# Patient Record
Sex: FEMALE | Race: WHITE | NOT HISPANIC OR LATINO | Employment: OTHER | URBAN - METROPOLITAN AREA
[De-identification: names, ages, dates, MRNs, and addresses within clinical notes are randomized per-mention and may not be internally consistent; named-entity substitution may affect disease eponyms.]

---

## 2017-05-19 ENCOUNTER — GENERIC CONVERSION - ENCOUNTER (OUTPATIENT)
Dept: OTHER | Facility: OTHER | Age: 82
End: 2017-05-19

## 2017-05-19 ENCOUNTER — APPOINTMENT (EMERGENCY)
Dept: RADIOLOGY | Facility: HOSPITAL | Age: 82
End: 2017-05-19
Payer: MEDICARE

## 2017-05-19 ENCOUNTER — HOSPITAL ENCOUNTER (OUTPATIENT)
Facility: HOSPITAL | Age: 82
Setting detail: OBSERVATION
Discharge: HOME | End: 2017-05-20
Attending: EMERGENCY MEDICINE | Admitting: INTERNAL MEDICINE
Payer: MEDICARE

## 2017-05-19 DIAGNOSIS — R06.02 SOB (SHORTNESS OF BREATH): ICD-10-CM

## 2017-05-19 DIAGNOSIS — I20.0 ANGINA PECTORIS, UNSTABLE (HCC): Primary | ICD-10-CM

## 2017-05-19 PROBLEM — R07.9 CHEST PAIN: Status: ACTIVE | Noted: 2017-05-19

## 2017-05-19 PROBLEM — I10 ESSENTIAL HYPERTENSION: Status: ACTIVE | Noted: 2017-05-19

## 2017-05-19 PROBLEM — K21.9 GERD (GASTROESOPHAGEAL REFLUX DISEASE): Status: ACTIVE | Noted: 2017-05-19

## 2017-05-19 PROBLEM — I25.10 CAD (CORONARY ARTERY DISEASE): Status: ACTIVE | Noted: 2017-05-19

## 2017-05-19 PROBLEM — E03.9 ACQUIRED HYPOTHYROIDISM: Status: ACTIVE | Noted: 2017-05-19

## 2017-05-19 LAB
ANION GAP SERPL CALCULATED.3IONS-SCNC: 9 MMOL/L (ref 4–13)
ATRIAL RATE: 63 BPM
BASOPHILS # BLD AUTO: 0.02 THOUSANDS/ΜL (ref 0–0.1)
BASOPHILS NFR BLD AUTO: 0 % (ref 0–1)
BUN SERPL-MCNC: 26 MG/DL (ref 5–25)
CALCIUM SERPL-MCNC: 8.9 MG/DL (ref 8.3–10.1)
CHLORIDE SERPL-SCNC: 109 MMOL/L (ref 100–108)
CO2 SERPL-SCNC: 26 MMOL/L (ref 21–32)
CREAT SERPL-MCNC: 0.92 MG/DL (ref 0.6–1.3)
EOSINOPHIL # BLD AUTO: 0.15 THOUSAND/ΜL (ref 0–0.61)
EOSINOPHIL NFR BLD AUTO: 2 % (ref 0–6)
ERYTHROCYTE [DISTWIDTH] IN BLOOD BY AUTOMATED COUNT: 12.2 % (ref 11.6–15.1)
GFR SERPL CREATININE-BSD FRML MDRD: 57.9 ML/MIN/1.73SQ M
GLUCOSE SERPL-MCNC: 95 MG/DL (ref 65–140)
HCT VFR BLD AUTO: 39.2 % (ref 34.8–46.1)
HGB BLD-MCNC: 13.4 G/DL (ref 11.5–15.4)
HOLD SPECIMEN: NORMAL
LYMPHOCYTES # BLD AUTO: 1.17 THOUSANDS/ΜL (ref 0.6–4.47)
LYMPHOCYTES NFR BLD AUTO: 15 % (ref 14–44)
MCH RBC QN AUTO: 33.4 PG (ref 26.8–34.3)
MCHC RBC AUTO-ENTMCNC: 34.2 G/DL (ref 31.4–37.4)
MCV RBC AUTO: 98 FL (ref 82–98)
MONOCYTES # BLD AUTO: 0.59 THOUSAND/ΜL (ref 0.17–1.22)
MONOCYTES NFR BLD AUTO: 8 % (ref 4–12)
NEUTROPHILS # BLD AUTO: 5.7 THOUSANDS/ΜL (ref 1.85–7.62)
NEUTS SEG NFR BLD AUTO: 75 % (ref 43–75)
NRBC BLD AUTO-RTO: 0 /100 WBCS
NT-PROBNP SERPL-MCNC: 600 PG/ML
P AXIS: 65 DEGREES
PLATELET # BLD AUTO: 150 THOUSANDS/UL (ref 149–390)
PLATELET # BLD AUTO: 170 THOUSANDS/UL (ref 149–390)
PMV BLD AUTO: 10.1 FL (ref 8.9–12.7)
PMV BLD AUTO: 10.4 FL (ref 8.9–12.7)
POTASSIUM SERPL-SCNC: 3.9 MMOL/L (ref 3.5–5.3)
PR INTERVAL: 170 MS
QRS AXIS: 68 DEGREES
QRSD INTERVAL: 74 MS
QT INTERVAL: 402 MS
QTC INTERVAL: 411 MS
RBC # BLD AUTO: 4.01 MILLION/UL (ref 3.81–5.12)
SODIUM SERPL-SCNC: 144 MMOL/L (ref 136–145)
SPECIMEN SOURCE: NORMAL
T WAVE AXIS: 55 DEGREES
TROPONIN I BLD-MCNC: 0 NG/ML (ref 0–0.08)
TROPONIN I SERPL-MCNC: <0.02 NG/ML
VENTRICULAR RATE: 63 BPM
WBC # BLD AUTO: 7.65 THOUSAND/UL (ref 4.31–10.16)

## 2017-05-19 PROCEDURE — 93005 ELECTROCARDIOGRAM TRACING: CPT | Performed by: EMERGENCY MEDICINE

## 2017-05-19 PROCEDURE — 71020 HB CHEST X-RAY 2VW FRONTAL&LATL: CPT

## 2017-05-19 PROCEDURE — 84484 ASSAY OF TROPONIN QUANT: CPT

## 2017-05-19 PROCEDURE — 36415 COLL VENOUS BLD VENIPUNCTURE: CPT | Performed by: EMERGENCY MEDICINE

## 2017-05-19 PROCEDURE — 85025 COMPLETE CBC W/AUTO DIFF WBC: CPT | Performed by: EMERGENCY MEDICINE

## 2017-05-19 PROCEDURE — 85049 AUTOMATED PLATELET COUNT: CPT | Performed by: INTERNAL MEDICINE

## 2017-05-19 PROCEDURE — 83880 ASSAY OF NATRIURETIC PEPTIDE: CPT | Performed by: EMERGENCY MEDICINE

## 2017-05-19 PROCEDURE — 99285 EMERGENCY DEPT VISIT HI MDM: CPT

## 2017-05-19 PROCEDURE — 84484 ASSAY OF TROPONIN QUANT: CPT | Performed by: INTERNAL MEDICINE

## 2017-05-19 PROCEDURE — 80048 BASIC METABOLIC PNL TOTAL CA: CPT | Performed by: EMERGENCY MEDICINE

## 2017-05-19 RX ORDER — PANTOPRAZOLE SODIUM 20 MG/1
20 TABLET, DELAYED RELEASE ORAL
Status: DISCONTINUED | OUTPATIENT
Start: 2017-05-19 | End: 2017-05-20 | Stop reason: HOSPADM

## 2017-05-19 RX ORDER — AMLODIPINE BESYLATE 5 MG/1
5 TABLET ORAL DAILY
Status: DISCONTINUED | OUTPATIENT
Start: 2017-05-19 | End: 2017-05-20 | Stop reason: HOSPADM

## 2017-05-19 RX ORDER — LEVOTHYROXINE SODIUM 0.1 MG/1
100 TABLET ORAL
Status: DISCONTINUED | OUTPATIENT
Start: 2017-05-19 | End: 2017-05-20 | Stop reason: HOSPADM

## 2017-05-19 RX ORDER — ATORVASTATIN CALCIUM 40 MG/1
40 TABLET, FILM COATED ORAL DAILY
COMMUNITY
End: 2018-06-05 | Stop reason: SDUPTHER

## 2017-05-19 RX ORDER — MELATONIN
1000 DAILY
COMMUNITY
End: 2018-06-05 | Stop reason: SDUPTHER

## 2017-05-19 RX ORDER — ASPIRIN 81 MG/1
81 TABLET, CHEWABLE ORAL DAILY
Status: DISCONTINUED | OUTPATIENT
Start: 2017-05-19 | End: 2017-05-20 | Stop reason: HOSPADM

## 2017-05-19 RX ORDER — HYDRALAZINE HYDROCHLORIDE 20 MG/ML
10 INJECTION INTRAMUSCULAR; INTRAVENOUS EVERY 6 HOURS PRN
Status: DISCONTINUED | OUTPATIENT
Start: 2017-05-19 | End: 2017-05-20 | Stop reason: HOSPADM

## 2017-05-19 RX ORDER — CARVEDILOL 12.5 MG/1
12.5 TABLET ORAL 2 TIMES DAILY WITH MEALS
Status: DISCONTINUED | OUTPATIENT
Start: 2017-05-19 | End: 2017-05-20 | Stop reason: HOSPADM

## 2017-05-19 RX ORDER — DIPHENHYDRAMINE HCL 25 MG
25 TABLET ORAL
Status: DISCONTINUED | OUTPATIENT
Start: 2017-05-19 | End: 2017-05-20 | Stop reason: HOSPADM

## 2017-05-19 RX ORDER — OMEPRAZOLE 20 MG/1
20 CAPSULE, DELAYED RELEASE ORAL DAILY
COMMUNITY
End: 2018-06-05 | Stop reason: SDUPTHER

## 2017-05-19 RX ORDER — LANOLIN ALCOHOL/MO/W.PET/CERES
1000 CREAM (GRAM) TOPICAL DAILY
COMMUNITY
End: 2019-08-16 | Stop reason: HOSPADM

## 2017-05-19 RX ORDER — CHOLECALCIFEROL (VITAMIN D3) 125 MCG
1000 CAPSULE ORAL DAILY
Status: DISCONTINUED | OUTPATIENT
Start: 2017-05-19 | End: 2017-05-20 | Stop reason: HOSPADM

## 2017-05-19 RX ORDER — LOSARTAN POTASSIUM 100 MG/1
100 TABLET ORAL DAILY
COMMUNITY
End: 2018-06-05 | Stop reason: SDUPTHER

## 2017-05-19 RX ORDER — ACETAMINOPHEN 325 MG/1
650 TABLET ORAL EVERY 4 HOURS PRN
Status: DISCONTINUED | OUTPATIENT
Start: 2017-05-19 | End: 2017-05-20 | Stop reason: HOSPADM

## 2017-05-19 RX ORDER — MELATONIN
1000 DAILY
Status: DISCONTINUED | OUTPATIENT
Start: 2017-05-19 | End: 2017-05-20 | Stop reason: HOSPADM

## 2017-05-19 RX ORDER — DIPHENHYDRAMINE HCL 25 MG
25 CAPSULE ORAL
COMMUNITY
End: 2018-06-01

## 2017-05-19 RX ORDER — ATORVASTATIN CALCIUM 40 MG/1
40 TABLET, FILM COATED ORAL DAILY
Status: DISCONTINUED | OUTPATIENT
Start: 2017-05-19 | End: 2017-05-20 | Stop reason: HOSPADM

## 2017-05-19 RX ORDER — ASPIRIN 81 MG/1
81 TABLET, CHEWABLE ORAL DAILY
COMMUNITY
End: 2018-06-05 | Stop reason: SDUPTHER

## 2017-05-19 RX ORDER — MEMANTINE HYDROCHLORIDE 10 MG/1
10 TABLET ORAL 2 TIMES DAILY
Status: DISCONTINUED | OUTPATIENT
Start: 2017-05-19 | End: 2017-05-20 | Stop reason: HOSPADM

## 2017-05-19 RX ORDER — LEVOTHYROXINE SODIUM 0.1 MG/1
100 TABLET ORAL DAILY
COMMUNITY
End: 2018-06-04 | Stop reason: SDUPTHER

## 2017-05-19 RX ORDER — MEMANTINE HYDROCHLORIDE 10 MG/1
10 TABLET ORAL 2 TIMES DAILY
COMMUNITY
End: 2018-06-05 | Stop reason: SDUPTHER

## 2017-05-19 RX ORDER — METOPROLOL SUCCINATE 50 MG/1
50 TABLET, EXTENDED RELEASE ORAL DAILY
Status: DISCONTINUED | OUTPATIENT
Start: 2017-05-19 | End: 2017-05-19

## 2017-05-19 RX ORDER — LOSARTAN POTASSIUM 50 MG/1
100 TABLET ORAL DAILY
Status: DISCONTINUED | OUTPATIENT
Start: 2017-05-19 | End: 2017-05-20 | Stop reason: HOSPADM

## 2017-05-19 RX ADMIN — LOSARTAN POTASSIUM 100 MG: 50 TABLET, FILM COATED ORAL at 13:09

## 2017-05-19 RX ADMIN — LEVOTHYROXINE SODIUM 100 MCG: 100 TABLET ORAL at 13:06

## 2017-05-19 RX ADMIN — PANTOPRAZOLE SODIUM 20 MG: 20 TABLET, DELAYED RELEASE ORAL at 12:25

## 2017-05-19 RX ADMIN — CARVEDILOL 12.5 MG: 12.5 TABLET, FILM COATED ORAL at 17:05

## 2017-05-19 RX ADMIN — ACETAMINOPHEN 650 MG: 325 TABLET, FILM COATED ORAL at 22:16

## 2017-05-19 RX ADMIN — CYANOCOBALAMIN TAB 500 MCG 1000 MCG: 500 TAB at 13:10

## 2017-05-19 RX ADMIN — MEMANTINE HYDROCHLORIDE 10 MG: 10 TABLET ORAL at 17:14

## 2017-05-19 RX ADMIN — VITAMIN D, TAB 1000IU (100/BT) 1000 UNITS: 25 TAB at 13:07

## 2017-05-19 RX ADMIN — ATORVASTATIN CALCIUM 40 MG: 40 TABLET, FILM COATED ORAL at 13:08

## 2017-05-19 RX ADMIN — AMLODIPINE BESYLATE 5 MG: 5 TABLET ORAL at 19:55

## 2017-05-19 RX ADMIN — ASPIRIN 81 MG 81 MG: 81 TABLET ORAL at 12:25

## 2017-05-19 RX ADMIN — MEMANTINE HYDROCHLORIDE 10 MG: 10 TABLET ORAL at 13:07

## 2017-05-19 RX ADMIN — ENOXAPARIN SODIUM 40 MG: 40 INJECTION SUBCUTANEOUS at 12:25

## 2017-05-19 RX ADMIN — METOPROLOL SUCCINATE 50 MG: 50 TABLET, EXTENDED RELEASE ORAL at 13:08

## 2017-05-20 ENCOUNTER — APPOINTMENT (OUTPATIENT)
Dept: NON INVASIVE DIAGNOSTICS | Facility: HOSPITAL | Age: 82
End: 2017-05-20
Payer: MEDICARE

## 2017-05-20 VITALS
HEART RATE: 56 BPM | OXYGEN SATURATION: 97 % | BODY MASS INDEX: 18.78 KG/M2 | WEIGHT: 110 LBS | SYSTOLIC BLOOD PRESSURE: 173 MMHG | RESPIRATION RATE: 18 BRPM | TEMPERATURE: 97.5 F | DIASTOLIC BLOOD PRESSURE: 72 MMHG | HEIGHT: 64 IN

## 2017-05-20 PROBLEM — R06.02 SOB (SHORTNESS OF BREATH): Status: RESOLVED | Noted: 2017-05-19 | Resolved: 2017-05-20

## 2017-05-20 LAB
CHOLEST SERPL-MCNC: 195 MG/DL (ref 50–200)
HDLC SERPL-MCNC: 87 MG/DL (ref 40–60)
LDLC SERPL CALC-MCNC: 80 MG/DL (ref 0–100)
TRIGL SERPL-MCNC: 139 MG/DL

## 2017-05-20 PROCEDURE — 80061 LIPID PANEL: CPT | Performed by: INTERNAL MEDICINE

## 2017-05-20 RX ORDER — CARVEDILOL 12.5 MG/1
12.5 TABLET ORAL 2 TIMES DAILY WITH MEALS
Qty: 60 TABLET | Refills: 0 | Status: SHIPPED | OUTPATIENT
Start: 2017-05-20 | End: 2017-10-04 | Stop reason: HOSPADM

## 2017-05-20 RX ORDER — AMLODIPINE BESYLATE 5 MG/1
5 TABLET ORAL DAILY
Qty: 30 TABLET | Refills: 0 | Status: SHIPPED | OUTPATIENT
Start: 2017-05-20 | End: 2019-08-28 | Stop reason: ALTCHOICE

## 2017-05-20 RX ADMIN — ATORVASTATIN CALCIUM 40 MG: 40 TABLET, FILM COATED ORAL at 09:21

## 2017-05-20 RX ADMIN — CARVEDILOL 12.5 MG: 12.5 TABLET, FILM COATED ORAL at 09:21

## 2017-05-20 RX ADMIN — VITAMIN D, TAB 1000IU (100/BT) 1000 UNITS: 25 TAB at 09:21

## 2017-05-20 RX ADMIN — LEVOTHYROXINE SODIUM 100 MCG: 100 TABLET ORAL at 06:08

## 2017-05-20 RX ADMIN — PANTOPRAZOLE SODIUM 20 MG: 20 TABLET, DELAYED RELEASE ORAL at 06:10

## 2017-05-20 RX ADMIN — LOSARTAN POTASSIUM 100 MG: 50 TABLET, FILM COATED ORAL at 09:21

## 2017-05-20 RX ADMIN — ASPIRIN 81 MG 81 MG: 81 TABLET ORAL at 09:21

## 2017-05-20 RX ADMIN — MEMANTINE HYDROCHLORIDE 10 MG: 10 TABLET ORAL at 09:22

## 2017-05-20 RX ADMIN — AMLODIPINE BESYLATE 5 MG: 5 TABLET ORAL at 09:21

## 2017-05-22 DIAGNOSIS — R26.9 ABNORMALITY OF GAIT AND MOBILITY: ICD-10-CM

## 2017-06-13 ENCOUNTER — GENERIC CONVERSION - ENCOUNTER (OUTPATIENT)
Dept: OTHER | Facility: OTHER | Age: 82
End: 2017-06-13

## 2017-06-13 ENCOUNTER — APPOINTMENT (OUTPATIENT)
Dept: PHYSICAL THERAPY | Facility: REHABILITATION | Age: 82
End: 2017-06-13
Payer: MEDICARE

## 2017-06-13 DIAGNOSIS — R26.9 ABNORMALITY OF GAIT AND MOBILITY: ICD-10-CM

## 2017-06-13 PROCEDURE — 97162 PT EVAL MOD COMPLEX 30 MIN: CPT

## 2017-06-13 PROCEDURE — 97112 NEUROMUSCULAR REEDUCATION: CPT

## 2017-06-13 PROCEDURE — G8978 MOBILITY CURRENT STATUS: HCPCS

## 2017-06-13 PROCEDURE — G8979 MOBILITY GOAL STATUS: HCPCS

## 2017-06-22 ENCOUNTER — APPOINTMENT (OUTPATIENT)
Dept: PHYSICAL THERAPY | Facility: REHABILITATION | Age: 82
End: 2017-06-22
Payer: MEDICARE

## 2017-06-22 PROCEDURE — 97112 NEUROMUSCULAR REEDUCATION: CPT

## 2017-06-22 PROCEDURE — 97110 THERAPEUTIC EXERCISES: CPT

## 2017-06-27 ENCOUNTER — APPOINTMENT (OUTPATIENT)
Dept: PHYSICAL THERAPY | Facility: REHABILITATION | Age: 82
End: 2017-06-27
Payer: MEDICARE

## 2017-06-27 PROCEDURE — 97140 MANUAL THERAPY 1/> REGIONS: CPT

## 2017-06-27 PROCEDURE — 97112 NEUROMUSCULAR REEDUCATION: CPT

## 2017-06-27 PROCEDURE — 97110 THERAPEUTIC EXERCISES: CPT

## 2017-06-29 ENCOUNTER — ALLSCRIPTS OFFICE VISIT (OUTPATIENT)
Dept: OTHER | Facility: OTHER | Age: 82
End: 2017-06-29

## 2017-06-29 ENCOUNTER — APPOINTMENT (OUTPATIENT)
Dept: PHYSICAL THERAPY | Facility: REHABILITATION | Age: 82
End: 2017-06-29
Payer: MEDICARE

## 2017-06-29 DIAGNOSIS — I25.10 ATHEROSCLEROTIC HEART DISEASE OF NATIVE CORONARY ARTERY WITHOUT ANGINA PECTORIS: ICD-10-CM

## 2017-06-29 PROCEDURE — 97112 NEUROMUSCULAR REEDUCATION: CPT

## 2017-06-29 PROCEDURE — 97110 THERAPEUTIC EXERCISES: CPT

## 2017-07-03 ENCOUNTER — ALLSCRIPTS OFFICE VISIT (OUTPATIENT)
Dept: OTHER | Facility: OTHER | Age: 82
End: 2017-07-03

## 2017-07-03 ENCOUNTER — APPOINTMENT (OUTPATIENT)
Dept: PHYSICAL THERAPY | Facility: REHABILITATION | Age: 82
End: 2017-07-03
Payer: MEDICARE

## 2017-07-05 ENCOUNTER — APPOINTMENT (OUTPATIENT)
Dept: PHYSICAL THERAPY | Facility: REHABILITATION | Age: 82
End: 2017-07-05
Payer: MEDICARE

## 2017-07-05 PROCEDURE — 97112 NEUROMUSCULAR REEDUCATION: CPT

## 2017-07-05 PROCEDURE — 97110 THERAPEUTIC EXERCISES: CPT

## 2017-07-06 ENCOUNTER — APPOINTMENT (OUTPATIENT)
Dept: PHYSICAL THERAPY | Facility: REHABILITATION | Age: 82
End: 2017-07-06
Payer: MEDICARE

## 2017-07-10 ENCOUNTER — APPOINTMENT (OUTPATIENT)
Dept: PHYSICAL THERAPY | Facility: REHABILITATION | Age: 82
End: 2017-07-10
Payer: MEDICARE

## 2017-07-11 ENCOUNTER — HOSPITAL ENCOUNTER (EMERGENCY)
Facility: HOSPITAL | Age: 82
Discharge: LEFT BEFORE DISCHARGE | End: 2017-07-11
Attending: EMERGENCY MEDICINE | Admitting: EMERGENCY MEDICINE
Payer: MEDICARE

## 2017-07-11 ENCOUNTER — APPOINTMENT (EMERGENCY)
Dept: RADIOLOGY | Facility: HOSPITAL | Age: 82
End: 2017-07-11
Payer: MEDICARE

## 2017-07-11 VITALS
OXYGEN SATURATION: 96 % | SYSTOLIC BLOOD PRESSURE: 159 MMHG | HEIGHT: 62 IN | HEART RATE: 60 BPM | BODY MASS INDEX: 22.08 KG/M2 | TEMPERATURE: 98.2 F | DIASTOLIC BLOOD PRESSURE: 75 MMHG | WEIGHT: 120 LBS | RESPIRATION RATE: 18 BRPM

## 2017-07-11 DIAGNOSIS — R19.7 DIARRHEA: Primary | ICD-10-CM

## 2017-07-11 LAB
ALBUMIN SERPL BCP-MCNC: 3 G/DL (ref 3.5–5)
ALP SERPL-CCNC: 87 U/L (ref 46–116)
ALT SERPL W P-5'-P-CCNC: 23 U/L (ref 12–78)
ANION GAP SERPL CALCULATED.3IONS-SCNC: 6 MMOL/L (ref 4–13)
AST SERPL W P-5'-P-CCNC: 21 U/L (ref 5–45)
ATRIAL RATE: 67 BPM
BASOPHILS # BLD AUTO: 0.01 THOUSANDS/ΜL (ref 0–0.1)
BASOPHILS NFR BLD AUTO: 0 % (ref 0–1)
BILIRUB SERPL-MCNC: 0.36 MG/DL (ref 0.2–1)
BUN SERPL-MCNC: 12 MG/DL (ref 5–25)
CALCIUM SERPL-MCNC: 8.4 MG/DL (ref 8.3–10.1)
CHLORIDE SERPL-SCNC: 109 MMOL/L (ref 100–108)
CO2 SERPL-SCNC: 26 MMOL/L (ref 21–32)
CREAT SERPL-MCNC: 0.95 MG/DL (ref 0.6–1.3)
EOSINOPHIL # BLD AUTO: 0.16 THOUSAND/ΜL (ref 0–0.61)
EOSINOPHIL NFR BLD AUTO: 3 % (ref 0–6)
ERYTHROCYTE [DISTWIDTH] IN BLOOD BY AUTOMATED COUNT: 12.3 % (ref 11.6–15.1)
GFR SERPL CREATININE-BSD FRML MDRD: 55.8 ML/MIN/1.73SQ M
GLUCOSE SERPL-MCNC: 92 MG/DL (ref 65–140)
HCT VFR BLD AUTO: 36.2 % (ref 34.8–46.1)
HGB BLD-MCNC: 12.5 G/DL (ref 11.5–15.4)
LIPASE SERPL-CCNC: 240 U/L (ref 73–393)
LYMPHOCYTES # BLD AUTO: 1.24 THOUSANDS/ΜL (ref 0.6–4.47)
LYMPHOCYTES NFR BLD AUTO: 20 % (ref 14–44)
MCH RBC QN AUTO: 33 PG (ref 26.8–34.3)
MCHC RBC AUTO-ENTMCNC: 34.5 G/DL (ref 31.4–37.4)
MCV RBC AUTO: 96 FL (ref 82–98)
MONOCYTES # BLD AUTO: 0.7 THOUSAND/ΜL (ref 0.17–1.22)
MONOCYTES NFR BLD AUTO: 11 % (ref 4–12)
NEUTROPHILS # BLD AUTO: 4.11 THOUSANDS/ΜL (ref 1.85–7.62)
NEUTS SEG NFR BLD AUTO: 66 % (ref 43–75)
NRBC BLD AUTO-RTO: 0 /100 WBCS
P AXIS: 105 DEGREES
PLATELET # BLD AUTO: 191 THOUSANDS/UL (ref 149–390)
PMV BLD AUTO: 10.2 FL (ref 8.9–12.7)
POTASSIUM SERPL-SCNC: 4.1 MMOL/L (ref 3.5–5.3)
PR INTERVAL: 178 MS
PROT SERPL-MCNC: 6.1 G/DL (ref 6.4–8.2)
QRS AXIS: 71 DEGREES
QRSD INTERVAL: 80 MS
QT INTERVAL: 390 MS
QTC INTERVAL: 412 MS
RBC # BLD AUTO: 3.79 MILLION/UL (ref 3.81–5.12)
SODIUM SERPL-SCNC: 141 MMOL/L (ref 136–145)
T WAVE AXIS: 98 DEGREES
VENTRICULAR RATE: 67 BPM
WBC # BLD AUTO: 6.23 THOUSAND/UL (ref 4.31–10.16)

## 2017-07-11 PROCEDURE — 93005 ELECTROCARDIOGRAM TRACING: CPT | Performed by: EMERGENCY MEDICINE

## 2017-07-11 PROCEDURE — 99284 EMERGENCY DEPT VISIT MOD MDM: CPT

## 2017-07-11 PROCEDURE — 36415 COLL VENOUS BLD VENIPUNCTURE: CPT

## 2017-07-11 PROCEDURE — 85025 COMPLETE CBC W/AUTO DIFF WBC: CPT | Performed by: EMERGENCY MEDICINE

## 2017-07-11 PROCEDURE — 74177 CT ABD & PELVIS W/CONTRAST: CPT

## 2017-07-11 PROCEDURE — 83690 ASSAY OF LIPASE: CPT | Performed by: EMERGENCY MEDICINE

## 2017-07-11 PROCEDURE — 80053 COMPREHEN METABOLIC PANEL: CPT | Performed by: EMERGENCY MEDICINE

## 2017-07-11 RX ADMIN — IOHEXOL 85 ML: 350 INJECTION, SOLUTION INTRAVENOUS at 15:22

## 2017-07-13 ENCOUNTER — APPOINTMENT (OUTPATIENT)
Dept: PHYSICAL THERAPY | Facility: REHABILITATION | Age: 82
End: 2017-07-13
Payer: MEDICARE

## 2017-07-13 PROCEDURE — 97112 NEUROMUSCULAR REEDUCATION: CPT

## 2017-07-13 PROCEDURE — 97110 THERAPEUTIC EXERCISES: CPT

## 2017-07-17 ENCOUNTER — APPOINTMENT (OUTPATIENT)
Dept: PHYSICAL THERAPY | Facility: REHABILITATION | Age: 82
End: 2017-07-17
Payer: MEDICARE

## 2017-07-17 PROCEDURE — 97110 THERAPEUTIC EXERCISES: CPT

## 2017-07-17 PROCEDURE — 97112 NEUROMUSCULAR REEDUCATION: CPT

## 2017-07-19 ENCOUNTER — HOSPITAL ENCOUNTER (OUTPATIENT)
Dept: NON INVASIVE DIAGNOSTICS | Facility: CLINIC | Age: 82
Discharge: HOME | End: 2017-07-19
Payer: MEDICARE

## 2017-07-19 DIAGNOSIS — I25.10 ATHEROSCLEROTIC HEART DISEASE OF NATIVE CORONARY ARTERY WITHOUT ANGINA PECTORIS: ICD-10-CM

## 2017-07-19 PROCEDURE — 93306 TTE W/DOPPLER COMPLETE: CPT

## 2017-07-20 ENCOUNTER — APPOINTMENT (OUTPATIENT)
Dept: PHYSICAL THERAPY | Facility: REHABILITATION | Age: 82
End: 2017-07-20
Payer: MEDICARE

## 2017-07-20 PROCEDURE — 97110 THERAPEUTIC EXERCISES: CPT

## 2017-07-20 PROCEDURE — 97112 NEUROMUSCULAR REEDUCATION: CPT

## 2017-07-25 ENCOUNTER — APPOINTMENT (OUTPATIENT)
Dept: PHYSICAL THERAPY | Facility: REHABILITATION | Age: 82
End: 2017-07-25
Payer: MEDICARE

## 2017-07-25 PROCEDURE — 97110 THERAPEUTIC EXERCISES: CPT

## 2017-07-27 ENCOUNTER — APPOINTMENT (OUTPATIENT)
Dept: PHYSICAL THERAPY | Facility: REHABILITATION | Age: 82
End: 2017-07-27
Payer: MEDICARE

## 2017-07-27 PROCEDURE — G8979 MOBILITY GOAL STATUS: HCPCS

## 2017-07-27 PROCEDURE — 97110 THERAPEUTIC EXERCISES: CPT

## 2017-07-27 PROCEDURE — 97112 NEUROMUSCULAR REEDUCATION: CPT

## 2017-07-27 PROCEDURE — G8978 MOBILITY CURRENT STATUS: HCPCS | Performed by: PHYSICAL THERAPIST

## 2017-08-08 ENCOUNTER — APPOINTMENT (OUTPATIENT)
Dept: PHYSICAL THERAPY | Facility: REHABILITATION | Age: 82
End: 2017-08-08
Payer: MEDICARE

## 2017-08-08 PROCEDURE — G8991 OTHER PT/OT GOAL STATUS: HCPCS

## 2017-08-08 PROCEDURE — G8990 OTHER PT/OT CURRENT STATUS: HCPCS

## 2017-08-08 PROCEDURE — 97110 THERAPEUTIC EXERCISES: CPT

## 2017-08-08 PROCEDURE — 97112 NEUROMUSCULAR REEDUCATION: CPT

## 2017-08-16 ENCOUNTER — APPOINTMENT (OUTPATIENT)
Dept: PHYSICAL THERAPY | Facility: REHABILITATION | Age: 82
End: 2017-08-16
Payer: MEDICARE

## 2017-08-16 PROCEDURE — 97112 NEUROMUSCULAR REEDUCATION: CPT

## 2017-08-16 PROCEDURE — 97110 THERAPEUTIC EXERCISES: CPT

## 2017-08-21 ENCOUNTER — APPOINTMENT (OUTPATIENT)
Dept: PHYSICAL THERAPY | Facility: REHABILITATION | Age: 82
End: 2017-08-21
Payer: MEDICARE

## 2017-08-21 PROCEDURE — 97110 THERAPEUTIC EXERCISES: CPT

## 2017-08-21 PROCEDURE — 97112 NEUROMUSCULAR REEDUCATION: CPT

## 2017-08-23 ENCOUNTER — APPOINTMENT (OUTPATIENT)
Dept: PHYSICAL THERAPY | Facility: REHABILITATION | Age: 82
End: 2017-08-23
Payer: MEDICARE

## 2017-08-23 PROCEDURE — 97112 NEUROMUSCULAR REEDUCATION: CPT

## 2017-08-23 PROCEDURE — 97110 THERAPEUTIC EXERCISES: CPT

## 2017-08-28 ENCOUNTER — APPOINTMENT (OUTPATIENT)
Dept: PHYSICAL THERAPY | Facility: REHABILITATION | Age: 82
End: 2017-08-28
Payer: MEDICARE

## 2017-08-28 PROCEDURE — 97112 NEUROMUSCULAR REEDUCATION: CPT

## 2017-08-28 PROCEDURE — 97110 THERAPEUTIC EXERCISES: CPT

## 2017-08-30 ENCOUNTER — APPOINTMENT (OUTPATIENT)
Dept: PHYSICAL THERAPY | Facility: REHABILITATION | Age: 82
End: 2017-08-30
Payer: MEDICARE

## 2017-08-30 PROCEDURE — 97110 THERAPEUTIC EXERCISES: CPT

## 2017-08-30 PROCEDURE — 97112 NEUROMUSCULAR REEDUCATION: CPT

## 2017-09-05 ENCOUNTER — APPOINTMENT (OUTPATIENT)
Dept: PHYSICAL THERAPY | Facility: REHABILITATION | Age: 82
End: 2017-09-05
Payer: MEDICARE

## 2017-09-07 ENCOUNTER — APPOINTMENT (OUTPATIENT)
Dept: PHYSICAL THERAPY | Facility: REHABILITATION | Age: 82
End: 2017-09-07
Payer: MEDICARE

## 2017-09-07 PROCEDURE — 97112 NEUROMUSCULAR REEDUCATION: CPT

## 2017-09-07 PROCEDURE — 97110 THERAPEUTIC EXERCISES: CPT

## 2017-09-12 ENCOUNTER — APPOINTMENT (OUTPATIENT)
Dept: PHYSICAL THERAPY | Facility: REHABILITATION | Age: 82
End: 2017-09-12
Payer: MEDICARE

## 2017-09-12 PROCEDURE — 97112 NEUROMUSCULAR REEDUCATION: CPT

## 2017-09-12 PROCEDURE — 97110 THERAPEUTIC EXERCISES: CPT

## 2017-09-14 ENCOUNTER — APPOINTMENT (OUTPATIENT)
Dept: PHYSICAL THERAPY | Facility: REHABILITATION | Age: 82
End: 2017-09-14
Payer: MEDICARE

## 2017-09-14 ENCOUNTER — GENERIC CONVERSION - ENCOUNTER (OUTPATIENT)
Dept: OTHER | Facility: OTHER | Age: 82
End: 2017-09-14

## 2017-09-14 PROCEDURE — G8979 MOBILITY GOAL STATUS: HCPCS

## 2017-09-14 PROCEDURE — 97110 THERAPEUTIC EXERCISES: CPT

## 2017-09-14 PROCEDURE — 97112 NEUROMUSCULAR REEDUCATION: CPT

## 2017-09-14 PROCEDURE — G8978 MOBILITY CURRENT STATUS: HCPCS

## 2017-09-21 ENCOUNTER — ALLSCRIPTS OFFICE VISIT (OUTPATIENT)
Dept: OTHER | Facility: OTHER | Age: 82
End: 2017-09-21

## 2017-10-02 ENCOUNTER — HOSPITAL ENCOUNTER (INPATIENT)
Facility: HOSPITAL | Age: 82
LOS: 1 days | Discharge: HOME | DRG: 641 | End: 2017-10-04
Attending: EMERGENCY MEDICINE | Admitting: INTERNAL MEDICINE
Payer: MEDICARE

## 2017-10-02 ENCOUNTER — APPOINTMENT (EMERGENCY)
Dept: RADIOLOGY | Facility: HOSPITAL | Age: 82
DRG: 641 | End: 2017-10-02
Payer: MEDICARE

## 2017-10-02 ENCOUNTER — GENERIC CONVERSION - ENCOUNTER (OUTPATIENT)
Dept: OTHER | Facility: OTHER | Age: 82
End: 2017-10-02

## 2017-10-02 DIAGNOSIS — R94.31 ECG ABNORMALITY: ICD-10-CM

## 2017-10-02 DIAGNOSIS — R82.998 URINE WBC INCREASED: ICD-10-CM

## 2017-10-02 DIAGNOSIS — R55 COLLAPSE: ICD-10-CM

## 2017-10-02 DIAGNOSIS — R63.0 DECREASED APPETITE: ICD-10-CM

## 2017-10-02 DIAGNOSIS — R63.4 UNINTENTIONAL WEIGHT LOSS: ICD-10-CM

## 2017-10-02 DIAGNOSIS — I63.9 ACUTE CEREBROVASCULAR ACCIDENT (CVA) (HCC): ICD-10-CM

## 2017-10-02 DIAGNOSIS — R53.1 WEAKNESS: Primary | ICD-10-CM

## 2017-10-02 PROBLEM — F03.90 DEMENTIA (HCC): Status: ACTIVE | Noted: 2017-10-02

## 2017-10-02 PROBLEM — R25.3 TWITCHING: Status: ACTIVE | Noted: 2017-10-02

## 2017-10-02 PROBLEM — Z86.73 HISTORY OF TIA (TRANSIENT ISCHEMIC ATTACK): Status: ACTIVE | Noted: 2017-10-02

## 2017-10-02 LAB
ALBUMIN SERPL BCP-MCNC: 3.7 G/DL (ref 3.5–5)
ALP SERPL-CCNC: 96 U/L (ref 46–116)
ALT SERPL W P-5'-P-CCNC: 22 U/L (ref 12–78)
ANION GAP SERPL CALCULATED.3IONS-SCNC: 8 MMOL/L (ref 4–13)
AST SERPL W P-5'-P-CCNC: 21 U/L (ref 5–45)
ATRIAL RATE: 63 BPM
BACTERIA UR QL AUTO: ABNORMAL /HPF
BASOPHILS # BLD AUTO: 0.01 THOUSANDS/ΜL (ref 0–0.1)
BASOPHILS NFR BLD AUTO: 0 % (ref 0–1)
BILIRUB SERPL-MCNC: 0.72 MG/DL (ref 0.2–1)
BILIRUB UR QL STRIP: ABNORMAL
BUN SERPL-MCNC: 15 MG/DL (ref 5–25)
CALCIUM SERPL-MCNC: 9.1 MG/DL (ref 8.3–10.1)
CHLORIDE SERPL-SCNC: 104 MMOL/L (ref 100–108)
CLARITY UR: CLEAR
CO2 SERPL-SCNC: 27 MMOL/L (ref 21–32)
COLOR UR: YELLOW
COLOR, POC: NORMAL
CREAT SERPL-MCNC: 0.92 MG/DL (ref 0.6–1.3)
EOSINOPHIL # BLD AUTO: 0.07 THOUSAND/ΜL (ref 0–0.61)
EOSINOPHIL NFR BLD AUTO: 1 % (ref 0–6)
ERYTHROCYTE [DISTWIDTH] IN BLOOD BY AUTOMATED COUNT: 12.7 % (ref 11.6–15.1)
GFR SERPL CREATININE-BSD FRML MDRD: 57 ML/MIN/1.73SQ M
GLUCOSE SERPL-MCNC: 91 MG/DL (ref 65–140)
GLUCOSE UR STRIP-MCNC: NEGATIVE MG/DL
HCT VFR BLD AUTO: 38.6 % (ref 34.8–46.1)
HGB BLD-MCNC: 12.9 G/DL (ref 11.5–15.4)
HGB UR QL STRIP.AUTO: ABNORMAL
HYALINE CASTS #/AREA URNS LPF: ABNORMAL /LPF
KETONES UR STRIP-MCNC: ABNORMAL MG/DL
LEUKOCYTE ESTERASE UR QL STRIP: ABNORMAL
LIPASE SERPL-CCNC: 180 U/L (ref 73–393)
LYMPHOCYTES # BLD AUTO: 1.37 THOUSANDS/ΜL (ref 0.6–4.47)
LYMPHOCYTES NFR BLD AUTO: 14 % (ref 14–44)
MCH RBC QN AUTO: 32.3 PG (ref 26.8–34.3)
MCHC RBC AUTO-ENTMCNC: 33.4 G/DL (ref 31.4–37.4)
MCV RBC AUTO: 97 FL (ref 82–98)
MONOCYTES # BLD AUTO: 0.6 THOUSAND/ΜL (ref 0.17–1.22)
MONOCYTES NFR BLD AUTO: 6 % (ref 4–12)
NEUTROPHILS # BLD AUTO: 7.77 THOUSANDS/ΜL (ref 1.85–7.62)
NEUTS SEG NFR BLD AUTO: 79 % (ref 43–75)
NITRITE UR QL STRIP: NEGATIVE
NON-SQ EPI CELLS URNS QL MICRO: ABNORMAL /HPF
NRBC BLD AUTO-RTO: 0 /100 WBCS
P AXIS: 75 DEGREES
PH UR STRIP.AUTO: 5.5 [PH] (ref 4.5–8)
PLATELET # BLD AUTO: 203 THOUSANDS/UL (ref 149–390)
PMV BLD AUTO: 10.6 FL (ref 8.9–12.7)
POTASSIUM SERPL-SCNC: 3.5 MMOL/L (ref 3.5–5.3)
PR INTERVAL: 176 MS
PROT SERPL-MCNC: 7.3 G/DL (ref 6.4–8.2)
PROT UR STRIP-MCNC: ABNORMAL MG/DL
QRS AXIS: 81 DEGREES
QRSD INTERVAL: 82 MS
QT INTERVAL: 428 MS
QTC INTERVAL: 437 MS
RBC # BLD AUTO: 4 MILLION/UL (ref 3.81–5.12)
RBC #/AREA URNS AUTO: ABNORMAL /HPF
SODIUM SERPL-SCNC: 139 MMOL/L (ref 136–145)
SP GR UR STRIP.AUTO: 1.02 (ref 1–1.03)
SPECIMEN SOURCE: NORMAL
T WAVE AXIS: 5 DEGREES
TROPONIN I BLD-MCNC: 0 NG/ML (ref 0–0.08)
TSH SERPL DL<=0.05 MIU/L-ACNC: 0.76 UIU/ML (ref 0.36–3.74)
UROBILINOGEN UR QL STRIP.AUTO: 0.2 E.U./DL
VENTRICULAR RATE: 63 BPM
WBC # BLD AUTO: 9.84 THOUSAND/UL (ref 4.31–10.16)
WBC #/AREA URNS AUTO: ABNORMAL /HPF

## 2017-10-02 PROCEDURE — 83690 ASSAY OF LIPASE: CPT | Performed by: EMERGENCY MEDICINE

## 2017-10-02 PROCEDURE — 80053 COMPREHEN METABOLIC PANEL: CPT | Performed by: EMERGENCY MEDICINE

## 2017-10-02 PROCEDURE — 84443 ASSAY THYROID STIM HORMONE: CPT | Performed by: EMERGENCY MEDICINE

## 2017-10-02 PROCEDURE — 81001 URINALYSIS AUTO W/SCOPE: CPT

## 2017-10-02 PROCEDURE — 87086 URINE CULTURE/COLONY COUNT: CPT

## 2017-10-02 PROCEDURE — 81002 URINALYSIS NONAUTO W/O SCOPE: CPT | Performed by: EMERGENCY MEDICINE

## 2017-10-02 PROCEDURE — 84484 ASSAY OF TROPONIN QUANT: CPT

## 2017-10-02 PROCEDURE — 96361 HYDRATE IV INFUSION ADD-ON: CPT

## 2017-10-02 PROCEDURE — 85025 COMPLETE CBC W/AUTO DIFF WBC: CPT | Performed by: EMERGENCY MEDICINE

## 2017-10-02 PROCEDURE — 70450 CT HEAD/BRAIN W/O DYE: CPT

## 2017-10-02 PROCEDURE — 71020 HB CHEST X-RAY 2VW FRONTAL&LATL: CPT

## 2017-10-02 PROCEDURE — 99285 EMERGENCY DEPT VISIT HI MDM: CPT

## 2017-10-02 PROCEDURE — 36415 COLL VENOUS BLD VENIPUNCTURE: CPT | Performed by: EMERGENCY MEDICINE

## 2017-10-02 PROCEDURE — 96360 HYDRATION IV INFUSION INIT: CPT

## 2017-10-02 PROCEDURE — 93005 ELECTROCARDIOGRAM TRACING: CPT | Performed by: EMERGENCY MEDICINE

## 2017-10-02 RX ORDER — ASPIRIN 81 MG/1
81 TABLET, CHEWABLE ORAL DAILY
Status: DISCONTINUED | OUTPATIENT
Start: 2017-10-03 | End: 2017-10-04 | Stop reason: HOSPADM

## 2017-10-02 RX ORDER — LEVOTHYROXINE SODIUM 0.1 MG/1
100 TABLET ORAL
Status: DISCONTINUED | OUTPATIENT
Start: 2017-10-03 | End: 2017-10-04 | Stop reason: HOSPADM

## 2017-10-02 RX ORDER — AMLODIPINE BESYLATE 5 MG/1
5 TABLET ORAL DAILY
Status: DISCONTINUED | OUTPATIENT
Start: 2017-10-03 | End: 2017-10-04 | Stop reason: HOSPADM

## 2017-10-02 RX ORDER — ATORVASTATIN CALCIUM 40 MG/1
40 TABLET, FILM COATED ORAL DAILY
Status: DISCONTINUED | OUTPATIENT
Start: 2017-10-03 | End: 2017-10-04 | Stop reason: HOSPADM

## 2017-10-02 RX ORDER — ATORVASTATIN CALCIUM 40 MG/1
40 TABLET, FILM COATED ORAL EVERY EVENING
COMMUNITY
Start: 2013-06-13 | End: 2017-10-02

## 2017-10-02 RX ORDER — CARVEDILOL 12.5 MG/1
12.5 TABLET ORAL 2 TIMES DAILY WITH MEALS
Status: DISCONTINUED | OUTPATIENT
Start: 2017-10-03 | End: 2017-10-03

## 2017-10-02 RX ORDER — CHOLECALCIFEROL (VITAMIN D3) 125 MCG
1000 CAPSULE ORAL DAILY
Status: DISCONTINUED | OUTPATIENT
Start: 2017-10-03 | End: 2017-10-04 | Stop reason: HOSPADM

## 2017-10-02 RX ORDER — MEMANTINE HYDROCHLORIDE 5 MG/1
10 TABLET ORAL 2 TIMES DAILY
Status: DISCONTINUED | OUTPATIENT
Start: 2017-10-03 | End: 2017-10-04 | Stop reason: HOSPADM

## 2017-10-02 RX ORDER — MELATONIN
1000 DAILY
Status: DISCONTINUED | OUTPATIENT
Start: 2017-10-03 | End: 2017-10-04 | Stop reason: HOSPADM

## 2017-10-02 RX ORDER — DIPHENHYDRAMINE HCL 25 MG
25 TABLET ORAL
Status: DISCONTINUED | OUTPATIENT
Start: 2017-10-02 | End: 2017-10-04 | Stop reason: HOSPADM

## 2017-10-02 RX ORDER — LOSARTAN POTASSIUM 50 MG/1
100 TABLET ORAL DAILY
Status: DISCONTINUED | OUTPATIENT
Start: 2017-10-03 | End: 2017-10-04 | Stop reason: HOSPADM

## 2017-10-02 RX ORDER — PANTOPRAZOLE SODIUM 40 MG/1
40 TABLET, DELAYED RELEASE ORAL
Status: DISCONTINUED | OUTPATIENT
Start: 2017-10-03 | End: 2017-10-04 | Stop reason: HOSPADM

## 2017-10-02 RX ADMIN — SODIUM CHLORIDE 1000 ML: 0.9 INJECTION, SOLUTION INTRAVENOUS at 14:42

## 2017-10-02 NOTE — ED PROVIDER NOTES
History  Chief Complaint   Patient presents with    Fatigue     Pt c/o weakness and shaking for 3 days     81 yo F who presents to ED with c/o intermittent episodes of "shaking" x 3 days and b/l LE weakness  Pt reports intermittent upper extremity tremors lasting a few seconds that are not associated with movement  Pt denies any trauma/injury, HA, visual changes, chest pain, dyspnea, cough/cold symptoms, fever/chills, abdominal pain, V/D, urinary symptoms or focal neuro deficits  Per , pt was at [employer] on Sunday when she had an episode in which she became weak and collapsed with no reported head injury/LOC  Pt has been in physical therapy all summer for b/l LE weakness  Pt has poor appetite at baseline with reports of unintentional weight loss  Pt has PMH of HTN, HLD, CAD s/p MI, hypothyroidism, anxiety and GERD, PSH of appendectomy  Pt is a nonsmoker, denies any ETOH or recreational drug use  No recent travel or known sick contacts  No recent medication changes  No interventions prior to arrival, no other complaints at this time  Prior to Admission Medications   Prescriptions Last Dose Informant Patient Reported? Taking?    amLODIPine (NORVASC) 5 mg tablet   No No   Sig: Take 1 tablet by mouth daily for 30 days   aspirin 81 mg chewable tablet   Yes No   Sig: Chew 81 mg daily   atorvastatin (LIPITOR) 40 mg tablet   Yes No   Sig: Take 40 mg by mouth daily   carvedilol (COREG) 12 5 mg tablet   No No   Sig: Take 1 tablet by mouth 2 (two) times a day with meals for 30 days   cholecalciferol (VITAMIN D3) 1,000 units tablet   Yes No   Sig: Take 1,000 Units by mouth daily   cyanocobalamin (VITAMIN B-12) 1,000 mcg tablet   Yes No   Sig: Take 1,000 mcg by mouth daily   diphenhydrAMINE (BENADRYL ALLERGY) 25 mg capsule   Yes No   Sig: Take 25 mg by mouth daily at bedtime as needed for itching   levothyroxine 100 mcg tablet   Yes No   Sig: Take 100 mcg by mouth daily   losartan (COZAAR) 100 MG tablet   Yes No Sig: Take 100 mg by mouth daily   memantine (NAMENDA) 10 mg tablet   Yes No   Sig: Take 10 mg by mouth 2 (two) times a day   omeprazole (PriLOSEC) 20 mg delayed release capsule   Yes No   Sig: Take 20 mg by mouth daily      Facility-Administered Medications: None       Past Medical History:   Diagnosis Date    Disease of thyroid gland     GERD (gastroesophageal reflux disease)     Hyperlipidemia     Hypertension     MI (myocardial infarction)        Past Surgical History:   Procedure Laterality Date    APPENDECTOMY         History reviewed  No pertinent family history  I have reviewed and agree with the history as documented  Social History   Substance Use Topics    Smoking status: Never Smoker    Smokeless tobacco: Never Used    Alcohol use Yes        Review of Systems   Constitutional: Positive for appetite change and unexpected weight change  Negative for chills, fatigue and fever  HENT: Negative for congestion, rhinorrhea and sore throat  Eyes: Negative for pain, redness and visual disturbance  Respiratory: Negative for cough, chest tightness, shortness of breath, wheezing and stridor  Cardiovascular: Negative for chest pain, palpitations and leg swelling  Gastrointestinal: Negative for abdominal pain, blood in stool, constipation, diarrhea, nausea and vomiting  Genitourinary: Negative for dysuria, frequency, hematuria and urgency  Musculoskeletal: Negative for back pain and neck pain  Skin: Negative for pallor and rash  Neurological: Positive for tremors and weakness  Negative for dizziness, seizures, syncope, light-headedness and headaches  Episode in which she became weak and collapsed on Sunday while at [employer]   Psychiatric/Behavioral: Negative for agitation and confusion         Physical Exam  ED Triage Vitals   Temperature Pulse Respirations Blood Pressure SpO2   10/02/17 1130 10/02/17 1130 10/02/17 1130 10/02/17 1130 10/02/17 1145   97 5 °F (36 4 °C) (!) 51 16 153/70 99 %      Temp Source Heart Rate Source Patient Position - Orthostatic VS BP Location FiO2 (%)   10/02/17 1130 10/02/17 1130 10/02/17 1145 10/02/17 1430 --   Tympanic Monitor Lying Right arm       Pain Score       10/02/17 1130       No Pain           Physical Exam   Constitutional: She is oriented to person, place, and time  She appears well-developed and well-nourished  No distress  HENT:   Head: Normocephalic and atraumatic  Mouth/Throat: Oropharynx is clear and moist  No oropharyngeal exudate  Eyes: Conjunctivae and EOM are normal  Pupils are equal, round, and reactive to light  Right eye exhibits no discharge  Left eye exhibits no discharge  Neck: Normal range of motion  Neck supple  No JVD present  No tracheal deviation present  Cardiovascular: Normal rate, regular rhythm, normal heart sounds and intact distal pulses  Exam reveals no gallop and no friction rub  No murmur heard  Pulmonary/Chest: Effort normal and breath sounds normal  No stridor  No respiratory distress  She has no wheezes  She has no rales  Abdominal: Soft  Bowel sounds are normal  She exhibits no distension  There is no tenderness  There is no rebound and no guarding  Musculoskeletal: Normal range of motion  She exhibits no edema, tenderness or deformity  Neurological: She is alert and oriented to person, place, and time  No cranial nerve deficit  4+/5 x 4, intact sensation, no facial droop/dysarthria   Skin: Skin is warm and dry  No rash noted  She is not diaphoretic  Psychiatric: She has a normal mood and affect  Nursing note and vitals reviewed        ED Medications  Medications   amLODIPine (NORVASC) tablet 5 mg (5 mg Oral Given 10/3/17 0851)   aspirin chewable tablet 81 mg (81 mg Oral Given 10/3/17 0851)   atorvastatin (LIPITOR) tablet 40 mg (40 mg Oral Given 10/3/17 0851)   cholecalciferol (VITAMIN D3) tablet 1,000 Units (1,000 Units Oral Given 10/3/17 0851)   cyanocobalamin (VITAMIN B-12) tablet 1,000 mcg (1,000 mcg Oral Given 10/3/17 0851)   diphenhydrAMINE (BENADRYL) tablet 25 mg (not administered)   levothyroxine tablet 100 mcg (100 mcg Oral Given 10/3/17 0523)   losartan (COZAAR) tablet 100 mg (100 mg Oral Given 10/3/17 0851)   memantine (NAMENDA) tablet 10 mg (10 mg Oral Given 10/3/17 1713)   pantoprazole (PROTONIX) EC tablet 40 mg (40 mg Oral Given 10/3/17 0523)   enoxaparin (LOVENOX) subcutaneous injection 40 mg (40 mg Subcutaneous Given 10/3/17 0850)   carvedilol (COREG) tablet 6 25 mg (6 25 mg Oral Given 10/3/17 1713)   sodium chloride 0 9 % bolus 1,000 mL (0 mL Intravenous Stopped 10/2/17 1642)   potassium chloride (K-DUR,KLOR-CON) CR tablet 40 mEq (40 mEq Oral Given 10/3/17 1331)   magnesium sulfate 2 g/50 mL IVPB (premix) 2 g (2 g Intravenous New Bag 10/3/17 1445)   potassium chloride (K-DUR,KLOR-CON) CR tablet 20 mEq (20 mEq Oral Given 10/3/17 1713)       Diagnostic Studies  Labs Reviewed   CBC AND DIFFERENTIAL - Abnormal        Result Value Ref Range Status    Neutrophils Relative 79 (*) 43 - 75 % Final    Neutrophils Absolute 7 77 (*) 1 85 - 7 62 Thousands/µL Final    WBC 9 84  4 31 - 10 16 Thousand/uL Final    RBC 4 00  3 81 - 5 12 Million/uL Final    Hemoglobin 12 9  11 5 - 15 4 g/dL Final    Hematocrit 38 6  34 8 - 46 1 % Final    MCV 97  82 - 98 fL Final    MCH 32 3  26 8 - 34 3 pg Final    MCHC 33 4  31 4 - 37 4 g/dL Final    RDW 12 7  11 6 - 15 1 % Final    MPV 10 6  8 9 - 12 7 fL Final    Platelets 332  823 - 390 Thousands/uL Final    nRBC 0  /100 WBCs Final    Lymphocytes Relative 14  14 - 44 % Final    Monocytes Relative 6  4 - 12 % Final    Eosinophils Relative 1  0 - 6 % Final    Basophils Relative 0  0 - 1 % Final    Lymphocytes Absolute 1 37  0 60 - 4 47 Thousands/µL Final    Monocytes Absolute 0 60  0 17 - 1 22 Thousand/µL Final    Eosinophils Absolute 0 07  0 00 - 0 61 Thousand/µL Final    Basophils Absolute 0 01  0 00 - 0 10 Thousands/µL Final   URINE MICROSCOPIC - Abnormal     WBC, UA 10-20 (*) None Seen, 0-5, 5-55, 5-65 /hpf Final    RBC, UA None Seen  None Seen, 0-5 /hpf Final    Epithelial Cells None Seen  None Seen, Occasional /hpf Final    Bacteria, UA None Seen  None Seen, Occasional /hpf Final    Hyaline Casts, UA None Seen  None Seen /lpf Final   ED URINE MACROSCOPIC - Abnormal     Leukocytes, UA Small (*) Negative Final    Protein, UA 30 (1+) (*) Negative mg/dl Final    Ketones, UA Trace (*) Negative mg/dl Final    Bilirubin, UA Interference- unable to analyze (*) Negative Final    Comment: The dipstick result may be falsely positive do to interfering substances  We recommend reliance upon serum bilirubin, liver & kidney function tests to guide patient care if clinically indicated  Blood, UA Trace (*) Negative Final    Color, UA Yellow   Final    Clarity, UA Clear   Final    pH, UA 5 5  4 5 - 8 0 Final    Nitrite, UA Negative  Negative Final    Glucose, UA Negative  Negative mg/dl Final    Urobilinogen, UA 0 2  0 2, 1 0 E U /dl E U /dl Final    Specific Gravity, UA 1 025  1 003 - 1 030 Final    Narrative:     CLINITEK RESULT   LIPASE - Normal    Lipase 180  73 - 393 u/L Final   TSH, 3RD GENERATION WITH T4 REFLEX - Normal    TSH 3RD GENERATON 0 760  0 358 - 3 740 uIU/mL Final    Narrative:     Patients undergoing fluorescein dye angiography may retain small amounts of fluorescein in the body for 48-72 hours post procedure  Samples containing fluorescein can produce falsely depressed TSH values  If the patient had this procedure,a specimen should be resubmitted post fluorescein clearance            The recommended reference ranges for TSH during pregnancy are as follows:  First trimester 0 1 to 2 5 uIU/mL  Second trimester  0 2 to 3 0 uIU/mL  Third trimester 0 3 to 3 0 uIU/m     POCT URINALYSIS DIPSTICK - Normal    Color, UA see chart   Final   POCT TROPONIN - Normal    POC Troponin I 0 00  0 00 - 0 08 ng/ml Final    Specimen Type VENOUS   Final    Narrative:     [employer] i-Stat handheld analyzer 99% cutoff is > 0 08ng/mL in [facility] Emergency Departments    o cTnI 99% cutoff is useful only when applied to patients in the clinical setting of myocardial ischemia  o cTnI 99% cutoff should be interpreted in the context of clinical history, ECG findings and possibly cardiac imaging to establish correct diagnosis  o cTnI 99% cutoff may be suggestive but clearly not indicative of a coronary event without the clinical setting of myocardial ischemia  COMPREHENSIVE METABOLIC PANEL    Sodium 806  136 - 145 mmol/L Final    Potassium 3 5  3 5 - 5 3 mmol/L Final    Chloride 104  100 - 108 mmol/L Final    CO2 27  21 - 32 mmol/L Final    Anion Gap 8  4 - 13 mmol/L Final    BUN 15  5 - 25 mg/dL Final    Creatinine 0 92  0 60 - 1 30 mg/dL Final    Comment: Standardized to IDMS reference method    Glucose 91  65 - 140 mg/dL Final    Comment:   If the patient is fasting, the ADA then defines impaired fasting glucose as > 100 mg/dL and diabetes as > or equal to 123 mg/dL  Specimen collection should occur prior to Sulfasalazine administration due to the potential for falsely depressed results  Specimen collection should occur prior to Sulfapyridine administration due to the potential for falsely elevated results  Calcium 9 1  8 3 - 10 1 mg/dL Final    AST 21  5 - 45 U/L Final    Comment:   Specimen collection should occur prior to Sulfasalazine administration due to the potential for falsely depressed results  ALT 22  12 - 78 U/L Final    Comment:   Specimen collection should occur prior to Sulfasalazine and/or Sulfapyridine administration due to the potential for falsely depressed results       Alkaline Phosphatase 96  46 - 116 U/L Final    Total Protein 7 3  6 4 - 8 2 g/dL Final    Albumin 3 7  3 5 - 5 0 g/dL Final    Total Bilirubin 0 72  0 20 - 1 00 mg/dL Final    eGFR 57  ml/min/1 73sq m Final    Narrative:     National Kidney Disease Education Program recommendations are as follows:  GFR calculation is accurate only with a steady state creatinine  Chronic Kidney disease less than 60 ml/min/1 73 sq  meters  Kidney failure less than 15 ml/min/1 73 sq  meters  XR chest 2 views   Final Result      No active pulmonary disease            Workstation performed: [id number]         CT head without contrast   Final Result      No acute intracranial hemorrhage seen   Mild cerebral atrophy   No mass effect or midline shift seen         Workstation performed: [id number]             Procedures  Procedures     ECG 10/02/17 1302  Performed in ED   Reviewed by Lavenia Sacks, Adra Milch  Compared to previous: changes noted  Abnormal ECG  Rate: Normal  Rhythm: Sinus rhythm  Ectopy: infrequent PACs  Axis: Normal  QRS width: Normal  No ST elevation/depression  T wave: inversion in leads aVR, V1, II, III, aVF, V4,V5,V6      Phone Consults  ED Phone Contact    ED Course  ED Course          HEART Risk Score    Flowsheet Row Most Recent Value   History  1 Filed at: 10/02/2017 1729   ECG  1 Filed at: 10/02/2017 1729   Age  2 Filed at: 10/02/2017 1729   Risk Factors  2 Filed at: 10/02/2017 1729   Troponin  0 Filed at: 10/02/2017 1729   Heart Score Risk Calculator   History  1 Filed at: 10/02/2017 1729   ECG  1 Filed at: 10/02/2017 1729   Age  2 Filed at: 10/02/2017 1729   Risk Factors  2 Filed at: 10/02/2017 1729   Troponin  0 Filed at: 10/02/2017 1729   HEART Score  6 Filed at: 10/02/2017 1729   HEART Score  6 Filed at: 10/02/2017 1729          NIH Stroke Scale    Flowsheet Row Most Recent Value   Level of Consciousness (1a )  0 Filed at: 10/03/2017 0000   LOC Questions (1b )  0 Filed at: 10/03/2017 0000   LOC Commands (1c )  0 Filed at: 10/03/2017 0000   Best Gaze (2 )  0 Filed at: 10/03/2017 0000   Visual (3 )  1 [left eye, decreased vision] Filed at: 10/03/2017 0000   Facial Palsy (4 )  0 Filed at: 10/03/2017 0000   Motor Arm, Left (5a )  0 Filed at: 10/03/2017 0000   Motor Arm, Right (5b )  0 Filed at: 10/03/2017 0000   Motor Leg, Left (6a )  0 Filed at: 10/03/2017 0000   Motor Leg, Right (6b )  0 Filed at: 10/03/2017 0000   Limb Ataxia (7 )  0 Filed at: 10/03/2017 0000   Sensory (8 )  0 Filed at: 10/03/2017 0000   Best Language (9 )  0 Filed at: 10/03/2017 0000   Dysarthria (10 )  0 Filed at: 10/03/2017 0000   Extinction and Inattention (11 ) (Formerly Neglect)  0 Filed at: 10/03/2017 0000   Total  1 Filed at: 10/03/2017 0000                 Unknown etiology of LE weakness/collapse while at [employer] on Sunday - pt with abnormal UA but no symptoms of UTI (will wait for cx), CT head negative and normal neuro exam (will evaluate for TIA), also pt with new ischemic t wave findings on ECG but normal troponin so potential for underlying ACS  Patient and  updated on results of tests and plan of care including admission to hospital for further evaluation of presenting complaint with understanding verbalized  Report to Dr Yuri Lock with [facility] for continuation of patient care  [facility]  [facility]    Disposition  Final diagnoses:   Weakness   Collapse   ECG abnormality   Urine WBC increased   Decreased appetite   Unintentional weight loss     ED Disposition     ED Disposition Condition Comment    Admit  Case was discussed with [facility] and the patient's admission status was agreed to be Admission Status: observation status to the service of Dr Munoz  Follow-up Information    None         No discharge procedures on file  ED Provider  Attending physically available and evaluated Jessica Gallardo I managed the patient along with the ED Attending      Electronically Signed by       Kelly Mccord DO  Resident  10/04/17 Jean Burris DO  Resident  10/04/17 [id number]

## 2017-10-02 NOTE — H&P
History and Physical - [facility] Internal Medicine    Patient Information: Akilah Yeh 80 y o  female MRN: [id number]  Unit/Bed#: ED 06 Encounter: [id number]  Admitting Physician: Carlota Mercado DO  PCP: Lety Sylvester MD  Date of Admission:  10/02/17    Assessment/Plan:    Hospital Problem List:     Principal Problem:    Weakness  Active Problems:    Acquired hypothyroidism    Essential hypertension    CAD (coronary artery disease)    GERD (gastroesophageal reflux disease)    Dementia    History of TIA (transient ischemic attack)    Twitching    Abnormal EKG      Plan for the Primary Problem(s):  · LLE weakness  · Pt will be placed on observation to rule out CVA/TIA  Due to the transient nature of this episode, and her risk factors including HTN and HL and past probable TIAs, suspect this may be a TIA  CTHead WNL  Ordered MRI brain  Consulted neuro for further recs  Pt may benefit from being switched to plavix  Pt already on Lipitor  Will check lipid panel  Defer to neuro if further testing indicated  If not TIA, possible dehydration - this was tx with NS boluses in ER  However, this is unlikely as her BUN was normal and pt says she had LLE weakness only and no dizziness  Plan for Additional Problems:   · Abnormal EKG: EKG showed flattened T waves in leads II and III  However, the pt denies CP or SOB, and has a negative trop  Will observe on tele  · CAD:   Stable  C/w Coreg, ASA, and statin  Pt had normal echo in 2017  · HTN: ostable on Coreg and Norvasc and ARB  · Hypothyroid: stable on Synthroid  · Dementia: stable on Namenda  · GERD: stable on PPI   · Pyuria: urine sent for cx  However, doubt UTI as pt has no dysuria or fever or leukocytosis    VTE Prophylaxis: Enoxaparin (Lovenox)  / sequential compression device   Code Status: Full  POLST: POLST form is not discussed and not completed at this time      Anticipated Length of Stay:  Patient will be admitted on an Observation basis with an anticipated length of stay of  Less than 2 midnights  Justification for Hospital Stay: rule out acute CVA    Total Time for Visit, including Counseling / Coordination of Care: 1 hour  Greater than 50% of this total time spent on direct patient counseling and coordination of care  Chief Complaint:   Left leg weakness    History of Present Illness:    Byron Montana is a 80 y o  female with hx of TIA and CAD who presents with an episode of left lower extremity weakness yesterday after [place]  Patient said that at [facility] she is mobile and exercises regularly  Yesterday, while leaving [employer], she said her LLE felt weak and she fell to the ground  Pt otherwise denied dizziness, CP, SOB, n/v/d  Pt said she had a slightly decreased appetite for dinner yesterday but ate her regular breakfast this AM   Pt does als admit to a brief episode of hand twitching 2-3 days ago but that has not occurred since  In the ER, pt says she has been afraid to walk after the episode yesterday, but otherwise feels fine  PT says she has had TIAs in the past where she has brief episodes of speech slurring  Review of Systems:    Review of Systems   HENT: Negative  Eyes: Negative  Respiratory: Negative  Cardiovascular: Negative  Gastrointestinal: Negative  Endocrine: Negative  Genitourinary: Negative  Musculoskeletal: Negative  Skin: Negative  Allergic/Immunologic: Negative  Neurological: Positive for weakness  Negative for dizziness and syncope  Hematological: Negative  Psychiatric/Behavioral: Negative          Past Medical and Surgical History:     Past Medical History:   Diagnosis Date    Disease of thyroid gland     GERD (gastroesophageal reflux disease)     Hyperlipidemia     Hypertension     MI (myocardial infarction)        Past Surgical History:   Procedure Laterality Date    APPENDECTOMY         Meds/Allergies:    Prior to Admission medications    Medication Sig Start Date End Date Taking? Authorizing Provider   atorvastatin (LIPITOR) 40 mg tablet Take 40 mg by mouth every evening 6/13/13 10/2/17 Yes Historical Provider, MD   amLODIPine (NORVASC) 5 mg tablet Take 1 tablet by mouth daily for 30 days 5/20/17 7/11/17  LEE Russo   aspirin 81 mg chewable tablet Chew 81 mg daily    Historical Provider, MD   atorvastatin (LIPITOR) 40 mg tablet Take 40 mg by mouth daily    Historical Provider, MD   carvedilol (COREG) 12 5 mg tablet Take 1 tablet by mouth 2 (two) times a day with meals for 30 days 5/20/17 7/11/17  LEE Russo   cholecalciferol (VITAMIN D3) 1,000 units tablet Take 1,000 Units by mouth daily    Historical Provider, MD   cyanocobalamin (VITAMIN B-12) 1,000 mcg tablet Take 1,000 mcg by mouth daily    Historical Provider, MD   diphenhydrAMINE (BENADRYL ALLERGY) 25 mg capsule Take 25 mg by mouth daily at bedtime as needed for itching    Historical Provider, MD   levothyroxine 100 mcg tablet Take 100 mcg by mouth daily    Historical Provider, MD   losartan (COZAAR) 100 MG tablet Take 100 mg by mouth daily    Historical Provider, MD   memantine (NAMENDA) 10 mg tablet Take 10 mg by mouth 2 (two) times a day    Historical Provider, MD   omeprazole (PriLOSEC) 20 mg delayed release capsule Take 20 mg by mouth daily    Historical Provider, MD     I have reviewed home medications using allscripts  Allergies: Allergies   Allergen Reactions    Penicillins        Social History:     Marital Status: /Civil Union     Substance Use History:   History   Alcohol Use    Yes     History   Smoking Status    Never Smoker   Smokeless Tobacco    Never Used     History   Drug Use No       Family History:    History reviewed  No pertinent family history      Physical Exam:     Vitals:   Blood Pressure: 164/65 (10/02/17 1815)  Pulse: 68 (10/02/17 1815)  Temperature: 97 5 °F (36 4 °C) (10/02/17 1130)  Temp Source: Tympanic (10/02/17 1130)  Respirations: 22 (10/02/17 1815)  Height: 5' 2" (157 5 cm) (10/02/17 1130)  Weight - Scale: 50 8 kg (112 lb) (10/02/17 1130)  SpO2: 97 % (10/02/17 1815)    Physical Exam   Constitutional: She is oriented to person, place, and time  She appears well-developed and well-nourished  HENT:   Head: Normocephalic and atraumatic  Eyes: Conjunctivae and EOM are normal    Neck: Normal range of motion  Neck supple  Cardiovascular: Normal rate and regular rhythm  Pulmonary/Chest: Effort normal and breath sounds normal  She has no wheezes  She has no rales  Abdominal: Soft  Bowel sounds are normal  She exhibits no distension  There is no tenderness  Musculoskeletal: Normal range of motion  She exhibits no edema  Neurological: She is alert and oriented to person, place, and time  No cranial nerve deficit  She exhibits normal muscle tone  Coordination normal    Skin: Skin is warm and dry        )    Additional Data:     Lab Results: I have personally reviewed pertinent reports  Results from last 7 days  Lab Units 10/02/17  1323   WBC Thousand/uL 9 84   HEMOGLOBIN g/dL 12 9   HEMATOCRIT % 38 6   PLATELETS Thousands/uL 203   NEUTROS PCT % 79*   LYMPHS PCT % 14   MONOS PCT % 6   EOS PCT % 1       Results from last 7 days  Lab Units 10/02/17  1323   SODIUM mmol/L 139   POTASSIUM mmol/L 3 5   CHLORIDE mmol/L 104   CO2 mmol/L 27   BUN mg/dL 15   CREATININE mg/dL 0 92   CALCIUM mg/dL 9 1   TOTAL PROTEIN g/dL 7 3   BILIRUBIN TOTAL mg/dL 0 72   ALK PHOS U/L 96   ALT U/L 22   AST U/L 21   GLUCOSE RANDOM mg/dL 91           Imaging: I have personally reviewed pertinent reports  Xr Chest 2 Views    Result Date: 10/2/2017  Narrative: CHEST - DUAL ENERGY INDICATION:  Weakness  Collapse  COMPARISON:  May 19, 2017 VIEWS:  PA (including soft tissue/bone algorithms) and lateral projections IMAGES:  4 FINDINGS:     Cardiomediastinal silhouette appears unremarkable  Minor biapical pleural-parenchymal scarring is noted  The lungs are clear    No pneumothorax or pleural effusion  Visualized osseous structures appear within normal limits for the patient's age  Impression: No active pulmonary disease  Workstation performed: [id number]     Ct Head Without Contrast    Result Date: 10/2/2017  Narrative: CT BRAIN - WITHOUT CONTRAST INDICATION:  Status post collapse, evaluate for subdural hematoma COMPARISON:  September 11, 2014 TECHNIQUE:  CT examination of the brain was performed  In addition to axial images, coronal reformatted images were created and submitted for interpretation  Radiation dose length product (DLP) for this visit:  1036 mGy-cm   This examination, like all CT scans performed in the [facility], was performed utilizing techniques to minimize radiation dose exposure, including the use of iterative reconstruction and automated exposure control  IMAGE QUALITY:  Diagnostic  FINDINGS:  PARENCHYMA:  No intracranial mass, mass effect or midline shift  No CT signs of acute infarction  There is no parenchymal hemorrhage  Mild periventricular and white matter hypodensities are seen likely due to chronic small vessel ischemic changes VENTRICLES AND EXTRA-AXIAL SPACES:  Prominence of the extra-axial spaces in the frontal region suggests cerebral atrophic VISUALIZED ORBITS AND PARANASAL SINUSES:  Unremarkable  CALVARIUM AND EXTRACRANIAL SOFT TISSUES:   Normal      Impression: No acute intracranial hemorrhage seen Mild cerebral atrophy No mass effect or midline shift seen Workstation performed: [id number]       EKG, Pathology, and Other Studies Reviewed on Admission:   · EKG: NSR w/ PAC  Flattened T waves in leads II and II     Allscripts / Epic Records Reviewed: Yes     ** Please Note: This note has been constructed using a voice recognition system   **

## 2017-10-03 ENCOUNTER — APPOINTMENT (OUTPATIENT)
Dept: RADIOLOGY | Facility: HOSPITAL | Age: 82
DRG: 641 | End: 2017-10-03
Payer: MEDICARE

## 2017-10-03 ENCOUNTER — APPOINTMENT (OUTPATIENT)
Dept: NON INVASIVE DIAGNOSTICS | Facility: HOSPITAL | Age: 82
DRG: 641 | End: 2017-10-03
Payer: MEDICARE

## 2017-10-03 PROBLEM — E87.6 HYPOKALEMIA: Status: ACTIVE | Noted: 2017-10-03

## 2017-10-03 PROBLEM — E83.42 HYPOMAGNESEMIA: Status: ACTIVE | Noted: 2017-10-03

## 2017-10-03 PROBLEM — R29.898 LEFT LEG WEAKNESS: Status: ACTIVE | Noted: 2017-10-03

## 2017-10-03 LAB
ANION GAP SERPL CALCULATED.3IONS-SCNC: 8 MMOL/L (ref 4–13)
APTT PPP: 27 SECONDS (ref 23–35)
ATRIAL RATE: 56 BPM
BACTERIA UR CULT: NORMAL
BUN SERPL-MCNC: 11 MG/DL (ref 5–25)
CALCIUM SERPL-MCNC: 8.7 MG/DL (ref 8.3–10.1)
CHLORIDE SERPL-SCNC: 107 MMOL/L (ref 100–108)
CHOLEST SERPL-MCNC: 174 MG/DL (ref 50–200)
CO2 SERPL-SCNC: 26 MMOL/L (ref 21–32)
CREAT SERPL-MCNC: 0.76 MG/DL (ref 0.6–1.3)
ERYTHROCYTE [DISTWIDTH] IN BLOOD BY AUTOMATED COUNT: 12.9 % (ref 11.6–15.1)
GFR SERPL CREATININE-BSD FRML MDRD: 71 ML/MIN/1.73SQ M
GLUCOSE SERPL-MCNC: 87 MG/DL (ref 65–140)
HCT VFR BLD AUTO: 36.6 % (ref 34.8–46.1)
HDLC SERPL-MCNC: 100 MG/DL (ref 40–60)
HGB BLD-MCNC: 12.4 G/DL (ref 11.5–15.4)
INR PPP: 1.07 (ref 0.86–1.16)
LDLC SERPL CALC-MCNC: 49 MG/DL (ref 0–100)
MAGNESIUM SERPL-MCNC: 1.6 MG/DL (ref 1.6–2.6)
MCH RBC QN AUTO: 32.6 PG (ref 26.8–34.3)
MCHC RBC AUTO-ENTMCNC: 33.9 G/DL (ref 31.4–37.4)
MCV RBC AUTO: 96 FL (ref 82–98)
P AXIS: 63 DEGREES
PA ADP BLD-ACNC: 435 ARU
PHOSPHATE SERPL-MCNC: 2.7 MG/DL (ref 2.3–4.1)
PLATELET # BLD AUTO: 187 THOUSANDS/UL (ref 149–390)
PMV BLD AUTO: 10.1 FL (ref 8.9–12.7)
POTASSIUM SERPL-SCNC: 3.2 MMOL/L (ref 3.5–5.3)
PR INTERVAL: 160 MS
PROTHROMBIN TIME: 13.9 SECONDS (ref 12.1–14.4)
QRS AXIS: 62 DEGREES
QRSD INTERVAL: 82 MS
QT INTERVAL: 442 MS
QTC INTERVAL: 426 MS
RBC # BLD AUTO: 3.8 MILLION/UL (ref 3.81–5.12)
SODIUM SERPL-SCNC: 141 MMOL/L (ref 136–145)
T WAVE AXIS: 98 DEGREES
TRIGL SERPL-MCNC: 123 MG/DL
VENTRICULAR RATE: 56 BPM
WBC # BLD AUTO: 7.46 THOUSAND/UL (ref 4.31–10.16)

## 2017-10-03 PROCEDURE — 70551 MRI BRAIN STEM W/O DYE: CPT

## 2017-10-03 PROCEDURE — 80048 BASIC METABOLIC PNL TOTAL CA: CPT | Performed by: GENERAL PRACTICE

## 2017-10-03 PROCEDURE — 84100 ASSAY OF PHOSPHORUS: CPT | Performed by: GENERAL PRACTICE

## 2017-10-03 PROCEDURE — 85610 PROTHROMBIN TIME: CPT | Performed by: GENERAL PRACTICE

## 2017-10-03 PROCEDURE — G8988 SELF CARE GOAL STATUS: HCPCS

## 2017-10-03 PROCEDURE — G8978 MOBILITY CURRENT STATUS: HCPCS

## 2017-10-03 PROCEDURE — 93880 EXTRACRANIAL BILAT STUDY: CPT

## 2017-10-03 PROCEDURE — 85730 THROMBOPLASTIN TIME PARTIAL: CPT | Performed by: GENERAL PRACTICE

## 2017-10-03 PROCEDURE — 85027 COMPLETE CBC AUTOMATED: CPT | Performed by: GENERAL PRACTICE

## 2017-10-03 PROCEDURE — 97166 OT EVAL MOD COMPLEX 45 MIN: CPT

## 2017-10-03 PROCEDURE — 85576 BLOOD PLATELET AGGREGATION: CPT | Performed by: PHYSICIAN ASSISTANT

## 2017-10-03 PROCEDURE — G8979 MOBILITY GOAL STATUS: HCPCS

## 2017-10-03 PROCEDURE — G8987 SELF CARE CURRENT STATUS: HCPCS

## 2017-10-03 PROCEDURE — 93005 ELECTROCARDIOGRAM TRACING: CPT | Performed by: NURSE PRACTITIONER

## 2017-10-03 PROCEDURE — 80061 LIPID PANEL: CPT | Performed by: GENERAL PRACTICE

## 2017-10-03 PROCEDURE — 83735 ASSAY OF MAGNESIUM: CPT | Performed by: GENERAL PRACTICE

## 2017-10-03 PROCEDURE — 97163 PT EVAL HIGH COMPLEX 45 MIN: CPT

## 2017-10-03 RX ORDER — MAGNESIUM SULFATE HEPTAHYDRATE 40 MG/ML
2 INJECTION, SOLUTION INTRAVENOUS ONCE
Status: COMPLETED | OUTPATIENT
Start: 2017-10-03 | End: 2017-10-04

## 2017-10-03 RX ORDER — POTASSIUM CHLORIDE 20 MEQ/1
20 TABLET, EXTENDED RELEASE ORAL ONCE
Status: COMPLETED | OUTPATIENT
Start: 2017-10-03 | End: 2017-10-03

## 2017-10-03 RX ORDER — CARVEDILOL 6.25 MG/1
6.25 TABLET ORAL 2 TIMES DAILY WITH MEALS
Status: DISCONTINUED | OUTPATIENT
Start: 2017-10-03 | End: 2017-10-04 | Stop reason: HOSPADM

## 2017-10-03 RX ORDER — POTASSIUM CHLORIDE 20 MEQ/1
40 TABLET, EXTENDED RELEASE ORAL ONCE
Status: COMPLETED | OUTPATIENT
Start: 2017-10-03 | End: 2017-10-03

## 2017-10-03 RX ADMIN — POTASSIUM CHLORIDE 20 MEQ: 1500 TABLET, EXTENDED RELEASE ORAL at 17:13

## 2017-10-03 RX ADMIN — POTASSIUM CHLORIDE 40 MEQ: 1500 TABLET, EXTENDED RELEASE ORAL at 13:31

## 2017-10-03 RX ADMIN — VITAMIN D, TAB 1000IU (100/BT) 1000 UNITS: 25 TAB at 08:51

## 2017-10-03 RX ADMIN — CARVEDILOL 12.5 MG: 12.5 TABLET, FILM COATED ORAL at 08:51

## 2017-10-03 RX ADMIN — CARVEDILOL 6.25 MG: 6.25 TABLET, FILM COATED ORAL at 17:13

## 2017-10-03 RX ADMIN — ATORVASTATIN CALCIUM 40 MG: 40 TABLET, FILM COATED ORAL at 08:51

## 2017-10-03 RX ADMIN — CYANOCOBALAMIN TAB 500 MCG 1000 MCG: 500 TAB at 08:51

## 2017-10-03 RX ADMIN — MEMANTINE 10 MG: 5 TABLET ORAL at 08:50

## 2017-10-03 RX ADMIN — MEMANTINE 10 MG: 5 TABLET ORAL at 17:13

## 2017-10-03 RX ADMIN — LEVOTHYROXINE SODIUM 100 MCG: 100 TABLET ORAL at 05:23

## 2017-10-03 RX ADMIN — LOSARTAN POTASSIUM 100 MG: 50 TABLET, FILM COATED ORAL at 08:51

## 2017-10-03 RX ADMIN — ENOXAPARIN SODIUM 40 MG: 40 INJECTION SUBCUTANEOUS at 08:50

## 2017-10-03 RX ADMIN — MAGNESIUM SULFATE HEPTAHYDRATE 2 G: 40 INJECTION, SOLUTION INTRAVENOUS at 14:45

## 2017-10-03 RX ADMIN — PANTOPRAZOLE SODIUM 40 MG: 40 TABLET, DELAYED RELEASE ORAL at 05:23

## 2017-10-03 RX ADMIN — ASPIRIN 81 MG 81 MG: 81 TABLET ORAL at 08:51

## 2017-10-03 RX ADMIN — AMLODIPINE BESYLATE 5 MG: 5 TABLET ORAL at 08:51

## 2017-10-03 NOTE — PHYSICAL THERAPY NOTE
Physical Therapy Evaluation    Patient's Name:Pete Lomeli    Today's Date:10/03/17    Patient Active Problem List   Diagnosis    Acquired hypothyroidism    Essential hypertension    CAD (coronary artery disease)    GERD (gastroesophageal reflux disease)    Chest pain    Weakness    Dementia    History of TIA (transient ischemic attack)    Twitching    Abnormal EKG    Left leg weakness    Hypokalemia    Hypomagnesemia       Past Medical History:   Diagnosis Date    Disease of thyroid gland     GERD (gastroesophageal reflux disease)     Hyperlipidemia     Hypertension     MI (myocardial infarction)        Past Surgical History:   Procedure Laterality Date    APPENDECTOMY            10/03/17 0940   Note Type   Note type Eval only   Pain Assessment   Pain Assessment No/denies pain   Pain Score No Pain   Home Living   Type of [address] Able to live on main level with bedroom/bathroom; Two level   Additional Comments Pt lives in [address] [place] [address] with  and has 1 SMITH  Pt reported she will be moving to [place] soon with her   Prior Function   Level of [place] Independent with ADLs and functional mobility   Lives With Spouse   Receives Help From Family   ADL Assistance Independent   IADLs Independent   Falls in the last 6 months (1)   Vocational Retired   Restrictions/Precautions   [place] Bearing Precautions Per Order No   Other Precautions Chair Alarm; Bed Alarm;Telemetry; Fall Risk   Cognition   Overall Cognitive Status WFL   Arousal/Participation Alert   Orientation Level Oriented X4   Following Commands Follows one step commands without difficulty   RLE Assessment   RLE Assessment X  (Pt overall strength 3+/5 in RLE assessed with ambulation )   LLE Assessment   LLE Assessment X  (Pt overall strength 3+/5 in LLE assessed with ambulation )   Coordination   Movements are Fluid and Coordinated 1   Sensation WFL   Bed Mobility   Additional Comments Pt was sitting up in chair upon arrival and left sitting up in chair at end of session  Transfers   Sit to Stand 5  Supervision   Additional items Increased time required; Impulsive   Stand to Sit 5  Supervision   Additional items Increased time required   Ambulation/Elevation   Gait pattern Short stride;Narrow LADI   Gait Assistance 4  Minimal assist  (for proper use of RW )   Additional items Verbal cues; Assist x 1   Assistive Device Rolling walker   Distance 75'   Balance   Static Sitting Fair +   Dynamic Sitting Fair   Static Standing Fair -   Dynamic Standing Fair -   Ambulatory Fair -   Endurance Deficit   Endurance Deficit Yes   Activity Tolerance   Activity Tolerance Treatment limited secondary to medical complications (Comment)   Assessment   Prognosis Good   Problem List Decreased endurance; Impaired balance;Decreased mobility; Decreased strength; Impaired vision   Assessment Pt is an 81 y/o female who presented with LE weakness, tremors of whole body, decreased appetite, SOB, nausea, and fatigue that was worsening for the past 3 days  Pt had a fall after [employer] secondary to increased LLE weakness  Pt was admitted to r/o CVA/TIA  Pt has a history of HTN, CAD, TIA, dementia, GERD, HLD, and MI  PTA, pt was (I) with ADLs and was not using an AD for ambulation  During IE, pt was able to perform transfers with S and ambulated 76' with RW and min A and VCing to help with proper sequencing with RW  Pt ran into things 3 times on her right side  Pt performed TUG in 28 1 secs with the use of RW  For community dwelling adults, a time of <13 5 secs is normal  Pts time puts her at a moderate risk for falls  Pt would benefit from inpatient physical therapy to improve balance, endurance, and strength  Recommend home with outpatient PT  Goals   Patient Goals Return home with      STG Expiration Date 10/13/17   Short Term Goal #1 Pt will be able to ambulate 200' with RW and S; pt will perform transfers (I)ly; pt will improve standing balance to fair +; pt will be able to asc /desc  2 steps with S   Plan   Treatment/Interventions Functional transfer training;LE strengthening/ROM; Elevations; Therapeutic exercise; Endurance training;Bed mobility;Gait training   PT Frequency 5x/wk   Recommendation   Recommendation Outpatient PT   Equipment Recommended Walker  (Does not currently own one)   PT - OK to Discharge (when medically stable)   Modified [place] Scale   Modified Reyes Scale 2   Barthel Index   Feeding 10   Bathing 5   Grooming Score 5   Dressing Score 5   Bladder Score 10   Bowels Score 10   Toilet Use Score 5   Transfers (Bed/Chair) Score 10   Mobility (Level Surface) Score 0   Stairs Score 0   Barthel Index Score 60   Gia Romero, SPT

## 2017-10-03 NOTE — OCCUPATIONAL THERAPY NOTE
[address]  Evaluation     Patient Name: Arlin Bucio  QOIJC'I Date: 10/3/2017  Problem List  Patient Active Problem List   Diagnosis    Acquired hypothyroidism    Essential hypertension    CAD (coronary artery disease)    GERD (gastroesophageal reflux disease)    Chest pain    Weakness    Dementia    History of TIA (transient ischemic attack)    Twitching    Abnormal EKG     Past Medical History  Past Medical History:   Diagnosis Date    Disease of thyroid gland     GERD (gastroesophageal reflux disease)     Hyperlipidemia     Hypertension     MI (myocardial infarction)      Past Surgical History  Past Surgical History:   Procedure Laterality Date    APPENDECTOMY        10/03/17 0941   Note Type   Note type Eval/Treat   Restrictions/Precautions   Weight Bearing Precautions Per Order No   Other Precautions Bed Alarm; Chair Alarm;Cognitive; Impulsive; Fall Risk;Telemetry;Multiple lines   Pain Assessment   Pain Assessment No/denies pain   Pain Score No Pain   Home Living   Type of Home House   Home Layout Multi-level   Additional Comments pt lives w/  in [place] in [facility] w/ first floor setup w/ 1 SMITH vs garage w/ 0 SMITH   Prior Function   Level of [place] Independent with ADLs and functional mobility   Lives With Spouse   Receives Help From Family   ADL Assistance Independent   IADLs Needs assistance   Falls in the last 6 months 0   Vocational Retired   Lifestyle   Autonomy Pt was I w/  I/ADLS, A for driving per , & required no use of DME PTA   Reciprocal Relationships Supportive husbadn pt lives w/ is retired and home to A as needed, manages driving PRN functionally indepdendent, pt has 4 children locally  Service to Others Retired from [employer] in [place], worked in [employer] Mom Enjoys going to [employer] and doing her rehab/exercises  Psychosocial   Psychosocial (WDL) WDL   Subjective   Subjective "I have alot on my plate"     ADL   Eating Assistance 6  Modified independent   Grooming Assistance 6  Modified Independent   UB Bathing Assistance 5  Supervision/Setup   LB Bathing Assistance 5  Supervision/Setup   UB Dressing Assistance 5  Supervision/Setup   LB Dressing Assistance 5  Supervision/Setup   Toileting Assistance  5  Supervision/Setup   Functional Assistance 5  Supervision/Setup   Bed Mobility   Rolling R (pt observed seated in chair upon entrance into room)   Transfers   Sit to Stand 5  Supervision   Stand to Sit 5  Supervision   Functional Mobility   Functional Mobility 4  Minimal assistance   Additional items Rolling walker   Balance   Static Sitting Fair +   Dynamic Sitting Fair +   Static Standing Fair +   Dynamic Standing Fair +   Ambulatory Fair +   Activity Tolerance   Activity Tolerance Patient limited by fatigue   RUE Assessment   RUE Assessment WFL   LUE Assessment   LUE Assessment WFL   Hand Function   Gross Motor Coordination Functional   Fine Motor Coordination Functional   Sensation   Light Touch No apparent deficits   Proprioception   Proprioception No apparent deficits   Vision-Basic Assessment   Current Vision No visual deficits   Vision - Complex Assessment   Ocular Range of Motion WFL   Perception   Inattention/Neglect Appears intact   Cognition   Overall Cognitive Status Impaired   Arousal/Participation Alert   Attention Attends with cues to redirect   Orientation Level Oriented to person;Disoriented to place; Disoriented to time;Disoriented to situation   Memory Decreased short term memory;Decreased recall of recent events;Decreased recall of precautions   Following Commands Follows one step commands with increased time or repetition   Assessment   Limitation Decreased ADL status; Decreased UE strength;Decreased Safe judgement during ADL;Decreased cognition;Decreased endurance;Decreased UE ROM; Decreased self-care trans;Decreased high-level ADLs   Prognosis Fair   Assessment Pt is pleasant, active, retired 81 y/o female seen for OT eval s/p adm to [facility] w/ dizziness, fell leaving [place] w/ + knee strike, LLE weakness, decreased appetite recently, dx'd w/ r/o TIA/CVA, CTB: (-) acute, MRIB P, comorbidities include a h/o HLD, HTN, MI, GERD  Pt was I w/  I/ADLS, A for driving per , & required no use of DME PTA, pt lives w/  in [place] in a [facility] w/ first floor setup w/ 1 SMITH vs garage w/ 0 SMITH  Pt is currently demonstrating the following occupational deficits: limited 2* decreased endurance/activity tolerance, generalized weakness, deconditioning, SOB, DOLAN, fatigue, fall risk, impaired balance, LLE discomfort, gross motor ataxia, ? R peripheral field inattention ? Field cut, minimizes impairments, mildly impulsive, anxious, decreased safety insight judgment and awareness into deficits  Pt requires overall SBA for ADLs/self care and SBA for fx'l mobility w/ RW  The following Occupational Performance Areas to address include: bathing/shower, toilet hygiene, dressing, socialization, health maintenance, functional mobility, community mobility, clothing management, cleaning, meal prep, money management, household maintenance, care of children, care of pets, job performance/volunteering and social participation  Pt scored overall 60/100 on the Barthel Index and 3/6 on the Modified Reyes Scale  Based on the aforementioned OT evaluation, functional performance deficits, and assessments, pt has been identified as a moderate complexity evaluation  Recommend outpt OT/PT when med stable, recommend [address] Neuro specialty site, pt minimally agreeable, perseverative on [place] outpt PT 2* h/o same though educated on need for neuro specific site  Pt to continue to benefit from acute immediate OT services to address the following goals 3-5x/wk to  w/in 7-10 days:   Goals   Patient Goals "To go to outpt in [place] I don't undertstand why I need this right away we're moving to [place]"     Short Term Goal #1 See established goals as stated below  Plan   Treatment Interventions ADL retraining;Functional transfer training;UE strengthening/ROM; Endurance training;Cognitive reorientation;Patient/family training;Equipment evaluation/education; Compensatory technique education;Continued evaluation; Activityengagement; Energy conservation   Goal Expiration Date 10/13/17   OT Frequency 3-5x/wk   Recommendation   Discharge Recommendation Outpatient OT  (to outpt OT/PT @ [address] for neuro specialty when med stable)   OT - OK to Discharge Yes  (to outpt OT/PT @ [address] for neuro specialty when med stable)   Barthel Index   Feeding 10   Bathing 5   Grooming Score 5   Dressing Score 5   Bladder Score 10   Bowels Score 10   Toilet Use Score 5   Transfers (Bed/Chair) Score 10   Mobility (Level Surface) Score 0   Stairs Score 0   Barthel Index Score 60   Modified [place] Scale   Modified [place] Scale 3   1) Pt will imrpove activity tolerance to G for min 30 min txment sessions  2) Pt will complete ADLs/self care w/ mod I  3) Pt will complete toileting w/ mod I w/ G hygiene/thoroughness using DME PRN  4) Pt will improve fx'l tfers on/off all surfaces using dME PRN w/ G balance/safety including toileting w/ mod I  5) Pt will improve fx'l mobility during I/ADl/leisure tasks using DME PRN w/ g balance/safety w/ mod I  6) Pt will engage in ongoing cognitive assessment w/ G participation w/ mod I to A w/ safe d/c planning/recommendations  7) Pt will demonstrate G carryover of pt/caregiver education and training as appropriate w/ mod I w/o cues w/ G tolerance  8) Pt will engage in depression screen/leisure interest checklist w/ mod I and G participation to monitor s/s depression and ID 3 positive coping strategies to A w/ emotional regulation and management  9) Pt will improve UB fx'l use/ROM to [facility] and strength 1/2 MMT via AROM/AAROM/PROM in all planes as tolerated s/p skilled education of HEP w/ mod I w/o cues for carryover  10) Pt will engage in ongoing  assessments, screens, and activities t/o fx'l I/ADL/leisure tasks w/ G participation and mod I to A w/ adaptation and accomodations or rule out visual perceptual impairments  Thank you for the consult!  Please call if you have any questions: Tae Ann, GISSELL, OTR/L, C-RAUDEL, CSRS  Neuro [employer]

## 2017-10-03 NOTE — ASSESSMENT & PLAN NOTE
resolved  R/o TIA vs  CVA- stroke pathway  MRI completed, pending official interpretation  CT head- no acute findings, Mild cerebral atrophy  ucx in process  ECHO in July noted  Carotid us pending  Neuro consult pending

## 2017-10-03 NOTE — CASE MANAGEMENT
Initial Clinical Review    Admission: Date/Time/Statement:   10/02/17 1749    Orders Placed This Encounter   Procedures    Place in Observation (expected length of stay for this patient is less than two midnights)     Standing Status:   Standing     Number of Occurrences:   1     Order Specific Question:   Admitting Physician     Answer:   Sixto Ruelas     Order Specific Question:   Level of Care     Answer:   Med Surg [16]         ED: Date/Time/Mode of Arrival:   ED Arrival Information     Expected Arrival Acuity Means of Arrival Escorted By Service Admission Type    10/2/2017 10:59 10/2/2017 11:15 Emergent Walk-In Self General Medicine Emergency    Arrival Complaint    weakness/nausea/loss of appetite          Chief Complaint:   Chief Complaint   Patient presents with    Fatigue     Pt c/o weakness and shaking for 3 days       History of Illness:     Gia Adler is a 80 y o  female with hx of TIA and CAD who presents with an episode of left lower extremity weakness yesterday after [facility]  Patient said that at [facility] she is mobile and exercises regularly  Yesterday, while leaving [facility], she said her LLE felt weak and she fell to the ground  Pt said she had a slightly decreased appetite for dinner yesterday but ate her regular breakfast this AM   Pt does als admit to a brief episode of hand twitching 2-3 days ago but that has not occurred since    In the ER, pt says she has been afraid to walk after the episode yesterday  PT says she has had TIAs in the past where she has brief episodes of speech slurring            ED Vital Signs:   ED Triage Vitals   Temperature Pulse Respirations Blood Pressure SpO2   10/02/17 1130 10/02/17 1130 10/02/17 1130 10/02/17 1130 10/02/17 1145   97 5 °F (36 4 °C) (!) 51 16 153/70 99 %      Temp Source Heart Rate Source Patient Position - Orthostatic VS BP Location FiO2 (%)   10/02/17 1130 10/02/17 1130 10/02/17 1145 10/02/17 1430 --   Tympanic Monitor Lying Right arm       Pain Score       10/02/17 1130       No Pain        Wt Readings from Last 1 Encounters:   10/02/17 55 6 kg (122 lb 9 2 oz)       Vital Signs (abnormal):   10/02/17 2159  --  87  18  166/71  --  97 %   10/02/17 2015  --   54  13  164/67  --  95 %       10/02/17 1503  --  64  15  157/96  99 %           Abnormal Labs/Diagnostic Test Results:     CT HEAD  NO ACUTE FINDING  EKG   NSR w/ PAC  Flattened T waves in leads II and II       TIA TOTAL SCORE  =4     AGE>60=1   SBP>140 =1  DBP >90  =1  UNILATERAL WEAKNESS =2   DURATION  UNKNOWN =0  DM =0      ED Treatment:   Medication Administration from 10/02/2017 1059 to 10/02/2017 2217    Date/Time Order Dose Route   10/02/2017 1442 sodium chloride 0 9 % bolus 1,000 mL 1,000 mL Intravenous       Past Medical/Surgical History: Active Ambulatory Problems     Diagnosis Date Noted    Acquired hypothyroidism 05/19/2017    Essential hypertension 05/19/2017    CAD (coronary artery disease) 05/19/2017    GERD (gastroesophageal reflux disease) 05/19/2017    Chest pain 05/19/2017     Resolved Ambulatory Problems     Diagnosis Date Noted    SOB (shortness of breath) 05/19/2017     Past Medical History:    Disease of thyroid gland     GERD (gastroesophageal reflux disease)     Hyperlipidemia     Hypertension     MI (myocardial infarction)        Admitting Diagnosis: Weakness [R53 1]  ECG abnormality [R94 31]  Decreased appetite [R63 0]  Urine WBC increased [R82 99]  Unintentional weight loss [R63 4]  Collapse [R55]    Age/Sex: 80 y o  female    Assessment/Plan:    Plan for the Primary Problem(s):  · LLE weakness  ? Pt will be placed on observation to rule out CVA/TIA  Due to the transient nature of this episode, and her risk factors including HTN and HL and past probable TIAs, suspect this may be a TIA  CTHead WNL  Ordered MRI brain  Consulted neuro for further recs  Pt may benefit from being switched to plavix  Pt already on Lipitor  Will check lipid panel  Defer to neuro if further testing indicated  If not TIA, possible dehydration - this was tx with NS boluses in ER  However, this is unlikely as her BUN was normal and pt says she had LLE weakness only and no dizziness        Plan for Additional Problems:   · Abnormal EKG: EKG showed flattened T waves in leads II and III  However, the pt denies CP or SOB, and has a negative trop  Will observe on tele  · CAD:   Stable  C/w Coreg, ASA, and statin  Pt had normal echo in 2017  · HTN: ostable on Coreg and Norvasc and ARB  · Hypothyroid: stable on Synthroid  · Dementia: stable on Namenda  · GERD: stable on PPI   · Pyuria: urine sent for cx    However, doubt UTI as pt has no dysuria or fever or leukocytosis          Admission Orders:    CAROTID COMPLETE STUDY   LIPID PANEL WITH LDL    URINE CULTURE   MRI BRAIN  ECHO  PT AND OT EVAL AND TREAT  CONSULT NEUROLOGY  STROKE EDUCATION  DYSPHAGIA ASSESS MENT PRIOR TO PO  CARDIAC STEP 1 DIET   TELEMETRY  SEQUENTIAL COMPRESSION DEVICE  Reason for not initiating IV thrombolytic: NIHSS score = 0      Scheduled Meds:   amLODIPine 5 mg Oral Daily   aspirin 81 mg Oral Daily   atorvastatin 40 mg Oral Daily   carvedilol 12 5 mg Oral BID With Meals   cholecalciferol 1,000 Units Oral Daily   cyanocobalamin 1,000 mcg Oral Daily   enoxaparin 40 mg Subcutaneous Daily   levothyroxine 100 mcg Oral Early Morning   losartan 100 mg Oral Daily   memantine 10 mg Oral BID   pantoprazole 40 mg Oral Early Morning     Continuous Infusions:    PRN Meds: diphenhydrAMINE

## 2017-10-03 NOTE — CONSULTS
Consultation - Neurology   Aria Schafer 80 y o  female MRN: [id number]  Unit/Bed#: [facility] 713-01 Encounter: [id number]      Assessment/Plan   Assessment:   Aria Schafer is a 80 y o  female with a h/o CAD, MI and TIA who has had a several day history of generalized weakness, difficulty walking, nausea, poor PO intake, and brief moments of hand tremors as well as an episode on 10/1 of LLE weakness and subsequent fall to L knee  Work-up thus far has been unremarkable  MRI read pending  Plan:  1  LLE wekaness - Resolved  It does not seem to be consistent with TIA , though interested in MRI read  More likely systemic process - ?dehydration due to recent poor intake, ? UTI though asymptomatic - urine culture pending  2  Macular degeneration - exam is consistent with this  Patient has difficulty with her vision, missing words when reading  Follow-up with outpatient ophthalmology  3  Left visual field cut - Unclear etiology  Will await MRI results  Recommend outpatient ophthalmology evaluation  2  H/o TIA with vascular risk factors - Already taking Aspirin 81mg qd and Lipitor 40mg qd  API ordered and resulted with therapeutic response noted  3  HTN - Amlodipine 5mg qd, Coreg 12 5mg qd  4  Dementia - Chronic diagnosis  On Namenda 10mg bid  5  EKG abnormality - as per primary team    History of Present Illness     Reason for Consult / Principal Problem: Weakness    HPI: Aria Schafer is a 80 y o  female with a h/o CAD, MI and TIA who presented to the ED upon recommendation of outpatient provider after having generalized weakness, difficulty walking, nausea, poor PO intake, and brief moments of B/L hand tremors x 3 days  After [facility] on 10/1, patient had episode of LLE weakness and subsequently fell onto left knee  CT head without contrast revealed no acute abnormalities  Her exam was documented as unremarkable in the ED  Urine culture is pending  MRI brain pending      Patient states that she has a history of TIAs in the past where she has had brief episodes of speech slurring  This morning the patient is a bit frustrated though able to be calmed  She does admit to vision issues but states that these are chronic  Currently she denies pain  She is pretty adamant that she wants to leave as soon as possible  Patient denies chest pain, shortness of breath, fever, chills, bowel/bladder issues, vision changes, weakness or numbness  Inpatient consult to Neurology  Consult performed by: Yonatan Moore ordered by: Julian Zamarripa        Review of Systems A 12 point ROS was completed and other than the complaints mentioned above in the HPI, all remaining systems were negative  Historical Information   Past Medical History:   Diagnosis Date    Disease of thyroid gland     GERD (gastroesophageal reflux disease)     Hyperlipidemia     Hypertension     MI (myocardial infarction)      Past Surgical History:   Procedure Laterality Date    APPENDECTOMY       Social History   History   Alcohol Use    Yes     History   Drug Use No     History   Smoking Status    Never Smoker   Smokeless Tobacco    Never Used     Family History: History reviewed  No pertinent family history  Review of previous medical records was completed      Meds/Allergies   current meds:   Current Facility-Administered Medications   Medication Dose Route Frequency    amLODIPine (NORVASC) tablet 5 mg  5 mg Oral Daily    aspirin chewable tablet 81 mg  81 mg Oral Daily    atorvastatin (LIPITOR) tablet 40 mg  40 mg Oral Daily    carvedilol (COREG) tablet 12 5 mg  12 5 mg Oral BID With Meals    cholecalciferol (VITAMIN D3) tablet 1,000 Units  1,000 Units Oral Daily    cyanocobalamin (VITAMIN B-12) tablet 1,000 mcg  1,000 mcg Oral Daily    diphenhydrAMINE (BENADRYL) tablet 25 mg  25 mg Oral HS PRN    enoxaparin (LOVENOX) subcutaneous injection 40 mg  40 mg Subcutaneous Daily    levothyroxine tablet 100 mcg  100 mcg Oral Early Morning    losartan (COZAAR) tablet 100 mg  100 mg Oral Daily    memantine (NAMENDA) tablet 10 mg  10 mg Oral BID    pantoprazole (PROTONIX) EC tablet 40 mg  40 mg Oral Early Morning    and PTA meds:   Prior to Admission Medications   Prescriptions Last Dose Informant Patient Reported? Taking? amLODIPine (NORVASC) 5 mg tablet   No No   Sig: Take 1 tablet by mouth daily for 30 days   aspirin 81 mg chewable tablet   Yes No   Sig: Chew 81 mg daily   atorvastatin (LIPITOR) 40 mg tablet   Yes No   Sig: Take 40 mg by mouth daily   carvedilol (COREG) 12 5 mg tablet   No No   Sig: Take 1 tablet by mouth 2 (two) times a day with meals for 30 days   cholecalciferol (VITAMIN D3) 1,000 units tablet   Yes No   Sig: Take 1,000 Units by mouth daily   cyanocobalamin (VITAMIN B-12) 1,000 mcg tablet   Yes No   Sig: Take 1,000 mcg by mouth daily   diphenhydrAMINE (BENADRYL ALLERGY) 25 mg capsule   Yes No   Sig: Take 25 mg by mouth daily at bedtime as needed for itching   levothyroxine 100 mcg tablet   Yes No   Sig: Take 100 mcg by mouth daily   losartan (COZAAR) 100 MG tablet   Yes No   Sig: Take 100 mg by mouth daily   memantine (NAMENDA) 10 mg tablet   Yes No   Sig: Take 10 mg by mouth 2 (two) times a day   omeprazole (PriLOSEC) 20 mg delayed release capsule   Yes No   Sig: Take 20 mg by mouth daily      Facility-Administered Medications: None       Allergies   Allergen Reactions    Penicillins        Objective   Vitals:Blood pressure 160/70, pulse 61, temperature 97 5 °F (36 4 °C), temperature source Oral, resp  rate 18, height 5' 2" (1 575 m), weight 55 6 kg (122 lb 9 2 oz), SpO2 98 %  ,Body mass index is 22 42 kg/m²  Intake/Output Summary (Last 24 hours) at 10/03/17 0856  Last data filed at 10/02/17 1642   Gross per 24 hour   Intake             2000 ml   Output                0 ml   Net             2000 ml       Invasive Devices:    Invasive Devices     Peripheral Intravenous Line            Peripheral IV 10/02/17 Left Antecubital less than 1 day                Physical Exam   Constitutional: She is oriented to person, place, and time  She appears well-developed and well-nourished  No distress  Elderly  female   HENT:   Head: Normocephalic and atraumatic  Eyes: Conjunctivae are normal  Pupils are equal, round, and reactive to light  Right eye exhibits no discharge  Left eye exhibits no discharge  No scleral icterus  See neuro exam for further details  Unable to visualize fundi   Neck: Normal range of motion  Neck supple  Cardiovascular: Normal rate, regular rhythm and normal heart sounds  Exam reveals no gallop and no friction rub  No murmur heard  Pulmonary/Chest: Breath sounds normal  No stridor  No respiratory distress  Abdominal: Soft  She exhibits no distension  There is no tenderness  Musculoskeletal: Normal range of motion  She exhibits no edema  Neurological: She is alert and oriented to person, place, and time  She has normal strength  She has a normal Heel to [employer] and a normal Romberg Test  Finger-nose-finger test: Looses track of examiner's finger far left  Skin: Skin is warm and dry  No erythema  Psychiatric: She has a normal mood and affect  Her speech is normal    Poor insight with regards to L visual neglect and with regards to leaving the hospital     Neurologic Exam     Mental Status   Oriented to person, place, and time  Attention: normal  Concentration: normal    Speech: speech is normal   Level of consciousness: alert  Knowledge: good  Able to name object  Unable to read: Misses multiple words in a sentence  Missed sentense on top of page  Normal comprehension  Patient does seem to lack judgment with regards to he readiness for discharge     Cranial Nerves     CN II   Visual acuity: decreased  Right visual field deficit: none  Left visual field deficit: upper temporal and lower temporal quadrant(s)    CN III, IV, VI   Pupils are equal, round, and reactive to light    Right pupil: Size: 1 mm  Shape: regular  Reactivity: brisk  Consensual response: intact  Accommodation: intact  Left pupil: Size: 1 mm  Shape: regular  Reactivity: brisk  Consensual response: intact  Accommodation: intact  Nystagmus: none   Diplopia: none  Conjugate gaze: present    CN V   Right facial sensation deficit: none  Left facial sensation deficit: none    CN VII   Right facial weakness: none  Left facial weakness: none    CN XI   CN XI normal      CN XII   Tongue: not atrophic  Fasciculations: absent  Tongue deviation: none  Patient unable to puff out cheeks  Able to track left with verbal cuing, but L field neglected noted     Motor Exam   Muscle bulk: normal  Overall muscle tone: normal    Strength   Strength 5/5 throughout  grossly     Sensory Exam   Patient able to tell when I am touching her (light touch) though she states that it is not symmetric but is unable to clarify  She guessed by saying I think you are touching me with more fingers on the LLE and LUE compared to the R      Gait, Coordination, and Reflexes     Gait  Gait: wide-based (Observed therapy session  Initially patient noted to be wall walking  Able to walk a few steps independently with wide base of support  More stable with walker )    Coordination   Romberg: negative  Finger-nose-finger test: Looses track of examiner's finger far left  Heel to shin coordination: normal    Tremor   Resting tremor: absent    Reflexes   Right plantar: normal  Left plantar: normal  L visual field neglect noted when ambulating with walker and pushed walker into closed door on far left    Unable to tap finger and thumb rapidly  Unable to alternate thumb to fingers bilaterally      1+ reflexes throughout       Lab Results:   Recent Results (from the past 24 hour(s))   ECG 12 lead    Collection Time: 10/02/17  1:02 PM   Result Value Ref Range    Ventricular Rate 63 BPM    Atrial Rate 63 BPM    VA Interval 176 ms    QRSD Interval 82 ms    QT Interval 428 ms    QTC Interval 437 ms    P Axis 75 degrees    QRS Axis 81 degrees    T Wave Axis 5 degrees   CBC and differential    Collection Time: 10/02/17  1:23 PM   Result Value Ref Range    WBC 9 84 4 31 - 10 16 Thousand/uL    RBC 4 00 3 81 - 5 12 Million/uL    Hemoglobin 12 9 11 5 - 15 4 g/dL    Hematocrit 38 6 34 8 - 46 1 %    MCV 97 82 - 98 fL    MCH 32 3 26 8 - 34 3 pg    MCHC 33 4 31 4 - 37 4 g/dL    RDW 12 7 11 6 - 15 1 %    MPV 10 6 8 9 - 12 7 fL    Platelets 182 382 - 669 Thousands/uL    nRBC 0 /100 WBCs    Neutrophils Relative 79 (H) 43 - 75 %    Lymphocytes Relative 14 14 - 44 %    Monocytes Relative 6 4 - 12 %    Eosinophils Relative 1 0 - 6 %    Basophils Relative 0 0 - 1 %    Neutrophils Absolute 7 77 (H) 1 85 - 7 62 Thousands/µL    Lymphocytes Absolute 1 37 0 60 - 4 47 Thousands/µL    Monocytes Absolute 0 60 0 17 - 1 22 Thousand/µL    Eosinophils Absolute 0 07 0 00 - 0 61 Thousand/µL    Basophils Absolute 0 01 0 00 - 0 10 Thousands/µL   Comprehensive metabolic panel    Collection Time: 10/02/17  1:23 PM   Result Value Ref Range    Sodium 139 136 - 145 mmol/L    Potassium 3 5 3 5 - 5 3 mmol/L    Chloride 104 100 - 108 mmol/L    CO2 27 21 - 32 mmol/L    Anion Gap 8 4 - 13 mmol/L    BUN 15 5 - 25 mg/dL    Creatinine 0 92 0 60 - 1 30 mg/dL    Glucose 91 65 - 140 mg/dL    Calcium 9 1 8 3 - 10 1 mg/dL    AST 21 5 - 45 U/L    ALT 22 12 - 78 U/L    Alkaline Phosphatase 96 46 - 116 U/L    Total Protein 7 3 6 4 - 8 2 g/dL    Albumin 3 7 3 5 - 5 0 g/dL    Total Bilirubin 0 72 0 20 - 1 00 mg/dL    eGFR 57 ml/min/1 73sq m   Lipase    Collection Time: 10/02/17  1:23 PM   Result Value Ref Range    Lipase 180 73 - 393 u/L   TSH, 3rd generation with T4 reflex    Collection Time: 10/02/17  1:23 PM   Result Value Ref Range    TSH 3RD GENERATON 0 760 0 358 - 3 740 uIU/mL   POCT troponin    Collection Time: 10/02/17  1:44 PM   Result Value Ref Range    POC Troponin I 0 00 0 00 - 0 08 ng/ml    Specimen Type VENOUS    POCT urinalysis dipstick    Collection Time: 10/02/17  1:46 PM   Result Value Ref Range    Color, UA see chart    ED Urine Macroscopic    Collection Time: 10/02/17  1:47 PM   Result Value Ref Range    Color, UA Yellow     Clarity, UA Clear     pH, UA 5 5 4 5 - 8 0    Leukocytes, UA Small (A) Negative    Nitrite, UA Negative Negative    Protein, UA 30 (1+) (A) Negative mg/dl    Glucose, UA Negative Negative mg/dl    Ketones, UA Trace (A) Negative mg/dl    Urobilinogen, UA 0 2 0 2, 1 0 E U /dl E U /dl    Bilirubin, UA Interference- unable to analyze (A) Negative    Blood, UA Trace (A) Negative    Specific Gravity, UA 1 025 1 003 - 1 030   Urine Microscopic    Collection Time: 10/02/17  1:47 PM   Result Value Ref Range    RBC, UA None Seen None Seen, 0-5 /hpf    WBC, UA 10-20 (A) None Seen, 0-5, 5-55, 5-65 /hpf    Epithelial Cells None Seen None Seen, Occasional /hpf    Bacteria, UA None Seen None Seen, Occasional /hpf    Hyaline Casts, UA None Seen None Seen /lpf   CBC (With Platelets)    Collection Time: 10/03/17  5:46 AM   Result Value Ref Range    WBC 7 46 4 31 - 10 16 Thousand/uL    RBC 3 80 (L) 3 81 - 5 12 Million/uL    Hemoglobin 12 4 11 5 - 15 4 g/dL    Hematocrit 36 6 34 8 - 46 1 %    MCV 96 82 - 98 fL    MCH 32 6 26 8 - 34 3 pg    MCHC 33 9 31 4 - 37 4 g/dL    RDW 12 9 11 6 - 15 1 %    Platelets 944 530 - 328 Thousands/uL    MPV 10 1 8 9 - 12 7 fL   Basic metabolic panel    Collection Time: 10/03/17  5:46 AM   Result Value Ref Range    Sodium 141 136 - 145 mmol/L    Potassium 3 2 (L) 3 5 - 5 3 mmol/L    Chloride 107 100 - 108 mmol/L    CO2 26 21 - 32 mmol/L    Anion Gap 8 4 - 13 mmol/L    BUN 11 5 - 25 mg/dL    Creatinine 0 76 0 60 - 1 30 mg/dL    Glucose 87 65 - 140 mg/dL    Calcium 8 7 8 3 - 10 1 mg/dL    eGFR 71 ml/min/1 73sq m   Magnesium    Collection Time: 10/03/17  5:46 AM   Result Value Ref Range    Magnesium 1 6 1 6 - 2 6 mg/dL   Phosphorus    Collection Time: 10/03/17  5:46 AM   Result Value Ref Range    Phosphorus 2 7 2 3 - 4 1 mg/dL   Protime-INR    Collection Time: 10/03/17  5:46 AM   Result Value Ref Range    Protime 13 9 12 1 - 14 4 seconds    INR 1 07 0 86 - 1 16   APTT    Collection Time: 10/03/17  5:46 AM   Result Value Ref Range    PTT 27 23 - 35 seconds   Lipid Panel with Direct LDL reflex    Collection Time: 10/03/17  5:46 AM   Result Value Ref Range    Cholesterol 174 50 - 200 mg/dL    Triglycerides 123 <=150 mg/dL    HDL, Direct 100 (H) 40 - 60 mg/dL    LDL Calculated 49 0 - 100 mg/dL     Imaging Studies: I have personally reviewed pertinent films in PACS   10/2/17 CT wo contrast:  No acute intracranial hemorrhage seen  Mild cerebral atrophy  No mass effect or midline shift seen    EKG, Pathology, and Other Studies: I have personally reviewed pertinent reports       EKG with T wave abnormality  VTE Prophylaxis: Sequential compression device (Venodyne)  and Enoxaparin (Lovenox)

## 2017-10-03 NOTE — CONSULTS
Consultation - Cardiology   Rita Sauceda 80 y o  female MRN: [id number]  Unit/Bed#: [facility] 713-01 Encounter: [id number]    Assessment/Plan     Assessment:    Sinus bradycardia with PACs   - Likely 2/2 to electrolyte abnormalities and/or beta blocker   - Asymptomatic bradycardic episodes down to low 40s with PACs on telemetry   - Replete potassium and Mg to goal K = 4 and Mg = 2  - Decrease coreg to 6 25     CAD   - MI 10 years ago with Cath at that time showing stenosed vessels per patient  No intervention at that time   - Recent ECHO 7/17 showed EF 60% with normal wall thickness and left ventricular diastolic function normal   - EKG on admission shows Sinus Rhythm with t wave abnormalities present on 7/11/17 but not present on 5/19/17    - Troponins negative x 1    - With EKG changes can consider outpatient stress test   - Continue aspirin 81 mg daily and atorvastatin 40 mg daily     HTN    - Controlled in hospital on current medications   - Continue amlodipine 5, losartan 100mg daily, coreg 6 25 BID    LLE weakness   - Likely TIA vs systemic dehydration vs hypoglycemia   - CT head without acute abnormality   - Neurology following   - F/U VAS carotid and MRI brain       History of Present Illness   Physician Requesting Consult: Zaki Moya MD  Reason for Consult / Principal Problem: Sinus Arrhythmia   HPI: Rita Sauceda is a 80y o  year old female who presents with Past medical history TIA, CAD, hypertension, GERD, and diabetes who presents with left lower extremity weakness  Following discharge service Monday morning, while patient was walking out to car with her  became weak and fell to the ground  She notes this is due to left leg weakness  She denied dizziness, lightheadedness, headaches, change in vision, chest pain, shortness of breath, palpitations, and nausea related to this event  Additionally, on Saturday night, the patient remembers having tremors in b/l hands which resolved that night  Patient rested during the day Sunday and called PCP Monday who recommended presenting to hospital for evaluation  Of note, patient follows with Dr Onel Guy as an outpatient  Patient was last seen 6/29/2017 as a follow-up visit from the hospital where she was admitted for shortness of breath secondary to hypertensive urgency  She was placed on Norvasc 5 milligrams daily and carvedilol was put in place of metoprolol  Following the hospitalization, patient had an echo which showed EF of 60 percent, normal wall thickness, left ventricular diastolic function normal, trace mitral regurgitation, trace aortic valve regurgitation  At this follow-up visit, there was discussion of updating ischemic tests but patient wants to hold off for now  Patient also reports having an MI 10 years ago with cardiac catheterization at that time showing "thin vessles"  There is no intervention at that time  On presentation to ED, Initial vitals show temperature 97 5, rate 51, respirations 16, blood pressure 153/70, 99 percent saturation on room air  Initial laboratory testing showed negative troponins x1, TSH 0 76, potassium 3 5, magnesium 1 6  Chest x-ray no active pulmonary disease  CT head without intracranial hemorrhage, mild cerebral atrophy, no mass effect or midline shift  Initial EKG shows sinus rhythm of 63 with premature atrial contractions and T-wave abnormalities  These findings were present on EKG 7/11/2017 but not present on 5/19/2017  Patient was admitted on telemetry and Neurology consulted to rule out CVA/TIA  Overnight, telemetry showing sinus bradycardia down to low 40s with multiple PACs  Patient asymptomatic during these episodes  Cardiology consulted for evaluation  Inpatient consult to Cardiology  Performed by: Halie Carrillo  Authorized by: Gustavo Monday           Review of Systems   Constitutional: Negative for chills, fatigue and fever     Eyes: Negative for photophobia and visual disturbance  Respiratory: Negative for cough, shortness of breath and wheezing  Cardiovascular: Negative for chest pain, palpitations and leg swelling  Gastrointestinal: Negative for abdominal pain, blood in stool, constipation and diarrhea  Genitourinary: Negative for difficulty urinating, dysuria and frequency  Neurological: Positive for tremors (hands )  Negative for dizziness, light-headedness, numbness and headaches  Historical Information   Past Medical History:   Diagnosis Date    Disease of thyroid gland     GERD (gastroesophageal reflux disease)     Hyperlipidemia     Hypertension     MI (myocardial infarction)      Past Surgical History:   Procedure Laterality Date    APPENDECTOMY       History   Alcohol Use    Yes     History   Drug Use No     History   Smoking Status    Never Smoker   Smokeless Tobacco    Never Used     Family History: History reviewed  No pertinent family history  Meds/Allergies   PTA meds:   Prior to Admission Medications   Prescriptions Last Dose Informant Patient Reported? Taking?    amLODIPine (NORVASC) 5 mg tablet   No No   Sig: Take 1 tablet by mouth daily for 30 days   aspirin 81 mg chewable tablet   Yes No   Sig: Chew 81 mg daily   atorvastatin (LIPITOR) 40 mg tablet   Yes No   Sig: Take 40 mg by mouth daily   carvedilol (COREG) 12 5 mg tablet   No No   Sig: Take 1 tablet by mouth 2 (two) times a day with meals for 30 days   cholecalciferol (VITAMIN D3) 1,000 units tablet   Yes No   Sig: Take 1,000 Units by mouth daily   cyanocobalamin (VITAMIN B-12) 1,000 mcg tablet   Yes No   Sig: Take 1,000 mcg by mouth daily   diphenhydrAMINE (BENADRYL ALLERGY) 25 mg capsule   Yes No   Sig: Take 25 mg by mouth daily at bedtime as needed for itching   levothyroxine 100 mcg tablet   Yes No   Sig: Take 100 mcg by mouth daily   losartan (COZAAR) 100 MG tablet   Yes No   Sig: Take 100 mg by mouth daily   memantine (NAMENDA) 10 mg tablet   Yes No   Sig: Take 10 mg by mouth 2 (two) times a day   omeprazole (PriLOSEC) 20 mg delayed release capsule   Yes No   Sig: Take 20 mg by mouth daily      Facility-Administered Medications: None     Allergies   Allergen Reactions    Penicillins        Objective   Vitals: Blood pressure 123/61, pulse 60, temperature 98 5 °F (36 9 °C), temperature source Oral, resp  rate 18, height 5' 2" (1 575 m), weight 55 6 kg (122 lb 9 2 oz), SpO2 99 %  Orthostatic Blood Pressures    Flowsheet Row Most Recent Value   Blood Pressure  123/61 filed at 10/03/2017 1225   Patient Position - Orthostatic VS  Sitting filed at 10/03/2017 1225            Intake/Output Summary (Last 24 hours) at 10/03/17 1447  Last data filed at 10/03/17 1331   Gross per 24 hour   Intake             1360 ml   Output                0 ml   Net             1360 ml       Invasive Devices     Peripheral Intravenous Line            Peripheral IV 10/02/17 Left Antecubital 1 day                Physical Exam   Constitutional: She is oriented to person, place, and time  She appears well-developed and well-nourished  No distress  HENT:   Head: Normocephalic and atraumatic  Mouth/Throat: Oropharynx is clear and moist  No oropharyngeal exudate  Eyes: Conjunctivae and EOM are normal  Pupils are equal, round, and reactive to light  No scleral icterus  Cardiovascular: Regular rhythm and normal heart sounds  No murmur heard  Bradycardic, regularly irregular    Pulmonary/Chest: Effort normal and breath sounds normal  No respiratory distress  She has no wheezes  She has no rales  Abdominal: Soft  Bowel sounds are normal  She exhibits no distension  There is no tenderness  There is no rebound  Musculoskeletal: She exhibits no edema or tenderness  Muscle strength 5/5 UE and LE b/l    Neurological: She is alert and oriented to person, place, and time  No cranial nerve deficit  Skin: She is not diaphoretic  Psychiatric: She has a normal mood and affect   Her behavior is normal  Judgment and thought content normal        Lab Results:   CBC with diff:   Results from last 7 days  Lab Units 10/03/17  0546   WBC Thousand/uL 7 46   RBC Million/uL 3 80*   HEMOGLOBIN g/dL 12 4   HEMATOCRIT % 36 6   MCV fL 96   MCH pg 32 6   MCHC g/dL 33 9   RDW % 12 9   MPV fL 10 1   PLATELETS Thousands/uL 187     CMP:   Results from last 7 days  Lab Units 10/03/17  0546 10/02/17  1323   SODIUM mmol/L 141 139   POTASSIUM mmol/L 3 2* 3 5   CHLORIDE mmol/L 107 104   CO2 mmol/L 26 27   ANION GAP mmol/L 8 8   BUN mg/dL 11 15   CREATININE mg/dL 0 76 0 92   GLUCOSE RANDOM mg/dL 87 91   CALCIUM mg/dL 8 7 9 1   AST U/L  --  21   ALT U/L  --  22   ALK PHOS U/L  --  96   TOTAL PROTEIN g/dL  --  7 3   ALBUMIN g/dL  --  3 7   BILIRUBIN TOTAL mg/dL  --  0 72   EGFR ml/min/1 73sq m 71 57     Troponin:   0  Lab Value Date/Time   TROPONINI <0 02 05/19/2017 1709   TROPONINI <0 02 05/19/2017 1501   TROPONINI <0 02 05/19/2017 1232     BNP:   Results from last 7 days  Lab Units 10/03/17  0546   SODIUM mmol/L 141   POTASSIUM mmol/L 3 2*   CHLORIDE mmol/L 107   CO2 mmol/L 26   ANION GAP mmol/L 8   BUN mg/dL 11   CREATININE mg/dL 0 76   GLUCOSE RANDOM mg/dL 87   CALCIUM mg/dL 8 7   EGFR ml/min/1 73sq m 71     Coags:   Results from last 7 days  Lab Units 10/03/17  0546   PTT seconds 27   INR  1 07     TSH:   Results from last 7 days  Lab Units 10/02/17  1323   TSH 3RD GENERATON uIU/mL 0 760     Magnesium:   Results from last 7 days  Lab Units 10/03/17  0546   MAGNESIUM mg/dL 1 6     Lipid Profile:   Results from last 7 days  Lab Units 10/03/17  0546   HDL mg/dL 100*   CHOLESTEROL mg/dL 174   LDL CALC mg/dL 49   TRIGLYCERIDES mg/dL 123     Imaging: Xr Chest 2 Views    Result Date: 10/2/2017  Impression: No active pulmonary disease   Workstation performed: [id number]     Ct Head Without Contrast    Result Date: 10/2/2017  Impression: No acute intracranial hemorrhage seen Mild cerebral atrophy No mass effect or midline shift seen Workstation performed: [id number]     EKG:  Sinus bradycardia with with supraventricular complexes   VTE Prophylaxis: Enoxaparin (Lovenox)    Code Status: Level 1 - Full Code     Counseling / Coordination of Care  Total floor / unit time spent today 45 minutes  Greater than 50% of total time was spent with the patient and / or family counseling and / or coordination of care  A description of the counseling / coordination of care: Crista Hernandez

## 2017-10-03 NOTE — SOCIAL WORK
Met with pt and explained CM role  Pt lives with her  in a 2 story house with 1 SMITH and 1st floor setup  Pt was independent PTA and has a HHA from [address] [place] [address] on Tues and Thurs from 8 a to 4p  Pt does not drive and her  provides pt with transportation  Pt's PCP is Dr Leslie Sapp  Pt has a cane at home  No hx of HHC or rehab  No hx of mental health issues or drug use  Pt has a prescription plan and uses [facility] for her prescriptions  Primary contact is pt's  [facility] # [phone]  Pt's  will provide pt with transportation home upon discharge  CM reviewed d/c planning process including the following: identifying help at home, patient preference for d/c planning needs, Discharge Lounge, [facility] Meds to Bed program, availability of treatment team to discuss questions or concerns patient and/or family may have regarding understanding medications and recognizing signs and symptoms once discharged  CM also encouraged patient to follow up with all recommended appointments after discharge  Patient advised of importance for patient and family to participate in managing patients medical well being

## 2017-10-03 NOTE — PLAN OF CARE
Problem: OCCUPATIONAL THERAPY ADULT  Goal: Performs self-care activities at highest level of function for planned discharge setting  See evaluation for individualized goals  Treatment Interventions: ADL retraining, Functional transfer training, UE strengthening/ROM, Endurance training, Cognitive reorientation, Patient/family training, Equipment evaluation/education, Compensatory technique education, Continued evaluation, Activityengagement, Energy conservation          See flowsheet documentation for full assessment, interventions and recommendations  Outcome: Progressing  Limitation: Decreased ADL status, Decreased UE strength, Decreased Safe judgement during ADL, Decreased cognition, Decreased endurance, Decreased UE ROM, Decreased self-care trans, Decreased high-level ADLs  Prognosis: Fair  Assessment: Pt is pleasant, active, retired 79 y/o female seen for OT eval s/p adm to [facility] w/ dizziness, fell leaving [facility] w/ + knee strike, LLE weakness, decreased appetite recently, dx'd w/ r/o TIA/CVA, CTB: (-) acute, MRIB P, comorbidities include a h/o HLD, HTN, MI, GERD  Pt was I w/  I/ADLS, A for driving per , & required no use of DME PTA, pt lives w/  in [place] in a [facility] w/ first floor setup w/ 1 SMITH vs garage w/ 0 SMITH  Pt is currently demonstrating the following occupational deficits: limited 2* decreased endurance/activity tolerance, generalized weakness, deconditioning, SOB, DOLAN, fatigue, fall risk, impaired balance, LLE discomfort, gross motor ataxia, ? R peripheral field inattention ? Field cut, minimizes impairments, mildly impulsive, anxious, decreased safety insight judgment and awareness into deficits  Pt requires overall SBA for ADLs/self care and SBA for fx'l mobility w/ RW   The following Occupational Performance Areas to address include: bathing/shower, toilet hygiene, dressing, socialization, health maintenance, functional mobility, community mobility, clothing management, cleaning, meal prep, money management, household maintenance, care of children, care of pets, job performance/volunteering and social participation  Pt scored overall 60/100 on the Barthel Index and 3/6 on the Modified Sterling Scale  Based on the aforementioned OT evaluation, functional performance deficits, and assessments, pt has been identified as a moderate complexity evaluation  Recommend outpt OT/PT when med stable, recommend [address] Neuro specialty site, pt minimally agreeable, perseverative on [place] outpt PT 2* h/o same though educated on need for neuro specific site  Pt to continue to benefit from acute immediate OT services to address the following goals 3-5x/wk to  w/in 7-10 days:     Discharge Recommendation: Outpatient OT (to outpt OT/PT @ [address] for neuro specialty when med stable)  OT - OK to Discharge: Yes (to outpt OT/PT @ [address] for neuro specialty when med stable)    Thank you for the consult!  Please call if you have any questions: Carmen Carranza, OTD, OTR/L, C-GCM, CSRS  [facility] [employer]

## 2017-10-03 NOTE — PROGRESS NOTES
Progress Note - Akilah Yeh 80 y o  female MRN: [id number]    Unit/Bed#: [facility] 713-01 Encounter: [id number]        Abnormal EKG   Assessment & Plan    Repeat EKG appreciated   Telemetry showing sinus arrythmia with bradycardia as well  Consult cards  Replete electrolytes  Hold coreg for heart rate less than 60  TSH 0 7    Recent echo in July showing LEFT VENTRICLE: Size was normal Systolic function was normal  Ejection fraction was estimated to be 60 %  Wall thickness was normal Left ventricular diastolic function parameters were normal RIGHT VENTRICLE:The size was normal Systolic function was normal MITRAL VALVE:There was trace to mild regurgitation  AORTIC VALVE:There was trace regurgitation  * Left leg weakness   Assessment & Plan    resolved  R/o TIA vs  CVA- stroke pathway  MRI completed, pending official interpretation  CT head- no acute findings, Mild cerebral atrophy  ucx in process  ECHO in July noted  Carotid us pending  Neuro consult pending         Hypomagnesemia   Assessment & Plan    Give 2 grams of Mag        Hypokalemia   Assessment & Plan    Replete with 40 meq kdur        Dementia   Assessment & Plan    C/w namenda        GERD (gastroesophageal reflux disease)   Assessment & Plan    C/w PPI        CAD (coronary artery disease)   Assessment & Plan    Pending cards eval         Essential hypertension   Assessment & Plan    C/w norvasc and coreg, hold coreg for HR < 60        Acquired hypothyroidism   Assessment & Plan    tsh 0 760  C/w synthroid             VTE Pharmacologic Prophylaxis:   Pharmacologic: Enoxaparin (Lovenox)  Mechanical VTE Prophylaxis in Place: Yes    Patient Centered Rounds: I have performed bedside rounds with nursing staff today  Discussions with Specialists or Other Care Team Provider: RN, neurology    Education and Discussions with Family / Patient: d/w patient  Call to  no answer, message left     Time Spent for Care: 30 minutes    More than 50% of total time spent on counseling and coordination of care as described above  Current Length of Stay: 0 day(s)    Current Patient Status: Observation   Certification Statement: TBD    Discharge Plan: pt is adamant about being discharged today  Reviewed EKG results with her and the need for a cardiac evaluation prior to discharge  Also still needs to be seen by neuro and MRI pending  Recommended that the patient stay inpatient  Pt currently refusing  Attempting to talk with her   She was instructed that she would have to leave AMA if she did decide to leave  Code Status: Level 1 - Full Code      Subjective:   Pt is upset  "I want to be discharged, why can't people call me when I get home to give me the results " she denies any current complaints, left leg weakness and left hand numbness/tingling resolved  Objective:     Vitals:   Temp (24hrs), Av 8 °F (36 6 °C), Min:97 5 °F (36 4 °C), Max:98 °F (36 7 °C)    HR:  [54-96] 56  Resp:  [13-22] 18  BP: (102-192)/(52-96) 102/53  SpO2:  [94 %-99 %] 99 %  Body mass index is 22 42 kg/m²  Input and Output Summary (last 24 hours): Intake/Output Summary (Last 24 hours) at 10/03/17 1200  Last data filed at 10/02/17 1642   Gross per 24 hour   Intake             2000 ml   Output                0 ml   Net             2000 ml       Physical Exam:      Physical Exam   Constitutional: She is oriented to person, place, and time  She appears well-developed  No distress  Neck: Neck supple  Cardiovascular: Normal heart sounds and intact distal pulses  Bradycardia present  No murmur heard  Pulmonary/Chest: Effort normal and breath sounds normal  No respiratory distress  She has no wheezes  She has no rales  Abdominal: Soft  Bowel sounds are normal  She exhibits no distension  There is no tenderness  There is no rebound  Musculoskeletal: She exhibits no edema or tenderness  Neurological: She is alert and oriented to person, place, and time   No cranial nerve deficit  Skin: Skin is warm and dry  No rash noted  She is not diaphoretic  No erythema  Psychiatric: She has a normal mood and affect  Additional Data:     Labs:      Results from last 7 days  Lab Units 10/03/17  0546 10/02/17  1323   WBC Thousand/uL 7 46 9 84   HEMOGLOBIN g/dL 12 4 12 9   HEMATOCRIT % 36 6 38 6   PLATELETS Thousands/uL 187 203   NEUTROS PCT %  --  79*   LYMPHS PCT %  --  14   MONOS PCT %  --  6   EOS PCT %  --  1       Results from last 7 days  Lab Units 10/03/17  0546 10/02/17  1323   SODIUM mmol/L 141 139   POTASSIUM mmol/L 3 2* 3 5   CHLORIDE mmol/L 107 104   CO2 mmol/L 26 27   BUN mg/dL 11 15   CREATININE mg/dL 0 76 0 92   CALCIUM mg/dL 8 7 9 1   TOTAL PROTEIN g/dL  --  7 3   BILIRUBIN TOTAL mg/dL  --  0 72   ALK PHOS U/L  --  96   ALT U/L  --  22   AST U/L  --  21   GLUCOSE RANDOM mg/dL 87 91       Results from last 7 days  Lab Units 10/03/17  0546   INR  1 07       * I Have Reviewed All Lab Data Listed Above  * Additional Pertinent Lab Tests Reviewed: All Labs Within Last 24 Hours Reviewed    Imaging:    Imaging Reports Reviewed Today Include: ct head, chest xray   Recent Cultures (last 7 days):           Last 24 Hours Medication List:     amLODIPine 5 mg Oral Daily   aspirin 81 mg Oral Daily   atorvastatin 40 mg Oral Daily   carvedilol 12 5 mg Oral BID With Meals   cholecalciferol 1,000 Units Oral Daily   cyanocobalamin 1,000 mcg Oral Daily   enoxaparin 40 mg Subcutaneous Daily   levothyroxine 100 mcg Oral Early Morning   losartan 100 mg Oral Daily   memantine 10 mg Oral BID   pantoprazole 40 mg Oral Early Morning        Today, Patient Was Seen By: LEE Koenig    ** Please Note: Dragon 360 Dictation voice to text software may have been used in the creation of this document   **

## 2017-10-03 NOTE — PLAN OF CARE
Problem: PHYSICAL THERAPY ADULT  Goal: Performs mobility at highest level of function for planned discharge setting  See evaluation for individualized goals  Treatment/Interventions: Functional transfer training, LE strengthening/ROM, Elevations, Therapeutic exercise, Endurance training, Bed mobility, Gait training  Equipment Recommended: Katerin Yusuf (Does not currently own one)       See flowsheet documentation for full assessment, interventions and recommendations  Prognosis: Good  Problem List: Decreased endurance, Impaired balance, Decreased mobility, Decreased strength, Impaired vision  Assessment: Pt is an 79 y/o female who presented with LE weakness, tremors of whole body, decreased appetite, SOB, nausea, and fatigue that was worsening for the past 3 days  Pt had a fall after [employer] secondary to increased LLE weakness  Pt was admitted to r/o CVA/TIA  Pt has a history of HTN, CAD, TIA, dementia, GERD, HLD, and MI  PTA, pt was (I) with ADLs and was not using an AD for ambulation  During IE, pt was able to perform transfers with S and ambulated 76' with RW and min A and VCing to help with proper sequencing with RW  Pt ran into things 3 times on her right side  Pt performed TUG in 28 1 secs with the use of RW  For community dwelling adults, a time of <13 5 secs is normal  Pts time puts her at a moderate risk for falls  Pt would benefit from inpatient physical therapy to improve balance, endurance, and strength  Recommend home with outpatient PT  Recommendation: Outpatient PT     PT - OK to Discharge:  (when medically stable)    See flowsheet documentation for full assessment

## 2017-10-03 NOTE — PLAN OF CARE
Activity Intolerance/Impaired Mobility     Mobility/activity is maintained at optimum level for patient Progressing        Communication Impairment     Ability to express needs and understand communication [address] Macdonald Discharge to home or other facility with appropriate resources Progressing        INFECTION - ADULT     Absence or prevention of progression during hospitalization Progressing        Knowledge Deficit     Patient/family/caregiver demonstrates understanding of disease process, treatment plan, medications, and discharge instructions Progressing        Neurological Deficit     Neurological status is stable or improving Progressing        Nutrition     Nutrition/Hydration status is improving Progressing        Nutrition/Hydration-ADULT     Nutrient/Hydration intake appropriate for improving, restoring or maintaining nutritional needs Progressing        PAIN - ADULT     Verbalizes/displays adequate comfort level or baseline comfort level Progressing        Potential for Aspiration     Non-ventilated patient's risk of aspiration is minimized Progressing        Potential for Falls     Patient will remain free of falls Progressing        Prexisting or High Potential for Compromised Skin Integrity     Skin integrity is maintained or improved Progressing        SAFETY ADULT     Maintain or return to baseline ADL function Progressing     Maintain or return mobility status to optimal level Progressing

## 2017-10-03 NOTE — PLAN OF CARE
Problem: DISCHARGE PLANNING - CARE MANAGEMENT  Goal: Discharge to post-acute care or home with appropriate resources  INTERVENTIONS:  - Conduct assessment to determine patient/family and health care team treatment goals, and need for post-acute services based on payer coverage, community resources, and patient preferences, and barriers to discharge  - Address psychosocial, clinical, and financial barriers to discharge as identified in assessment in conjunction with the patient/family and health care team  - Arrange appropriate level of post-acute services according to patient's   needs and preference and payer coverage in collaboration with the physician and health care team  - Communicate with and update the patient/family, physician, and health care team regarding progress on the discharge plan  - Arrange appropriate transportation to post-acute venues  - Pt will be discharged to home self care  Outcome: Progressing

## 2017-10-03 NOTE — PROGRESS NOTES
Discussed with ultilization review   The patient qualifies for an inpatient admission, requiring > 2MN stay d/t CVA w/u and abnormal EKG pending cardiology evaluation and neurology evals

## 2017-10-03 NOTE — ASSESSMENT & PLAN NOTE
Repeat EKG appreciated   Telemetry showing sinus arrythmia with bradycardia as well  Consult cards  Replete electrolytes  Hold coreg for heart rate less than 60  TSH 0 7    Recent echo in July showing LEFT VENTRICLE: Size was normal Systolic function was normal  Ejection fraction was estimated to be 60 %  Wall thickness was normal Left ventricular diastolic function parameters were normal RIGHT VENTRICLE:The size was normal Systolic function was normal MITRAL VALVE:There was trace to mild regurgitation  AORTIC VALVE:There was trace regurgitation

## 2017-10-04 VITALS
BODY MASS INDEX: 22.56 KG/M2 | OXYGEN SATURATION: 100 % | WEIGHT: 122.58 LBS | RESPIRATION RATE: 18 BRPM | TEMPERATURE: 98.1 F | HEART RATE: 53 BPM | SYSTOLIC BLOOD PRESSURE: 136 MMHG | DIASTOLIC BLOOD PRESSURE: 69 MMHG | HEIGHT: 62 IN

## 2017-10-04 PROBLEM — R29.898 LEFT LEG WEAKNESS: Status: RESOLVED | Noted: 2017-10-03 | Resolved: 2017-10-04

## 2017-10-04 PROBLEM — E87.6 HYPOKALEMIA: Status: RESOLVED | Noted: 2017-10-03 | Resolved: 2017-10-04

## 2017-10-04 PROBLEM — E83.42 HYPOMAGNESEMIA: Status: RESOLVED | Noted: 2017-10-03 | Resolved: 2017-10-04

## 2017-10-04 LAB
ANION GAP SERPL CALCULATED.3IONS-SCNC: 8 MMOL/L (ref 4–13)
BUN SERPL-MCNC: 19 MG/DL (ref 5–25)
CALCIUM SERPL-MCNC: 8.7 MG/DL (ref 8.3–10.1)
CHLORIDE SERPL-SCNC: 108 MMOL/L (ref 100–108)
CO2 SERPL-SCNC: 25 MMOL/L (ref 21–32)
CREAT SERPL-MCNC: 1.17 MG/DL (ref 0.6–1.3)
GFR SERPL CREATININE-BSD FRML MDRD: 42 ML/MIN/1.73SQ M
GLUCOSE SERPL-MCNC: 88 MG/DL (ref 65–140)
POTASSIUM SERPL-SCNC: 4.5 MMOL/L (ref 3.5–5.3)
SODIUM SERPL-SCNC: 141 MMOL/L (ref 136–145)

## 2017-10-04 PROCEDURE — 97535 SELF CARE MNGMENT TRAINING: CPT

## 2017-10-04 PROCEDURE — 97530 THERAPEUTIC ACTIVITIES: CPT

## 2017-10-04 PROCEDURE — 80048 BASIC METABOLIC PNL TOTAL CA: CPT | Performed by: NURSE PRACTITIONER

## 2017-10-04 PROCEDURE — 97116 GAIT TRAINING THERAPY: CPT

## 2017-10-04 RX ORDER — CARVEDILOL 6.25 MG/1
6.25 TABLET ORAL 2 TIMES DAILY WITH MEALS
Qty: 60 TABLET | Refills: 0 | Status: SHIPPED | OUTPATIENT
Start: 2017-10-04 | End: 2018-11-07 | Stop reason: ALTCHOICE

## 2017-10-04 RX ADMIN — AMLODIPINE BESYLATE 5 MG: 5 TABLET ORAL at 07:46

## 2017-10-04 RX ADMIN — ENOXAPARIN SODIUM 40 MG: 40 INJECTION SUBCUTANEOUS at 07:46

## 2017-10-04 RX ADMIN — ASPIRIN 81 MG 81 MG: 81 TABLET ORAL at 07:46

## 2017-10-04 RX ADMIN — CYANOCOBALAMIN TAB 500 MCG 1000 MCG: 500 TAB at 07:45

## 2017-10-04 RX ADMIN — VITAMIN D, TAB 1000IU (100/BT) 1000 UNITS: 25 TAB at 07:46

## 2017-10-04 RX ADMIN — ATORVASTATIN CALCIUM 40 MG: 40 TABLET, FILM COATED ORAL at 07:46

## 2017-10-04 RX ADMIN — CARVEDILOL 6.25 MG: 6.25 TABLET, FILM COATED ORAL at 07:45

## 2017-10-04 RX ADMIN — LEVOTHYROXINE SODIUM 100 MCG: 100 TABLET ORAL at 05:03

## 2017-10-04 RX ADMIN — LOSARTAN POTASSIUM 100 MG: 50 TABLET, FILM COATED ORAL at 07:46

## 2017-10-04 RX ADMIN — MEMANTINE 10 MG: 5 TABLET ORAL at 07:46

## 2017-10-04 RX ADMIN — PANTOPRAZOLE SODIUM 40 MG: 40 TABLET, DELAYED RELEASE ORAL at 05:03

## 2017-10-04 NOTE — OCCUPATIONAL THERAPY NOTE
Occupational Therapy Treatment Note       10/04/17 0858   Restrictions/Precautions   Weight Bearing Precautions Per Order No   Other Precautions Chair Alarm   Lifestyle   Autonomy Pt was I w/  I/ADLS, A for driving per , & required no use of DME PTA   Reciprocal Relationships Supportive valencia pt lives w/ is retired and home to A as needed, manages driving PRN functionally indepdendent, pt has 4 children locally  Service to Others Retired from [employer] in [place], worked in [employer] Enjoys going to [employer] and doing her rehab/exercises  Pain Assessment   Pain Assessment No/denies pain   Pain Score No Pain   ADL   Where Assessed Standing at sink   Grooming Assistance 5  Supervision/Setup    W [address]; Increased time to complete  (cognitve support )   Toileting Assistance  5  Supervision/Setup   Toileting Deficit Increased time to complete;Steadying   Transfers   Sit to Stand 5  Supervision   Additional items Increased time required   Stand to Sit 5  Supervision   Additional items Increased time required   Functional Mobility   Functional Mobility 5  Supervision   Additional items Rolling walker   Cognition   Overall Cognitive Status Impaired   Arousal/Participation Alert   Attention Attends with cues to redirect   Orientation Level Oriented X4   Memory Decreased short term memory;Decreased recall of recent events;Decreased recall of precautions   Following Commands Follows one step commands with increased time or repetition   Activity Tolerance   Activity Tolerance Patient tolerated treatment well   Assessment   Assessment Pt participated in occupational therapy with focus on activity tolerance, functional transfers/mob, standing  balance and tolerance for pt engagement in functional UB self-care task/oral care, toileting/toilet transfers   Pt presented sitting out of bed to bedside chair for sponge bathing/AM care  Pt pleasant and cooperative with OT requests  Pt reported no need for RW to/from pt bathroom however did require furniture/wall surface to steady balance in stance  Pt reported looking forward to return to home upon d/c from hospital   Pt would benefit from  Out-pt rehab  to continue to address pt deficits with decreased overall strength which currently impair pt ADL and functional mob      Plan   Treatment Interventions ADL retraining;Functional transfer training;Patient/family training   Goal Expiration Date 10/13/17   Treatment Day 1   OT Frequency 3-5x/wk   Recommendation   Discharge Recommendation Outpatient OT     Adriana GRAFF/RYAN

## 2017-10-04 NOTE — SOCIAL WORK
Met with pt and pts  who are requesting a RW for pt at dc and requesting [facility]  Pt and spouse report never receiving DME within 5 years  Pt and  are requesting RW be delivered to pts home  Pt's  to transport home at dc  [facility] referral sent to [facility]  Inna Dawson liasion aware of same

## 2017-10-04 NOTE — SOCIAL WORK
Pt recommended for outpt PT/OT services--pt is aware and agreeable  Outpt script provided to patient

## 2017-10-04 NOTE — DISCHARGE SUMMARY
Discharge Summary - [facility] Internal Medicine    Patient Information: Surya Rosario 80 y o  female MRN: [id number]  Unit/Bed#: [facility] 738-88 Encounter: [id number]    Discharging Physician / Practitioner: Bari Chadwick PA-C  PCP: Ritu Almanza MD  Admission Date: 10/2/2017  Discharge Date: 10/04/17    Reason for Admission: weakness r/o CVA     Discharge Diagnoses:     Principal Problem:    Left leg weakness  Active Problems:    Acquired hypothyroidism    Essential hypertension    CAD (coronary artery disease)    GERD (gastroesophageal reflux disease)    Dementia    History of TIA (transient ischemic attack)    Abnormal EKG    Hypokalemia    Hypomagnesemia  Resolved Problems:    * No resolved hospital problems  *      Consultations During Hospital Stay:  · Neurology Dr Naeem Ott  · Cardiology Dr Yo Vazquez    Procedures Performed:     · none    Significant Findings / Test Results:     · Chest x-ray no active pulmonary disease  · CT head no acute intracranial hemorrhage  Mild cerebral atrophy  No mass effect or midline shift  · MRI of the brain:  No acute intracranial abnormality  No restricted diffusion within the brain parenchyma to suggest acute ischemia  Mild scattered periventricular and subcortical foci of white matter T2 hyperintensity which is nonspecific most likely related to some small vessel ischemic changes are chronic  Mild volume loss  · Bilateral carotid duplex study-less than 50% stenosis in the right internal carotid artery, no significant stenosis in the left internal carotid artery  · Sinus Camelia Matter with PACs  · Left lower extremity weakness, resolved    Incidental Findings:   · None     Test Results Pending at Discharge (will require follow up):    · None     Outpatient Tests Requested:  · outpatient follow-up with PCP, cardiology    Medication changes upon discharge:  · Decrease carvedilol to 6 25 mg twice daily  · Continue aspirin, statin for stroke prevention    Complications: None    Hospital Course:     Byron Montana is a 80 y o  female patient with past medical history is significant for hypertension, coronary artery disease, who originally presented to the hospital on 10/2/2017 due to left lower extremity weakness  Patient was also experiencing generalized fatigue, a fall at home as well as poor appetite and weight loss  Patient was seen in consultation by Neurology to rule out CVA/TIA and workup was essentially negative for new ischemic event  The patient was found to have abnormal EKG and cardiology was also consulted and she was observed on telemetry  Patient was found to have some episodes of bradycardia and her Coreg dose was decreased in half  She was maintained on amlodipine and losartan no blood pressures remained stable with new adjustment  Patient has refused ischemic testing in the outpatient setting and no further testing was done in the inpatient setting  Prior to discharge there are no events of bradycardia overnight on telemetry  Her lower extremity weakness and left arm tingling without other focal deficits resolved prior to discharge and patient informed she did not have a stroke  Patient's symptoms are possibly related to hypoperfusion in the setting of multiple antihypertensive medications versus dehydration  The signs and symptoms associated with stroke were discussed with the patient and she expressed understanding  She will continue taking her aspirin and statin and follow up with her primary care physician and cardiologist Dr Guera Quintero  Patient expressed understanding and agrees with plan for discharge home with close outpatient follow-up  Her reduction in Coreg dose was discussed and provided in discharge instructions  Patient also given prescription for outpatient physical therapy and gait training  Condition at Discharge: stable     Discharge Day Visit / Exam:     Subjective:  Patient seen and examined at bedside    Patient feels well and wants to go home  No lower extremity weakness  No difficulty ambulating or sensation of feeling off balance for the patient  No nausea, vomiting, diarrhea  No chest pain or shortness of breath  No visual deficits that are new  Vitals: Blood Pressure: 101/56 (10/04/17 0729)  Pulse: 61 (10/04/17 0729)  Temperature: 98 1 °F (36 7 °C) (10/04/17 0729)  Temp Source: Oral (10/04/17 0729)  Respirations: 16 (10/04/17 0729)  Height: 5' 2" (157 5 cm) (10/02/17 2303)  Weight - Scale: 55 6 kg (122 lb 9 2 oz) (10/02/17 2303)  SpO2: 96 % (10/04/17 0729)    Exam:   Physical Exam   Constitutional: She is oriented to person, place, and time  She appears well-developed and well-nourished  No distress  HENT:   Head: Normocephalic and atraumatic  Mouth/Throat: Oropharynx is clear and moist    Eyes: EOM are normal  No scleral icterus  Cardiovascular: Normal rate, regular rhythm and normal heart sounds  No murmur heard  Pulmonary/Chest: Effort normal and breath sounds normal    Abdominal: Soft  Bowel sounds are normal    Musculoskeletal: Normal range of motion  She exhibits no edema  Strength intact all 4 extremities    Neurological: She is alert and oriented to person, place, and time  Skin: Skin is warm and dry  Psychiatric: She has a normal mood and affect  Vitals reviewed  Discharge instructions/Information to patient and family:   See after visit summary for information provided to patient and family  Provisions for Follow-Up Care:  See after visit summary for information related to follow-up care and any pertinent home health orders  Disposition:     Home    For Discharges to [facility] SNF:   · Not Applicable to this Patient - Not Applicable to this Patient    Planned Readmission: none     Discharge Statement:  I spent 35 minutes discharging the patient  This time was spent on the day of discharge  I had direct contact with the patient on the day of discharge   Greater than 50% of the total time was spent examining patient, answering all patient questions, arranging and discussing plan of care with patient as well as directly providing post-discharge instructions  Additional time then spent on discharge activities  Discharge Medications:  See after visit summary for reconciled discharge medications provided to patient and family        ** Please Note: This note has been constructed using a voice recognition system **

## 2017-10-04 NOTE — PLAN OF CARE
Problem: PHYSICAL THERAPY ADULT  Goal: Performs mobility at highest level of function for planned discharge setting  See evaluation for individualized goals  Treatment/Interventions: Functional transfer training, LE strengthening/ROM, Elevations, Therapeutic exercise, Endurance training, Bed mobility, Gait training  Equipment Recommended: Fern Dle Toro (Does not currently own one)       See flowsheet documentation for full assessment, interventions and recommendations  Outcome: Progressing  Prognosis: Good  Problem List: Decreased strength, Decreased endurance, Impaired balance, Decreased mobility  Assessment: Pt seen for session, gait training x 25 min  Note much improved strength today in LEs  Less LOB w/ gait, with or without RW  Still performs better w/ RW, and continue to recommend home w/ RW, outpatient PT at d/c        Recommendation:  (home w/ RW, outpatient PT)     PT - OK to Discharge:  (when medically stable)    See flowsheet documentation for full assessment

## 2017-10-04 NOTE — PROGRESS NOTES
Progress Note - Cardiology   Arlin Bucio 80 y o  female MRN: [id number]  [facility] Encounter: [id number]    Assessment:    Sinus bradycardia with PACs   - Likely 2/2 to electrolyte abnormalities and/or beta blocker   - No bradycardic events on telemetry overnight   - Patients HR ~ 60 Continues to be asymptomatic   - Goal K = 4 and Mg = 2  - Continue coreg 6 25   - Follow up outpatient with Dr Henny Calzada   - Continue to monitor blood pressure at home    CAD   - MI 10 years ago with Cath at that time showing stenosed vessels per patient  No intervention at that time   - Recent ECHO 7/17 showed EF 60% with normal wall thickness and left ventricular diastolic function normal   - EKG on admission shows Sinus Rhythm with t wave abnormalities present on 7/11/17 but not present on 5/19/17    - Troponins negative x 1    - Continue aspirin 81 mg daily and atorvastatin 40 mg daily   - Patient refused further ischemic testing at last office visit   No further testing for now      HTN    - Controlled in hospital on current medications   - Continue amlodipine 5 mg, losartan 100mg daily, coreg 6 25 BID  - If needed, can increase amlodipine dose to 10mg     LLE weakness   - Likely TIA vs systemic dehydration vs hypoglycemia   - CT head without acute abnormality   - MRI without acute intracranial abnormalities   - VAS <50% stenosis   - Appreciate neurology recommendations     Subjective/Objective       Vitals: /56   Pulse 61   Temp 98 1 °F (36 7 °C) (Oral)   Resp 16   Ht 5' 2" (1 575 m)   Wt 55 6 kg (122 lb 9 2 oz)   SpO2 96%   BMI 22 42 kg/m²   Vitals:    10/02/17 1130 10/02/17 2303   Weight: 50 8 kg (112 lb) 55 6 kg (122 lb 9 2 oz)     Orthostatic Blood Pressures    Flowsheet Row Most Recent Value   Blood Pressure  101/56 filed at 10/04/2017 4712   Patient Position - Orthostatic VS  Lying filed at 10/04/2017 0729            Intake/Output Summary (Last 24 hours) at 10/04/17 6137  Last data filed at 10/04/17 9770   Gross per 24 hour   Intake              680 ml   Output                0 ml   Net              680 ml       Invasive Devices     Peripheral Intravenous Line            Peripheral IV 10/02/17 Left Antecubital 1 day                Physical Exam: /56   Pulse 61   Temp 98 1 °F (36 7 °C) (Oral)   Resp 16   Ht 5' 2" (1 575 m)   Wt 55 6 kg (122 lb 9 2 oz)   SpO2 96%   BMI 22 42 kg/m²     Physical Exam   Constitutional: She is oriented to person, place, and time  She appears well-developed and well-nourished  No distress  HENT:   Head: Normocephalic and atraumatic  Mouth/Throat: Oropharynx is clear and moist  No oropharyngeal exudate  Eyes: Conjunctivae and EOM are normal  Pupils are equal, round, and reactive to light  Cardiovascular: Regular rhythm and normal heart sounds  No murmur heard  bradycardic   Pulmonary/Chest: Effort normal and breath sounds normal  No respiratory distress  She has no wheezes  She has no rales  Abdominal: Soft  There is no tenderness  Musculoskeletal: She exhibits no edema, tenderness or deformity  Neurological: She is alert and oriented to person, place, and time  Psychiatric: She has a normal mood and affect   Her behavior is normal  Judgment and thought content normal        Lab Results: CBC with diff:   Results from last 7 days  Lab Units 10/03/17  0546   WBC Thousand/uL 7 46   RBC Million/uL 3 80*   HEMOGLOBIN g/dL 12 4   HEMATOCRIT % 36 6   MCV fL 96   MCH pg 32 6   MCHC g/dL 33 9   RDW % 12 9   MPV fL 10 1   PLATELETS Thousands/uL 187     CMP:   Results from last 7 days  Lab Units 10/04/17  0531  10/02/17  1323   SODIUM mmol/L 141  < > 139   POTASSIUM mmol/L 4 5  < > 3 5   CHLORIDE mmol/L 108  < > 104   CO2 mmol/L 25  < > 27   ANION GAP mmol/L 8  < > 8   BUN mg/dL 19  < > 15   CREATININE mg/dL 1 17  < > 0 92   GLUCOSE RANDOM mg/dL 88  < > 91   CALCIUM mg/dL 8 7  < > 9 1   AST U/L  --   --  21   ALT U/L  --   --  22   ALK PHOS U/L  --   --  96   TOTAL PROTEIN g/dL  --   --  7 3   ALBUMIN g/dL  --   --  3 7   BILIRUBIN TOTAL mg/dL  --   --  0 72   EGFR ml/min/1 73sq m 42  < > 57   < > = values in this interval not displayed  Troponin:   0  Lab Value Date/Time   TROPONINI <0 02 05/19/2017 1709   TROPONINI <0 02 05/19/2017 1501   TROPONINI <0 02 05/19/2017 1232     BNP:   Results from last 7 days  Lab Units 10/04/17  0531   SODIUM mmol/L 141   POTASSIUM mmol/L 4 5   CHLORIDE mmol/L 108   CO2 mmol/L 25   ANION GAP mmol/L 8   BUN mg/dL 19   CREATININE mg/dL 1 17   GLUCOSE RANDOM mg/dL 88   CALCIUM mg/dL 8 7   EGFR ml/min/1 73sq m 42     Coags:   Results from last 7 days  Lab Units 10/03/17  0546   PTT seconds 27   INR  1 07     TSH:   Results from last 7 days  Lab Units 10/02/17  1323   TSH 3RD GENERATON uIU/mL 0 760     Magnesium:   Results from last 7 days  Lab Units 10/03/17  0546   MAGNESIUM mg/dL 1 6     Lipid Profile:   Results from last 7 days  Lab Units 10/03/17  0546   HDL mg/dL 100*   CHOLESTEROL mg/dL 174   LDL CALC mg/dL 49   TRIGLYCERIDES mg/dL 123     Imaging: I have personally reviewed pertinent reports      EKG: Sinus bradycardia with PACs  VTE Pharmacologic Prophylaxis: Enoxaparin (Lovenox)  VTE Mechanical Prophylaxis: sequential compression device

## 2017-10-04 NOTE — PLAN OF CARE
Problem: Nutrition/Hydration-ADULT  Goal: Nutrient/Hydration intake appropriate for improving, restoring or maintaining nutritional needs  Monitor and assess patient's nutrition/hydration status for malnutrition (ex- brittle hair, bruises, dry skin, pale skin and conjunctiva, muscle wasting, smooth red tongue, and disorientation)  Collaborate with interdisciplinary team and initiate plan and interventions as ordered  Monitor patient's weight and dietary intake as ordered or per policy  Utilize nutrition screening tool and intervene per policy  Determine patient's food preferences and provide high-protein, high-caloric foods as appropriate       INTERVENTIONS:  - Monitor oral intake, urinary output, labs, and treatment plans  - Assess nutrition and hydration status and recommend course of action  - Evaluate amount of meals eaten  - Assist patient with eating if necessary   - Allow adequate time for meals  - Recommend/ encourage appropriate diets, oral nutritional supplements, and vitamin/mineral supplements  - Provide specific nutrition/hydration education as appropriate  - Include patient/family/caregiver in decisions related to nutrition    Outcome: Progressing

## 2017-10-04 NOTE — PHYSICAL THERAPY NOTE
Physical Therapy Treatment Note     10/04/17 1030   Pain Assessment   Pain Assessment 0-10   Pain Score No Pain   Restrictions/Precautions   Weight Bearing Precautions Per Order No   Other Precautions Chair Alarm; Bed Alarm; Fall Risk;Impulsive   General   Chart Reviewed Yes   Family/Caregiver Present No   Cognition   Overall Cognitive Status Impaired   Arousal/Participation Responsive   Attention Attends with cues to redirect   Orientation Level Oriented X4   Memory Unable to assess   Following Commands Follows one step commands without difficulty   Subjective   Subjective states she feels better today  improving strength, hopes to go home today   Transfers   Sit to Stand 5  Supervision   Additional items Assist x 1   Stand to Sit 5  Supervision   Additional items Assist x 1   Ambulation/Elevation   Gait pattern (mild lateral sway, especially when distracted  no LOB)   Gait Assistance 4  Minimal assist   Additional items Assist x 1   Assistive Device Rolling walker   Distance 200'x1 w/ RW, 150' without RW   standing rest x 5 min   Balance   Static Sitting Normal   Dynamic Sitting Good   Static Standing Fair +   Dynamic Standing Fair +   Ambulatory Fair +   Endurance Deficit   Endurance Deficit Yes   Activity Tolerance   Activity Tolerance Patient limited by fatigue;Treatment limited secondary to medical complications (Comment)   Assessment   Prognosis Good   Problem List Decreased strength;Decreased endurance; Impaired balance;Decreased mobility   Assessment Pt seen for session, gait training x 25 min  Note much improved strength today in LEs  Less LOB w/ gait, with or without RW  Still performs better w/ RW, and continue to recommend home w/ RW, outpatient PT at d/c   Goals   Patient Goals none stated   STG Expiration Date 10/13/17   Treatment Day 1   Plan   Treatment/Interventions Functional transfer training;LE strengthening/ROM; Therapeutic exercise; Endurance training;Patient/family training;Equipment eval/education; Bed mobility;Gait training   PT Frequency 5x/wk   Recommendation   Recommendation (home w/ RW, outpatient PT)   Equipment Recommended Anu Goldberg   PT - OK to Discharge (to home when stable w/ RW and outpatient PT)   Tomasa James PT, DPT CSRS

## 2017-10-09 ENCOUNTER — ALLSCRIPTS OFFICE VISIT (OUTPATIENT)
Dept: OTHER | Facility: OTHER | Age: 82
End: 2017-10-09

## 2017-10-10 ENCOUNTER — APPOINTMENT (OUTPATIENT)
Dept: PHYSICAL THERAPY | Facility: REHABILITATION | Age: 82
End: 2017-10-10
Payer: MEDICARE

## 2017-10-10 PROCEDURE — G8978 MOBILITY CURRENT STATUS: HCPCS

## 2017-10-10 PROCEDURE — 97110 THERAPEUTIC EXERCISES: CPT

## 2017-10-10 PROCEDURE — 97162 PT EVAL MOD COMPLEX 30 MIN: CPT

## 2017-10-10 PROCEDURE — G8979 MOBILITY GOAL STATUS: HCPCS

## 2017-10-12 ENCOUNTER — GENERIC CONVERSION - ENCOUNTER (OUTPATIENT)
Dept: OTHER | Facility: OTHER | Age: 82
End: 2017-10-12

## 2017-10-13 ENCOUNTER — APPOINTMENT (OUTPATIENT)
Dept: PHYSICAL THERAPY | Facility: REHABILITATION | Age: 82
End: 2017-10-13
Payer: MEDICARE

## 2017-10-13 PROCEDURE — 97110 THERAPEUTIC EXERCISES: CPT

## 2017-10-13 PROCEDURE — 97140 MANUAL THERAPY 1/> REGIONS: CPT

## 2017-10-17 ENCOUNTER — APPOINTMENT (OUTPATIENT)
Dept: PHYSICAL THERAPY | Facility: REHABILITATION | Age: 82
End: 2017-10-17
Payer: MEDICARE

## 2017-10-17 PROCEDURE — 97112 NEUROMUSCULAR REEDUCATION: CPT

## 2017-10-17 PROCEDURE — 97110 THERAPEUTIC EXERCISES: CPT

## 2017-10-19 ENCOUNTER — APPOINTMENT (OUTPATIENT)
Dept: PHYSICAL THERAPY | Facility: REHABILITATION | Age: 82
End: 2017-10-19
Payer: MEDICARE

## 2017-10-19 PROCEDURE — 97140 MANUAL THERAPY 1/> REGIONS: CPT

## 2017-10-19 PROCEDURE — 97110 THERAPEUTIC EXERCISES: CPT

## 2017-10-19 PROCEDURE — 97112 NEUROMUSCULAR REEDUCATION: CPT

## 2017-10-23 ENCOUNTER — APPOINTMENT (OUTPATIENT)
Dept: PHYSICAL THERAPY | Facility: REHABILITATION | Age: 82
End: 2017-10-23
Payer: MEDICARE

## 2017-10-23 PROCEDURE — 97110 THERAPEUTIC EXERCISES: CPT

## 2017-10-23 PROCEDURE — 97112 NEUROMUSCULAR REEDUCATION: CPT

## 2017-10-24 ENCOUNTER — GENERIC CONVERSION - ENCOUNTER (OUTPATIENT)
Dept: OTHER | Facility: OTHER | Age: 82
End: 2017-10-24

## 2017-10-26 ENCOUNTER — APPOINTMENT (OUTPATIENT)
Dept: PHYSICAL THERAPY | Facility: REHABILITATION | Age: 82
End: 2017-10-26
Payer: MEDICARE

## 2017-10-26 PROCEDURE — 97110 THERAPEUTIC EXERCISES: CPT

## 2017-10-26 PROCEDURE — 97112 NEUROMUSCULAR REEDUCATION: CPT

## 2017-10-30 ENCOUNTER — APPOINTMENT (OUTPATIENT)
Dept: PHYSICAL THERAPY | Facility: REHABILITATION | Age: 82
End: 2017-10-30
Payer: MEDICARE

## 2017-10-30 PROCEDURE — 97110 THERAPEUTIC EXERCISES: CPT

## 2017-10-30 PROCEDURE — 97112 NEUROMUSCULAR REEDUCATION: CPT

## 2017-11-02 ENCOUNTER — APPOINTMENT (OUTPATIENT)
Dept: PHYSICAL THERAPY | Facility: REHABILITATION | Age: 82
End: 2017-11-02
Payer: MEDICARE

## 2017-11-02 ENCOUNTER — GENERIC CONVERSION - ENCOUNTER (OUTPATIENT)
Dept: OTHER | Facility: OTHER | Age: 82
End: 2017-11-02

## 2017-11-02 PROCEDURE — 97112 NEUROMUSCULAR REEDUCATION: CPT

## 2017-11-02 PROCEDURE — 97110 THERAPEUTIC EXERCISES: CPT

## 2017-11-02 PROCEDURE — G8992 OTHER PT/OT  D/C STATUS: HCPCS

## 2017-11-02 PROCEDURE — G8991 OTHER PT/OT GOAL STATUS: HCPCS

## 2018-01-10 NOTE — RESULT NOTES
Verified Results  (1) CBC/PLT/DIFF [id number] 83:50VI Carol Otero    Order Number: [id number]  TW Order Number: [id number]     Test Name Result Flag Reference   WBC COUNT 7 75 Thousand/uL  4 31-10 16   RBC COUNT 4 22 Million/uL  3 81-5 12   HEMOGLOBIN 13 8 g/dL  11 5-15 4   HEMATOCRIT 42 5 %  34 8-46  1    fL H 82-98   MCH 32 7 pg  26 8-34 3   MCHC 32 5 g/dL  31 4-37 4   RDW 13 0 %  11 6-15 1   MPV 11 3 fL  8 9-12 7   PLATELET COUNT 754 Thousands/uL  149-390   nRBC AUTOMATED 0 /100 WBCs     NEUTROPHILS RELATIVE PERCENT 74 %  43-75   LYMPHOCYTES RELATIVE PERCENT 18 %  14-44   MONOCYTES RELATIVE PERCENT 7 %  4-12   EOSINOPHILS RELATIVE PERCENT 1 %  0-6   BASOPHILS RELATIVE PERCENT 0 %  0-1   NEUTROPHILS ABSOLUTE COUNT 5 68 Thousands/?L  1 85-7 62   LYMPHOCYTES ABSOLUTE COUNT 1 42 Thousands/?L  0 60-4 47   MONOCYTES ABSOLUTE COUNT 0 51 Thousand/?L  0 17-1 22   EOSINOPHILS ABSOLUTE COUNT 0 10 Thousand/?L  0 00-0 61   BASOPHILS ABSOLUTE COUNT 0 02 Thousands/?L  0 00-0 10     (1) COMPREHENSIVE METABOLIC PANEL 42PEZ3519 12:31QT Carol Otero    Order Number: [id number]   Order Number: [id number] Order Number: [id number] Order Number: [id number] Order Number: [id number] Order Number: [id number]     Test Name Result Flag Reference   GLUCOSE,RANDM 97 mg/dL     If the patient is fasting, the ADA then defines impaired fasting glucose as > 100 mg/dL and diabetes as > or equal to 123 mg/dL     SODIUM 140 mmol/L  136-145   POTASSIUM 4 6 mmol/L  3 5-5 3   CHLORIDE 107 mmol/L  100-108   CARBON DIOXIDE 28 mmol/L  21-32   ANION GAP (CALC) 5 mmol/L  4-13   BLOOD UREA NITROGEN 20 mg/dL  5-25   CREATININE 0 92 mg/dL  0 60-1 30   Standardized to IDMS reference method   CALCIUM 9 0 mg/dL  8 3-10 1   BILI, TOTAL 0 49 mg/dL  0 20-1 00   ALK PHOSPHATAS 105 U/L     ALT (SGPT) 26 U/L  12-78   AST(SGOT) 26 U/L  5-45   ALBUMIN 3 8 g/dL  3 5-5 0   TOTAL PROTEIN 7 0 g/dL  6 4-8 2   eGFR Non-African American 58 0 ml/min/1 73sq m     [facility] Disease Education Program recommendations are as follows:  GFR calculation is accurate only with a steady state creatinine  Chronic Kidney disease less than 60 ml/min/1 73 sq  meters  Kidney failure less than 15 ml/min/1 73 sq  meters       (1) TSH [id number] 62:76DF [place]   TW Order Number: [id number]  TW Order Number: [id number] Order Number: [id number] Order Number: [id number] Order Number: [id number] Order Number: [id number]     Test Name Result Flag Reference   TSH 1 630 uIU/mL  0 358-3 740   The recommended reference ranges for TSH during pregnancy are as follows:  First trimester 0 1 to 2 5 uIU/mL  Second trimester  0 2 to 3 0 uIU/mL  Third trimester 0 3 to 3 0 uIU/m     (1) VITAMIN B12 [id number] 58:49VM [place]   TW Order Number: [id number]  TW Order Number: [id number] Order Number: [id number] Order Number: [id number] Order Number: [id number] Order Number: [id number]     Test Name Result Flag Reference   VITAMIN B12 279 pg/mL  100-900     (1) FOLATE [id number] 34:66EA [place]   TW Order Number: [id number]  TW Order Number: [id number] Order Number: [id number] Order Number: [id number] Order Number: [id number] Order Number: [id number]     Test Name Result Flag Reference   FOLATE 9 1 ng/mL  3 1-17 5     (1) VITAMIN D 25-HYDROXY [id number] 78:54IC Afua Lilly Order Number: [id number]  TW Order Number: [id number]     Test Name Result Flag Reference   VIT D 25-HYDROX 22 1 ng/mL L 30 0-100 0     (1) LIPID PANEL, FASTING [id number] 58:47ET Afua Lilly Order Number: [id number]  TW Order Number: [id number] Order Number: [id number] Order Number: [id number] Order Number: [id number] Order Number: [id number]     Test Name Result Flag Reference   CHOLESTEROL 210 mg/dL H    HDL,DIRECT 98 mg/dL H 40-60   Specimen collection should occur prior to Metamizole administration due to the potential for falsely depressed results  LDL CHOLESTEROL CALCULATED 76 mg/dL  0-100   Triglyceride:         Normal              <150 mg/dl       Borderline High    150-199 mg/dl       High               200-499 mg/dl       Very High          >499 mg/dl  Cholesterol:         Desirable        <200 mg/dl      Borderline High  200-239 mg/dl      High             >239 mg/dl  HDL Cholesterol:        High    >59 mg/dL      Low     <41 mg/dL  LDL CALCULATED:    This screening LDL is a calculated result  It does not have the accuracy of the Direct Measured LDL in the monitoring of patients with hyperlipidemia and/or statin therapy  Direct Measure LDL (ONC479) must be ordered separately in these patients  TRIGLYCERIDES 180 mg/dL H <=150   Specimen collection should occur prior to N-Acetylcysteine or Metamizole administration due to the potential for falsely depressed results  Discussion/Summary   Please speak with   Only significant findings on patient's blood work was a low vitamin D at 25  She should increase her over-the-counter vitamin D3 by an extra 2000 units per day  However this would not cause her symptoms of memory lapses    If they are interested we could initiate Aricept 5 mg once daily as we have discussed

## 2018-01-11 NOTE — MISCELLANEOUS
Assessment    1  Gait disturbance (781 2) (R26 9)   2  Muscle weakness (generalized) (998 87) (M62 81)    Discussion/Summary  Discussion Summary:   Gait disturbance due to lower extremity weakness  No etiology was discovered  Patient will initiate physical therapy and continue her therapy once she reaches [place] for the winter  Chief Complaint  Chief Complaint Free Text Note Form: MARISA: PT was admitted to [facility] from 10/02/2017 through 10/04/2017  Dx: Left Leg Weakness  Spoke with pt's  who reports pt feels weak but is doing ok  Denies SOB, CP, dizziness, palpitations, N/V  Pt is using walker to help her get around  Follow up with PCP scheduled for 10/09/2017 @ 11:15am  Med list will be reviewed at time of office visit  History of Present Illness  TCM Communication [place]: The patient is being contacted for follow-up after hospitalization and 10/05/2017, 10/06/2017  She was hospitalized at [facility]  The date of admission: 10/02/2017, date of discharge: 10/04/2017  Diagnosis: left leg weakness  She was discharged to home  Medications reviewed and updated today  She scheduled a follow up appointment  Symptoms: weakness  Counseling was provided to patient's family  spouse  Topics counseled included importance of compliance with treatment  Communication performed and completed by Bronwyn Patel       HPI: I reviewed MARISA notes as well as discharge summary from hospital  No etiology was discovered patient's left leg weakness which she is states continues to this day  She denies any significant pain  She is scheduled to travel to [place] in November 1 and stay for her usual 6 months during the winter  Patient is using a walker with wheels at home      Review of Systems  Complete-Female:   Constitutional: No fever, no chills, feels well, no tiredness, no recent weight gain or weight loss     ENT: no complaints of earache, no loss of hearing, no nose bleeds, no nasal discharge, no sore throat, no hoarseness  Cardiovascular: No complaints of slow heart rate, no fast heart rate, no chest pain, no palpitations, no leg claudication, no lower extremity edema  Respiratory: No complaints of shortness of breath, no wheezing, no cough, no SOB on exertion, no orthopnea, no PND  Gastrointestinal: No complaints of abdominal pain, no constipation, no nausea or vomiting, no diarrhea, no bloody stools  Musculoskeletal: No complaints of arthralgias, no myalgias, no joint swelling or stiffness, no limb pain or swelling  Integumentary: No complaints of skin rash or lesions, no itching, no skin wounds, no breast pain or lump  Neurological: Chronic mild memory lapses due to early dementia, but as noted in HPI  Psychiatric: Not suicidal, no sleep disturbance, no anxiety or depression, no change in personality, no emotional problems  Active Problems    1  Anxiety (300 00) (F41 9)   2  Coronary artery disease (414 00) (I25 10)   3  Dizziness (780 4) (R42)   4  Esophageal reflux (530 81) (K21 9)   5  Gait disturbance (781 2) (R26 9)   6  History of allergy (V15 09) (Z88 9)   7  Hyperlipidemia (272 4) (E78 5)   8  Hypertension (401 9) (I10)   9  Hypothyroidism (244 9) (E03 9)   10  Insomnia (780 52) (G47 00)   11  Macular degeneration (362 50) (H35 30)   12  Memory deficits (780 93) (R41 3)   13  Muscle weakness (generalized) (728 87) (M62 81)   14  Nausea (787 02) (R11 0)   15  Need for prophylactic vaccination and inoculation against influenza (V04 81) (Z23)   16  Need for vaccination with 13-polyvalent pneumococcal conjugate vaccine (V03 82) (Z23)   17  Normal routine physical examination (V70 0) (Z00 00)   18  Osteopenia (733 90) (M85 80)   19  Tremor (781 0) (R25 1)   20  URI, acute (465 9) (J06 9)    Past Medical History    1  History of Acute myocardial infarction (410 90) (I21 9)   2  History of insomnia (V13 89) (C38 275)    Surgical History    1   History of Colonoscopy (Fiberoptic) Screening    Family History  Father    1  No pertinent family history    Social History    · Marital History - Currently    · Never a smoker  Social History Reviewed: The social history was reviewed and updated today  Current Meds   1  Align Oral Capsule; Take 1 tablet daily; Therapy: (Recorded:20May2016) to Recorded   2  AmLODIPine Besylate 5 MG Oral Tablet; Take 1 tablet daily; Therapy: 63LWT7976 to (Jeremy Medicus)  Requested for: 40AGE0948; Last   Rx:23Jun2017 Ordered   3  Aspirin EC 81 MG Oral Tablet Delayed Release; TAKE 1 TABLET DAILY; Therapy: (Recorded:01May2013) to Recorded   4  Atorvastatin Calcium 40 MG Oral Tablet; Take 1 tablet daily; Therapy: 66ZXX6324 to (Last Rx:12May2017)  Requested for: 03AXQ4901 Ordered   5  Carvedilol 12 5 MG Oral Tablet; TAKE 1 TABLET TWICE DAILY WITH MEALS; Therapy: 78QWQ5962 to (Jeremy Medicus)  Requested for: 86CKC0597; Last   WL:01CTE2139 Ordered   6  Donepezil HCl - 10 MG Oral Tablet; TAKE ONE (1) TABLET(S) ONCE DAILY; Therapy: 11KWA4285 to (Abdullahi Sabillon)  Requested for: 08RLR7390; Last   Rx:12May2017 Ordered   7  Levothyroxine Sodium 100 MCG Oral Tablet; Take 1 tablet daily; Therapy: 16JKO7682 to (Last Rx:12May2017)  Requested for: 49ZQG1559 Ordered   8  LORazepam 0 5 MG Oral Tablet; TAKE 1 TABLET TWICE DAILY AS NEEDED; Last   Rx:12May2017 Ordered   9  Losartan Potassium 100 MG Oral Tablet; Take 1 tablet daily; Therapy: 19AMD2293 to (Last Rx:12May2017)  Requested for: 39EOY6588 Ordered   10  Memantine HCl - 10 MG Oral Tablet; take 1 tablet by mouth twice a day; Therapy: 26YXC3219 to Recorded   11  Metoprolol Tartrate 50 MG Oral Tablet; take one tablet by mouth every day; Therapy: 90EPV8158 to (Evaluate:20Mar2018) Recorded   12  Omeprazole 20 MG Oral Capsule Delayed Release; TAKE 1 CAPSULE Daily; Therapy: 05FBZ2713 to (Last Rx:12May2017)  Requested for: 41DJY2053 Ordered   13   Vitamin B-12 1000 MCG Oral Tablet; TAKE 1 TABLET DAILY AS DIRECTED; Therapy: 33SZW0045 to Recorded   14  Vitamin D3 1000 UNIT Oral Tablet; TAKE 1 TABLET DAILY; Therapy: 31UJP4236 to Recorded   15  Zolpidem Tartrate 5 MG Oral Tablet; TAKE 1 TABLET AT BEDTIME AS NEEDED; Last    Rx:89Fim8948 Ordered  Medication List Reviewed: The medication list was reviewed and updated today  Allergies    1  Benadryl TABS   2  Fosamax TABS   3  Penicillins    Vitals  Signs   Recorded: 42CHD3240 11:21AM   Temperature: 96 9 F  Heart Rate: 64  Respiration: 16  Systolic: 90  Diastolic: 58  Height: 5 ft 2 in  Weight: 114 lb 8 oz  BMI Calculated: 20 94  BSA Calculated: 1 51    Physical Exam    Constitutional   General appearance: No acute distress, well appearing and well nourished  Pulmonary   Respiratory effort: No increased work of breathing or signs of respiratory distress  Auscultation of lungs: Clear to auscultation  Cardiovascular   Auscultation of heart: Normal rate and rhythm, normal S1 and S2, without murmurs  Lymphatic   Palpation of lymph nodes in neck: No lymphadenopathy  Musculoskeletal   Gait and station: Abnormal   Patient ambulating with a walking cane  Inspection/palpation of joints, bones, and muscles: Normal   Is 5/5 lower extremity muscle strength  Negative straight leg raising          Signatures   Electronically signed by : SARANYA Kaba ; Oct  9 8785 12:23PM EST                       (Author)

## 2018-01-13 VITALS
HEART RATE: 66 BPM | BODY MASS INDEX: 20.08 KG/M2 | DIASTOLIC BLOOD PRESSURE: 66 MMHG | WEIGHT: 117 LBS | SYSTOLIC BLOOD PRESSURE: 118 MMHG

## 2018-01-13 VITALS
BODY MASS INDEX: 21.25 KG/M2 | HEART RATE: 60 BPM | TEMPERATURE: 96.5 F | SYSTOLIC BLOOD PRESSURE: 110 MMHG | HEIGHT: 62 IN | DIASTOLIC BLOOD PRESSURE: 60 MMHG | RESPIRATION RATE: 16 BRPM | WEIGHT: 115.5 LBS

## 2018-01-13 VITALS
RESPIRATION RATE: 16 BRPM | HEIGHT: 62 IN | WEIGHT: 114.5 LBS | TEMPERATURE: 96.9 F | DIASTOLIC BLOOD PRESSURE: 58 MMHG | SYSTOLIC BLOOD PRESSURE: 90 MMHG | HEART RATE: 64 BPM | BODY MASS INDEX: 21.07 KG/M2

## 2018-01-14 VITALS
HEIGHT: 62 IN | TEMPERATURE: 95.6 F | SYSTOLIC BLOOD PRESSURE: 108 MMHG | BODY MASS INDEX: 21.37 KG/M2 | RESPIRATION RATE: 16 BRPM | WEIGHT: 116.13 LBS | DIASTOLIC BLOOD PRESSURE: 60 MMHG | HEART RATE: 68 BPM

## 2018-01-22 VITALS
BODY MASS INDEX: 20.24 KG/M2 | WEIGHT: 110 LBS | SYSTOLIC BLOOD PRESSURE: 106 MMHG | HEART RATE: 60 BPM | TEMPERATURE: 97.2 F | RESPIRATION RATE: 16 BRPM | DIASTOLIC BLOOD PRESSURE: 68 MMHG | HEIGHT: 62 IN

## 2018-06-01 ENCOUNTER — OFFICE VISIT (OUTPATIENT)
Dept: FAMILY MEDICINE CLINIC | Facility: CLINIC | Age: 83
End: 2018-06-01
Payer: MEDICARE

## 2018-06-01 ENCOUNTER — TELEPHONE (OUTPATIENT)
Dept: FAMILY MEDICINE CLINIC | Facility: CLINIC | Age: 83
End: 2018-06-01

## 2018-06-01 ENCOUNTER — APPOINTMENT (OUTPATIENT)
Dept: LAB | Facility: CLINIC | Age: 83
End: 2018-06-01
Payer: MEDICARE

## 2018-06-01 VITALS
DIASTOLIC BLOOD PRESSURE: 56 MMHG | SYSTOLIC BLOOD PRESSURE: 108 MMHG | RESPIRATION RATE: 14 BRPM | TEMPERATURE: 97.2 F | BODY MASS INDEX: 19.85 KG/M2 | WEIGHT: 107.9 LBS | HEIGHT: 62 IN | HEART RATE: 60 BPM

## 2018-06-01 DIAGNOSIS — R53.1 WEAKNESS: ICD-10-CM

## 2018-06-01 DIAGNOSIS — R53.83 FATIGUE, UNSPECIFIED TYPE: ICD-10-CM

## 2018-06-01 DIAGNOSIS — J30.9 ALLERGIC RHINITIS, UNSPECIFIED SEASONALITY, UNSPECIFIED TRIGGER: ICD-10-CM

## 2018-06-01 DIAGNOSIS — R82.998 URINE LEUKOCYTES: Primary | ICD-10-CM

## 2018-06-01 DIAGNOSIS — I10 ESSENTIAL HYPERTENSION: ICD-10-CM

## 2018-06-01 DIAGNOSIS — F03.90 DEMENTIA WITHOUT BEHAVIORAL DISTURBANCE, UNSPECIFIED DEMENTIA TYPE (HCC): ICD-10-CM

## 2018-06-01 DIAGNOSIS — R26.9 GAIT DISTURBANCE: ICD-10-CM

## 2018-06-01 LAB
ALBUMIN SERPL BCP-MCNC: 3.4 G/DL (ref 3.5–5)
ALP SERPL-CCNC: 116 U/L (ref 46–116)
ALT SERPL W P-5'-P-CCNC: 31 U/L (ref 12–78)
ANION GAP SERPL CALCULATED.3IONS-SCNC: 8 MMOL/L (ref 4–13)
AST SERPL W P-5'-P-CCNC: 38 U/L (ref 5–45)
BILIRUB SERPL-MCNC: 0.56 MG/DL (ref 0.2–1)
BUN SERPL-MCNC: 19 MG/DL (ref 5–25)
CALCIUM SERPL-MCNC: 9.2 MG/DL (ref 8.3–10.1)
CHLORIDE SERPL-SCNC: 107 MMOL/L (ref 100–108)
CO2 SERPL-SCNC: 28 MMOL/L (ref 21–32)
CREAT SERPL-MCNC: 1.05 MG/DL (ref 0.6–1.3)
ERYTHROCYTE [DISTWIDTH] IN BLOOD BY AUTOMATED COUNT: 12.3 % (ref 11.6–15.1)
ERYTHROCYTE [SEDIMENTATION RATE] IN BLOOD: 20 MM/HOUR (ref 0–20)
GFR SERPL CREATININE-BSD FRML MDRD: 48 ML/MIN/1.73SQ M
GLUCOSE SERPL-MCNC: 95 MG/DL (ref 65–140)
HCT VFR BLD AUTO: 44.4 % (ref 34.8–46.1)
HGB BLD-MCNC: 14 G/DL (ref 11.5–15.4)
MCH RBC QN AUTO: 32.2 PG (ref 26.8–34.3)
MCHC RBC AUTO-ENTMCNC: 31.5 G/DL (ref 31.4–37.4)
MCV RBC AUTO: 102 FL (ref 82–98)
PLATELET # BLD AUTO: 204 THOUSANDS/UL (ref 149–390)
PMV BLD AUTO: 11.3 FL (ref 8.9–12.7)
POTASSIUM SERPL-SCNC: 4.1 MMOL/L (ref 3.5–5.3)
PROT SERPL-MCNC: 7.2 G/DL (ref 6.4–8.2)
RBC # BLD AUTO: 4.35 MILLION/UL (ref 3.81–5.12)
SL AMB  POCT GLUCOSE, UA: NEGATIVE
SL AMB LEUKOCYTE ESTERASE,UA: ABNORMAL
SL AMB POCT BILIRUBIN,UA: NEGATIVE
SL AMB POCT BLOOD,UA: NEGATIVE
SL AMB POCT CLARITY,UA: ABNORMAL
SL AMB POCT COLOR,UA: ABNORMAL
SL AMB POCT KETONES,UA: NEGATIVE
SL AMB POCT NITRITE,UA: ABNORMAL
SL AMB POCT PH,UA: 5
SL AMB POCT SPECIFIC GRAVITY,UA: 1.02
SL AMB POCT URINE PROTEIN: ABNORMAL
SL AMB POCT UROBILINOGEN: 0.2
SODIUM SERPL-SCNC: 143 MMOL/L (ref 136–145)
TSH SERPL DL<=0.05 MIU/L-ACNC: 10.3 UIU/ML (ref 0.36–3.74)
WBC # BLD AUTO: 9.1 THOUSAND/UL (ref 4.31–10.16)

## 2018-06-01 PROCEDURE — 81002 URINALYSIS NONAUTO W/O SCOPE: CPT | Performed by: FAMILY MEDICINE

## 2018-06-01 PROCEDURE — 85652 RBC SED RATE AUTOMATED: CPT

## 2018-06-01 PROCEDURE — 87086 URINE CULTURE/COLONY COUNT: CPT | Performed by: FAMILY MEDICINE

## 2018-06-01 PROCEDURE — 84443 ASSAY THYROID STIM HORMONE: CPT

## 2018-06-01 PROCEDURE — 80053 COMPREHEN METABOLIC PANEL: CPT

## 2018-06-01 PROCEDURE — 36415 COLL VENOUS BLD VENIPUNCTURE: CPT

## 2018-06-01 PROCEDURE — 85027 COMPLETE CBC AUTOMATED: CPT

## 2018-06-01 PROCEDURE — 99214 OFFICE O/P EST MOD 30 MIN: CPT | Performed by: FAMILY MEDICINE

## 2018-06-01 RX ORDER — ZOLPIDEM TARTRATE 5 MG/1
1 TABLET ORAL
COMMUNITY
End: 2018-06-05 | Stop reason: SDUPTHER

## 2018-06-01 RX ORDER — FLUTICASONE PROPIONATE 50 MCG
2 SPRAY, SUSPENSION (ML) NASAL DAILY
Qty: 16 G | Refills: 5 | Status: SHIPPED | OUTPATIENT
Start: 2018-06-01 | End: 2018-11-07 | Stop reason: SDUPTHER

## 2018-06-01 RX ORDER — METOPROLOL TARTRATE 50 MG/1
1 TABLET, FILM COATED ORAL DAILY
COMMUNITY
Start: 2017-09-21 | End: 2018-06-05 | Stop reason: SDUPTHER

## 2018-06-01 NOTE — PROGRESS NOTES
FAMILY PRACTICE OFFICE VISIT       NAME: Jannet Oscar  AGE: 80 y o  SEX: female       : 3/23/1931        MRN: [id number]    DATE: 2018  TIME: 12:12 PM    Assessment and Plan     Problem List Items Addressed This Visit     Essential hypertension     Hypertension  Patient blood pressure is stable at this time         Relevant Medications    metoprolol tartrate (LOPRESSOR) 50 mg tablet    Weakness     Generalized weakness  Patient will obtain blood work as ordered for further evaluation  We will check a urine culture to verify any infection after obtaining abnormal urinalysis in the office         Dementia     Dementia  No significant decline in memory according to patient and her          Relevant Medications    zolpidem (AMBIEN) 5 mg tablet    Gait disturbance     Gait disturbance  Patient will continue with outpatient physical therapy at [facility] physical therapy in [place]           Other Visit Diagnoses     Urine leukocytes    -  Primary    Relevant Orders    POCT urine dip (Completed)    Urine culture    Allergic rhinitis, unspecified seasonality, unspecified trigger        Relevant Medications    fluticasone (FLONASE) 50 mcg/act nasal spray    Fatigue, unspecified type        Relevant Orders    CBC    Comprehensive metabolic panel    TSH, 3rd generation    Sedimentation rate, automated            There are no Patient Instructions on file for this visit  Chief Complaint     Chief Complaint   Patient presents with    Fatigue     Patient is here c/o weakness and fatigue x's 3+ mths   Urinary Frequency     Patient also c/o urinary frequency and incontinence x's 6 mths  History of Present Illness     The patient was accompanied by her   They spend 6 months out of the year in [place]  She denies any illness during her winter in [place]  She has been having a difficulties with ambulating and had been receiving physical therapy while in [place]    She is scheduled to follow up with [facility] physical therapy next week for continued care of gait disturbance and lower extremity weakness  Review of Systems   Review of Systems   Constitutional: Positive for fatigue  Negative for fever  HENT: Negative  Respiratory: Negative  Gastrointestinal: Negative  Genitourinary: Negative  Musculoskeletal:        As per HPI       Active Problem List     Patient Active Problem List   Diagnosis    Acquired hypothyroidism    Essential hypertension    CAD (coronary artery disease)    GERD (gastroesophageal reflux disease)    Chest pain    Weakness    Dementia    History of TIA (transient ischemic attack)    Twitching    Abnormal EKG    Gait disturbance       Past Medical History:  Past Medical History:   Diagnosis Date    Disease of thyroid gland     GERD (gastroesophageal reflux disease)     Hyperlipidemia     Hypertension     Insomnia     MI (myocardial infarction) ([facility] [address] )        Past Surgical History:  Past Surgical History:   Procedure Laterality Date    APPENDECTOMY      COLONOSCOPY      [id number]  LAST ASSESSED       Family History:  Family History   Problem Relation Age of Onset    No Known Problems Father        Social History:  Social History     Social History    Marital status: /Civil Union     Spouse name: N/A    Number of children: N/A    Years of education: N/A     Occupational History    Not on file       Social History Main Topics    Smoking status: Never Smoker    Smokeless tobacco: Never Used    Alcohol use Yes    Drug use: No    Sexual activity: Not on file     Other Topics Concern    Not on file     Social History Narrative    No narrative on file       Objective     Vitals:    06/01/18 1045   BP: 108/56   Pulse: 60   Resp: 14   Temp: (!) 97 2 °F (36 2 °C)     Wt Readings from Last 3 Encounters:   06/01/18 48 9 kg (107 lb 14 4 oz)   10/09/17 51 9 kg (114 lb 8 oz)   10/02/17 55 6 kg (122 lb 9 2 oz)       Physical Exam Constitutional: She is oriented to person, place, and time  No distress  HENT:   Mouth/Throat: Oropharynx is clear and moist  No oropharyngeal exudate  Tympanic membranes within normal limits bilaterally   Neck:   No carotid bruits   Cardiovascular:   Regular rate and rhythm with no murmurs   Pulmonary/Chest:   Lungs are clear to auscultation without wheezes,rales, or rhonchi   Musculoskeletal: She exhibits no edema  Patient ambulates very slowly with the use of a walking cane  Lymphadenopathy:     She has no cervical adenopathy  Neurological: She is alert and oriented to person, place, and time  No cranial nerve deficit  Skin: No rash noted         Pertinent Laboratory/Diagnostic Studies:  Lab Results   Component Value Date    GLUCOSE 88 10/04/2017    BUN 19 10/04/2017    CREATININE 1 17 10/04/2017    CALCIUM 8 7 10/04/2017     10/04/2017    K 4 5 10/04/2017    CO2 25 10/04/2017     10/04/2017     Lab Results   Component Value Date    ALT 22 10/02/2017    AST 21 10/02/2017    ALKPHOS 96 10/02/2017    BILITOT 0 72 10/02/2017       Lab Results   Component Value Date    WBC 7 46 10/03/2017    HGB 12 4 10/03/2017    HCT 36 6 10/03/2017    MCV 96 10/03/2017     10/03/2017       No results found for: TSH    Lab Results   Component Value Date    CHOL 174 10/03/2017     Lab Results   Component Value Date    TRIG 123 10/03/2017     Lab Results   Component Value Date     (H) 10/03/2017     Lab Results   Component Value Date    LDLCALC 49 10/03/2017     Lab Results   Component Value Date    HGBA1C 5 4 09/12/2014       Results for orders placed or performed in visit on 06/01/18   POCT urine dip   Result Value Ref Range    LEUKOCYTE ESTERASE,++      NITRITE,UA +     SL AMB POCT UROBILINOGEN 0 2     SL AMB POCT URINE PROTEIN 15+      PH,UA 5 0      BLOOD,UA negative      SPECIFIC GRAVITY,UA 1 025      KETONES,UA negative      BILIRUBIN,UA negative     GLUCOSE, UA negative COLOR,UA bradly      CLARITY,UA hazy        Orders Placed This Encounter   Procedures    Urine culture    CBC    Comprehensive metabolic panel    TSH, 3rd generation    Sedimentation rate, automated    POCT urine dip       ALLERGIES:  Allergies   Allergen Reactions    Alendronate      Other reaction(s): tooth decay    Diphenhydramine Hyperactivity    Penicillins        Current Medications     Current Outpatient Prescriptions   Medication Sig Dispense Refill    amLODIPine (NORVASC) 5 mg tablet Take 1 tablet by mouth daily for 30 days 30 tablet 0    aspirin 81 mg chewable tablet Chew 81 mg daily      atorvastatin (LIPITOR) 40 mg tablet Take 40 mg by mouth daily      carvedilol (COREG) 6 25 mg tablet Take 1 tablet by mouth 2 (two) times a day with meals 60 tablet 0    cholecalciferol (VITAMIN D3) 1,000 units tablet Take 1,000 Units by mouth daily      cyanocobalamin (VITAMIN B-12) 1,000 mcg tablet Take 1,000 mcg by mouth daily      levothyroxine 100 mcg tablet Take 100 mcg by mouth daily      losartan (COZAAR) 100 MG tablet Take 100 mg by mouth daily      memantine (NAMENDA) 10 mg tablet Take 10 mg by mouth 2 (two) times a day      metoprolol tartrate (LOPRESSOR) 50 mg tablet Take 1 tablet by mouth daily      omeprazole (PriLOSEC) 20 mg delayed release capsule Take 20 mg by mouth daily      Probiotic Product (ALIGN PO) Take 1 tablet by mouth daily      fluticasone (FLONASE) 50 mcg/act nasal spray 2 sprays into each nostril daily 16 g 5    zolpidem (AMBIEN) 5 mg tablet Take 1 tablet by mouth       No current facility-administered medications for this visit  Health Maintenance   There are no preventive care reminders to display for this patient    Immunization History   Administered Date(s) Administered    Influenza 09/17/2010    Influenza Split High Dose Preservative Free IM 09/26/2014    Influenza TIV (IM) 10/04/2016    Pneumococcal Conjugate 13-Valent 10/04/2016    Pneumococcal Polysaccharide PPV23 03/23/1931, 10/05/2012    Td (adult), Unspecified 01/01/2005    Td (adult), adsorbed 08/01/2012    Tdap 08/01/2012    Zoster 05/14/2007, 01/01/2008, 50/81/4155       Gustavo Bhatt MD

## 2018-06-01 NOTE — TELEPHONE ENCOUNTER
----- Message from Fabián Boone MD sent at 9/0/0667  5:10 PM EDT -----  Please call   Recent blood work shows thyroid level to be low  If she has been on her usual levothyroxine 100 mcg tablet she would need increase to at least 125 mcg once daily and have repeat blood test prior to leaving for [place] in November

## 2018-06-01 NOTE — ASSESSMENT & PLAN NOTE
Generalized weakness  Patient will obtain blood work as ordered for further evaluation    We will check a urine culture to verify any infection after obtaining abnormal urinalysis in the office

## 2018-06-01 NOTE — ASSESSMENT & PLAN NOTE
Gait disturbance    Patient will continue with outpatient physical therapy at [facility] physical therapy in [facility]

## 2018-06-02 LAB — BACTERIA UR CULT: NORMAL

## 2018-06-04 ENCOUNTER — TELEPHONE (OUTPATIENT)
Dept: FAMILY MEDICINE CLINIC | Facility: CLINIC | Age: 83
End: 2018-06-04

## 2018-06-04 DIAGNOSIS — E03.9 HYPOTHYROIDISM, UNSPECIFIED TYPE: Primary | ICD-10-CM

## 2018-06-04 RX ORDER — LEVOTHYROXINE SODIUM 0.12 MG/1
TABLET ORAL
Qty: 30 TABLET | Refills: 5 | Status: SHIPPED | OUTPATIENT
Start: 2018-06-04 | End: 2018-11-26 | Stop reason: DRUGHIGH

## 2018-06-05 ENCOUNTER — TELEPHONE (OUTPATIENT)
Dept: FAMILY MEDICINE CLINIC | Facility: CLINIC | Age: 83
End: 2018-06-05

## 2018-06-05 ENCOUNTER — EVALUATION (OUTPATIENT)
Dept: PHYSICAL THERAPY | Facility: REHABILITATION | Age: 83
End: 2018-06-05
Payer: MEDICARE

## 2018-06-05 VITALS — DIASTOLIC BLOOD PRESSURE: 78 MMHG | HEIGHT: 63 IN | SYSTOLIC BLOOD PRESSURE: 118 MMHG

## 2018-06-05 DIAGNOSIS — R41.3 MEMORY DEFICIT: ICD-10-CM

## 2018-06-05 DIAGNOSIS — E78.5 HYPERLIPIDEMIA, UNSPECIFIED HYPERLIPIDEMIA TYPE: ICD-10-CM

## 2018-06-05 DIAGNOSIS — I10 ESSENTIAL HYPERTENSION: Primary | ICD-10-CM

## 2018-06-05 DIAGNOSIS — R26.9 NEUROLOGIC GAIT DYSFUNCTION: Primary | ICD-10-CM

## 2018-06-05 DIAGNOSIS — E55.9 VITAMIN D DEFICIENCY: ICD-10-CM

## 2018-06-05 DIAGNOSIS — K21.9 GASTROESOPHAGEAL REFLUX DISEASE WITHOUT ESOPHAGITIS: ICD-10-CM

## 2018-06-05 DIAGNOSIS — E03.9 HYPOTHYROIDISM, UNSPECIFIED TYPE: ICD-10-CM

## 2018-06-05 DIAGNOSIS — Z74.09 IMPAIRED FUNCTIONAL MOBILITY, BALANCE, GAIT, AND ENDURANCE: Primary | ICD-10-CM

## 2018-06-05 PROCEDURE — 97162 PT EVAL MOD COMPLEX 30 MIN: CPT | Performed by: PHYSICAL THERAPIST

## 2018-06-05 PROCEDURE — G8978 MOBILITY CURRENT STATUS: HCPCS | Performed by: PHYSICAL THERAPIST

## 2018-06-05 PROCEDURE — 97110 THERAPEUTIC EXERCISES: CPT | Performed by: PHYSICAL THERAPIST

## 2018-06-05 PROCEDURE — G8979 MOBILITY GOAL STATUS: HCPCS | Performed by: PHYSICAL THERAPIST

## 2018-06-05 RX ORDER — ASPIRIN 81 MG/1
81 TABLET, CHEWABLE ORAL DAILY
Qty: 30 TABLET | Refills: 3 | Status: SHIPPED | OUTPATIENT
Start: 2018-06-05 | End: 2021-06-07

## 2018-06-05 RX ORDER — LOSARTAN POTASSIUM 100 MG/1
100 TABLET ORAL DAILY
Qty: 30 TABLET | Refills: 3 | Status: SHIPPED | OUTPATIENT
Start: 2018-06-05 | End: 2018-11-07 | Stop reason: DRUGHIGH

## 2018-06-05 RX ORDER — ATORVASTATIN CALCIUM 40 MG/1
40 TABLET, FILM COATED ORAL DAILY
Qty: 30 TABLET | Refills: 3 | Status: SHIPPED | OUTPATIENT
Start: 2018-06-05 | End: 2018-10-02 | Stop reason: SDUPTHER

## 2018-06-05 RX ORDER — MEMANTINE HYDROCHLORIDE 10 MG/1
10 TABLET ORAL 2 TIMES DAILY
Qty: 30 TABLET | Refills: 3 | Status: SHIPPED | OUTPATIENT
Start: 2018-06-05 | End: 2018-10-02 | Stop reason: SDUPTHER

## 2018-06-05 RX ORDER — METOPROLOL TARTRATE 50 MG/1
50 TABLET, FILM COATED ORAL DAILY
Qty: 30 TABLET | Refills: 3 | Status: SHIPPED | OUTPATIENT
Start: 2018-06-05 | End: 2018-11-07 | Stop reason: SDUPTHER

## 2018-06-05 RX ORDER — MELATONIN
1000 DAILY
Qty: 30 TABLET | Refills: 3 | Status: SHIPPED | OUTPATIENT
Start: 2018-06-05 | End: 2019-08-16 | Stop reason: HOSPADM

## 2018-06-05 RX ORDER — ZOLPIDEM TARTRATE 5 MG/1
5 TABLET ORAL
Qty: 30 TABLET | Refills: 3 | Status: SHIPPED | OUTPATIENT
Start: 2018-06-05 | End: 2018-11-07 | Stop reason: SDUPTHER

## 2018-06-05 RX ORDER — OMEPRAZOLE 20 MG/1
20 CAPSULE, DELAYED RELEASE ORAL DAILY
Qty: 30 CAPSULE | Refills: 3 | Status: SHIPPED | OUTPATIENT
Start: 2018-06-05 | End: 2018-10-02 | Stop reason: SDUPTHER

## 2018-06-05 NOTE — TELEPHONE ENCOUNTER
Ra rey/ Ketty Jonas P/T in [facility] called requesting a RX for patient to be seen at their facility for weakness, and gait dysfunction  Can you please put in system

## 2018-06-05 NOTE — PROGRESS NOTES
PT Evaluation     Today's date: 2018  Patient name: Antonietta Ritchie  : 3/23/1931  MRN: [id number]  Referring provider: Mac Gandhi MD  Dx:   Encounter Diagnosis     ICD-10-CM    1  Impaired functional mobility, balance, gait, and endurance Z74 09        Start Time: 1130  Stop Time: 1224  Total time in clinic (min): 54 minutes    Assessment  Impairments: abnormal gait, abnormal muscle tone, abnormal or restricted ROM, impaired balance, impaired physical strength, lacks appropriate home exercise program, safety issue and poor body mechanics    Assessment details: Patient is an 79 y/o female who presents with generalized muscle weakness  B/L MMT of LE revealed a deficits in various hip, knee, and ankle motions  Patient's balance is at a fall risk due to age, previous falls, TUG and 5x Sit to Stand balance  assessment  Patient demonstrates a deficits in the amount of hip flexion, heel strike, and push off from metatarsals upon ambulating  Patient would benefit from from skilled PT in order to improve balance, gait abnormailty, muscle strength and ROM  Understanding of Dx/Px/POC: excellent   Prognosis: good    Goals  Short term:   Improve strength in in all motions a 1/2 MMT grade with in 4 weeks  Improve TUG time by 5 seconds  Long term:  Improve FOTO score to at least a 39 within 12 weeks  Improve TUG by 10 seconds  Patient will tolerate community ambulation with close supervision and use of LRAD  Pt will able to perform 13 steps with CGA and use of 1 HR  Pt will be able to perform ADL's with close supervision         Plan  Patient would benefit from: PT eval and skilled physical therapy  Planned modality interventions: unattended electrical stimulation  Planned therapy interventions: ADL training, abdominal trunk stabilization, balance, body mechanics training, flexibility, gait training, home exercise program, stretching, strengthening, patient education, muscle pump exercises, therapeutic exercise, manual therapy and therapeutic activities  Frequency: 3x week  Duration in weeks: 12  Treatment plan discussed with: patient and family        Subjective Evaluation    History of Present Illness  Mechanism of injury: Patient reports generalized muscle weakness in LE  Patient's functional mobility  has been severely affected slowly declining over the past six months  Patient recently fell outside  's office on her LLE hip    Recurrent probem    Quality of life: poor    Pain  Current pain ratin  At best pain ratin  At worst pain ratin    Social Support  Stairs in house: yes   Lives in: multiple-level home  Lives with: spouse      Diagnostic Tests  No diagnostic tests performed  Treatments  Previous treatment: physical therapy  Current treatment: physical therapy  Patient Goals  Patient goals for therapy: increased strength, improved balance, increased motion, independence with ADLs/IADLs and return to sport/leisure activities          Objective     Static Posture     Shoulders  Rounded  Neurological Testing     Sensation     Knee   Left Knee   Intact: light touch    Right Knee   Intact: light touch     Reflexes   Left   Patellar (L4): normal (2+)    Right   Patellar (L4): normal (2+)    Passive Range of Motion     Additional Passive Range of Motion Details  B/L Passive Hip Flexion was not WFL  Hamstring flexibility deficit      Strength/Myotome Testing     Left Hip   Planes of Motion   Flexion: 3+  Extension: 3+  Abduction: 3+  Adduction: 3+    Right Hip   Planes of Motion   Flexion: 3  Extension: 3+  Abduction: 3  Adduction: 3+    Left Knee   Flexion: 3  Extension: 3+    Right Knee   Flexion: 3  Extension: 3+    Left Ankle/Foot   Dorsiflexion: 3+  Plantar flexion: 4+    Right Ankle/Foot   Dorsiflexion: 3+  Plantar flexion: 4+    Ambulation     Ambulation: Stairs   Ascend stairs: moderate assist  Pattern: non-reciprocal  Railings: hand held  Descend stairs: moderate assist  Pattern: non-reciprocal  Railings: hand held    Additional Stairs Ambulation Details  Poor safety awareness increased fear of falling    Observational Gait   Increased left stance time and right stance time  Decreased walking speed, stride length, left swing time, right swing time, left step length and right step length  Left foot contact pattern: foot flat  Right foot contact pattern: foot flat  Left arm swing: decreased  Right arm swing: decreased    Additional Observational Gait Details  Patient uses a single point cane as an AD  Abnormal gait demonstrates shuffle gait  decrease in hip flexion/extension  followed by no heel strike upon initial contact  Quality of Movement During Gait   Trunk  Forward lean       Functional Assessment     Comments  Sit to Stand: 32 seconds  TU seconds  SLS: Unable to perform  Mod Tandem Stance: Unable to perform      Flowsheet Rows      Most Recent Value   PT/OT G-Codes   Current Score  12   Projected Score  47   FOTO information reviewed  Yes   Assessment Type  Evaluation   G code set  Mobility: Walking & Moving Around   Mobility: Walking and Moving Around Current Status ()  CM   Mobility: Walking and Moving Around Goal Status ()  CL          Precautions: HX of falls, HTN, Dementia, Fatigues Easily  Daily Treatment Diary     Manual                                                                                   Exercise Diary  6/5            Bike              Step to target  10x            Sit to stand  2x5            HR/TR 10x                                                                                                                                                                                                                                Modalities

## 2018-06-05 NOTE — TELEPHONE ENCOUNTER
Spoke with Esa Padilla from [facility] she would like the scripts faxed over  Faxes sent

## 2018-06-06 ENCOUNTER — TELEPHONE (OUTPATIENT)
Dept: FAMILY MEDICINE CLINIC | Facility: CLINIC | Age: 83
End: 2018-06-06

## 2018-06-06 DIAGNOSIS — I10 ESSENTIAL HYPERTENSION: ICD-10-CM

## 2018-06-06 DIAGNOSIS — R41.3 MEMORY DEFICIT: ICD-10-CM

## 2018-06-06 NOTE — TELEPHONE ENCOUNTER
lalito from [facility] called stating that he recived the medications that were requested, However Metoprolol should be ER 50 mg, Memantine should be 60 tablets since she is taking it two times a day, and Losartan he has it should be 50 mg not 100 mg  Please call to advise    He is just verifying these since that it what he has from [facility]

## 2018-06-06 NOTE — TELEPHONE ENCOUNTER
Please verify with  because he is the one that called for refills and gave us this info  I don't know if anything was changed when they were in [place]   Ok to change to whatever  states is her current dosing

## 2018-06-11 ENCOUNTER — OFFICE VISIT (OUTPATIENT)
Dept: PHYSICAL THERAPY | Facility: REHABILITATION | Age: 83
End: 2018-06-11
Payer: MEDICARE

## 2018-06-11 DIAGNOSIS — Z74.09 IMPAIRED FUNCTIONAL MOBILITY, BALANCE, GAIT, AND ENDURANCE: Primary | ICD-10-CM

## 2018-06-11 PROCEDURE — 97110 THERAPEUTIC EXERCISES: CPT | Performed by: PHYSICAL THERAPIST

## 2018-06-11 PROCEDURE — 97112 NEUROMUSCULAR REEDUCATION: CPT | Performed by: PHYSICAL THERAPIST

## 2018-06-11 PROCEDURE — 97140 MANUAL THERAPY 1/> REGIONS: CPT | Performed by: PHYSICAL THERAPIST

## 2018-06-11 RX ORDER — METOPROLOL TARTRATE 50 MG/1
50 TABLET, FILM COATED ORAL DAILY
Qty: 30 TABLET | Refills: 3 | Status: CANCELLED | OUTPATIENT
Start: 2018-06-11

## 2018-06-11 RX ORDER — LOSARTAN POTASSIUM 50 MG/1
TABLET ORAL
Qty: 30 TABLET | Refills: 3 | Status: CANCELLED | OUTPATIENT
Start: 2018-06-11

## 2018-06-11 RX ORDER — MEMANTINE HYDROCHLORIDE 10 MG/1
10 TABLET ORAL 2 TIMES DAILY
Qty: 60 TABLET | Refills: 3 | Status: CANCELLED | OUTPATIENT
Start: 2018-06-11

## 2018-06-11 NOTE — PROGRESS NOTES
Daily Note     Today's date: 2018  Patient name: Nidia Fletcher  : 3/23/1931  MRN: [id number]  Referring provider: Hailee Ramsey MD  Dx:   Encounter Diagnosis     ICD-10-CM    1  Impaired functional mobility, balance, gait, and endurance Z74 09        Start Time: 1145  Stop Time: 1240  Total time in clinic (min): 55 minutes    Subjective: My family says I need to get into a place that has more help for me  Objective: See treatment diary below      Assessment: Tolerated treatment fair  Patient demonstrated fatigue post treatment and would benefit from continued PT      Plan: Continue per plan of care  Progress treatment as tolerated        Precautions: HX of falls, HTN, Dementia, Fatigues Easily  Daily Treatment Diary     Manual              Hamstring stretch   LB           DF   LB                                                      Exercise Diary             Bike   10'           Step to target  10x            Sit to stand  2x5 2x10           HR/TR 10x 2x10           LAQ  3# 2x20           Standing on Foam FT FA EO/EC  multiple reps 12 min           Marching on Foam   2x20           Heel strike/long stride walking   2 laps                                                                                                                                                                            Modalities

## 2018-06-11 NOTE — TELEPHONE ENCOUNTER
Spoke with pt's , Correct doses are as follows:  1  Metoprolol 50 mg, One PO QD (#30)  2  Memantine 10 mg, One PO BID (#60)  3   Losartan 50mg, One PO QD  (#30)

## 2018-06-14 ENCOUNTER — OFFICE VISIT (OUTPATIENT)
Dept: PHYSICAL THERAPY | Facility: REHABILITATION | Age: 83
End: 2018-06-14
Payer: MEDICARE

## 2018-06-14 DIAGNOSIS — Z74.09 IMPAIRED FUNCTIONAL MOBILITY, BALANCE, GAIT, AND ENDURANCE: Primary | ICD-10-CM

## 2018-06-14 PROCEDURE — 97110 THERAPEUTIC EXERCISES: CPT | Performed by: PHYSICAL THERAPIST

## 2018-06-14 PROCEDURE — 97112 NEUROMUSCULAR REEDUCATION: CPT | Performed by: PHYSICAL THERAPIST

## 2018-06-14 NOTE — PROGRESS NOTES
Daily Note     Today's date: 2018  Patient name: Jessica Gallardo  : 3/23/1931  MRN: [id number]  Referring provider: Sarah Mcnally MD  Dx:   Encounter Diagnosis     ICD-10-CM    1  Impaired functional mobility, balance, gait, and endurance Z74 09        Start Time: 1130  Stop Time: 1215  Total time in clinic (min): 45 minutes  Subjective: I am not able to walk very well today  Objective: See treatment diary below      Assessment: Tolerated treatment fair  Patient demonstrated fatigue post treatment and would benefit from continued PT      Plan: Continue per plan of care  Progress treatment as tolerated        Precautions: HX of falls, HTN, Dementia, Fatigues Easily  Daily Treatment Diary     Manual             Hamstring stretch   LB LB          DF   LB LB                                                     Exercise Diary            Bike   10' 10'          Step to target  10x            Sit to stand  2x5 2x10 10x          HR/TR 10x 2x10 TR 2x10          LAQ  3# 2x20           Standing on Foam FT FA EO/EC  multiple reps 12 min           Marching on Foam   2x20 2x20          Heel strike/long stride walking   2 laps            Ambulation outdoors    Min A  9 min          SLR flex    3x10           Bridges    3x10           T band Abd    3x10                                                                                                                       Modalities

## 2018-06-18 ENCOUNTER — OFFICE VISIT (OUTPATIENT)
Dept: PHYSICAL THERAPY | Facility: REHABILITATION | Age: 83
End: 2018-06-18
Payer: MEDICARE

## 2018-06-18 DIAGNOSIS — Z74.09 IMPAIRED FUNCTIONAL MOBILITY, BALANCE, GAIT, AND ENDURANCE: Primary | ICD-10-CM

## 2018-06-18 PROCEDURE — 97112 NEUROMUSCULAR REEDUCATION: CPT | Performed by: PHYSICAL THERAPIST

## 2018-06-18 PROCEDURE — 97110 THERAPEUTIC EXERCISES: CPT | Performed by: PHYSICAL THERAPIST

## 2018-06-18 NOTE — PROGRESS NOTES
Daily Note     Today's date: 2018  Patient name: Valente Mcarthur  : 3/23/1931  MRN: [id number]  Referring provider: Artem Cleveland MD  Dx:   Encounter Diagnosis     ICD-10-CM    1  Impaired functional mobility, balance, gait, and endurance Z74 09        Start Time: 1015  Stop Time: 1055  Total time in clinic (min): 40 minutes    Subjective: I am wore out today  Didn't fall this weekend  Maggie Parnell hangs on to me  Objective: See treatment diary below      Assessment: Tolerated treatment fair  Pt BP was taken secondary to fatigue, dizziness, and difficulty staying alert and on task  Pt /77  Patient demonstrated fatigue post treatment and would benefit from continued PT      Plan: Continue per plan of care  Progress treatment as tolerated        Precautions: HX of falls, HTN, Dementia, Fatigues Easily  Daily Treatment Diary     Manual            Hamstring stretch   LB LB          DF   LB LB                                                     Exercise Diary           Bike   10' 10' 10'         Step to target  10x            Sit to stand  2x5 2x10 10x 2x10         HR/TR 10x 2x10 TR 2x10          LAQ  3# 2x20           Standing on Foam FT FA EO/EC  multiple reps 12 min  30sx5 each         Marching on Foam   2x20 2x20 2x15         Heel strike/long stride walking   2 laps   4 laps         Ambulation outdoors    Min A  9 min          SLR flex    3x10           Bridges    3x10           T band Abd    3x10  2x15         Step ups on Foam    2x10                                                                                                        Modalities

## 2018-06-21 ENCOUNTER — OFFICE VISIT (OUTPATIENT)
Dept: PHYSICAL THERAPY | Facility: REHABILITATION | Age: 83
End: 2018-06-21
Payer: MEDICARE

## 2018-06-21 DIAGNOSIS — Z74.09 IMPAIRED FUNCTIONAL MOBILITY, BALANCE, GAIT, AND ENDURANCE: Primary | ICD-10-CM

## 2018-06-21 PROCEDURE — 97110 THERAPEUTIC EXERCISES: CPT | Performed by: PHYSICAL THERAPIST

## 2018-06-21 PROCEDURE — 97112 NEUROMUSCULAR REEDUCATION: CPT | Performed by: PHYSICAL THERAPIST

## 2018-06-21 NOTE — PROGRESS NOTES
Daily Note     Today's date: 2018  Patient name: Surya Rosario  : 3/23/1931  MRN: [id number]  Referring provider: Angelica Olivares MD  Dx:   Encounter Diagnosis     ICD-10-CM    1  Impaired functional mobility, balance, gait, and endurance Z74 09        Start Time: 1100  Stop Time: 1150  Total time in clinic (min): 50 minutes    Subjective: I have been up since 530 that early just couldn't sleep  Objective: See treatment diary below      Assessment: Tolerated treatment fair  BP was taken secondary to increased balance intolerance and fatigue  / 62  Pt  was educated on increasing his guarding and awareness of her today  Any increased symptoms pt was advised to call MD   Patient demonstrated fatigue post treatment and would benefit from continued PT      Plan: Continue per plan of care  Progress treatment as tolerated          Precautions: HX of falls, HTN, Dementia, Fatigues Easily  Daily Treatment Diary     Manual           Hamstring stretch   LB LB          DF   LB LB                                                     Exercise Diary          Bike   10' 10' 10' 10'        Step to target  10x    3x10f/backward         Sit to stand  2x5 2x10 10x 2x10 10x        HR/TR 10x 2x10 TR 2x10          LAQ  3# 2x20   3x15        Standing on Foam FT FA EO/EC  multiple reps 12 min  30sx5 each         Marching on Foam   2x20 2x20 2x15         Heel strike/long stride walking   2 laps   4 laps 10'x4        Ambulation outdoors    Min A  9 min  Min A 10 min        SLR flex    3x10           Bridges    3x10           T band Abd    3x10  2x15 GTB        Step ups on Foam    2x10         4' step 10x      10x                                                                                          Modalities

## 2018-06-25 ENCOUNTER — APPOINTMENT (OUTPATIENT)
Dept: PHYSICAL THERAPY | Facility: REHABILITATION | Age: 83
End: 2018-06-25
Payer: MEDICARE

## 2018-06-27 ENCOUNTER — OFFICE VISIT (OUTPATIENT)
Dept: PHYSICAL THERAPY | Facility: REHABILITATION | Age: 83
End: 2018-06-27
Payer: MEDICARE

## 2018-06-27 DIAGNOSIS — Z74.09 IMPAIRED FUNCTIONAL MOBILITY, BALANCE, GAIT, AND ENDURANCE: Primary | ICD-10-CM

## 2018-06-27 PROCEDURE — 97110 THERAPEUTIC EXERCISES: CPT | Performed by: PHYSICAL THERAPIST

## 2018-06-27 PROCEDURE — 97112 NEUROMUSCULAR REEDUCATION: CPT | Performed by: PHYSICAL THERAPIST

## 2018-06-27 NOTE — PROGRESS NOTES
Daily Note     Today's date: 2018  Patient name: Nia Adan  : 3/23/1931  MRN: [id number]  Referring provider: Ned Pavon MD  Dx:   Encounter Diagnosis     ICD-10-CM    1  Impaired functional mobility, balance, gait, and endurance Z74 09        Start Time: 1130  Stop Time: 1215  Total time in clinic (min): 45 minutes    Subjective: I am better today but had a few rough days not sure what is  wrong I am just unstable  Objective: See treatment diary below      Assessment: Tolerated treatment well  Patient demonstrated fatigue post treatment and would benefit from continued PT      Plan: Continue per plan of care  Progress treatment as tolerated        Precautions: HX of falls, HTN, Dementia, Fatigues Easily  Daily Treatment Diary     Manual          Hamstring stretch   LB LB          DF   LB LB                                                     Exercise Diary         Bike   10' 10' 10' 10' 10       Step to target  10x    3x10f/backward         Sit to stand  2x5 2x10 10x 2x10 10x 2x10       HR/TR 10x 2x10 TR 2x10   2x15       LAQ  3# 2x20   3x15        Standing on Foam FT FA EO/EC  multiple reps 12 min  30sx5 each  5x 30"       Marching on Foam   2x20 2x20 2x15  3x20       Heel strike/long stride walking   2 laps   4 laps 10'x4        Ambulation outdoors    Min A  9 min  Min A 10 min        SLR flex    3x10           Bridges    3x10           T band Abd    3x10  2x15 GTB        Step ups on Foam    2x10  From sit to stand   3x10       4' step 10x      10x 5x 10"       Tandem stance       3x ez 15 sec         SLS       5x ea 3-6 sec                                                               Modalities

## 2018-06-28 ENCOUNTER — OFFICE VISIT (OUTPATIENT)
Dept: PHYSICAL THERAPY | Facility: REHABILITATION | Age: 83
End: 2018-06-28
Payer: MEDICARE

## 2018-06-28 DIAGNOSIS — Z74.09 IMPAIRED FUNCTIONAL MOBILITY, BALANCE, GAIT, AND ENDURANCE: Primary | ICD-10-CM

## 2018-06-28 PROCEDURE — 97112 NEUROMUSCULAR REEDUCATION: CPT | Performed by: PHYSICAL THERAPIST

## 2018-06-28 PROCEDURE — 97110 THERAPEUTIC EXERCISES: CPT | Performed by: PHYSICAL THERAPIST

## 2018-06-28 NOTE — PROGRESS NOTES
Daily Note     Today's date: 2018  Patient name: Jayden Hoffmann  : 3/23/1931  MRN: [id number]  Referring provider: Mone Moon MD  Dx:   Encounter Diagnosis     ICD-10-CM    1  Impaired functional mobility, balance, gait, and endurance Z74 09        Start Time: 1108  Stop Time: 1151  Total time in clinic (min): 43 minutes    Subjective: I am really having a balance problem lately       Objective: See treatment diary below      Assessment: Tolerated treatment fair  Family informed of pts decline in balance  Patient demonstrated fatigue post treatment and would benefit from continued PT      Plan: Continue per plan of care  Progress treatment as tolerated        Precautions: HX of falls, HTN, Dementia, Fatigues Easily  Daily Treatment Diary     Manual          Hamstring stretch   LB LB          DF   LB LB                                                     Exercise Diary        Bike   10' 10' 10' 10' 10 5      Step to target  10x    3x10f/backward   3x10      Sit to stand  2x5 2x10 10x 2x10 10x 2x10 2x10 w/step       HR/TR 10x 2x10 TR 2x10   2x15 2x15      LAQ  3# 2x20   3x15  2x20 3#      Standing on Foam FT FA EO/EC  multiple reps 12 min  30sx5 each  5x 30" 5x ea 10"      Marching on Foam   2x20 2x20 2x15  3x20 3x20      Heel strike/long stride walking   2 laps   4 laps 10'x4  2 laps 20'      Ambulation outdoors    Min A  9 min  Min A 10 min        SLR flex    3x10           Bridges    3x10           T band Abd    3x10  2x15 GTB        Step ups on Foam    2x10  From sit to stand   3x10       4' step 10x      10x 5x 10"       Tandem stance       3x ez 15 sec   5x 5" hold on blue foam steps      SLS       5x ea 3-6 sec 6x 5" Min A                                                              Modalities

## 2018-07-02 ENCOUNTER — OFFICE VISIT (OUTPATIENT)
Dept: PHYSICAL THERAPY | Facility: REHABILITATION | Age: 83
End: 2018-07-02
Payer: MEDICARE

## 2018-07-02 DIAGNOSIS — Z74.09 IMPAIRED FUNCTIONAL MOBILITY, BALANCE, GAIT, AND ENDURANCE: Primary | ICD-10-CM

## 2018-07-02 PROCEDURE — 97112 NEUROMUSCULAR REEDUCATION: CPT | Performed by: PHYSICAL THERAPIST

## 2018-07-02 PROCEDURE — 97110 THERAPEUTIC EXERCISES: CPT | Performed by: PHYSICAL THERAPIST

## 2018-07-02 PROCEDURE — G8978 MOBILITY CURRENT STATUS: HCPCS | Performed by: PHYSICAL THERAPIST

## 2018-07-02 PROCEDURE — G8979 MOBILITY GOAL STATUS: HCPCS | Performed by: PHYSICAL THERAPIST

## 2018-07-02 NOTE — PROGRESS NOTES
Daily Note     Today's date: 2018  Patient name: Antonietta Ritchie  : 3/23/1931  MRN: [id number]  Referring provider: Mac Gandhi MD  Dx:   Encounter Diagnosis     ICD-10-CM    1  Impaired functional mobility, balance, gait, and endurance Z74 09        Start Time: 1130  Stop Time: 1215  Total time in clinic (min): 45 minutes    Subjective: I have had no falls all week  Objective: See treatment diary below      Assessment: Tolerated treatment well  Patient demonstrated fatigue post treatment and would benefit from continued PT      Plan: Continue per plan of care  Progress treatment as tolerated        Precautions: HX of falls, HTN, Dementia, Fatigues Easily  Daily Treatment Diary     Manual          Hamstring stretch   LB LB          DF   LB LB                                                     Exercise Diary       Bike   10' 10' 10' 10' 10 5 10     Step to target  10x    3x10f/backward   3x10 3x10     Sit to stand  2x5 2x10 10x 2x10 10x 2x10 2x10 w/step  10x no UE     HR/TR 10x 2x10 TR 2x10   2x15 2x15 3x12     LAQ  3# 2x20   3x15  2x20 3#      Standing on Foam FT FA EO/EC  multiple reps 12 min  30sx5 each  5x 30" 5x ea 10" 8x ea     Marching on Foam   2x20 2x20 2x15  3x20 3x20 2x20     Heel strike/long stride walking   2 laps   4 laps 10'x4  2 laps 20' 2 laps     Ambulation outdoors    Min A  9 min  Min A 10 min        SLR flex ,abd   3x10      standing 2# 2x20      Bridges    3x10           T band Abd    3x10  2x15 GTB        Step ups on Foam    2x10  From sit to stand   3x10  3x15     4' step 10x      10x 5x 10"       Tandem stance       3x ez 15 sec   5x 5" hold on blue foam steps On grey foam 6x ea leg lead     SLS       5x ea 3-6 sec 6x 5" Min A 3x ea 10 " min A                                                             Modalities

## 2018-07-05 ENCOUNTER — OFFICE VISIT (OUTPATIENT)
Dept: PHYSICAL THERAPY | Facility: REHABILITATION | Age: 83
End: 2018-07-05
Payer: MEDICARE

## 2018-07-05 DIAGNOSIS — Z74.09 IMPAIRED FUNCTIONAL MOBILITY, BALANCE, GAIT, AND ENDURANCE: Primary | ICD-10-CM

## 2018-07-05 PROCEDURE — 97110 THERAPEUTIC EXERCISES: CPT | Performed by: PHYSICAL THERAPIST

## 2018-07-05 PROCEDURE — 97112 NEUROMUSCULAR REEDUCATION: CPT | Performed by: PHYSICAL THERAPIST

## 2018-07-05 NOTE — PROGRESS NOTES
Daily Note     Today's date: 2018  Patient name: Aria Schafer  : 3/23/1931  MRN: [id number]  Referring provider: Ravi Covarrubias MD  Dx:   Encounter Diagnosis     ICD-10-CM    1  Impaired functional mobility, balance, gait, and endurance Z74 09        Start Time: 1100  Stop Time: 1144  Total time in clinic (min): 44 minutes    Subjective: I am doing ok today would like to work on my balance  Objective: See treatment diary below      Assessment: Tolerated treatment fair  Patient demonstrated fatigue post treatment and would benefit from continued PT      Plan: Continue per plan of care  Progress treatment as tolerated  Precautions: HX of falls, HTN, Dementia, Fatigues Easily  Daily Treatment Diary     Manual          Hamstring stretch   LB LB          DF   LB LB                                                     Exercise Diary      Bike   10' 10' 10' 10' 10 5 10 10'    Step to target  10x    3x10f/backward   3x10 3x10     Sit to stand  2x5 2x10 10x 2x10 10x 2x10 2x10 w/step  10x no UE 10x     HR/TR 10x 2x10 TR 2x10   2x15 2x15 3x12 2x15    LAQ  3# 2x20   3x15  2x20 3#      Standing on Foam FT FA EO/EC  multiple reps 12 min  30sx5 each  5x 30" 5x ea 10" 8x ea 5x 10"    Marching on Foam   2x20 2x20 2x15  3x20 3x20 2x20 2x20    Heel strike/long stride walking   2 laps   4 laps 10'x4  2 laps 20' 2 laps     Ambulation outdoors    Min A  9 min  Min A 10 min        SLR flex ,abd   3x10      standing 2# 2x20      Bridges    3x10           T band Abd    3x10  2x15 GTB    2x15    Step ups on Foam    2x10  From sit to stand   3x10  3x15 2x20  With march 10x/trunl rotation       4' step 10x      10x 5x 10"       Tandem stance       3x ez 15 sec   5x 5" hold on blue foam steps On grey foam 6x ea leg lead 5x on blue foam /grey foam     SLS       5x ea 3-6 sec 6x 5" Min A 3x ea 10 " min A 5x 5-10"    Steam Modalities

## 2018-07-09 ENCOUNTER — OFFICE VISIT (OUTPATIENT)
Dept: PHYSICAL THERAPY | Facility: REHABILITATION | Age: 83
End: 2018-07-09
Payer: MEDICARE

## 2018-07-09 DIAGNOSIS — Z74.09 IMPAIRED FUNCTIONAL MOBILITY, BALANCE, GAIT, AND ENDURANCE: Primary | ICD-10-CM

## 2018-07-09 PROCEDURE — 97112 NEUROMUSCULAR REEDUCATION: CPT | Performed by: PHYSICAL THERAPIST

## 2018-07-09 PROCEDURE — 97110 THERAPEUTIC EXERCISES: CPT | Performed by: PHYSICAL THERAPIST

## 2018-07-09 NOTE — PROGRESS NOTES
Daily Note     Today's date: 2018  Patient name: Gia Adlre  : 3/23/1931  MRN: [id number]  Referring provider: Juan Darby MD  Dx:   Encounter Diagnosis     ICD-10-CM    1  Impaired functional mobility, balance, gait, and endurance Z74 09        Start Time: 1130  Stop Time: 1200  Total time in clinic (min): 30 minutes    Subjective: I am not having a very good day  I am just wiped out from the weekend  Objective: See treatment diary below      Assessment: Tolerated treatment poor  TX stopped early secondary to fatigue  BP taken 133/71 HR 62 Patient demonstrated fatigue post treatment and would benefit from continued PT      Plan: Continue per plan of care  Progress treatment as tolerated  Precautions: HX of falls, HTN, Dementia, Fatigues Easily  Daily Treatment Diary     Manual          Hamstring stretch   LB LB          DF   LB LB                                                     Exercise Diary     Bike   10' 10' 10' 10' 10 5 10 10' 8   Step to target  10x    3x10f/backward   3x10 3x10     Sit to stand  2x5 2x10 10x 2x10 10x 2x10 2x10 w/step  10x no UE 10x  2x5x   HR/TR 10x 2x10 TR 2x10   2x15 2x15 3x12 2x15 3x10   LAQ  3# 2x20   3x15  2x20 3#      Standing on Foam FT FA EO/EC  multiple reps 12 min  30sx5 each  5x 30" 5x ea 10" 8x ea 5x 10"    Marching on Foam   2x20 2x20 2x15  3x20 3x20 2x20 2x20 3x20   Heel strike/long stride walking   2 laps   4 laps 10'x4  2 laps 20' 2 laps  1 lap     Ambulation outdoors    Min A  9 min  Min A 10 min        SLR flex ,abd   3x10      standing 2# 2x20      Bridges    3x10           T band Abd    3x10  2x15 GTB    2x15    Step ups on Foam    2x10  From sit to stand   3x10  3x15 2x20  With march 10x/trunl rotation       4' step 10x      10x 5x 10"       Tandem stance       3x ez 15 sec   5x 5" hold on blue foam steps On grey foam 6x ea leg lead 5x on blue foam /grey foam     SLS 5x ea 3-6 sec 6x 5" Min A 3x ea 10 " min A 5x 5-10"                                                            Modalities

## 2018-07-12 ENCOUNTER — OFFICE VISIT (OUTPATIENT)
Dept: PHYSICAL THERAPY | Facility: REHABILITATION | Age: 83
End: 2018-07-12
Payer: MEDICARE

## 2018-07-12 DIAGNOSIS — Z74.09 IMPAIRED FUNCTIONAL MOBILITY, BALANCE, GAIT, AND ENDURANCE: Primary | ICD-10-CM

## 2018-07-12 PROCEDURE — 97112 NEUROMUSCULAR REEDUCATION: CPT | Performed by: PHYSICAL THERAPIST

## 2018-07-12 PROCEDURE — 97110 THERAPEUTIC EXERCISES: CPT | Performed by: PHYSICAL THERAPIST

## 2018-07-12 NOTE — PROGRESS NOTES
Daily Note     Today's date: 2018  Patient name: Cordell Zuniga  : 3/23/1931  MRN: [id number]  Referring provider: Kodak William MD  Dx:   Encounter Diagnosis     ICD-10-CM    1  Impaired functional mobility, balance, gait, and endurance Z74 09        Start Time: 1130  Stop Time: 1215  Total time in clinic (min): 45 minutes    Subjective: I have a little energy today but not much  Objective: See treatment diary below      Assessment: Tolerated treatment fair  Pt fatigues very easily and requires Min -Mod A for all therex and balance activites   educated on proper guarding techniques  Patient demonstrated fatigue post treatment and would benefit from continued PT      Plan: Continue per plan of care  Progress treatment as tolerated         Precautions: HX of falls, HTN, Dementia, Fatigues Easily  Daily Treatment Diary     Manual                Hamstring stretch              DF                                                         Exercise Diary                Bike  10            Step to target              Sit to stand  2x10            HR/TR 2x15            LAQ 4# 3x12            Standing on Foam FT FA EO/EC             Marching on Foam              Heel strike/long stride walking  2 lpas             Ambulation outdoors              SLR flex ,abd             Bridges              T band Abd  Side steps YTB            Step ups on Foam             4' step 10x              Tandem stance  10x 5"            SLS  10x 5"                                                                    Modalities

## 2018-07-16 ENCOUNTER — APPOINTMENT (OUTPATIENT)
Dept: PHYSICAL THERAPY | Facility: REHABILITATION | Age: 83
End: 2018-07-16
Payer: MEDICARE

## 2018-07-19 ENCOUNTER — APPOINTMENT (OUTPATIENT)
Dept: PHYSICAL THERAPY | Facility: REHABILITATION | Age: 83
End: 2018-07-19
Payer: MEDICARE

## 2018-07-23 ENCOUNTER — OFFICE VISIT (OUTPATIENT)
Dept: PHYSICAL THERAPY | Facility: REHABILITATION | Age: 83
End: 2018-07-23
Payer: MEDICARE

## 2018-07-23 DIAGNOSIS — Z74.09 IMPAIRED FUNCTIONAL MOBILITY, BALANCE, GAIT, AND ENDURANCE: Primary | ICD-10-CM

## 2018-07-23 PROCEDURE — 97110 THERAPEUTIC EXERCISES: CPT | Performed by: PHYSICAL THERAPIST

## 2018-07-23 PROCEDURE — 97112 NEUROMUSCULAR REEDUCATION: CPT | Performed by: PHYSICAL THERAPIST

## 2018-07-23 NOTE — PROGRESS NOTES
Daily Note     Today's date: 2018  Patient name: Min Calvillo  : 3/23/1931  MRN: [id number]  Referring provider: Darlin White MD  Dx:   Encounter Diagnosis     ICD-10-CM    1  Impaired functional mobility, balance, gait, and endurance Z74 09        Start Time: 1151  Stop Time: 1218  Total time in clinic (min): 27 minutes    Subjective: I am so fatigued I am not sure how much I can do  Objective: See treatment diary below      Assessment: Tolerated treatment fair  Patient demonstrated fatigue post treatment and would benefit from continued PT      Plan: Continue per plan of care  Progress treatment as tolerated         Precautions: HX of falls, HTN, Dementia, Fatigues Easily  Daily Treatment Diary     Manual                Hamstring stretch              DF                                                         Exercise Diary               Bike  10 8           Step to target   Over and to target 10" 3x10 b/l           Sit to stand  2x10 4x8 2x10           HR/TR 2x15            LAQ 4# 3x12 3#  3x16             Standing on Foam FT FA EO/EC             Marching on Foam              Heel strike/long stride walking  2 lpas             Ambulation outdoors              SLR flex ,abd             Bridges              T band Abd  Side steps YTB            Step ups on Foam  2x10            4' step 10x              Tandem stance  10x 5" 3x 10"           SLS  10x 5" 5x 5'"                                                                    Modalities

## 2018-07-26 ENCOUNTER — OFFICE VISIT (OUTPATIENT)
Dept: PHYSICAL THERAPY | Facility: REHABILITATION | Age: 83
End: 2018-07-26
Payer: MEDICARE

## 2018-07-26 DIAGNOSIS — Z74.09 IMPAIRED FUNCTIONAL MOBILITY, BALANCE, GAIT, AND ENDURANCE: Primary | ICD-10-CM

## 2018-07-26 PROCEDURE — G8979 MOBILITY GOAL STATUS: HCPCS | Performed by: PHYSICAL THERAPIST

## 2018-07-26 PROCEDURE — G8978 MOBILITY CURRENT STATUS: HCPCS | Performed by: PHYSICAL THERAPIST

## 2018-07-26 PROCEDURE — 97110 THERAPEUTIC EXERCISES: CPT | Performed by: PHYSICAL THERAPIST

## 2018-07-26 PROCEDURE — 97112 NEUROMUSCULAR REEDUCATION: CPT | Performed by: PHYSICAL THERAPIST

## 2018-07-26 NOTE — PROGRESS NOTES
PT Re-Evaluation     Today's date: 2018  Patient name: Irineo Padron  : 3/23/1931  MRN: [id number]  Referring provider: Pia Ly MD  Dx:   Encounter Diagnosis     ICD-10-CM    1  Impaired functional mobility, balance, gait, and endurance Z74 09        Start Time: 1138  Stop Time: 1220  Total time in clinic (min): 42 minutes    Assessment  Impairments: abnormal gait, abnormal muscle tone, abnormal or restricted ROM, impaired balance, impaired physical strength, lacks appropriate home exercise program, safety issue and poor body mechanics    Assessment details: Since beginning physical therapy, Matilde Miller has attended a total number of 10 visits and has maintained excellent compliance with established POC  Patient has made significant improvements in all areas, including increased strength, increased ROM, improved postural awareness and improved balance  Patient is reporting improved ability to perform a/iadls, recreational activities and engaging in social activities  Despite these improvements, Patient continues to present with decreased strength, ambulatory dysfunction and postural  dysfunction  Patient would continue to benefit from skilled physical therapy to address Her aforementioned impairments, improve their level of function and to improve their overall quality of life  Goals  Short term:   Improve strength in in all motions a 1/2 MMT grade with in 4 weeks  Improve TUG time by 5 seconds  Long term:  Improve FOTO score to at least a 39 within 12 weeks  Improve TUG by 10 seconds  Patient will tolerate community ambulation with close supervision and use of LRAD  Pt will able to perform 13 steps with CGA and use of 1 HR  Pt will be able to perform ADL's with close supervision         Plan  Patient would benefit from: skilled physical therapy  Planned modality interventions: unattended electrical stimulation  Planned therapy interventions: ADL training, abdominal trunk stabilization, balance, body mechanics training, flexibility, gait training, home exercise program, stretching, strengthening, patient education, muscle pump exercises, therapeutic exercise, manual therapy and therapeutic activities  Frequency: 3x week  Duration in weeks: 12  Treatment plan discussed with: patient and family        Subjective Evaluation    Quality of life: fair    Pain  Current pain ratin  At best pain ratin  At worst pain ratin  Progression: improved          Objective     Static Posture     Shoulders  Rounded  Neurological Testing     Sensation     Knee   Left Knee   Intact: light touch    Right Knee   Intact: light touch     Reflexes   Left   Patellar (L4): normal (2+)    Right   Patellar (L4): normal (2+)    Strength/Myotome Testing     Left Hip   Planes of Motion   Flexion: 4-  Extension: 3+  Abduction: 3+  Adduction: 3+    Right Hip   Planes of Motion   Flexion: 4-  Extension: 3+  Abduction: 3+  Adduction: 3+    Left Knee   Flexion: 3+  Extension: 4    Right Knee   Flexion: 3+  Extension: 4    Left Ankle/Foot   Dorsiflexion: 3+  Plantar flexion: 4+    Right Ankle/Foot   Dorsiflexion: 3+  Plantar flexion: 4+    Ambulation     Ambulation: Stairs   Ascend stairs: minimum assist  Pattern: non-reciprocal  Railings: one rail  Descend stairs: minimum assist  Pattern: non-reciprocal  Railings: one rail    Additional Stairs Ambulation Details  Continued decreased safety awareness    Observational Gait   Increased left stance time and right stance time  Decreased walking speed, stride length, left swing time, right swing time, left step length and right step length  Left foot contact pattern: foot flat  Right foot contact pattern: foot flat  Left arm swing: decreased  Right arm swing: decreased    Additional Observational Gait Details  Patient uses a single point cane as an AD   Abnormal gait demonstrates shuffle gait pattern but normalizes gait pattern with verbal cuing   Increased ambulation distance to 200' on even surface w/SPC  Quality of Movement During Gait   Trunk  Forward lean       Functional Assessment     Comments  Sit to Stand: 32 seconds  TU seconds  SLS: Unable to perform  Mod Tandem Stance: Unable to perform      Flowsheet Rows      Most Recent Value   PT/OT G-Codes   Current Score  23   Projected Score  47   FOTO information reviewed  Yes   Assessment Type  Re-evaluation   G code set  Mobility: Walking & Moving Around   Mobility: Walking and Moving Around Current Status ()  CL   Mobility: Walking and Moving Around Goal Status ()  CL          Precautions: Fall    Precautions: HX of falls, HTN, Dementia, Fatigues Easily  Daily Treatment Diary     Manual                Hamstring stretch              DF                                                         Exercise Diary              Bike  10 8 10          Step to target   Over and to target 10" 3x10 b/l           Sit to stand  2x10 4x8 2x10 3x10           HR/TR 2x15  3x10          LAQ 4# 3x12 3#  3x16             Standing on Foam FT FA EO/EC             Marching on Foam              Heel strike/long stride walking  2 lpas   ambulatio change of direction, backward, ^fabio           Ambulation outdoors              SLR flex ,abd             Bridges              T band Abd  Side steps YTB  sidesteps 15 x4          Step ups on Foam  2x10  2x20          4' step 10x              Tandem stance  10x 5" 3x 10" 5x 10"          SLS  10x 5" 5x 5'"            Stairs    14 steps 1 HR CGA                                                      Modalities

## 2018-08-01 ENCOUNTER — OFFICE VISIT (OUTPATIENT)
Dept: PHYSICAL THERAPY | Facility: REHABILITATION | Age: 83
End: 2018-08-01
Payer: MEDICARE

## 2018-08-01 DIAGNOSIS — Z74.09 IMPAIRED FUNCTIONAL MOBILITY, BALANCE, GAIT, AND ENDURANCE: Primary | ICD-10-CM

## 2018-08-01 PROCEDURE — G8979 MOBILITY GOAL STATUS: HCPCS | Performed by: PHYSICAL THERAPIST

## 2018-08-01 PROCEDURE — 97112 NEUROMUSCULAR REEDUCATION: CPT | Performed by: PHYSICAL THERAPIST

## 2018-08-01 PROCEDURE — G8978 MOBILITY CURRENT STATUS: HCPCS | Performed by: PHYSICAL THERAPIST

## 2018-08-01 PROCEDURE — 97110 THERAPEUTIC EXERCISES: CPT | Performed by: PHYSICAL THERAPIST

## 2018-08-01 NOTE — PROGRESS NOTES
Daily Note     Today's date: 2018  Patient name: Spencer Justin  : 3/23/1931  MRN: [id number]  Referring provider: Guerda Duarte MD  Dx:   Encounter Diagnosis     ICD-10-CM    1  Impaired functional mobility, balance, gait, and endurance Z74 09        Start Time: 1140  Stop Time: 1220  Total time in clinic (min): 40 minutes    Subjective: I am doing well today but tired       Objective: See treatment diary below      Assessment: Tolerated treatment well  Patient demonstrated fatigue post treatment and would benefit from continued PT      Plan: Continue per plan of care  Progress treatment as tolerated           Precautions: HX of falls, HTN, Dementia, Fatigues Easily  Daily Treatment Diary     Manual                Hamstring stretch              DF                                                         Exercise Diary             Bike  10 8 10 10         Step to target   Over and to target 10" 3x10 b/l  Over cone on to target 3x10          Sit to stand  2x10 4x8 2x10 3x10  3x10          HR/TR 2x15  3x10 3x15         LAQ 4# 3x12 3#  3x16             Standing on Foam FT FA EO/EC    5x 10"         Marching on Foam              Heel strike/long stride walking  2 lpas   ambulatio change of direction, backward, ^fabio           Ambulation outdoors              SLR flex ,abd             Bridges              T band Abd  Side steps YTB  sidesteps 15 x4          Step ups on Foam  2x10  2x20 2x20          4' step 10x              Tandem stance  10x 5" 3x 10" 5x 10" 10x ea 10"         SLS  10x 5" 5x 5'"            Stairs    14 steps 1 HR CGA  10 I railing HHA                                                     Modalities

## 2018-08-07 ENCOUNTER — OFFICE VISIT (OUTPATIENT)
Dept: PHYSICAL THERAPY | Facility: REHABILITATION | Age: 83
End: 2018-08-07
Payer: MEDICARE

## 2018-08-07 DIAGNOSIS — Z74.09 IMPAIRED FUNCTIONAL MOBILITY, BALANCE, GAIT, AND ENDURANCE: Primary | ICD-10-CM

## 2018-08-07 PROCEDURE — G8979 MOBILITY GOAL STATUS: HCPCS | Performed by: PHYSICAL THERAPIST

## 2018-08-07 PROCEDURE — 97112 NEUROMUSCULAR REEDUCATION: CPT | Performed by: PHYSICAL THERAPIST

## 2018-08-07 PROCEDURE — G8980 MOBILITY D/C STATUS: HCPCS | Performed by: PHYSICAL THERAPIST

## 2018-08-07 PROCEDURE — 97110 THERAPEUTIC EXERCISES: CPT | Performed by: PHYSICAL THERAPIST

## 2018-08-07 NOTE — PROGRESS NOTES
PT Discharge    Today's date: 2018  Patient name: Fahad Gonzalez  : 3/23/1931  MRN: [id number]  Referring provider: Maryann Fournier MD  Dx:   Encounter Diagnosis     ICD-10-CM    1  Impaired functional mobility, balance, gait, and endurance Z74 09                   Assessment    Assessment details: Pt has been attending outpatient PT for _15___  Pt has made steady progress in PT and has met all impairment and functional goals at this time  Pt is independent with HEP  Pt is D/C from PT to HEP continue to progress towards personal goals  Thank you! Goals  Goals  Short term:   Improve strength in in all motions a 1/2 MMT grade with in 4 weeks  Achieved   Improve TUG time by 5 seconds  Achieved       Long term:  Improve FOTO score to at least a 39 within 12 weeks  Achieved     Improve TUG by 10 seconds Not achieved   Patient will tolerate community ambulation with close supervision and use of LRAD  Achieved   Pt will able to perform 13 steps with CGA and use of 1 HR  partially achieved   Pt will be able to perform ADL's with close supervision  Achieved           Plan  Plan details: [facility]        Subjective Evaluation    History of Present Illness  Mechanism of injury:     Recurrent probem    Quality of life: poor    Pain  Current pain ratin  At best pain ratin  At worst pain ratin    Social Support  Stairs in house: yes   Lives in: multiple-level home  Lives with: spouse      Diagnostic Tests  No diagnostic tests performed  Treatments  Previous treatment: physical therapy  Current treatment: physical therapy  Patient Goals  Patient goals for therapy: increased strength, improved balance, increased motion, independence with ADLs/IADLs and return to sport/leisure activities          Objective     Static Posture     Shoulders  Rounded      Neurological Testing     Sensation     Knee   Left Knee   Intact: light touch    Right Knee   Intact: light touch     Reflexes   Left   Patellar (L4): normal (2+)    Right   Patellar (L4): normal (2+)    Passive Range of Motion     Additional Passive Range of Motion Details  B/L Passive Hip Flexion was not WFL  Hamstring flexibility deficit  Strength/Myotome Testing     Left Hip   Planes of Motion   Flexion: 4  Extension: 4  Abduction: 4  Adduction: 4    Right Hip   Planes of Motion   Flexion: 4-  Extension: 4-  Abduction: 3+  Adduction: 4    Left Knee   Flexion: 4-  Extension: 4    Right Knee   Flexion: 4-  Extension: 4    Left Ankle/Foot   Dorsiflexion: 3+  Plantar flexion: 4+    Right Ankle/Foot   Dorsiflexion: 3+  Plantar flexion: 4+    Ambulation     Ambulation: Stairs   Ascend stairs: minimum assist  Pattern: non-reciprocal  Railings: one rail  Descend stairs: minimum assist  Pattern: non-reciprocal  Railings: one rail    Additional Stairs Ambulation Details  Poor safety awareness increased fear of falling    Observational Gait   Walking speed and stride length within functional limits  Increased left stance time and right stance time  Decreased left swing time, right swing time, left step length and right step length  Left foot contact pattern: heel to toe  Right foot contact pattern: heel to toe  Left arm swing: within functional limits  Right arm swing: within functional limits  Walking speed comments: with vc   Stride length comments: with vc     Quality of Movement During Gait   Trunk  Trunk within functional limits and forward lean       Functional Assessment     Comments  Sit to Stand: 16 seconds  TU seconds  SLS: R 5 L 4  Mod Tandem Stance: 6 sec           Precautions: HX of falls, HTN, Dementia, Fatigues Easily  Daily Treatment Diary     Manual                Hamstring stretch              DF                                                         Exercise Diary            Bike  10 8 10 10 10        Step to target   Over and to target 10" 3x10 b/l  Over cone on to target 3x10          Sit to stand  2x10 4x8 2x10 3x10  3x10 3x10        HR/TR 2x15  3x10 3x15 3x15        LAQ 4# 3x12 3#  3x16             Standing on Foam FT FA EO/EC    5x 10"         Marching on Foam      10x        Heel strike/long stride walking  2 lpas   ambulatio change of direction, backward, ^fabio           Ambulation outdoors      5 min walk test tolerated         SLR flex ,abd     3x10        Bridges              T band Abd  Side steps YTB  sidesteps 15 x4  3x15        Step ups on Foam  2x10  2x20 2x20  20x         4' step 10x              Tandem stance  10x 5" 3x 10" 5x 10" 10x ea 10" Mod 10x 15"        SLS  10x 5" 5x 5'"            Stairs    14 steps 1 HR CGA  10 I railing HHA  12 Min A 1 railing                                                    Modalities

## 2018-09-04 DIAGNOSIS — F03.90 DEMENTIA WITHOUT BEHAVIORAL DISTURBANCE, UNSPECIFIED DEMENTIA TYPE (HCC): Primary | ICD-10-CM

## 2018-09-04 RX ORDER — DONEPEZIL HYDROCHLORIDE 10 MG/1
TABLET, FILM COATED ORAL
Qty: 30 TABLET | Refills: 5 | Status: SHIPPED | OUTPATIENT
Start: 2018-09-04 | End: 2018-12-31 | Stop reason: SDUPTHER

## 2018-09-28 NOTE — DISCHARGE INSTRUCTIONS
Please follow up with PCP and ophthalmologist in 2-4 weeks or sooner if your symptoms return  Please return to hospital immediately if you exerpeicne chest pain, shortness of breath, unilateral weakness, facial droop, slurred speech, palpitations, fever greater than 101 5 deg F  Please continue aspirin and lipitor for stroke prevention  Please take decreased dose of Coreg (carvedilol) 6 25mg twice a day **take 1/2 of your 12 5mg tabs twice a day** I will write you a prescription in case you need it  We will provide you a pill cutter  Please go to outpatient PT and OT  Fall Prevention   WHAT YOU NEED TO KNOW:   Fall prevention includes ways to make your home and other areas safer  It also includes ways you can move more carefully to prevent a fall  Health conditions that cause changes in your blood pressure, vision, or muscle strength and coordination may increase your risk for falls  Medicines may also increase your risk for falls if they make you dizzy, weak, or sleepy  DISCHARGE INSTRUCTIONS:   Call 911 or have someone else call if:   · You have fallen and are unconscious  · You have fallen and cannot move part of your body  Contact your healthcare provider if:   · You have fallen and have pain or a headache  · You have questions or concerns about your condition or care  Fall prevention tips:   · Stand or sit up slowly  This may help you keep your balance and prevent falls  · Use assistive devices as directed  Your healthcare provider may suggest that you use a cane or walker to help you keep your balance  You may need to have grab bars put in your bathroom near the toilet or in the shower  · Wear shoes that fit well and have soles that   Wear shoes both inside and outside  Use slippers with good   Do not wear shoes with high heels  · Wear a personal alarm  This is a device that allows you to call 911 if you fall and need help  Ask your healthcare provider for more information  · Stay active  Exercise can help strengthen your muscles and improve your balance  Your healthcare provider may recommend water aerobics or walking  He or she may also recommend physical therapy to improve your coordination  Never start an exercise program without talking to your healthcare provider first     · Manage your medical conditions  Keep all appointments with your healthcare providers  Visit your eye doctor as directed  Home safety tips:   · Add items to prevent falls in the bathroom  Put nonslip strips on your bath or shower floor to prevent you from slipping  Use a bath mat if you do not have carpet in the bathroom  This will prevent you from falling when you step out of the bath or shower  Use a shower seat so you do not need to stand while you shower  Sit on the toilet or a chair in your bathroom to dry yourself and put on clothing  This will prevent you from losing your balance from drying or dressing yourself while you are standing  · Keep paths clear  Remove books, shoes, and other objects from walkways and stairs  Place cords for telephones and lamps out of the way so that you do not need to walk over them  Tape them down if you cannot move them  Remove small rugs  If you cannot remove a rug, secure it with double-sided tape  This will prevent you from tripping  · Install bright lights in your home  Use night lights to help light paths to the bathroom or kitchen  Always turn on the light before you start walking  · Keep items you use often on shelves within reach  Do not use a step stool to help you reach an item  · Paint or place reflective tape on the edges of your stairs  This will help you see the stairs better  Follow up with your healthcare provider as directed: Write down your questions so you remember to ask them during your visits     © 2017 [employer] Information is for End User's use only and may not be sold, redistributed or otherwise used for commercial purposes  All illustrations and images included in CareNotes® are the copyrighted property of [employer]  or [employer]  The above information is an  only  It is not intended as medical advice for individual conditions or treatments  Talk to your doctor, nurse or pharmacist before following any medical regimen to see if it is safe and effective for you      Bradycardia   WHAT YOU NEED TO KNOW:   Bradycardia is a slow heart rate of fewer than 60 beats per minute  A slow heart rate is normal for some people, such as athletes, and needs no treatment  Bradycardia may also be caused by health conditions that do need treatment  DISCHARGE INSTRUCTIONS:   Follow up with your healthcare provider or cardiologist as directed: Write down your questions so you remember to ask them during your visits  You may need to see specialists for more treatment  If you have a pacemaker, your cardiologist needs to make sure that it is working as it should  Heart monitoring at home: You may need to use a heart monitor at home to provide more information about your condition  This device is also called a Holter monitor, event monitor, or mobile telemetry  Bring your monitor with you to follow-up visits with your healthcare provider or cardiologist  Ask for more information about the Holter monitor  For more information:   · [address]  [facility]   Phone: [phone]  Web Address: https://www strong [email or web address]  org   Contact your healthcare provider or cardiologist if:   · You are more tired than usual, even with treatment  · You have questions or concerns about your condition or care  Seek care immediately or call 911 if:   · You feel lightheaded or faint  · You have new or worsening dizziness, shortness of breath, chest pain, or confusion  · Your pulse rate is lower than healthcare providers say it should be, even with treatment    © 2017 [facility] Information is for End User's use only and may not be sold, redistributed or otherwise used for commercial purposes  All illustrations and images included in CareNotes® are the copyrighted property of [employer]  or [employer]  The above information is an  only  It is not intended as medical advice for individual conditions or treatments   Talk to your doctor, nurse or pharmacist before following any medical regimen to see if it is safe and effective for you     no palpitations/no peripheral edema/no syncope

## 2018-10-02 DIAGNOSIS — I10 ESSENTIAL HYPERTENSION: Primary | ICD-10-CM

## 2018-10-02 DIAGNOSIS — K21.9 GASTROESOPHAGEAL REFLUX DISEASE WITHOUT ESOPHAGITIS: ICD-10-CM

## 2018-10-02 DIAGNOSIS — R41.3 MEMORY DEFICIT: ICD-10-CM

## 2018-10-02 DIAGNOSIS — E78.5 HYPERLIPIDEMIA, UNSPECIFIED HYPERLIPIDEMIA TYPE: ICD-10-CM

## 2018-10-02 RX ORDER — MEMANTINE HYDROCHLORIDE 10 MG/1
TABLET ORAL
Qty: 60 TABLET | Refills: 5 | Status: SHIPPED | OUTPATIENT
Start: 2018-10-02 | End: 2019-02-18 | Stop reason: SDUPTHER

## 2018-10-02 RX ORDER — OMEPRAZOLE 20 MG/1
CAPSULE, DELAYED RELEASE ORAL
Qty: 30 CAPSULE | Refills: 5 | Status: SHIPPED | OUTPATIENT
Start: 2018-10-02 | End: 2019-02-18 | Stop reason: SDUPTHER

## 2018-10-02 RX ORDER — ATORVASTATIN CALCIUM 40 MG/1
TABLET, FILM COATED ORAL
Qty: 30 TABLET | Refills: 5 | Status: SHIPPED | OUTPATIENT
Start: 2018-10-02 | End: 2019-02-18 | Stop reason: SDUPTHER

## 2018-10-02 RX ORDER — METOPROLOL SUCCINATE 50 MG/1
TABLET, EXTENDED RELEASE ORAL
Qty: 30 TABLET | Refills: 5 | Status: SHIPPED | OUTPATIENT
Start: 2018-10-02 | End: 2019-07-20 | Stop reason: SDUPTHER

## 2018-10-10 DIAGNOSIS — R41.3 MEMORY DEFICIT: Primary | ICD-10-CM

## 2018-10-10 DIAGNOSIS — Z78.9 DECREASED ACTIVITIES OF DAILY LIVING (ADL): ICD-10-CM

## 2018-10-11 ENCOUNTER — TELEPHONE (OUTPATIENT)
Dept: FAMILY MEDICINE CLINIC | Facility: CLINIC | Age: 83
End: 2018-10-11

## 2018-10-11 NOTE — TELEPHONE ENCOUNTER
Jaqueline Blanton from [facility] called and stated that she would like to know if she can have a script for patient to have PT    Please fax to [phone] use Dx [phone]

## 2018-10-22 ENCOUNTER — TELEPHONE (OUTPATIENT)
Dept: FAMILY MEDICINE CLINIC | Facility: CLINIC | Age: 83
End: 2018-10-22

## 2018-10-22 NOTE — TELEPHONE ENCOUNTER
[email or web address] for Physical Therapy with a Dx:  [phone]  Please fax the RX to [facility] @ [phone]

## 2018-11-06 PROBLEM — H40.9 GLAUCOMA: Status: ACTIVE | Noted: 2018-11-06

## 2018-11-06 PROBLEM — I25.2 HISTORY OF MI (MYOCARDIAL INFARCTION): Status: ACTIVE | Noted: 2018-11-06

## 2018-11-06 PROBLEM — H26.9 CATARACTS, BILATERAL: Status: ACTIVE | Noted: 2018-11-06

## 2018-11-06 PROBLEM — Z90.49 HISTORY OF APPENDECTOMY: Status: ACTIVE | Noted: 2018-11-06

## 2018-11-07 ENCOUNTER — OFFICE VISIT (OUTPATIENT)
Dept: GERIATRICS | Facility: CLINIC | Age: 83
End: 2018-11-07
Payer: MEDICARE

## 2018-11-07 VITALS
RESPIRATION RATE: 14 BRPM | HEART RATE: 53 BPM | BODY MASS INDEX: 18.39 KG/M2 | HEIGHT: 63 IN | WEIGHT: 103.8 LBS | OXYGEN SATURATION: 98 % | SYSTOLIC BLOOD PRESSURE: 140 MMHG | DIASTOLIC BLOOD PRESSURE: 70 MMHG

## 2018-11-07 DIAGNOSIS — E03.8 OTHER SPECIFIED HYPOTHYROIDISM: ICD-10-CM

## 2018-11-07 DIAGNOSIS — E03.9 HYPOTHYROIDISM, UNSPECIFIED TYPE: ICD-10-CM

## 2018-11-07 DIAGNOSIS — L57.0 ACTINIC KERATOSIS: ICD-10-CM

## 2018-11-07 DIAGNOSIS — J30.9 ALLERGIC RHINITIS, UNSPECIFIED SEASONALITY, UNSPECIFIED TRIGGER: ICD-10-CM

## 2018-11-07 DIAGNOSIS — I10 ESSENTIAL HYPERTENSION: ICD-10-CM

## 2018-11-07 DIAGNOSIS — H35.30 MACULAR DEGENERATION OF BOTH EYES, UNSPECIFIED TYPE: Primary | ICD-10-CM

## 2018-11-07 PROCEDURE — 99205 OFFICE O/P NEW HI 60 MIN: CPT | Performed by: INTERNAL MEDICINE

## 2018-11-07 RX ORDER — ZOLPIDEM TARTRATE 5 MG/1
5 TABLET ORAL
Qty: 30 TABLET | Refills: 3 | Status: SHIPPED | OUTPATIENT
Start: 2018-11-07 | End: 2019-08-28 | Stop reason: ALTCHOICE

## 2018-11-07 RX ORDER — FLUTICASONE PROPIONATE 50 MCG
2 SPRAY, SUSPENSION (ML) NASAL DAILY
Qty: 16 G | Refills: 3 | Status: SHIPPED | OUTPATIENT
Start: 2018-11-07 | End: 2019-12-05 | Stop reason: ALTCHOICE

## 2018-11-07 RX ORDER — LOSARTAN POTASSIUM 50 MG/1
50 TABLET ORAL DAILY
COMMUNITY
End: 2018-12-03 | Stop reason: SDUPTHER

## 2018-11-07 RX ORDER — MONTELUKAST SODIUM 10 MG/1
10 TABLET ORAL
COMMUNITY
End: 2018-12-21 | Stop reason: SDUPTHER

## 2018-11-07 NOTE — PROGRESS NOTES
Assessment/Plan:    GERD (gastroesophageal reflux disease)  She finds that reflux is her most important symptom at present she does take omeprazole 20 mg daily but apparently it is not doing the job  Acquired hypothyroidism  Last TSH was performed in June and it was 10 but I do not see that anything was done about that so we will need to recheck a TSH and see her again in the next couple weeks  Essential hypertension  Patient's systolic blood pressure was 140/70 at present  CAD (coronary artery disease)  Patient has had up previously non ST segment elevated myocardial infarction  in the year 2000  Macular degeneration of both eyes  Patient follows with ophthalmologist locally I will refer her to Dr Samantha Wong for a 2nd opinion    Dementia  Will perform cognitive test     Actinic keratosis  Patient will be referred to Dermatology for this particular issue       Diagnoses and all orders for this visit:    Macular degeneration of both eyes, unspecified type  -     Ambulatory referral to Ophthalmology; Future    Other specified hypothyroidism  -     TSH, 3rd generation; Future    Essential hypertension  -     Comprehensive metabolic panel; Future  -     CBC and differential; Future    Actinic keratosis    Other orders  -     montelukast (SINGULAIR) 10 mg tablet; Take 10 mg by mouth daily at bedtime  -     losartan (COZAAR) 50 mg tablet; Take 25 mg by mouth daily          Subjective:      Patient ID: Jacqueline Peterson is a 80 y o  female  Patient is an 78-year-old  female who comes to establish the patient physician relationship  She has been living in this facility for the past few months  She has had some issues adjusting but she will be given and will apartment that is much larger and will meet their expectations  The patient does all of her basic and instrumental activities of daily living    She suffers from macular degeneration which makes reading rather difficult and would like to be assessed by an ophthalmologist   She has history of previous myocardial infarction in the year 2000 currently on atorvastatin 40 mg a day  She has history of hypertension currently on amlodipine, aspirin, Coreg, losartan, metoprolol  She does have history of reflux apparently omeprazole does not seem to be working as well as she expected  She does drink coffee on a regular basis  She also has history of hypothyroidism currently on levothyroxine will need to monitor a TSH  She also has a history of insomnia for which she takes Ambien 5 mg at night  Patient has had previous colonoscopies and mammograms in the past   She appears to be up-to-date on her vaccinations but is unsure as to whether she had the shingles vaccine are not  The following portions of the patient's history were reviewed and updated as appropriate: allergies, current medications, past family history, past medical history, past social history, past surgical history and problem list     Review of Systems   Constitutional: Negative  HENT: Negative  Eyes: Positive for visual disturbance  Patient has history of macular degeneration apparently bilaterally  Respiratory: Positive for apnea  Patient does get short of breath on moderate exertion  Gastrointestinal: Negative  Endocrine: Negative  Genitourinary: Positive for frequency and urgency  Patient does have stress and apparently urge incontinence  She uses approximately 1-2 pads a day   Musculoskeletal: Positive for back pain  Skin: Negative  Allergic/Immunologic: Negative  Neurological: Negative  Hematological: Negative  Psychiatric/Behavioral: Negative            Objective:      /70 (BP Location: Right arm, Patient Position: Sitting, Cuff Size: Standard)   Pulse (!) 53   Resp 14   Ht 5' 2 5" (1 588 m)   Wt 47 1 kg (103 lb 12 8 oz)   SpO2 98%   BMI 18 68 kg/m²          Physical Exam   Constitutional: She appears well-developed and well-nourished  HENT:   Head: Normocephalic and atraumatic  Right Ear: External ear normal    Left Ear: External ear normal    Nose: Nose normal    Mouth/Throat: Oropharynx is clear and moist    Eyes: Conjunctivae are normal    Neck: Neck supple  Cardiovascular: Normal rate, normal heart sounds and intact distal pulses  Pulmonary/Chest: Effort normal and breath sounds normal  No respiratory distress  Abdominal: Soft  Bowel sounds are normal  She exhibits no distension  There is no tenderness  Musculoskeletal: Normal range of motion  Neurological: She is alert  Skin: Skin is warm and dry  Patient has seborrheic lesion on her left neck that will need to be evaluated make sure there is no underlying cancer under looks like actinic keratosis  Psychiatric: She has a normal mood and affect  Her behavior is normal    Nursing note and vitals reviewed

## 2018-11-07 NOTE — ASSESSMENT & PLAN NOTE
She finds that reflux is her most important symptom at present she does take omeprazole 20 mg daily but apparently it is not doing the job

## 2018-11-07 NOTE — ASSESSMENT & PLAN NOTE
Patient follows with ophthalmologist locally I will refer her to Dr Rishabh Rowley for a 2nd opinion

## 2018-11-07 NOTE — PATIENT INSTRUCTIONS
1 -will refer to Dermatology for the skin lesion    2 -will refer to Dr Shira Pressley for glaucoma and macular degeneration      3 -will have her return in 2 weeks for a cognitive evaluation test

## 2018-11-07 NOTE — ASSESSMENT & PLAN NOTE
Last TSH was performed in June and it was 10 but I do not see that anything was done about that so we will need to recheck a TSH and see her again in the next couple weeks

## 2018-11-26 ENCOUNTER — OFFICE VISIT (OUTPATIENT)
Dept: GERIATRICS | Facility: CLINIC | Age: 83
End: 2018-11-26
Payer: MEDICARE

## 2018-11-26 VITALS
HEART RATE: 53 BPM | DIASTOLIC BLOOD PRESSURE: 58 MMHG | TEMPERATURE: 99.3 F | BODY MASS INDEX: 18.43 KG/M2 | WEIGHT: 102.38 LBS | RESPIRATION RATE: 14 BRPM | SYSTOLIC BLOOD PRESSURE: 122 MMHG

## 2018-11-26 DIAGNOSIS — F02.80 DEMENTIA ASSOCIATED WITH OTHER UNDERLYING DISEASE WITHOUT BEHAVIORAL DISTURBANCE (HCC): ICD-10-CM

## 2018-11-26 DIAGNOSIS — E03.9 ACQUIRED HYPOTHYROIDISM: Primary | ICD-10-CM

## 2018-11-26 PROCEDURE — 99214 OFFICE O/P EST MOD 30 MIN: CPT | Performed by: INTERNAL MEDICINE

## 2018-11-26 RX ORDER — LEVOTHYROXINE SODIUM 0.1 MG/1
100 TABLET ORAL DAILY
Qty: 30 TABLET | Refills: 3 | Status: SHIPPED | OUTPATIENT
Start: 2018-11-26 | End: 2019-06-25 | Stop reason: SDUPTHER

## 2018-11-26 NOTE — ASSESSMENT & PLAN NOTE
Patient's TSH is 0 03 I will need to reduce her levothyroxine to 100 mcg a day and recheck a TSH in 6 weeks time

## 2018-11-26 NOTE — PATIENT INSTRUCTIONS
1 - will recheck TSH in approximately 4 weeks and she will come for a follow-up visit at that time to review the results  2 -patient educated to not take Synthroid medication today and she will start her new dose after today

## 2018-11-26 NOTE — PROGRESS NOTES
Assessment/Plan:    Acquired hypothyroidism  Patient's TSH is 0 03 I will need to reduce her levothyroxine to 100 mcg a day and recheck a TSH in 6 weeks time    GERD (gastroesophageal reflux disease)  She did bring a list of her medications and she is no longer complaining of GERD symptoms    Essential hypertension  Blood pressure appears well controlled it is 122/58 she does show evidence of bradycardia she currently is taking metoprolol extended release 50 mg a day    Dementia  Patient had slum test today and scored 14/30  Diagnoses and all orders for this visit:    Acquired hypothyroidism  -     levothyroxine 100 mcg tablet; Take 1 tablet (100 mcg total) by mouth daily  -     TSH, 3rd generation with Free T4 reflex; Future    Dementia associated with other underlying disease without behavioral disturbance          Subjective:      Patient ID: Alayna Ba is a 80 y o  female  Patient is an 19-year-old  female who returns to the office at present she denies any GERD symptoms  We did review her blood work her GFR is 71  Her fasting blood sugar was 90  Her TSH was 0 03 I will need to readjust her dosage of her levothyroxine to 100 mcg daily and recheck a TSH in 6 weeks  We will need to do a cognitive evaluation today  She scored a 14/30 on the slum test   This score gives her moderate cognitive impairment   is the main caregiver looks after her well  The following portions of the patient's history were reviewed and updated as appropriate: allergies, current medications, past family history, past medical history, past social history, past surgical history and problem list     Review of Systems   Constitutional: Negative  HENT: Negative  Eyes: Positive for visual disturbance  Respiratory: Negative  Cardiovascular: Negative  Gastrointestinal: Negative  Endocrine: Negative  Genitourinary: Negative  Musculoskeletal: Negative  Skin: Negative  Allergic/Immunologic: Negative  Neurological: Negative  Hematological: Negative  Psychiatric/Behavioral: Negative  Objective:      /58 (BP Location: Left arm, Patient Position: Sitting, Cuff Size: Standard)   Pulse (!) 53   Temp 99 3 °F (37 4 °C) (Tympanic)   Resp 14   Wt 46 4 kg (102 lb 6 oz)   BMI 18 43 kg/m²          Physical Exam   Constitutional: She appears well-developed and well-nourished  HENT:   Head: Normocephalic and atraumatic  Right Ear: External ear normal    Left Ear: External ear normal    Nose: Nose normal    Mouth/Throat: Oropharynx is clear and moist    Eyes: Conjunctivae are normal    Neck: Normal range of motion  Neck supple  No thyromegaly present  Cardiovascular: Normal rate, regular rhythm, normal heart sounds and intact distal pulses  Pulmonary/Chest: Effort normal and breath sounds normal  No respiratory distress  Abdominal: Soft  Bowel sounds are normal    Musculoskeletal: She exhibits no edema  Neurological: She is alert  Skin: Skin is warm and dry  Psychiatric: She has a normal mood and affect  Her behavior is normal  Judgment and thought content normal    Nursing note and vitals reviewed

## 2018-11-26 NOTE — ASSESSMENT & PLAN NOTE
Blood pressure appears well controlled it is 122/58 she does show evidence of bradycardia she currently is taking metoprolol extended release 50 mg a day

## 2018-12-03 DIAGNOSIS — I10 ESSENTIAL HYPERTENSION: Primary | ICD-10-CM

## 2018-12-05 RX ORDER — LOSARTAN POTASSIUM 50 MG/1
TABLET ORAL
Qty: 30 TABLET | Refills: 0 | Status: SHIPPED | OUTPATIENT
Start: 2018-12-05 | End: 2019-05-31 | Stop reason: SDUPTHER

## 2018-12-19 ENCOUNTER — TELEPHONE (OUTPATIENT)
Dept: GERIATRICS | Facility: CLINIC | Age: 83
End: 2018-12-19

## 2018-12-19 NOTE — TELEPHONE ENCOUNTER
Patient  called requesting that we R/S OV 01/11/19 because they will be on vacation from 12/28- 05/01 in [place]  He also request that TSH BW schedule for 01/08 be R/S for 05/07/19  He was told that her TSH level was not at goal when check on 11/23 result at 0 03 and the Dr adjusted her meds at the last OV to recheck in 6wk   He still wanted BW to be R/S for 05/07/19

## 2018-12-21 DIAGNOSIS — J45.20 MILD INTERMITTENT ASTHMA WITHOUT COMPLICATION: Primary | ICD-10-CM

## 2018-12-24 RX ORDER — MONTELUKAST SODIUM 10 MG/1
TABLET ORAL
Qty: 30 TABLET | Refills: 0 | Status: SHIPPED | OUTPATIENT
Start: 2018-12-24 | End: 2019-07-20 | Stop reason: SDUPTHER

## 2018-12-31 DIAGNOSIS — F03.90 DEMENTIA WITHOUT BEHAVIORAL DISTURBANCE, UNSPECIFIED DEMENTIA TYPE (HCC): ICD-10-CM

## 2019-01-02 RX ORDER — DONEPEZIL HYDROCHLORIDE 10 MG/1
TABLET, FILM COATED ORAL
Qty: 90 TABLET | Refills: 3 | Status: SHIPPED | OUTPATIENT
Start: 2019-01-02 | End: 2019-07-20 | Stop reason: SDUPTHER

## 2019-01-08 ENCOUNTER — TELEPHONE (OUTPATIENT)
Dept: FAMILY MEDICINE CLINIC | Facility: CLINIC | Age: 84
End: 2019-01-08

## 2019-01-08 NOTE — TELEPHONE ENCOUNTER
States they sent over L[facility] of Care & evaluations back in Dec  & they haven't received them back yet  They need these back by 2pm today  Will refax them over for signature  Please call if any questions

## 2019-02-18 DIAGNOSIS — E78.5 HYPERLIPIDEMIA, UNSPECIFIED HYPERLIPIDEMIA TYPE: ICD-10-CM

## 2019-02-18 DIAGNOSIS — R41.3 MEMORY DEFICIT: ICD-10-CM

## 2019-02-18 DIAGNOSIS — K21.9 GASTROESOPHAGEAL REFLUX DISEASE WITHOUT ESOPHAGITIS: ICD-10-CM

## 2019-02-18 RX ORDER — MEMANTINE HYDROCHLORIDE 10 MG/1
TABLET ORAL
Qty: 60 TABLET | Refills: 2 | Status: SHIPPED | OUTPATIENT
Start: 2019-02-18 | End: 2019-07-20 | Stop reason: SDUPTHER

## 2019-02-18 RX ORDER — ATORVASTATIN CALCIUM 40 MG/1
TABLET, FILM COATED ORAL
Qty: 30 TABLET | Refills: 2 | Status: SHIPPED | OUTPATIENT
Start: 2019-02-18 | End: 2019-07-20 | Stop reason: SDUPTHER

## 2019-02-18 RX ORDER — OMEPRAZOLE 20 MG/1
CAPSULE, DELAYED RELEASE ORAL
Qty: 30 CAPSULE | Refills: 2 | Status: SHIPPED | OUTPATIENT
Start: 2019-02-18 | End: 2019-07-20 | Stop reason: SDUPTHER

## 2019-05-10 ENCOUNTER — OFFICE VISIT (OUTPATIENT)
Dept: GERIATRICS | Facility: CLINIC | Age: 84
End: 2019-05-10
Payer: MEDICARE

## 2019-05-10 VITALS
OXYGEN SATURATION: 98 % | SYSTOLIC BLOOD PRESSURE: 120 MMHG | HEART RATE: 64 BPM | DIASTOLIC BLOOD PRESSURE: 62 MMHG | BODY MASS INDEX: 19.14 KG/M2 | HEIGHT: 63 IN | WEIGHT: 108 LBS

## 2019-05-10 DIAGNOSIS — I10 ESSENTIAL HYPERTENSION: ICD-10-CM

## 2019-05-10 DIAGNOSIS — H35.30 MACULAR DEGENERATION OF BOTH EYES, UNSPECIFIED TYPE: ICD-10-CM

## 2019-05-10 DIAGNOSIS — R26.9 GAIT DISTURBANCE: ICD-10-CM

## 2019-05-10 DIAGNOSIS — E03.9 ACQUIRED HYPOTHYROIDISM: Primary | ICD-10-CM

## 2019-05-10 DIAGNOSIS — L57.0 ACTINIC KERATOSIS: ICD-10-CM

## 2019-05-10 DIAGNOSIS — E78.49 OTHER HYPERLIPIDEMIA: ICD-10-CM

## 2019-05-10 PROCEDURE — 99214 OFFICE O/P EST MOD 30 MIN: CPT | Performed by: INTERNAL MEDICINE

## 2019-05-31 DIAGNOSIS — I10 ESSENTIAL HYPERTENSION: ICD-10-CM

## 2019-06-03 RX ORDER — LOSARTAN POTASSIUM 50 MG/1
TABLET ORAL
Qty: 30 TABLET | Refills: 0 | Status: SHIPPED | OUTPATIENT
Start: 2019-06-03 | End: 2019-07-01 | Stop reason: SDUPTHER

## 2019-06-14 ENCOUNTER — TELEPHONE (OUTPATIENT)
Dept: GERIATRICS | Facility: CLINIC | Age: 84
End: 2019-06-14

## 2019-06-14 DIAGNOSIS — R53.1 WEAKNESS: ICD-10-CM

## 2019-06-14 DIAGNOSIS — Z86.73 HISTORY OF TIA (TRANSIENT ISCHEMIC ATTACK): Primary | ICD-10-CM

## 2019-06-14 DIAGNOSIS — R26.9 GAIT DISTURBANCE: ICD-10-CM

## 2019-06-25 DIAGNOSIS — E03.9 ACQUIRED HYPOTHYROIDISM: ICD-10-CM

## 2019-06-26 RX ORDER — LEVOTHYROXINE SODIUM 0.1 MG/1
TABLET ORAL
Qty: 30 TABLET | Refills: 3 | Status: SHIPPED | OUTPATIENT
Start: 2019-06-26 | End: 2019-10-18 | Stop reason: SDUPTHER

## 2019-07-01 DIAGNOSIS — I10 ESSENTIAL HYPERTENSION: ICD-10-CM

## 2019-07-02 RX ORDER — LOSARTAN POTASSIUM 50 MG/1
50 TABLET ORAL DAILY
Qty: 30 TABLET | Refills: 3 | Status: SHIPPED | OUTPATIENT
Start: 2019-07-02 | End: 2019-09-03

## 2019-07-19 ENCOUNTER — OFFICE VISIT (OUTPATIENT)
Dept: GERIATRICS | Facility: CLINIC | Age: 84
End: 2019-07-19
Payer: MEDICARE

## 2019-07-19 VITALS
WEIGHT: 103.1 LBS | SYSTOLIC BLOOD PRESSURE: 120 MMHG | BODY MASS INDEX: 18.97 KG/M2 | HEIGHT: 62 IN | HEART RATE: 69 BPM | OXYGEN SATURATION: 97 % | DIASTOLIC BLOOD PRESSURE: 56 MMHG | TEMPERATURE: 98.8 F

## 2019-07-19 DIAGNOSIS — IMO0002 SQUAMOUS CELL CARCINOMA: ICD-10-CM

## 2019-07-19 DIAGNOSIS — N39.45 CONTINUOUS LEAKAGE OF URINE: ICD-10-CM

## 2019-07-19 DIAGNOSIS — R09.82 POST-NASAL DRIP: Primary | ICD-10-CM

## 2019-07-19 PROBLEM — R32 URINARY LEAKAGE: Status: ACTIVE | Noted: 2019-07-19

## 2019-07-19 PROCEDURE — 99214 OFFICE O/P EST MOD 30 MIN: CPT | Performed by: INTERNAL MEDICINE

## 2019-07-19 RX ORDER — BENZONATATE 100 MG/1
100 CAPSULE ORAL 3 TIMES DAILY PRN
Qty: 20 CAPSULE | Refills: 0 | Status: SHIPPED | OUTPATIENT
Start: 2019-07-19 | End: 2019-07-19

## 2019-07-19 RX ORDER — BENZONATATE 100 MG/1
100 CAPSULE ORAL 3 TIMES DAILY PRN
Qty: 21 CAPSULE | Refills: 0 | Status: SHIPPED | OUTPATIENT
Start: 2019-07-19 | End: 2019-12-05 | Stop reason: ALTCHOICE

## 2019-07-19 NOTE — PROGRESS NOTES
[facility] Progress Note    NAME: Akilah Yeh  AGE: 80 y o  SEX: female [id number]    DATE OF ENCOUNTER: 7/19/2019    Assessment and Plan     Problem List Items Addressed This Visit        Other    Post-nasal drip - Primary     -Post-nasal drip present on examination of throat    -Ordered Tessalon perles 100mg PO tid for cough   -Patient advised to follow up in 1 week or sooner if her symptoms worsen or she begins to experience a fever    -Patient advised to stay positive about her health as much as she can  Relevant Medications    benzonatate (TESSALON PERLES) 100 mg capsule    Squamous cell carcinoma     Continue following with dermatology and plastic surgery for removal of SCC lesions  Urinary leakage     -Suspect it is secondary to increased frequency of coughing episodes  -Advised to continue wearing briefs    -Will reevaluate at follow up in 1 week  If still having trouble with leakage, nocturia, and urgency despite other symptoms resolving will consider other treatment options at that time  All medications and routine orders were reviewed and updated as needed  Plan discussed with: Patient    Chief Complaint     Chief Complaint   Patient presents with    Cough        History of Present Illness     Favio Doll is a 79 y/o  female with a primary complaint of post-nasal drip x 2 weeks  She also has a productive cough of cream-colored sputum  She reports that sometimes she also gets a sore chest from coughins so much  She denies having fever or chills  Since this began she has not been able to sleep well  She also has a complaint of increased leakage  She wears briefs but sometimes will leak through them  She also has nocturia, getting up 2-3 times a night  She has urgency to urinate frequently as well  Suspect increased leakage may be due to coughing episodes   She has a few lesions suspicious of scc on her skin, one on her nose, one on her left cheek, and one on her anterior right thigh  She is scheduled to have the lesions removed by a plastic surgeon in the near future  HISTORY:  Past Surgical History:   Procedure Laterality Date    APPENDECTOMY      COLONOSCOPY      [id number]  LAST ASSESSED      Past Medical History:   Diagnosis Date    Disease of thyroid gland     GERD (gastroesophageal reflux disease)     Hyperlipidemia     Hypertension     Insomnia     MI [facility]myocardial infarction) ([facility] [address] )      Family History   Problem Relation Age of Onset    No Known Problems Father     Heart disease Family      Social History     Socioeconomic History    Marital status: /Civil Union     Spouse name: Not on file    Number of children: Not on file    Years of education: Not on file    Highest education level: Not on file   Occupational History    Not on file   Social Needs    Financial resource strain: Not on file    Food insecurity:     Worry: Not on file     Inability: Not on file    Transportation needs:     Medical: Not on file     Non-medical: Not on file   Tobacco Use    Smoking status: Never Smoker    Smokeless tobacco: Never Used   Substance and Sexual Activity    Alcohol use:  Yes    Drug use: No    Sexual activity: Not on file   Lifestyle    Physical activity:     Days per week: Not on file     Minutes per session: Not on file    Stress: Not on file   Relationships    Social connections:     Talks on phone: Not on file     Gets together: Not on file     Attends Latter day service: Not on file     Active member of club or organization: Not on file     Attends meetings of clubs or organizations: Not on file     Relationship status: Not on file    Intimate partner violence:     Fear of current or ex partner: Not on file     Emotionally abused: Not on file     Physically abused: Not on file     Forced sexual activity: Not on file   Other Topics Concern    Not on file   Social History Narrative    Not on file Allergies: Allergies   Allergen Reactions    Alendronate      Other reaction(s): tooth decay    Diphenhydramine Hyperactivity    Penicillins        Review of Systems     Review of Systems   Constitutional: Negative  HENT: Positive for postnasal drip  Eyes: Negative  Respiratory: Positive for cough  Cardiovascular: Negative  Gastrointestinal: Negative  Genitourinary: Positive for frequency and urgency  Nocturia  Incontinence with increased leakage   Musculoskeletal: Negative  Skin: Negative  Neurological: Negative  Hematological: Negative  Psychiatric/Behavioral: Negative          PHQ-9 Depression Screening    PHQ-9:    Frequency of the following problems over the past two weeks:              Medications and orders       Current Outpatient Medications:     amLODIPine (NORVASC) 5 mg tablet, Take 1 tablet by mouth daily for 30 days, Disp: 30 tablet, Rfl: 0    aspirin 81 mg chewable tablet, Chew 1 tablet (81 mg total) daily, Disp: 30 tablet, Rfl: 3    atorvastatin (LIPITOR) 40 mg tablet, TAKE 1 TABLET BY MOUTH EVERY DAY, Disp: 30 tablet, Rfl: 2    cholecalciferol (VITAMIN D3) 1,000 units tablet, Take 1 tablet (1,000 Units total) by mouth daily, Disp: 30 tablet, Rfl: 3    cyanocobalamin (VITAMIN B-12) 1,000 mcg tablet, Take 1,000 mcg by mouth daily, Disp: , Rfl:     donepezil (ARICEPT) 10 mg tablet, TAKE 1 TABLET BY MOUTH EVERY DAY, Disp: 90 tablet, Rfl: 3    fluticasone (FLONASE) 50 mcg/act nasal spray, 2 sprays into each nostril daily, Disp: 16 g, Rfl: 3    levothyroxine 100 mcg tablet, TAKE 1 TABLET DAILY, Disp: 30 tablet, Rfl: 3    losartan (COZAAR) 50 mg tablet, Take 1 tablet (50 mg total) by mouth daily, Disp: 30 tablet, Rfl: 3    memantine (NAMENDA) 10 mg tablet, TAKE 1 TABLET BY MOUTH 2 TIMES A DAY, Disp: 60 tablet, Rfl: 2    metoprolol succinate (TOPROL-XL) 50 mg 24 hr tablet, TAKE 1 TABLET DAILY TAB/WHT/RND, Disp: 30 tablet, Rfl: 5    montelukast (SINGULAIR) 10 mg tablet, TAKE ONE TABLET AT BEDTIME, Disp: 30 tablet, Rfl: 0    omeprazole (PriLOSEC) 20 mg delayed release capsule, TAKE 1 CAPSULE BY MOUTH EVERY DAY, Disp: 30 capsule, Rfl: 2    Probiotic Product (ALIGN PO), Take 1 tablet by mouth daily, Disp: , Rfl:     zolpidem (AMBIEN) 5 mg tablet, Take 1 tablet (5 mg total) by mouth daily at bedtime as needed for sleep, Disp: 30 tablet, Rfl: 3       Objective     Vitals: There were no vitals filed for this visit  Physical Exam   Constitutional: She appears well-developed and well-nourished  No distress  HENT:   Head: Normocephalic and atraumatic  Right Ear: External ear normal    Left Ear: External ear normal    Mouth/Throat: Oropharynx is clear and moist  No oropharyngeal exudate  Post-nasal drip present    Cardiovascular: Normal rate, regular rhythm, normal heart sounds and intact distal pulses  Exam reveals no gallop and no friction rub  No murmur heard  Pulmonary/Chest: Effort normal and breath sounds normal  No stridor  No respiratory distress  She has no wheezes  She has no rales  She exhibits no tenderness  Abdominal: Soft  Bowel sounds are normal    Skin: Skin is warm  No rash noted  She is not diaphoretic  No erythema  No pallor  SCC lesions located on nose, left cheek, and anterior right thigh  Pertinent Laboratory/Diagnostic Studies: The following labs/studies were reviewed please see facility chart for details

## 2019-07-19 NOTE — ASSESSMENT & PLAN NOTE
-Suspect it is secondary to increased frequency of coughing episodes  -Advised to continue wearing briefs    -Will reevaluate at follow up in 1 week  If still having trouble with leakage, nocturia, and urgency despite other symptoms resolving will consider other treatment options at that time

## 2019-07-19 NOTE — ASSESSMENT & PLAN NOTE
-Post-nasal drip present on examination of throat    -Ordered Tessalon perles 100mg PO tid for cough   -Patient advised to follow up in 1 week or sooner if her symptoms worsen or she begins to experience a fever    -Patient advised to stay positive about her health as much as she can

## 2019-07-19 NOTE — PATIENT INSTRUCTIONS
1 - Tessalon perles 100mg PO tid x 7 days for cough  2 - Urinary leakage should become less frequent once coughing episodes subside  3 - Follow up in 1 week on 7/26/19

## 2019-07-20 DIAGNOSIS — F03.90 DEMENTIA WITHOUT BEHAVIORAL DISTURBANCE, UNSPECIFIED DEMENTIA TYPE (HCC): ICD-10-CM

## 2019-07-20 DIAGNOSIS — E78.5 HYPERLIPIDEMIA, UNSPECIFIED HYPERLIPIDEMIA TYPE: ICD-10-CM

## 2019-07-20 DIAGNOSIS — I10 ESSENTIAL HYPERTENSION: ICD-10-CM

## 2019-07-20 DIAGNOSIS — K21.9 GASTROESOPHAGEAL REFLUX DISEASE WITHOUT ESOPHAGITIS: ICD-10-CM

## 2019-07-20 DIAGNOSIS — R41.3 MEMORY DEFICIT: ICD-10-CM

## 2019-07-20 DIAGNOSIS — J45.20 MILD INTERMITTENT ASTHMA WITHOUT COMPLICATION: ICD-10-CM

## 2019-07-21 RX ORDER — METOPROLOL SUCCINATE 50 MG/1
TABLET, EXTENDED RELEASE ORAL
Qty: 30 TABLET | Refills: 2 | Status: SHIPPED | OUTPATIENT
Start: 2019-07-21 | End: 2019-10-18 | Stop reason: SDUPTHER

## 2019-07-21 RX ORDER — ATORVASTATIN CALCIUM 40 MG/1
TABLET, FILM COATED ORAL
Qty: 30 TABLET | Refills: 2 | Status: SHIPPED | OUTPATIENT
Start: 2019-07-21 | End: 2019-10-18 | Stop reason: SDUPTHER

## 2019-07-21 RX ORDER — DONEPEZIL HYDROCHLORIDE 10 MG/1
TABLET, FILM COATED ORAL
Qty: 30 TABLET | Refills: 2 | Status: SHIPPED | OUTPATIENT
Start: 2019-07-21 | End: 2019-10-18 | Stop reason: SDUPTHER

## 2019-07-21 RX ORDER — MEMANTINE HYDROCHLORIDE 10 MG/1
TABLET ORAL
Qty: 60 TABLET | Refills: 2 | Status: SHIPPED | OUTPATIENT
Start: 2019-07-21 | End: 2019-10-18 | Stop reason: SDUPTHER

## 2019-07-21 RX ORDER — OMEPRAZOLE 20 MG/1
CAPSULE, DELAYED RELEASE ORAL
Qty: 30 CAPSULE | Refills: 2 | Status: SHIPPED | OUTPATIENT
Start: 2019-07-21 | End: 2019-10-18 | Stop reason: SDUPTHER

## 2019-07-22 RX ORDER — MONTELUKAST SODIUM 10 MG/1
TABLET ORAL
Qty: 30 TABLET | Refills: 0 | Status: SHIPPED | OUTPATIENT
Start: 2019-07-22 | End: 2019-09-27 | Stop reason: SDUPTHER

## 2019-07-26 ENCOUNTER — OFFICE VISIT (OUTPATIENT)
Dept: GERIATRICS | Facility: CLINIC | Age: 84
End: 2019-07-26
Payer: MEDICARE

## 2019-07-26 ENCOUNTER — APPOINTMENT (EMERGENCY)
Dept: RADIOLOGY | Facility: HOSPITAL | Age: 84
DRG: 193 | End: 2019-07-26
Payer: MEDICARE

## 2019-07-26 ENCOUNTER — HOSPITAL ENCOUNTER (INPATIENT)
Facility: HOSPITAL | Age: 84
LOS: 20 days | Discharge: NURSING, REHAB OR LONG-TERM CARE | DRG: 193 | End: 2019-08-16
Attending: EMERGENCY MEDICINE | Admitting: INTERNAL MEDICINE
Payer: MEDICARE

## 2019-07-26 VITALS
HEIGHT: 62 IN | HEART RATE: 77 BPM | BODY MASS INDEX: 18.95 KG/M2 | SYSTOLIC BLOOD PRESSURE: 175 MMHG | DIASTOLIC BLOOD PRESSURE: 71 MMHG | WEIGHT: 103 LBS | OXYGEN SATURATION: 89 %

## 2019-07-26 DIAGNOSIS — J18.9 LEFT LOWER LOBE PNEUMONIA: Primary | ICD-10-CM

## 2019-07-26 DIAGNOSIS — R05.9 COUGH: ICD-10-CM

## 2019-07-26 DIAGNOSIS — J18.9 PNEUMONIA, UNSPECIFIED ORGANISM: Primary | ICD-10-CM

## 2019-07-26 DIAGNOSIS — K22.0 ACHALASIA OF ESOPHAGUS: ICD-10-CM

## 2019-07-26 DIAGNOSIS — J90 PLEURAL EFFUSION: ICD-10-CM

## 2019-07-26 DIAGNOSIS — I25.10 CAD (CORONARY ARTERY DISEASE): ICD-10-CM

## 2019-07-26 DIAGNOSIS — K21.9 GASTROESOPHAGEAL REFLUX DISEASE, ESOPHAGITIS PRESENCE NOT SPECIFIED: ICD-10-CM

## 2019-07-26 DIAGNOSIS — J86.9 EMPYEMA (HCC): ICD-10-CM

## 2019-07-26 PROBLEM — R53.1 GENERALIZED WEAKNESS: Status: ACTIVE | Noted: 2019-07-26

## 2019-07-26 LAB
ATRIAL RATE: 64 BPM
BASOPHILS # BLD AUTO: 0.03 THOUSANDS/ΜL (ref 0–0.1)
BASOPHILS NFR BLD AUTO: 0 % (ref 0–1)
EOSINOPHIL # BLD AUTO: 0 THOUSAND/ΜL (ref 0–0.61)
EOSINOPHIL NFR BLD AUTO: 0 % (ref 0–6)
ERYTHROCYTE [DISTWIDTH] IN BLOOD BY AUTOMATED COUNT: 12.4 % (ref 11.6–15.1)
HCT VFR BLD AUTO: 41.4 % (ref 34.8–46.1)
HGB BLD-MCNC: 13.6 G/DL (ref 11.5–15.4)
IMM GRANULOCYTES # BLD AUTO: 0.11 THOUSAND/UL (ref 0–0.2)
IMM GRANULOCYTES NFR BLD AUTO: 1 % (ref 0–2)
L PNEUMO1 AG UR QL IA.RAPID: NEGATIVE
LYMPHOCYTES # BLD AUTO: 0.59 THOUSANDS/ΜL (ref 0.6–4.47)
LYMPHOCYTES NFR BLD AUTO: 3 % (ref 14–44)
MCH RBC QN AUTO: 31.3 PG (ref 26.8–34.3)
MCHC RBC AUTO-ENTMCNC: 32.9 G/DL (ref 31.4–37.4)
MCV RBC AUTO: 95 FL (ref 82–98)
MONOCYTES # BLD AUTO: 0.85 THOUSAND/ΜL (ref 0.17–1.22)
MONOCYTES NFR BLD AUTO: 4 % (ref 4–12)
NEUTROPHILS # BLD AUTO: 18.89 THOUSANDS/ΜL (ref 1.85–7.62)
NEUTS SEG NFR BLD AUTO: 92 % (ref 43–75)
NRBC BLD AUTO-RTO: 0 /100 WBCS
NT-PROBNP SERPL-MCNC: 1458 PG/ML
P AXIS: 64 DEGREES
PLATELET # BLD AUTO: 259 THOUSANDS/UL (ref 149–390)
PMV BLD AUTO: 9.9 FL (ref 8.9–12.7)
PR INTERVAL: 164 MS
PROCALCITONIN SERPL-MCNC: 0.19 NG/ML
QRS AXIS: 33 DEGREES
QRSD INTERVAL: 76 MS
QT INTERVAL: 410 MS
QTC INTERVAL: 422 MS
RBC # BLD AUTO: 4.34 MILLION/UL (ref 3.81–5.12)
S PNEUM AG UR QL: NEGATIVE
T WAVE AXIS: 67 DEGREES
TROPONIN I SERPL-MCNC: <0.02 NG/ML
VENTRICULAR RATE: 64 BPM
WBC # BLD AUTO: 20.47 THOUSAND/UL (ref 4.31–10.16)

## 2019-07-26 PROCEDURE — 85025 COMPLETE CBC W/AUTO DIFF WBC: CPT | Performed by: EMERGENCY MEDICINE

## 2019-07-26 PROCEDURE — 93010 ELECTROCARDIOGRAM REPORT: CPT | Performed by: INTERNAL MEDICINE

## 2019-07-26 PROCEDURE — 99220 PR INITIAL OBSERVATION CARE/DAY 70 MINUTES: CPT | Performed by: INTERNAL MEDICINE

## 2019-07-26 PROCEDURE — 87449 NOS EACH ORGANISM AG IA: CPT | Performed by: EMERGENCY MEDICINE

## 2019-07-26 PROCEDURE — 83880 ASSAY OF NATRIURETIC PEPTIDE: CPT | Performed by: EMERGENCY MEDICINE

## 2019-07-26 PROCEDURE — 93005 ELECTROCARDIOGRAM TRACING: CPT

## 2019-07-26 PROCEDURE — 84484 ASSAY OF TROPONIN QUANT: CPT | Performed by: EMERGENCY MEDICINE

## 2019-07-26 PROCEDURE — 99285 EMERGENCY DEPT VISIT HI MDM: CPT

## 2019-07-26 PROCEDURE — 99214 OFFICE O/P EST MOD 30 MIN: CPT | Performed by: INTERNAL MEDICINE

## 2019-07-26 PROCEDURE — 99284 EMERGENCY DEPT VISIT MOD MDM: CPT | Performed by: EMERGENCY MEDICINE

## 2019-07-26 PROCEDURE — 84145 PROCALCITONIN (PCT): CPT | Performed by: EMERGENCY MEDICINE

## 2019-07-26 PROCEDURE — 96365 THER/PROPH/DIAG IV INF INIT: CPT

## 2019-07-26 PROCEDURE — 87040 BLOOD CULTURE FOR BACTERIA: CPT | Performed by: EMERGENCY MEDICINE

## 2019-07-26 PROCEDURE — 36415 COLL VENOUS BLD VENIPUNCTURE: CPT | Performed by: EMERGENCY MEDICINE

## 2019-07-26 PROCEDURE — 1123F ACP DISCUSS/DSCN MKR DOCD: CPT | Performed by: INTERNAL MEDICINE

## 2019-07-26 PROCEDURE — 71046 X-RAY EXAM CHEST 2 VIEWS: CPT

## 2019-07-26 RX ORDER — LEVOTHYROXINE SODIUM 0.1 MG/1
100 TABLET ORAL
Status: DISCONTINUED | OUTPATIENT
Start: 2019-07-27 | End: 2019-08-16 | Stop reason: HOSPADM

## 2019-07-26 RX ORDER — AMLODIPINE BESYLATE 5 MG/1
5 TABLET ORAL DAILY
Status: DISCONTINUED | OUTPATIENT
Start: 2019-07-27 | End: 2019-08-16 | Stop reason: HOSPADM

## 2019-07-26 RX ORDER — AZITHROMYCIN 250 MG/1
250 TABLET, FILM COATED ORAL EVERY 24 HOURS
Status: DISCONTINUED | OUTPATIENT
Start: 2019-07-27 | End: 2019-07-28

## 2019-07-26 RX ORDER — DONEPEZIL HYDROCHLORIDE 10 MG/1
10 TABLET, FILM COATED ORAL
Status: DISCONTINUED | OUTPATIENT
Start: 2019-07-26 | End: 2019-08-16 | Stop reason: HOSPADM

## 2019-07-26 RX ORDER — ZOLPIDEM TARTRATE 5 MG/1
5 TABLET ORAL
Status: DISCONTINUED | OUTPATIENT
Start: 2019-07-26 | End: 2019-08-16 | Stop reason: HOSPADM

## 2019-07-26 RX ORDER — METOPROLOL SUCCINATE 50 MG/1
50 TABLET, EXTENDED RELEASE ORAL DAILY
Status: DISCONTINUED | OUTPATIENT
Start: 2019-07-27 | End: 2019-08-16 | Stop reason: HOSPADM

## 2019-07-26 RX ORDER — LOSARTAN POTASSIUM 50 MG/1
50 TABLET ORAL DAILY
Status: DISCONTINUED | OUTPATIENT
Start: 2019-07-27 | End: 2019-08-16 | Stop reason: HOSPADM

## 2019-07-26 RX ORDER — ASPIRIN 81 MG/1
81 TABLET, CHEWABLE ORAL DAILY
Status: DISCONTINUED | OUTPATIENT
Start: 2019-07-27 | End: 2019-08-16 | Stop reason: HOSPADM

## 2019-07-26 RX ORDER — PANTOPRAZOLE SODIUM 40 MG/1
40 TABLET, DELAYED RELEASE ORAL
Status: DISCONTINUED | OUTPATIENT
Start: 2019-07-27 | End: 2019-08-16 | Stop reason: HOSPADM

## 2019-07-26 RX ORDER — FLUTICASONE PROPIONATE 50 MCG
2 SPRAY, SUSPENSION (ML) NASAL DAILY
Status: DISCONTINUED | OUTPATIENT
Start: 2019-07-27 | End: 2019-08-16 | Stop reason: HOSPADM

## 2019-07-26 RX ORDER — BENZONATATE 100 MG/1
100 CAPSULE ORAL 3 TIMES DAILY PRN
Status: DISCONTINUED | OUTPATIENT
Start: 2019-07-26 | End: 2019-08-16 | Stop reason: HOSPADM

## 2019-07-26 RX ORDER — ATORVASTATIN CALCIUM 40 MG/1
40 TABLET, FILM COATED ORAL EVERY EVENING
Status: DISCONTINUED | OUTPATIENT
Start: 2019-07-26 | End: 2019-08-16 | Stop reason: HOSPADM

## 2019-07-26 RX ORDER — MONTELUKAST SODIUM 10 MG/1
10 TABLET ORAL
Status: DISCONTINUED | OUTPATIENT
Start: 2019-07-26 | End: 2019-08-16 | Stop reason: HOSPADM

## 2019-07-26 RX ORDER — MEMANTINE HYDROCHLORIDE 10 MG/1
10 TABLET ORAL 2 TIMES DAILY
Status: DISCONTINUED | OUTPATIENT
Start: 2019-07-26 | End: 2019-08-16 | Stop reason: HOSPADM

## 2019-07-26 RX ORDER — ACETAMINOPHEN 325 MG/1
975 TABLET ORAL ONCE
Status: COMPLETED | OUTPATIENT
Start: 2019-07-26 | End: 2019-07-26

## 2019-07-26 RX ADMIN — CEFTRIAXONE SODIUM 1000 MG: 10 INJECTION, POWDER, FOR SOLUTION INTRAVENOUS at 15:20

## 2019-07-26 RX ADMIN — AZITHROMYCIN MONOHYDRATE 500 MG: 500 INJECTION, POWDER, LYOPHILIZED, FOR SOLUTION INTRAVENOUS at 16:34

## 2019-07-26 RX ADMIN — DONEPEZIL HYDROCHLORIDE 10 MG: 10 TABLET ORAL at 21:15

## 2019-07-26 RX ADMIN — SODIUM CHLORIDE 1000 ML: 0.9 INJECTION, SOLUTION INTRAVENOUS at 15:20

## 2019-07-26 RX ADMIN — MONTELUKAST SODIUM 10 MG: 10 TABLET, FILM COATED ORAL at 21:15

## 2019-07-26 RX ADMIN — ATORVASTATIN CALCIUM 40 MG: 40 TABLET, FILM COATED ORAL at 21:15

## 2019-07-26 RX ADMIN — ACETAMINOPHEN 975 MG: 325 TABLET ORAL at 15:24

## 2019-07-26 RX ADMIN — MEMANTINE 10 MG: 10 TABLET ORAL at 21:15

## 2019-07-26 NOTE — PROGRESS NOTES
[facility] Progress Note    NAME: Nia Adan  AGE: 80 y o  SEX: female [id number]    DATE OF ENCOUNTER: 7/26/2019    Assessment and Plan     Problem List Items Addressed This Visit        Respiratory    Pneumonia, unspecified organism - Primary     -Suspect based on history and PE findings  -Sent patient to the ED by ambulance for further evaluation  All medications and routine orders were reviewed and updated as needed  Plan discussed with: Patient    Chief Complaint     Chief Complaint   Patient presents with    Follow-up    Cough        History of Present Illness     Miladis Weldon is a 81 y/o  female who presents today for a follow up for cough  She had to be taken here by the nurses in a wheelchair  The patient is usually able to ambulate with the assistance of a walker  She appears to be ill and her skin is pale  She is not as talkative today  She was not able to take all of her morning medications because she felt so ill  She does not talk much during the appointment but she is concerned about her chest being in so much pain  She reports a persistent productive cough that has been ongoing since last appointment  She had a lesion surgically removed from her right anterior thigh on Tuesday  Her  was present as well and we all discussed her options and have decided to have her sent to the ED by ambulance for further evaluation           HISTORY:  Past Surgical History:   Procedure Laterality Date    APPENDECTOMY      COLONOSCOPY      [id number]  LAST ASSESSED      Past Medical History:   Diagnosis Date    Disease of thyroid gland     GERD (gastroesophageal reflux disease)     Hyperlipidemia     Hypertension     Insomnia     MI (myocardial infarction) ([facility] [address] )      Family History   Problem Relation Age of Onset    No Known Problems Father     Heart disease Family      Social History     Socioeconomic History    Marital status: /Civil Union     Spouse name: Not on file    Number of children: Not on file    Years of education: Not on file    Highest education level: Not on file   Occupational History    Not on file   Social Needs    Financial resource strain: Not on file    Food insecurity:     Worry: Not on file     Inability: Not on file    Transportation needs:     Medical: Not on file     Non-medical: Not on file   Tobacco Use    Smoking status: Never Smoker    Smokeless tobacco: Never Used   Substance and Sexual Activity    Alcohol use: Yes    Drug use: No    Sexual activity: Not on file   Lifestyle    Physical activity:     Days per week: Not on file     Minutes per session: Not on file    Stress: Not on file   Relationships    Social connections:     Talks on phone: Not on file     Gets together: Not on file     Attends Denominational service: Not on file     Active member of club or organization: Not on file     Attends meetings of clubs or organizations: Not on file     Relationship status: Not on file    Intimate partner violence:     Fear of current or ex partner: Not on file     Emotionally abused: Not on file     Physically abused: Not on file     Forced sexual activity: Not on file   Other Topics Concern    Not on file   Social History Narrative    Not on file       Allergies: Allergies   Allergen Reactions    Alendronate      Other reaction(s): tooth decay    Diphenhydramine Hyperactivity    Penicillins        Review of Systems     Review of Systems   Constitutional: Positive for fatigue  HENT: Negative  Respiratory: Positive for cough, chest tightness and shortness of breath  Cardiovascular: Positive for chest pain  Left lower chest, RUQ abdomen pain,    Gastrointestinal: Negative  Musculoskeletal: Negative  Skin: Negative  Neurological: Negative          PHQ-9 Depression Screening    PHQ-9:    Frequency of the following problems over the past two weeks:              Medications and orders Current Outpatient Medications:     amLODIPine (NORVASC) 5 mg tablet, Take 1 tablet by mouth daily for 30 days, Disp: 30 tablet, Rfl: 0    aspirin 81 mg chewable tablet, Chew 1 tablet (81 mg total) daily, Disp: 30 tablet, Rfl: 3    atorvastatin (LIPITOR) 40 mg tablet, TAKE 1 TABLET DAILY IN THE EVENING, Disp: 30 tablet, Rfl: 2    benzonatate (TESSALON PERLES) 100 mg capsule, Take 1 capsule (100 mg total) by mouth 3 (three) times a day as needed for cough, Disp: 21 capsule, Rfl: 0    cholecalciferol (VITAMIN D3) 1,000 units tablet, Take 1 tablet (1,000 Units total) by mouth daily, Disp: 30 tablet, Rfl: 3    cyanocobalamin (VITAMIN B-12) 1,000 mcg tablet, Take 1,000 mcg by mouth daily, Disp: , Rfl:     donepezil (ARICEPT) 10 mg tablet, TAKE 1 TABLET DAILY TAB/TAN/RND, Disp: 30 tablet, Rfl: 2    fluticasone (FLONASE) 50 mcg/act nasal spray, 2 sprays into each nostril daily, Disp: 16 g, Rfl: 3    levothyroxine 100 mcg tablet, TAKE 1 TABLET DAILY, Disp: 30 tablet, Rfl: 3    losartan (COZAAR) 50 mg tablet, Take 1 tablet (50 mg total) by mouth daily, Disp: 30 tablet, Rfl: 3    memantine (NAMENDA) 10 mg tablet, TAKE 1 TABLET TWICE DAILY, Disp: 60 tablet, Rfl: 2    metoprolol succinate (TOPROL-XL) 50 mg 24 hr tablet, TAKE 1 TABLET DAILY, Disp: 30 tablet, Rfl: 2    montelukast (SINGULAIR) 10 mg tablet, TAKE ONE TABLET AT BEDTIME, Disp: 30 tablet, Rfl: 0    omeprazole (PriLOSEC) 20 mg delayed release capsule, TAKE 1 CAPSULE DAILY, Disp: 30 capsule, Rfl: 2    Probiotic Product (ALIGN PO), Take 1 tablet by mouth daily, Disp: , Rfl:     zolpidem (AMBIEN) 5 mg tablet, Take 1 tablet (5 mg total) by mouth daily at bedtime as needed for sleep, Disp: 30 tablet, Rfl: 3       Objective     Vitals:   Vitals:    07/26/19 1119   BP: (!) 175/71   Pulse: 77   SpO2: (!) 89%   Weight: 46 7 kg (103 lb)   Height: 5' 2" (1 575 m)       Physical Exam   Constitutional: She is oriented to person, place, and time   She appears well-developed and well-nourished  She has a sickly appearance  She appears ill  She appears distressed  Patient is not acting like her usual self  She is wheelchair bound today when she is usually able to ambulate with the assistance of a walker  She appears moderately distressed  She does not talk very much and is very shaky  HENT:   Head: Normocephalic and atraumatic  Right Ear: External ear normal    Left Ear: External ear normal    Nose: Nose normal    Mouth/Throat: Oropharynx is clear and moist  No oropharyngeal exudate  TMs pearly grey and intact b/l  Neck: Normal range of motion  Neck supple  Cardiovascular: Normal rate, regular rhythm, normal heart sounds and intact distal pulses  Exam reveals no gallop and no friction rub  No murmur heard  Pulmonary/Chest: No stridor  She is in respiratory distress  She has wheezes  She has no rales  She exhibits tenderness  Respiratory effort increased and expansion decreased  Using accessory muscles to breath  Wheezes and rhonchi heard on auscultation, especially in mid left lung field  Musculoskeletal: She exhibits no edema, tenderness or deformity  ROM decreased  Wheelchair bound today  Neurological: She is alert and oriented to person, place, and time  Skin: She is not diaphoretic  There is pallor  Pertinent Laboratory/Diagnostic Studies: The following labs/studies were reviewed please see facility chart for details

## 2019-07-26 NOTE — ED PROVIDER NOTES
History  Chief Complaint   Patient presents with    Shortness of Breath     Patient comes to the E R  with report of wheezing and Rhonchi  Patient resides at [facility], went to the wellness center today  While at the wellness center, patient reportedly had chest pain wheezing and rhonchi  Patient sent to us for rule out pneumonia  Patient is an 80-year-old female with history of hypertension, CAD who presents from yet independent living at [facility] for concern for pneumonia  Symptoms started approximately 1 week ago with cough  Patient was initially evaluated and prescribed Tessalon Perles  Today, she developed generalized weakness, fatigue, and pleuritic chest pain  She was evaluated again and her primary care physician's office and noted to be hypoxic and ill-appearing  She was sent for further care  Patient is alert and able to provide a thorough history  She does report chest pain with cough  Denies current chest pain at rest or with breathing  Denies any current shortness of breath, but does admit to being short of breath with exertion -this is not baseline for her  Denies recent fever/chills, nausea/vomiting, leg swelling  Denies history of PE/DVT, recent major surgery, known malignancy, MDR infection, recent antibiotic use, recent hospital admission  Prior to Admission Medications   Prescriptions Last Dose Informant Patient Reported? Taking?    amLODIPine (NORVASC) 5 mg tablet 7/26/2019 at Unknown time  No Yes   Sig: Take 1 tablet by mouth daily for 30 days   aspirin 81 mg chewable tablet Past Month at Unknown time  No Yes   Sig: Chew 1 tablet (81 mg total) daily   atorvastatin (LIPITOR) 40 mg tablet 7/25/2019 at Unknown time  No Yes   Sig: TAKE 1 TABLET DAILY IN THE EVENING   benzonatate (TESSALON PERLES) 100 mg capsule 7/26/2019 at Unknown time  No Yes   Sig: Take 1 capsule (100 mg total) by mouth 3 (three) times a day as needed for cough   cholecalciferol (VITAMIN D3) 1,000 units tablet 2019 at Unknown time  No Yes   Sig: Take 1 tablet (1,000 Units total) by mouth daily   cyanocobalamin (VITAMIN B-12) 1,000 mcg tablet 2019 at Unknown time  Yes Yes   Sig: Take 1,000 mcg by mouth daily   donepezil (ARICEPT) 10 mg tablet 2019 at Unknown time  No Yes   Sig: TAKE 1 TABLET DAILY TAB/TAN/RND   fluticasone (FLONASE) 50 mcg/act nasal spray 2019 at Unknown time  No Yes   Si sprays into each nostril daily   levothyroxine 100 mcg tablet 2019 at Unknown time  No Yes   Sig: TAKE 1 TABLET DAILY   losartan (COZAAR) 50 mg tablet 2019 at Unknown time  No Yes   Sig: Take 1 tablet (50 mg total) by mouth daily   memantine (NAMENDA) 10 mg tablet 2019 at Unknown time  No Yes   Sig: TAKE 1 TABLET TWICE DAILY   metoprolol succinate (TOPROL-XL) 50 mg 24 hr tablet 2019 at Unknown time  No Yes   Sig: TAKE 1 TABLET DAILY   montelukast (SINGULAIR) 10 mg tablet 2019 at Unknown time  No Yes   Sig: TAKE ONE TABLET AT BEDTIME   omeprazole (PriLOSEC) 20 mg delayed release capsule 2019 at Unknown time  No Yes   Sig: TAKE 1 CAPSULE DAILY   zolpidem (AMBIEN) 5 mg tablet Past Week at Unknown time  No Yes   Sig: Take 1 tablet (5 mg total) by mouth daily at bedtime as needed for sleep      Facility-Administered Medications: None       Past Medical History:   Diagnosis Date    Disease of thyroid gland     GERD (gastroesophageal reflux disease)     Hyperlipidemia     Hypertension     Insomnia     MI [facility]myocardial infarction) ([facility] [address] )        Past Surgical History:   Procedure Laterality Date    APPENDECTOMY      COLONOSCOPY      [id number]  LAST ASSESSED       Family History   Problem Relation Age of Onset    No Known Problems Father     Heart disease Family      I have reviewed and agree with the history as documented      Social History     Tobacco Use    Smoking status: Never Smoker    Smokeless tobacco: Never Used   Substance Use Topics    Alcohol use: Yes    Drug use: No        Review of Systems   Constitutional: Negative for chills, fatigue and fever  HENT: Negative for congestion and sore throat  Eyes: Negative for visual disturbance  Respiratory: Positive for cough and shortness of breath  Cardiovascular: Positive for chest pain  Gastrointestinal: Negative for abdominal pain, diarrhea, nausea and vomiting  Endocrine: Negative for polyuria  Genitourinary: Negative for difficulty urinating and dysuria  Musculoskeletal: Negative for arthralgias  Skin: Negative for rash  Neurological: Negative for dizziness, light-headedness and headaches  All other systems reviewed and are negative  Physical Exam  ED Triage Vitals [07/26/19 1224]   Temperature Pulse Respirations Blood Pressure SpO2   97 5 °F (36 4 °C) 66 16 153/63 97 %      Temp Source Heart Rate Source Patient Position - Orthostatic VS BP Location FiO2 (%)   Oral Monitor Lying -- --      Pain Score       4             Orthostatic Vital Signs  Vitals:    07/26/19 1224 07/26/19 1345   BP: 153/63 154/70   Pulse: 66 65   Patient Position - Orthostatic VS: Lying        Physical Exam   Constitutional: She is oriented to person, place, and time  She appears well-developed and well-nourished  No distress  Elderly, frail   HENT:   Head: Normocephalic and atraumatic  Eyes: Pupils are equal, round, and reactive to light  EOM are normal    Neck: Normal range of motion  Neck supple  Cardiovascular: Normal rate, regular rhythm and normal heart sounds  Pulmonary/Chest: Effort normal and breath sounds normal  No respiratory distress  Abdominal: Soft  Bowel sounds are normal  There is no tenderness  Musculoskeletal: Normal range of motion  Neurological: She is alert and oriented to person, place, and time  Skin: Skin is warm and dry  Psychiatric: She has a normal mood and affect  Nursing note and vitals reviewed        ED Medications  Medications   sodium chloride 0 9 % bolus 1,000 mL (1,000 mL Intravenous New Bag 7/26/19 1520)   ceftriaxone (ROCEPHIN) 1 g/50 mL in dextrose IVPB (1,000 mg Intravenous New Bag 7/26/19 1520)   azithromycin (ZITHROMAX) 500 mg in sodium chloride 0 9% 250mL IVPB 500 mg (has no administration in time range)   acetaminophen (TYLENOL) tablet 975 mg (975 mg Oral Given 7/26/19 1524)       Diagnostic Studies  Results Reviewed     Procedure Component Value Units Date/Time    Legionella antigen, urine [[id number]] Collected:  07/26/19 1538    Lab Status: In process Specimen:  Urine, Clean Catch Updated:  07/26/19 1544    Strep Pneumoniae, Urine [[id number]] Collected:  07/26/19 1538    Lab Status: In process Specimen:  Urine, Clean Catch Updated:  07/26/19 1544    CBC and differential [[id number]]  (Abnormal) Collected:  07/26/19 1358    Lab Status:  Final result Specimen:  Blood from Arm, Right Updated:  07/26/19 1518     WBC 20 47 Thousand/uL      RBC 4 34 Million/uL      Hemoglobin 13 6 g/dL      Hematocrit 41 4 %      MCV 95 fL      MCH 31 3 pg      MCHC 32 9 g/dL      RDW 12 4 %      MPV 9 9 fL      Platelets 900 Thousands/uL      nRBC 0 /100 WBCs      Neutrophils Relative 92 %      Immat GRANS % 1 %      Lymphocytes Relative 3 %      Monocytes Relative 4 %      Eosinophils Relative 0 %      Basophils Relative 0 %      Neutrophils Absolute 18 89 Thousands/µL      Immature Grans Absolute 0 11 Thousand/uL      Lymphocytes Absolute 0 59 Thousands/µL      Monocytes Absolute 0 85 Thousand/µL      Eosinophils Absolute 0 00 Thousand/µL      Basophils Absolute 0 03 Thousands/µL     Narrative: This is an appended report  These results have been appended to a previously verified report      Procalcitonin [[id number]]  (Normal) Collected:  07/26/19 1358    Lab Status:  Final result Specimen:  Blood from Arm, Right Updated:  07/26/19 1505     Procalcitonin 0 19 ng/ml     B-type natriuretic peptide [[id number]]  (Abnormal) Collected:  07/26/19 1358    Lab Status:  Final result Specimen:  Blood from Arm, Right Updated:  07/26/19 1433     NT-proBNP 1,458 pg/mL     Troponin I [[id number]] Collected:  07/26/19 1358    Lab Status:  Final result Specimen:  Blood from Arm, Right Updated:  07/26/19 1432     Troponin I <0 02 ng/mL     Blood culture #2 [[id number]] Collected:  07/26/19 1358    Lab Status: In process Specimen:  Blood from Arm, Left Updated:  07/26/19 1407    Blood culture #1 [[id number]] Collected:  07/26/19 1358    Lab Status: In process Specimen:  Blood from Arm, Right Updated:  07/26/19 1407                 XR chest 2 views   ED Interpretation by Valdemar Hilliard MD (07/26 1446)   Abnormal   LLL PNA   More apparent on lateral        Final Result by Alina Sewell MD (07/26 1440)      Left mid to lower lung consolidation and moderate pleural effusion              Workstation performed: [id number]               Procedures  Procedures        ED Course               PERC Rule for PE      Most Recent Value   PERC Rule for PE   Age >=50  1 Filed at: 07/26/2019 1319   HR >=100     O2 Sat on room air < 95%     History of PE or DVT     Recent trauma or surgery     Hemoptysis     Exogenous estrogen     Unilateral leg swelling     PERC Rule for PE Results  1 Filed at: 07/26/2019 1319                Shyla Rolle' Criteria for PE      Most Recent Value   Wells' Criteria for PE   Clinical signs and symptoms of DVT  0 Filed at: 07/26/2019 1307   PE is primary diagnosis or equally likely  0 Filed at: 07/26/2019 1307   HR >100  0 Filed at: 07/26/2019 1307   Immobilization at least 3 days or Surgery in the previous 4 weeks  0 Filed at: 07/26/2019 1307   Previous, objectively diagnosed PE or DVT  0 Filed at: 07/26/2019 1307   Hemoptysis  0 Filed at: 07/26/2019 1307   Malignancy with treatment within 6 months or palliative  0 Filed at: 07/26/2019 1307   Wells' Criteria Total  0 Filed at: 07/26/2019 1307            MDM  Number of Diagnoses or Management Options  Diagnosis management comments: Patient is an 80-year-old female with history of hypertension, CAD who presents from yet independent living at [facility] for concern for pneumonia  Exam shows a frail but otherwise well-appearing female with normal oxygenation on room air; otherwise nonfocal   Workup significant for WBC count of 20, x-ray showed concerning for left lower lobe pneumonia  Patient has low risk for MDR  Discussed risk for spina spit of admission with patient  Attempted ambulation and patient was noted to be unsteady on her feet  Will therefore initiate treatment with IV antibiotics, admit for further care  Disposition  Final diagnoses:   None     ED Disposition     ED Disposition Condition Date/Time Comment    Admit Stable Fri Jul 26, 2019  3:46 PM Case was discussed with KAMILLA and the patient's admission status was agreed to be Admission Status: observation status to the service of Dr Enriquez Appl   Follow-up Information    None         Patient's Medications   Discharge Prescriptions    No medications on file     No discharge procedures on file  ED Provider  Attending physically available and evaluated Radha Cuellar I managed the patient along with the ED Attending      Electronically Signed by         Tereso Egan MD  07/26/19 6888

## 2019-07-26 NOTE — ED ATTENDING ATTESTATION
Leatha Renner MD, saw and evaluated the patient  All available labs and X-rays were ordered by me or the resident and have been reviewed by myself  I discussed the patient with the resident / non-physician and agree with the resident's / non-physician practitioner's findings and plan as documented in the resident's / non-physician practicitioner's note, except where noted  At this point, I agree with the current assessment done in the ED  I was present during key portions of all procedures performed unless otherwise stated  Chief Complaint   Patient presents with    Shortness of Breath     Patient comes to the E R  with report of wheezing and Rhonchi  Patient resides at [facility], went to the wellness center today  While at the wellness center, patient reportedly had chest pain wheezing and rhonchi  Patient sent to us for rule out pneumonia  This is an 61-year-old female presenting from home for evaluation of cough, occasional wheezing  Per patient she lives occur:  Susan Bradford with her   She has been having increased coughing last few days  She went to the PCP today who evaluated her who said that she might have a pneumonia so sent in for further evaluation  No fevers chills nausea vomiting  She denies chest pain to me  No dizziness or lightheadedness  She feels well enough to go home currently  She just wants something to help with the cough that is productive of mucus  No sore throat  Eating well drinking well  No bowel or bladder incontinence  PMH:  History of MI, hypertension, hyperlipidemia, GERD, hypothyroidism  PSH:appendectomy colonoscopy  No smoking drinking drugs  PE:  Vitals:    07/26/19 1224 07/26/19 1345 07/26/19 1545   BP: 153/63 154/70 156/100   Pulse: 66 65 66   Resp: 16 18 16   Temp: 97 5 °F (36 4 °C)     TempSrc: Oral     SpO2: 97% 93% 95%   Weight: 46 7 kg (102 lb 15 3 oz)     General: VSS, NAD, awake, alert  Well-nourished, well-developed   Appears stated age  Speaking normally in full sentences  Head: Normocephalic, atraumatic, nontender  Eyes: PERRL, EOM-I  No diplopia  No hyphema  No subconjunctival hemorrhages  Symmetrical lids  ENT: Atraumatic external nose and ears  MMM  No malocclusion  No stridor  Normal phonation  No drooling  Normal swallowing  Neck: Symmetric, trachea midline  No JVD  CV: RRR  +S1/S2  No murmurs or gallops  Peripheral pulses +2 throughout  No chest wall tenderness  Lungs:   Unlabored No retractions  CTAB, lungs sounds equal bilateral    No tachypnea  Abd: +BS, soft, NT/ND    MSK:   FROM   Back:   No rashes  Skin: Dry, intact  Neuro: AAOx3, GCS 15, CN II-XII grossly intact  Motor grossly intact  Psychiatric/Behavioral: Appropriate mood and affect   Exam: deferred  A:  - Pneumonia? P:  - Suspect viral vs bronchitis  - Given sent in by PCP though and elderly, labs + CXR for r/o pneumonia  - PSI/PORT ? dispo  - if no pna, ambulatory pulse ox, dispo   - 13 point ROS was performed and all are normal unless stated in the history above  - Nursing note reviewed  Vitals reviewed  - Orders placed by myself and/or advanced practitioner / resident     - Previous chart was reviewed  - No language barrier    - History obtained from  patient  - There are no limitations to the history obtained  - Critical care time: Not applicable for this patient  Final Diagnosis:  1  Left lower lobe pneumonia ([address] )    2   Cough        ED Course as of Jul 26 1622 Fri Jul 26, 2019   1443 WBC(!): 20 47   1444 Pneumonia Sevetity Index = [88]  [88] Age  [-10] Female = (-8) / Male = (0)  [10] Nursing home resident (+10)  [0] Neoplastic disease (+30)  [0] Liver disease (+20)  [0] CHF History (+10)  [0] CVA Hx (+10)  [0] Renal disease (+10)  [0] AMS (+20)  [0] RR > 29 (+29)  [1] Systolic < 90 (+12)  [6] Temp < 35 OR Temp > 39 9 (103 8F) (+15)  [0] HR > 124 (+10)  [0] pH < 7 35 (+30)  [0] BUN > 29 (+20)  [0] Na < 130 (+20)  [0] Glucose > 249 (+10)  [0] Hematocrit < 30% (+10)  [0] PaO2 < 60 (+10)  [0] Pleural effusion on CXR (+10)  71-90 = Risk Class III, (LOW)  0 9-2 8%% mortality  Outpatient or inpatient treatment, depending on clinical judgment  Medications   sodium chloride 0 9 % bolus 1,000 mL (1,000 mL Intravenous New Bag 7/26/19 1520)   azithromycin (ZITHROMAX) 500 mg in sodium chloride 0 9% 250mL IVPB 500 mg (has no administration in time range)   ceftriaxone (ROCEPHIN) 1 g/50 mL in dextrose IVPB (1,000 mg Intravenous New Bag 7/26/19 1520)   acetaminophen (TYLENOL) tablet 975 mg (975 mg Oral Given 7/26/19 1524)     XR chest 2 views   ED Interpretation   Abnormal   LLL PNA   More apparent on lateral        Final Result      Left mid to lower lung consolidation and moderate pleural effusion  Workstation performed: [id number]           Orders Placed This Encounter   Procedures    Blood culture #1    Blood culture #2    Legionella antigen, urine    Strep Pneumoniae, Urine    XR chest 2 views    CBC and differential    Procalcitonin    B-type natriuretic peptide    Troponin I    Basic metabolic panel    Continuous cardiac monitoring    Continuous pulse oximetry    Insert peripheral IV    EKG RESULTS    ECG 12 lead    Place in Observation (expected length of stay for this patient is less than two midnights)     Labs Reviewed   CBC AND DIFFERENTIAL - Abnormal       Result Value Ref Range Status    WBC 20 47 (*) 4 31 - 10 16 Thousand/uL Final    RBC 4 34  3 81 - 5 12 Million/uL Final    Hemoglobin 13 6  11 5 - 15 4 g/dL Final    Hematocrit 41 4  34 8 - 46 1 % Final    MCV 95  82 - 98 fL Final    MCH 31 3  26 8 - 34 3 pg Final    MCHC 32 9  31 4 - 37 4 g/dL Final    RDW 12 4  11 6 - 15 1 % Final    MPV 9 9  8 9 - 12 7 fL Final    Platelets 505  903 - 390 Thousands/uL Final    nRBC 0  /100 WBCs Final    Comment: This is an appended report    These results have been appended to a previously preliminary verified report  Neutrophils Relative 92 (*) 43 - 75 % Final    Immat GRANS % 1  0 - 2 % Final    Lymphocytes Relative 3 (*) 14 - 44 % Final    Monocytes Relative 4  4 - 12 % Final    Eosinophils Relative 0  0 - 6 % Final    Basophils Relative 0  0 - 1 % Final    Neutrophils Absolute 18 89 (*) 1 85 - 7 62 Thousands/µL Final    Immature Grans Absolute 0 11  0 00 - 0 20 Thousand/uL Final    Lymphocytes Absolute 0 59 (*) 0 60 - 4 47 Thousands/µL Final    Monocytes Absolute 0 85  0 17 - 1 22 Thousand/µL Final    Eosinophils Absolute 0 00  0 00 - 0 61 Thousand/µL Final    Basophils Absolute 0 03  0 00 - 0 10 Thousands/µL Final    Narrative: This is an appended report  These results have been appended to a previously verified report  NT-BNP PRO (BRAIN NATRIURETIC PEPTIDE) - Abnormal    NT-proBNP 1,458 (*) <450 pg/mL Final   PROCALCITONIN TEST - Normal    Procalcitonin 0 19  <=0 25 ng/ml Final    Comment: Suspected Lower Respiratory Tract Infection (LRTI):  - LESS than or EQUAL to 0 25 ng/mL:   low likelihood for bacterial LRTI; antibiotics DISCOURAGED   - GREATER than 0 25 ng/mL:   increased likelihood for bacterial LRTI; antibiotics ENCOURAGED  Suspected Sepsis:  - Strongly consider initiating antibiotics in ALL UNSTABLE patients  - LESS than or EQUAL to 0 5 ng/mL:   low likelihood for bacterial sepsis; antibiotics DISCOURAGED   - GREATER than 0 5 ng/mL:   increased likelihood for bacterial sepsis; antibiotics ENCOURAGED   - GREATER than 2 ng/mL:   high risk for severe sepsis / septic shock; antibiotics strongly ENCOURAGED  Decisions on antibiotic use should not be based solely on Procalcitonin (PCT) levels  If PCT is low but uncertainty exists with stopping antibiotics, repeat PCT in 6-24 hours to confirm the low level   If antibiotics are administered (regardless if initial PCT was high or low), repeat PCT every 1-2 days to consider early antibiotic cessation (when GREATER than 80% decrease from the peak OR when PCT drops below designated cutoffs, whichever comes first), so long as the infection is NOT one that typically requires prolonged treatment durations (e g , bone/joint infections, endocarditis, Staph  aureus bacteremia)  Situations of FALSE-POSITIVE Procalcitonin values:  1) Newborns < 67 hours old  2) Massive stress from severe trauma / burns, major surgery, acute pancreatitis, cardiogenic / hemorrhagic shock, sickle cell crisis, or other organ perfusion abnormalities  3) Malaria and some Candidal infections  4) Treatment with agents that stimulate cytokines (e g , OKT3, anti-lymphocyte globulins, alemtuzumab, IL-2, granulocyte transfusion [NOT GCSFs])  5) Chronic renal disease causes elevated baseline levels (consider GREATER than 0 75 ng/mL as an abnormal cut-off); initiating HD/CRRT may cause transient decreases  6) Paraneoplastic syndromes from medullary thyroid or SCLC, some forms of vasculitis, and acute yvdzj-jz-jvji disease    Situations of FALSE-NEGATIVE Procalcitonin values:  1) Too early in clinical course for PCT to have reached its peak (may repeat in 6-24 hours to confirm low level)  2) Localized infection WITHOUT systemic (SIRS / sepsis) response (e g , an abscess, osteomyelitis, cystitis)  3) Mycobacteria (e g , Tuberculosis, MAC)  4) Cystic fibrosis exacerbations     BLOOD CULTURE   BLOOD CULTURE   LEGIONELLA ANTIGEN, URINE   STREP PNEUMONIAE ANTIGEN,URINE   TROPONIN I    Troponin I <0 02  ng/mL Final    Comment:   [employer] Chemistry analyzer 99% cutoff is > 0 04 ng/mL in network labs     o cTnI 99% cutoff is useful only when applied to patients in the clinical setting of myocardial ischemia   o cTnI 99% cutoff should be interpreted in the context of clinical history, ECG findings and possibly cardiac imaging to establish correct diagnosis     o cTnI 99% cutoff may be suggestive but clearly not indicative of a coronary event without the clinical setting of myocardial ischemia  Time reflects when diagnosis was documented in both MDM as applicable and the Disposition within this note     Time User Action Codes Description Comment    2019  4:22 PM Maria G Olmedo [J18 1] Left lower lobe pneumonia ([address] )     2019  4:22 PM Jazlyn Myers Add [R05] Cough       ED Disposition     ED Disposition Condition Date/Time Comment    Admit Stable   3:46 PM Case was discussed with KAMILLA and the patient's admission status was agreed to be Admission Status: observation status to the service of Dr Jil Coy   Follow-up Information    None       Patient's Medications   Discharge Prescriptions    No medications on file     No discharge procedures on file  Prior to Admission Medications   Prescriptions Last Dose Informant Patient Reported? Taking?    amLODIPine (NORVASC) 5 mg tablet 2019 at Unknown time  No Yes   Sig: Take 1 tablet by mouth daily for 30 days   aspirin 81 mg chewable tablet Past Month at Unknown time  No Yes   Sig: Chew 1 tablet (81 mg total) daily   atorvastatin (LIPITOR) 40 mg tablet 2019 at Unknown time  No Yes   Sig: TAKE 1 TABLET DAILY IN THE EVENING   benzonatate (TESSALON PERLES) 100 mg capsule 2019 at Unknown time  No Yes   Sig: Take 1 capsule (100 mg total) by mouth 3 (three) times a day as needed for cough   cholecalciferol (VITAMIN D3) 1,000 units tablet 2019 at Unknown time  No Yes   Sig: Take 1 tablet (1,000 Units total) by mouth daily   cyanocobalamin (VITAMIN B-12) 1,000 mcg tablet 2019 at Unknown time  Yes Yes   Sig: Take 1,000 mcg by mouth daily   donepezil (ARICEPT) 10 mg tablet 2019 at Unknown time  No Yes   Sig: TAKE 1 TABLET DAILY TAB/TAN/RND   fluticasone (FLONASE) 50 mcg/act nasal spray 2019 at Unknown time  No Yes   Si sprays into each nostril daily   levothyroxine 100 mcg tablet 2019 at Unknown time  No Yes   Sig: TAKE 1 TABLET DAILY   losartan (COZAAR) 50 mg tablet 7/26/2019 at Unknown time  No Yes   Sig: Take 1 tablet (50 mg total) by mouth daily   memantine (NAMENDA) 10 mg tablet 7/26/2019 at Unknown time  No Yes   Sig: TAKE 1 TABLET TWICE DAILY   metoprolol succinate (TOPROL-XL) 50 mg 24 hr tablet 7/26/2019 at Unknown time  No Yes   Sig: TAKE 1 TABLET DAILY   montelukast (SINGULAIR) 10 mg tablet 7/25/2019 at Unknown time  No Yes   Sig: TAKE ONE TABLET AT BEDTIME   omeprazole (PriLOSEC) 20 mg delayed release capsule 7/26/2019 at Unknown time  No Yes   Sig: TAKE 1 CAPSULE DAILY   zolpidem (AMBIEN) 5 mg tablet Past Week at Unknown time  No Yes   Sig: Take 1 tablet (5 mg total) by mouth daily at bedtime as needed for sleep      Facility-Administered Medications: None       Portions of the record may have been created with voice recognition software  Occasional wrong word or "sound a like" substitutions may have occurred due to the inherent limitations of voice recognition software  Read the chart carefully and recognize, using context, where substitutions have occurred      Electronically signed by:  Estevan Laird

## 2019-07-26 NOTE — H&P
H&P- Celina Dong 3/23/1931, 80 y o  female MRN: [id number]    Unit/Bed#: ED 14 Encounter: [id number]    Primary Care Provider: Lalo Antoine MD   Date and time admitted to hospital: 7/26/2019 12:24 PM        * Community acquired pneumonia  Assessment & Plan  LL and LM lobe seen on lateral chest x-ray with possible effusion  Patient has leukocytosis  No fever  Started on ceftriaxone azithroycin  Will continue for now  Patient saturating well on room air  In ER evaluation patient's O2 saturation dropped into low 90s on ambulation, [facility] is not comfortable taking him back at this point because of generalized weakness and mild hypoxia  Check ambulatory pulse ox tomorrow  Follow-up blood cultures, urine strep and Legionella antigen  Generalized weakness  Assessment & Plan  Along with ambulatory dysfunction  Most likely secondary to pneumonia  PT OT evaluation  Dementia  Assessment & Plan  Continue Namenda and Aricept  Essential hypertension  Assessment & Plan  Well controlled  Continue home meds  Acquired hypothyroidism  Assessment & Plan  Continue levothyroxine  VTE Prophylaxis: Enoxaparin (Lovenox)  / sequential compression device   Code Status: full  POLST: POLST form is not discussed and not completed at this time  Discussion with family: pt    Anticipated Length of Stay:  Patient will be admitted on an Observation basis with an anticipated length of stay of  < 2 midnights  Justification for Hospital Stay: above    Total Time for Visit, including Counseling / Coordination of Care: 30 minutes  Greater than 50% of this total time spent on direct patient counseling and coordination of care  Chief Complaint:   Cough, SOB and left chest pain    History of Present Illness:    Celina Dong is a 80 y o  female w/PMH of HLD, HTN, and hx MI, who presents due to increased weakness and back pain w/coughing   Patient states she has been having a yellow-colored productive cough and runny nose x1wk, not associated w/ Fevers, chills N/V/D, ear/throat pain  She saw a doctor outpatient, who prescribed her Tessalon Pearls for her cough  Today pt notes increasing weakness, she had trouble ambulating with her walker, which she normally is able to do w/o issues  Additionally, pt complains of pleuritic pain located on L upper back, as well as TTP on left upper back and SOB with exertion  Patient denies any cp, hemoptysis, or sick contacts  ROS is negative for abd pain, N/V/D, dysuria, or LE edema  Review of Systems:    Review of Systems   Constitutional: Positive for fatigue  Negative for chills and fever  HENT: Negative for congestion and sore throat  Respiratory: Positive for cough and shortness of breath  Cardiovascular: Positive for chest pain  Negative for palpitations and leg swelling  Gastrointestinal: Negative for abdominal pain, nausea and vomiting  Genitourinary: Negative for difficulty urinating, dysuria, flank pain and frequency  Musculoskeletal: Negative for arthralgias and myalgias  Skin: Negative for color change and rash  Neurological: Negative for dizziness and light-headedness  Psychiatric/Behavioral: Negative for agitation and behavioral problems  Past Medical and Surgical History:     Past Medical History:   Diagnosis Date    Disease of thyroid gland     GERD (gastroesophageal reflux disease)     Hyperlipidemia     Hypertension     Insomnia     MI (myocardial infarction) [address] [address] )        Past Surgical History:   Procedure Laterality Date    APPENDECTOMY      COLONOSCOPY      [id number]  LAST ASSESSED       Meds/Allergies:    Prior to Admission medications    Medication Sig Start Date End Date Taking?  Authorizing Provider   amLODIPine (NORVASC) 5 mg tablet Take 1 tablet by mouth daily for 30 days 5/20/17 7/26/19 Yes LEE Drew   aspirin 81 mg chewable tablet Chew 1 tablet (81 mg total) daily 4/7/34  Yes Lidia Thomas MD atorvastatin (LIPITOR) 40 mg tablet TAKE 1 TABLET DAILY IN THE EVENING 9/72/79  Yes Jake Gracia MD   benzonatate (TESSALON PERLES) 100 mg capsule Take 1 capsule (100 mg total) by mouth 3 (three) times a day as needed for cough 7/19/19  Yes Jc Blake MD   cholecalciferol (VITAMIN D3) 1,000 units tablet Take 1 tablet (1,000 Units total) by mouth daily 8/5/95  Yes Jake Gracia MD   cyanocobalamin (VITAMIN B-12) 1,000 mcg tablet Take 1,000 mcg by mouth daily   Yes Historical Provider, MD   donepezil (ARICEPT) 10 mg tablet TAKE 1 TABLET DAILY TAB/TAN/RND 5/05/20  Yes Jake Gracia MD   fluticasone (FLONASE) 50 mcg/act nasal spray 2 sprays into each nostril daily 11/7/18  Yes Jc Blake MD   levothyroxine 100 mcg tablet TAKE 1 TABLET DAILY 6/26/19  Yes Jc Blake MD   losartan (COZAAR) 50 mg tablet Take 1 tablet (50 mg total) by mouth daily 7/2/19  Yes Jc Blake MD   memantine (NAMENDA) 10 mg tablet TAKE 1 TABLET TWICE DAILY 9/51/28  Yes Jake Gracia MD   metoprolol succinate (TOPROL-XL) 50 mg 24 hr tablet TAKE 1 TABLET DAILY 5/76/61  Yes Jake Gracia MD   montelukast (SINGULAIR) 10 mg tablet TAKE ONE TABLET AT BEDTIME 7/22/19  Yes Jc Blake MD   omeprazole (PriLOSEC) 20 mg delayed release capsule TAKE 1 CAPSULE DAILY 8/42/71  Yes Jake Gracia MD   zolpidem (AMBIEN) 5 mg tablet Take 1 tablet (5 mg total) by mouth daily at bedtime as needed for sleep 11/7/18  Yes Jc Blake MD   Probiotic Product (ALIGN PO) Take 1 tablet by mouth daily  7/26/19  Historical Provider, MD         Allergies:    Allergies   Allergen Reactions    Alendronate      Other reaction(s): tooth decay    Diphenhydramine Hyperactivity    Penicillins        Social History:     Marital Status: /Civil Union   Occupation:   Patient Pre-hospital Living Situation:   Patient Pre-hospital Level of Mobility:   Patient Pre-hospital Diet Restrictions:   Substance Use History:   Social History     Substance and Sexual Activity   Alcohol Use Yes     Social History     Tobacco Use   Smoking Status Never Smoker   Smokeless Tobacco Never Used     Social History     Substance and Sexual Activity   Drug Use No       Family History:    Family History   Problem Relation Age of Onset    No Known Problems Father     Heart disease Family        Physical Exam:     Vitals:   Blood Pressure: 121/59 (07/26/19 1700)  Pulse: 86 (07/26/19 1700)  Temperature: 97 5 °F (36 4 °C) (07/26/19 1224)  Temp Source: Oral (07/26/19 1224)  Respirations: 14 (07/26/19 1700)  Weight - Scale: 46 7 kg (102 lb 15 3 oz) (07/26/19 1224)  SpO2: 95 % (07/26/19 1700)    Physical Exam    Constitutional: Pt appears well-developed and well-nourished  Not in any acute distress  HENT:   Head: Normocephalic and atraumatic  Eyes: EOM are normal    Neck: Neck supple  Cardiovascular: Normal rate, regular rhythm, normal heart sounds  Exam reveals no gallop and no friction rub  No murmur heard  Pulmonary/Chest: Effort normal and breath sounds normal  No respiratory distress  Pt has decreased breath sounds on left along with faint crackles  No wheezing  Abdominal: Soft  Non-distended, Non-tender  Bowel sounds are normal    Musculoskeletal: Normal range of motion  Neurological:  Sleepy but woke up when called  Answered questions appropriately  Psychiatric: normal mood and affect  Additional Data:     Lab Results: I have personally reviewed pertinent reports  Results from last 7 days   Lab Units 07/26/19  1358   WBC Thousand/uL 20 47*   HEMOGLOBIN g/dL 13 6   HEMATOCRIT % 41 4   PLATELETS Thousands/uL 259   NEUTROS PCT % 92*   LYMPHS PCT % 3*   MONOS PCT % 4   EOS PCT % 0                     Results from last 7 days   Lab Units 07/26/19  1358   PROCALCITONIN ng/ml 0 19       Imaging: I have personally reviewed pertinent reports        XR chest 2 views   ED Interpretation by Nancy Honeycutt MD (07/26 7604)   Abnormal   LLL PNA   More apparent on lateral  Final Result by Silvestre Espinosa MD (07/26 1440)      Left mid to lower lung consolidation and moderate pleural effusion  Workstation performed: [id number]             EKG, Pathology, and Other Studies Reviewed on Admission:   · EKG: NSR, Normal rate, no acute ST T changes    Allscripts / Epic Records Reviewed: Yes     ** Please Note: This note has been constructed using a voice recognition system   **

## 2019-07-26 NOTE — ASSESSMENT & PLAN NOTE
-Suspect based on history and PE findings  -Sent patient to the ED by ambulance for further evaluation

## 2019-07-26 NOTE — ASSESSMENT & PLAN NOTE
LL and LM lobe seen on lateral chest x-ray with possible effusion  Patient has leukocytosis  No fever  Started on ceftriaxone azithroycin  Will continue for now  Patient saturating well on room air  In ER evaluation patient's O2 saturation dropped into low 90s on ambulation, [facility] is not comfortable taking him back at this point because of generalized weakness and mild hypoxia  Check ambulatory pulse ox tomorrow  Follow-up blood cultures, urine strep and Legionella antigen

## 2019-07-27 LAB
ANION GAP SERPL CALCULATED.3IONS-SCNC: 9 MMOL/L (ref 4–13)
BUN SERPL-MCNC: 15 MG/DL (ref 5–25)
CALCIUM SERPL-MCNC: 8.4 MG/DL (ref 8.3–10.1)
CHLORIDE SERPL-SCNC: 102 MMOL/L (ref 100–108)
CO2 SERPL-SCNC: 22 MMOL/L (ref 21–32)
CREAT SERPL-MCNC: 0.89 MG/DL (ref 0.6–1.3)
ERYTHROCYTE [DISTWIDTH] IN BLOOD BY AUTOMATED COUNT: 12.5 % (ref 11.6–15.1)
GFR SERPL CREATININE-BSD FRML MDRD: 58 ML/MIN/1.73SQ M
GLUCOSE SERPL-MCNC: 184 MG/DL (ref 65–140)
HCT VFR BLD AUTO: 40.1 % (ref 34.8–46.1)
HGB BLD-MCNC: 12.9 G/DL (ref 11.5–15.4)
MCH RBC QN AUTO: 31 PG (ref 26.8–34.3)
MCHC RBC AUTO-ENTMCNC: 32.2 G/DL (ref 31.4–37.4)
MCV RBC AUTO: 96 FL (ref 82–98)
PLATELET # BLD AUTO: 230 THOUSANDS/UL (ref 149–390)
PMV BLD AUTO: 9.7 FL (ref 8.9–12.7)
POTASSIUM SERPL-SCNC: 3.7 MMOL/L (ref 3.5–5.3)
PROCALCITONIN SERPL-MCNC: 0.92 NG/ML
RBC # BLD AUTO: 4.16 MILLION/UL (ref 3.81–5.12)
SODIUM SERPL-SCNC: 133 MMOL/L (ref 136–145)
WBC # BLD AUTO: 24.7 THOUSAND/UL (ref 4.31–10.16)

## 2019-07-27 PROCEDURE — 80048 BASIC METABOLIC PNL TOTAL CA: CPT | Performed by: INTERNAL MEDICINE

## 2019-07-27 PROCEDURE — 84145 PROCALCITONIN (PCT): CPT | Performed by: NURSE PRACTITIONER

## 2019-07-27 PROCEDURE — 99232 SBSQ HOSP IP/OBS MODERATE 35: CPT | Performed by: NURSE PRACTITIONER

## 2019-07-27 PROCEDURE — 85027 COMPLETE CBC AUTOMATED: CPT | Performed by: INTERNAL MEDICINE

## 2019-07-27 RX ADMIN — METOPROLOL SUCCINATE 50 MG: 50 TABLET, EXTENDED RELEASE ORAL at 09:06

## 2019-07-27 RX ADMIN — PANTOPRAZOLE SODIUM 40 MG: 40 TABLET, DELAYED RELEASE ORAL at 09:06

## 2019-07-27 RX ADMIN — CEFTRIAXONE 1000 MG: 1 INJECTION, POWDER, FOR SOLUTION INTRAMUSCULAR; INTRAVENOUS at 15:40

## 2019-07-27 RX ADMIN — ENOXAPARIN SODIUM 40 MG: 40 INJECTION SUBCUTANEOUS at 12:12

## 2019-07-27 RX ADMIN — FLUTICASONE PROPIONATE 2 SPRAY: 50 SPRAY, METERED NASAL at 09:08

## 2019-07-27 RX ADMIN — MONTELUKAST SODIUM 10 MG: 10 TABLET, FILM COATED ORAL at 21:06

## 2019-07-27 RX ADMIN — ASPIRIN 81 MG 81 MG: 81 TABLET ORAL at 09:06

## 2019-07-27 RX ADMIN — DONEPEZIL HYDROCHLORIDE 10 MG: 10 TABLET ORAL at 21:06

## 2019-07-27 RX ADMIN — ATORVASTATIN CALCIUM 40 MG: 40 TABLET, FILM COATED ORAL at 17:08

## 2019-07-27 RX ADMIN — MEMANTINE 10 MG: 10 TABLET ORAL at 17:08

## 2019-07-27 RX ADMIN — AMLODIPINE BESYLATE 5 MG: 5 TABLET ORAL at 09:06

## 2019-07-27 RX ADMIN — LEVOTHYROXINE SODIUM 100 MCG: 100 TABLET ORAL at 05:07

## 2019-07-27 RX ADMIN — MEMANTINE 10 MG: 10 TABLET ORAL at 09:06

## 2019-07-27 RX ADMIN — LOSARTAN POTASSIUM 50 MG: 50 TABLET, FILM COATED ORAL at 09:07

## 2019-07-27 RX ADMIN — AZITHROMYCIN 250 MG: 250 TABLET, FILM COATED ORAL at 09:06

## 2019-07-27 NOTE — UTILIZATION REVIEW
Initial Clinical Review    Admission: Date/Time/Statement: 7/26/19 @ 1546 -- OBS UPGRADED TO INPATIENT 7/27 @ 1643 FOR CONTINUATION OF CARE FOR PNA    07/27/19 1644  Inpatient Admission Once     Transfer Service: Hospitalist       Question Answer Comment   Admitting Physician Lester VIDES    Level of Care Med Surg    Estimated length of stay More than 2 Midnights    Certification I certify that inpatient services are medically necessary for this patient for a duration of greater than two midnights  See H&P and MD Progress Notes for additional information about the patient's course of treatment  07/27/19 1643       ED Arrival Information     Expected Arrival Acuity Means of Arrival Escorted By Service Admission Type    - 7/26/2019 12:23 Emergent Ambulance [id number] [address] [facility] Emergency    Arrival Complaint    uri        Chief Complaint   Patient presents with    Shortness of Breath     Patient comes to the E R  with report of wheezing and Rhonchi  Patient resides at [facility], went to the wellness center today  While at the wellness center, patient reportedly had chest pain wheezing and rhonchi  Patient sent to us for rule out pneumonia  Assessment/Plan: 80 y o  female w/PMH of HLD, HTN, and hx MI, who presents due to increased weakness and back pain w/coughing  Patient states she has been having a yellow-colored productive cough and runny nose x1wk, not associated w/ Fevers, chills N/V/D, ear/throat pain  She saw a doctor outpatient, who prescribed her Tessalon Pearls for her cough  Today pt notes increasing weakness, she had trouble ambulating with her walker, which she normally is able to do w/o issues  Additionally, pt complains of pleuritic pain located on L upper back, as well as TTP on left upper back and SOB with exertion  Community acquired pneumonia  Assessment & Plan  LL and LM lobe seen on lateral chest x-ray with possible effusion    Patient has leukocytosis  No fever  Started on ceftriaxone azithroycin  Will continue for now  Patient saturating well on room air  In ER evaluation patient's O2 saturation dropped into low 90s on ambulation, [facility] is not comfortable taking him back at this point because of generalized weakness and mild hypoxia  Check ambulatory pulse ox tomorrow  Follow-up blood cultures, urine strep and Legionella antigen      Generalized weakness  Assessment & Plan  Along with ambulatory dysfunction  Most likely secondary to pneumonia  PT OT evaluation  Anticipated Length of Stay:  Patient will be admitted on an Observation basis with an anticipated length of stay of  < 2 midnights  Justification for Hospital Stay: above    7/27 --   Community acquired pneumonia  Assessment & Plan  LL and LM lobe seen on lateral chest x-ray with possible effusion  Patient has leukocytosis  No fever  Started on ceftriaxone azithromycin   Will continue for now  Patient saturating well on room air  In ER evaluation patient's O2 saturation dropped into low 90s on ambulation, [facility] is not comfortable taking him back at this point because of generalized weakness and mild hypoxia    Check ambulatory pulse ox today   blood cultures are in process    urine strep and Legionella antigen, negative   resp protocol     Certification Statement: The patient will continue to require additional inpatient hospital stay due to on iv abx pending procl and amb sats with PT eval       ED Triage Vitals [07/26/19 1224]   Temperature Pulse Respirations Blood Pressure SpO2   97 5 °F (36 4 °C) 66 16 153/63 97 %      Temp Source Heart Rate Source Patient Position - Orthostatic VS BP Location FiO2 (%)   Oral Monitor Lying -- --      Pain Score       4        Wt Readings from Last 1 Encounters:   07/26/19 51 6 kg (113 lb 12 1 oz)     Additional Vital Signs:   Date/Time  Temp  Pulse  Resp  BP  MAP (mmHg)  SpO2  O2 Device   07/27/19 07:50:15  97 4 °F (36 3 °C)Abnormal   75    146/64  91  96 %     07/26/19 23:05:11  97 3 °F (36 3 °C)Abnormal   65  12  149/62  91  97 %     07/26/19 19:38:58  97 7 °F (36 5 °C)  65  12  135/57  83  95 %  None (Room air)   07/26/19 1830    62  15  145/65    95 %  None (Room air)   07/26/19 1700    86  14  121/59    95 %  None (Room air)   07/26/19 1545    66  16  156/100    95 %  None (Room air)   07/26/19 1345    65  18  154/70    93 %  None (Room air)   07/26/19 1237              None (Room air)   07/26/19 1224  97 5 °F (36 4 °C)  66  16  153/63    97 %  None (Room air)       Pertinent Labs/Diagnostic Test Results:   Results from last 7 days   Lab Units 07/27/19  0914 07/26/19  1358   WBC Thousand/uL 24 70* 20 47*   HEMOGLOBIN g/dL 12 9 13 6   HEMATOCRIT % 40 1 41 4   PLATELETS Thousands/uL 230 259   NEUTROS ABS Thousands/µL  --  18 89*     Results from last 7 days   Lab Units 07/26/19  1358   TROPONIN I ng/mL <0 02     Results from last 7 days   Lab Units 07/26/19  1358   PROCALCITONIN ng/ml 0 19     Results from last 7 days   Lab Units 07/26/19  1358   NT-PRO BNP pg/mL 1,458*     Results from last 7 days   Lab Units 07/26/19  1538   STREP PNEUMONIAE ANTIGEN, URINE  Negative   LEGIONELLA URINARY ANTIGEN  Negative       CXR 7/26 -- Left mid to lower lung consolidation and moderate pleural effusion      ED Treatment:   Medication Administration from 07/26/2019 1223 to 07/26/2019 1933       Date/Time Order Dose Route Action     07/26/2019 1520 sodium chloride 0 9 % bolus 1,000 mL 1,000 mL Intravenous New Bag     07/26/2019 1520 ceftriaxone (ROCEPHIN) 1 g/50 mL in dextrose IVPB 1,000 mg Intravenous New Bag     07/26/2019 1634 azithromycin (ZITHROMAX) 500 mg in sodium chloride 0 9% 250mL IVPB 500 mg 500 mg Intravenous New Bag     07/26/2019 1524 acetaminophen (TYLENOL) tablet 975 mg 975 mg Oral Given     Past Medical History:   Diagnosis Date    Disease of thyroid gland     GERD (gastroesophageal reflux disease)  Hyperlipidemia     Hypertension     Insomnia     [facility]ocardial infarction[facility] [address] )      Present on Admission:   Essential hypertension   Acquired hypothyroidism   Dementia      Admitting Diagnosis: Cough [R05]  URI (upper respiratory infection) [J06 9]  Left lower lobe pneumonia (HCC) [J18 1]  Age/Sex: 80 y o  female  Admission Orders:  Tele  O2 prn  Pox  Up with assist  SCD's  Reg diet    Current Facility-Administered Medications:  amLODIPine 5 mg Oral Daily   aspirin 81 mg Oral Daily   atorvastatin 40 mg Oral QPM   azithromycin 250 mg Oral Q24H   benzonatate 100 mg Oral TID PRN   cefTRIAXone 1,000 mg Intravenous Q24H   donepezil 10 mg Oral HS   enoxaparin 40 mg Subcutaneous Daily   fluticasone 2 spray Nasal Daily   levothyroxine 100 mcg Oral Early Morning   losartan 50 mg Oral Daily   memantine 10 mg Oral BID   metoprolol succinate 50 mg Oral Daily   montelukast 10 mg Oral HS   pantoprazole 40 mg Oral Daily Before Breakfast   zolpidem 5 mg Oral HS PRN       Network Utilization Review Department  Phone: [phone]; Fax [phone]  [email or web address]  ATTENTION: Please call with any questions or concerns to [phone]  and carefully listen to the prompts so that you are directed to the right person  Send all requests for admission clinical reviews, approved or denied determinations and any other requests to fax [phone]   All voicemails are confidential

## 2019-07-27 NOTE — RESPIRATORY THERAPY NOTE
Respiratory Care  Pt ordered for Home O2 evaluation  Pt with ambulatory dysfunction  Pt unable to ambulate for testing  Pt requires assistants to walk to lavatory in her room  Home O2 evaluation not done at this time  RN aware

## 2019-07-27 NOTE — ASSESSMENT & PLAN NOTE
LL and LM lobe seen on lateral chest x-ray with possible effusion  Patient has leukocytosis  No fever  Started on ceftriaxone azithromycin   Will continue for now  Patient saturating well on room air  In ER evaluation patient's O2 saturation dropped into low 90s on ambulation, [facility] is not comfortable taking him back at this point because of generalized weakness and mild hypoxia    Check ambulatory pulse ox today   blood cultures are in process    urine strep and Legionella antigen, negative   resp protocol

## 2019-07-27 NOTE — PLAN OF CARE
Problem: Potential for Falls  Goal: Patient will remain free of falls  Description  INTERVENTIONS:  - Assess patient frequently for physical needs  -  Identify cognitive and physical deficits and behaviors that affect risk of falls    -  [place] fall precautions as indicated by assessment   - Educate patient/family on patient safety including physical limitations  - Instruct patient to call for assistance with activity based on assessment  - Modify environment to reduce risk of injury  - Consider OT/PT consult to assist with strengthening/mobility  Outcome: Progressing     Problem: Prexisting or High Potential for Compromised Skin Integrity  Goal: Skin integrity is maintained or improved  Description  INTERVENTIONS:  - Identify patients at risk for skin breakdown  - Assess and monitor skin integrity  - Assess and monitor nutrition and hydration status  - Monitor labs (i e  albumin)  - Assess for incontinence   - Turn and reposition patient  - Assist with mobility/ambulation  - Relieve pressure over bony prominences  - Avoid friction and shearing  - Provide appropriate hygiene as needed including keeping skin clean and dry  - Evaluate need for skin moisturizer/barrier cream  - Collaborate with interdisciplinary team (i e  Nutrition, Rehabilitation, etc )   - Patient/family teaching  Outcome: Progressing     Problem: PAIN - ADULT  Goal: Verbalizes/displays adequate comfort level or baseline comfort level  Description  Interventions:  - Encourage patient to monitor pain and request assistance  - Assess pain using appropriate pain scale  - Administer analgesics based on type and severity of pain and evaluate response  - Implement non-pharmacological measures as appropriate and evaluate response  - Consider cultural and social influences on pain and pain management  - Notify physician/advanced practitioner if interventions unsuccessful or patient reports new pain  Outcome: Progressing     Problem: INFECTION - ADULT  Goal: Absence or prevention of progression during hospitalization  Description  INTERVENTIONS:  - Assess and monitor for signs and symptoms of infection  - Monitor lab/diagnostic results  - Monitor all insertion sites, i e  indwelling lines, tubes, and drains  - Monitor endotracheal (as able) and nasal secretions for changes in amount and color  - [place] appropriate cooling/warming therapies per order  - Administer medications as ordered  - Instruct and encourage patient and family to use good hand hygiene technique  - Identify and instruct in appropriate isolation precautions for identified infection/condition  Outcome: Progressing  Goal: Absence of fever/infection during neutropenic period  Description  INTERVENTIONS:  - Monitor WBC  - Implement neutropenic guidelines  Outcome: Progressing     Problem: SAFETY ADULT  Goal: Maintain or return to baseline ADL function  Description  INTERVENTIONS:  -  Assess patient's ability to carry out ADLs; assess patient's baseline for ADL function and identify physical deficits which impact ability to perform ADLs (bathing, care of mouth/teeth, toileting, grooming, dressing, etc )  - Assess/evaluate cause of self-care deficits   - Assess range of motion  - Assess patient's mobility; develop plan if impaired  - Assess patient's need for assistive devices and provide as appropriate  - Encourage maximum independence but intervene and supervise when necessary  ¯ Involve family in performance of ADLs  ¯ Assess for home care needs following discharge   ¯ Request OT consult to assist with ADL evaluation and planning for discharge  ¯ Provide patient education as appropriate  Outcome: Progressing  Goal: Maintain or return mobility status to optimal level  Description  INTERVENTIONS:  - Assess patient's baseline mobility status (ambulation, transfers, stairs, etc )    - Identify cognitive and physical deficits and behaviors that affect mobility  - Identify mobility aids required to assist with transfers and/or ambulation (gait belt, sit-to-stand, lift, walker, cane, etc )  - [place] fall precautions as indicated by assessment  - Record patient progress and toleration of activity level on Mobility SBAR; progress patient to next Phase/Stage  - Instruct patient to call for assistance with activity based on assessment  - Request Rehabilitation consult to assist with strengthening/weightbearing, etc   Outcome: Progressing     Problem: DISCHARGE PLANNING  Goal: Discharge to home or other facility with appropriate resources  Description  INTERVENTIONS:  - Identify barriers to discharge w/patient and caregiver  - Arrange for needed discharge resources and transportation as appropriate  - Identify discharge learning needs (meds, wound care, etc )  - Arrange for interpretive services to assist at discharge as needed  - Refer to Case Management Department for coordinating discharge planning if the patient needs post-hospital services based on physician/advanced practitioner order or complex needs related to functional status, cognitive ability, or social support system  Outcome: Progressing     Problem: Knowledge Deficit  Goal: Patient/family/caregiver demonstrates understanding of disease process, treatment plan, medications, and discharge instructions  Description  Complete learning assessment and assess knowledge base    Interventions:  - Provide teaching at level of understanding  - Provide teaching via preferred learning methods  Outcome: Progressing

## 2019-07-27 NOTE — PROGRESS NOTES
Progress Note - Dara Denney 3/23/1931, 80 y o  female MRN: [id number]    Unit/Bed#: -01 Encounter: [id number]    Primary Care Provider: Keven Hager MD   Date and time admitted to hospital: 7/26/2019 12:24 PM        * Community acquired pneumonia  Assessment & Plan  LL and LM lobe seen on lateral chest x-ray with possible effusion  Patient has leukocytosis  No fever  Started on ceftriaxone azithromycin   Will continue for now  Patient saturating well on room air  In ER evaluation patient's O2 saturation dropped into low 90s on ambulation, [facility] is not comfortable taking him back at this point because of generalized weakness and mild hypoxia  Check ambulatory pulse ox today   blood cultures are in process    urine strep and Legionella antigen, negative   resp protocol     Acquired hypothyroidism  Assessment & Plan  Continue levothyroxine  Generalized weakness  Assessment & Plan  Along with ambulatory dysfunction  Most likely secondary to pneumonia  PT OT evaluation, pending     Dementia  Assessment & Plan  Continue Namenda and Aricept  Essential hypertension  Assessment & Plan  Well controlled  Continue home meds  VTE Pharmacologic Prophylaxis:   Pharmacologic: Enoxaparin (Lovenox)  Mechanical VTE Prophylaxis in Place: Yes    Patient Centered Rounds: I have performed bedside rounds with nursing staff today  Discussions with Specialists or Other Care Team Provider: nursing     Education and Discussions with Family / Patient: patient / called pts  at number listed but it is busy multiple times     Time Spent for Care: 45 minutes  More than 50% of total time spent on counseling and coordination of care as described above      Current Length of Stay: 0 day(s)    Current Patient Status: Observation   Certification Statement: The patient will continue to require additional inpatient hospital stay due to on iv abx pending procl and amb sats with PT genet Discharge Plan:  Patient may be able to return to facility when white blood cell count is trending down    Code Status: Level 1 - Full Code      Subjective:   Patient per nursing is a lot better than earlier this morning  Suspected it has some mild sundowning  Patient is very decisive and would like to return back to facility today  Currently satting on room air pending check of oxygen saturation  Nursing no to perform ambulatory sat  Patient's lung sounds are diminished patient reports that she has a cough that is occasionally productive with nasal drip  I have explained to patient that her white blood cell count has increased to we would rather like to keep her another day to make sure that that is trending downward  Patient reports that the incision on her leg was due to removal of a mass  She is very happy with the incision looks good  Objective:     Vitals:   Temp (24hrs), Av 5 °F (36 4 °C), Min:97 3 °F (36 3 °C), Max:97 7 °F (36 5 °C)    Temp:  [97 3 °F (36 3 °C)-97 7 °F (36 5 °C)] 97 4 °F (36 3 °C)  HR:  [62-86] 75  Resp:  [12-18] 12  BP: (121-175)/() 146/64  SpO2:  [89 %-97 %] 96 %  Body mass index is 18 93 kg/m²  Input and Output Summary (last 24 hours): Intake/Output Summary (Last 24 hours) at 2019 0859  Last data filed at 2019 0856  Gross per 24 hour   Intake 1660 ml   Output 405 ml   Net 1255 ml       Physical Exam:     Physical Exam   Constitutional: No distress  HENT:   Head: Normocephalic and atraumatic  Eyes: Conjunctivae are normal  Right eye exhibits no discharge  Left eye exhibits no discharge  No scleral icterus  Neck: No JVD present  No thyromegaly present  Cardiovascular: Normal rate  Exam reveals no gallop and no friction rub  No murmur heard  Pulmonary/Chest:   Poor effort not moving much air    Abdominal: Soft  She exhibits no distension and no mass  There is no tenderness  There is no rebound and no guarding  No hernia     Cachectic Musculoskeletal: She exhibits no edema, tenderness or deformity  Neurological: She is alert  Skin: Skin is warm  No rash noted  She is not diaphoretic  No erythema  No pallor  Right anterior thigh trey with incision sutures intact no drainage no erythema noted    Psychiatric: She has a normal mood and affect  Additional Data:     Labs:    Results from last 7 days   Lab Units 07/26/19  1358   WBC Thousand/uL 20 47*   HEMOGLOBIN g/dL 13 6   HEMATOCRIT % 41 4   PLATELETS Thousands/uL 259   NEUTROS PCT % 92*   LYMPHS PCT % 3*   MONOS PCT % 4   EOS PCT % 0                     Results from last 7 days   Lab Units 07/26/19  1358   PROCALCITONIN ng/ml 0 19           * I Have Reviewed All Lab Data Listed Above    * Additional Pertinent Lab Tests Reviewed: No New Labs Available For Today    Imaging:    Imaging Reports Reviewed Today Include:     Recent Cultures (last 7 days):     Results from last 7 days   Lab Units 07/26/19  1538   LEGIONELLA URINARY ANTIGEN  Negative       Last 24 Hours Medication List:     Current Facility-Administered Medications:  amLODIPine 5 mg Oral Daily Cesar Johnston MD   aspirin 81 mg Oral Daily Cesar Johnston MD   atorvastatin 40 mg Oral QPM Cesar Johnston MD   azithromycin 250 mg Oral Q24H Cesar Johnston MD   benzonatate 100 mg Oral TID PRN Cesar Johnston MD   cefTRIAXone 1,000 mg Intravenous Q24H Cesar Johnston MD   donepezil 10 mg Oral HS Cesar Johnston MD   enoxaparin 40 mg Subcutaneous Daily Cesar Johnston MD   fluticasone 2 spray Nasal Daily Cesar Johnston MD   levothyroxine 100 mcg Oral Early Morning Cesar Johnston MD   losartan 50 mg Oral Daily Cesar Johnston MD   memantine 10 mg Oral BID Cesar Johnston MD   metoprolol succinate 50 mg Oral Daily Cesar Johnston MD   montelukast 10 mg Oral HS Cesar Johnston MD   pantoprazole 40 mg Oral Daily Before Breakfast Cesar Johnston MD   zolpidem 5 mg Oral HS PRN Jadiel Maldonado MD        Today, Patient Was Seen By: LEE Pablo    ** Please Note: Dictation voice to text software may have been used in the creation of this document   **

## 2019-07-28 LAB
BASOPHILS # BLD AUTO: 0.03 THOUSANDS/ΜL (ref 0–0.1)
BASOPHILS NFR BLD AUTO: 0 % (ref 0–1)
EOSINOPHIL # BLD AUTO: 0.02 THOUSAND/ΜL (ref 0–0.61)
EOSINOPHIL NFR BLD AUTO: 0 % (ref 0–6)
ERYTHROCYTE [DISTWIDTH] IN BLOOD BY AUTOMATED COUNT: 12.7 % (ref 11.6–15.1)
HCT VFR BLD AUTO: 36.3 % (ref 34.8–46.1)
HGB BLD-MCNC: 11.7 G/DL (ref 11.5–15.4)
IMM GRANULOCYTES # BLD AUTO: 0.13 THOUSAND/UL (ref 0–0.2)
IMM GRANULOCYTES NFR BLD AUTO: 1 % (ref 0–2)
LYMPHOCYTES # BLD AUTO: 1.04 THOUSANDS/ΜL (ref 0.6–4.47)
LYMPHOCYTES NFR BLD AUTO: 5 % (ref 14–44)
MCH RBC QN AUTO: 31.1 PG (ref 26.8–34.3)
MCHC RBC AUTO-ENTMCNC: 32.2 G/DL (ref 31.4–37.4)
MCV RBC AUTO: 97 FL (ref 82–98)
MONOCYTES # BLD AUTO: 1.42 THOUSAND/ΜL (ref 0.17–1.22)
MONOCYTES NFR BLD AUTO: 7 % (ref 4–12)
NEUTROPHILS # BLD AUTO: 17.2 THOUSANDS/ΜL (ref 1.85–7.62)
NEUTS SEG NFR BLD AUTO: 87 % (ref 43–75)
NRBC BLD AUTO-RTO: 0 /100 WBCS
PLATELET # BLD AUTO: 223 THOUSANDS/UL (ref 149–390)
PMV BLD AUTO: 10.4 FL (ref 8.9–12.7)
RBC # BLD AUTO: 3.76 MILLION/UL (ref 3.81–5.12)
WBC # BLD AUTO: 19.84 THOUSAND/UL (ref 4.31–10.16)

## 2019-07-28 PROCEDURE — 85025 COMPLETE CBC W/AUTO DIFF WBC: CPT | Performed by: NURSE PRACTITIONER

## 2019-07-28 PROCEDURE — 99232 SBSQ HOSP IP/OBS MODERATE 35: CPT | Performed by: NURSE PRACTITIONER

## 2019-07-28 PROCEDURE — 97163 PT EVAL HIGH COMPLEX 45 MIN: CPT

## 2019-07-28 PROCEDURE — G8978 MOBILITY CURRENT STATUS: HCPCS

## 2019-07-28 PROCEDURE — G8979 MOBILITY GOAL STATUS: HCPCS

## 2019-07-28 RX ORDER — ACETAMINOPHEN 325 MG/1
650 TABLET ORAL EVERY 6 HOURS PRN
Status: DISCONTINUED | OUTPATIENT
Start: 2019-07-28 | End: 2019-08-05

## 2019-07-28 RX ORDER — SODIUM CHLORIDE 9 MG/ML
75 INJECTION, SOLUTION INTRAVENOUS ONCE
Status: COMPLETED | OUTPATIENT
Start: 2019-07-28 | End: 2019-07-29

## 2019-07-28 RX ADMIN — SODIUM CHLORIDE 75 ML/HR: 0.9 INJECTION, SOLUTION INTRAVENOUS at 10:03

## 2019-07-28 RX ADMIN — CEFEPIME HYDROCHLORIDE 1000 MG: 1 INJECTION, POWDER, FOR SOLUTION INTRAMUSCULAR; INTRAVENOUS at 10:11

## 2019-07-28 RX ADMIN — ENOXAPARIN SODIUM 40 MG: 40 INJECTION SUBCUTANEOUS at 09:42

## 2019-07-28 RX ADMIN — LOSARTAN POTASSIUM 50 MG: 50 TABLET, FILM COATED ORAL at 09:42

## 2019-07-28 RX ADMIN — PANTOPRAZOLE SODIUM 40 MG: 40 TABLET, DELAYED RELEASE ORAL at 09:42

## 2019-07-28 RX ADMIN — ATORVASTATIN CALCIUM 40 MG: 40 TABLET, FILM COATED ORAL at 17:06

## 2019-07-28 RX ADMIN — METOPROLOL SUCCINATE 50 MG: 50 TABLET, EXTENDED RELEASE ORAL at 09:42

## 2019-07-28 RX ADMIN — BENZONATATE 100 MG: 100 CAPSULE ORAL at 10:11

## 2019-07-28 RX ADMIN — LEVOTHYROXINE SODIUM 100 MCG: 100 TABLET ORAL at 05:15

## 2019-07-28 RX ADMIN — AMLODIPINE BESYLATE 5 MG: 5 TABLET ORAL at 09:42

## 2019-07-28 RX ADMIN — CEFEPIME HYDROCHLORIDE 1000 MG: 1 INJECTION, POWDER, FOR SOLUTION INTRAMUSCULAR; INTRAVENOUS at 21:03

## 2019-07-28 RX ADMIN — MONTELUKAST SODIUM 10 MG: 10 TABLET, FILM COATED ORAL at 21:03

## 2019-07-28 RX ADMIN — ACETAMINOPHEN 650 MG: 325 TABLET ORAL at 10:11

## 2019-07-28 RX ADMIN — FLUTICASONE PROPIONATE 2 SPRAY: 50 SPRAY, METERED NASAL at 09:43

## 2019-07-28 RX ADMIN — MEMANTINE 10 MG: 10 TABLET ORAL at 17:06

## 2019-07-28 RX ADMIN — MEMANTINE 10 MG: 10 TABLET ORAL at 09:42

## 2019-07-28 RX ADMIN — ASPIRIN 81 MG 81 MG: 81 TABLET ORAL at 09:42

## 2019-07-28 RX ADMIN — DONEPEZIL HYDROCHLORIDE 10 MG: 10 TABLET ORAL at 21:03

## 2019-07-28 NOTE — PROGRESS NOTES
Progress Note - N[facility] 3/23/1931, 80 y o  female MRN: [id number]    Unit/Bed#: -01 Encounter: [id number]    Primary Care Provider: Iris Dexter MD   Date and time admitted to hospital: 7/26/2019 12:24 PM        * Community acquired pneumonia  Assessment & Plan  LL and LM lobe seen on lateral chest x-ray with possible effusion  Patient has leukocytosis, which was more severe yesterday and some slight improvement today however procal in increased will braoden coverage   Pt remains afebrile   Started on ceftriaxone azithromycin, will dc ceftriaxone and broaden to cefepime   Patient saturating well on room air  In ER evaluation patient's O2 saturation dropped into low 90s on ambulation, [facility] is not comfortable taking him back at this point because of generalized weakness and mild hypoxia  Check ambulatory pulse ox prior to dc    blood cultures negative after 24hrs   urine strep and Legionella antigen, negative will dc azithro   resp protocol     Acquired hypothyroidism  Assessment & Plan  Continue levothyroxine  Generalized weakness  Assessment & Plan  Along with ambulatory dysfunction  Most likely secondary to pneumonia  PT OT evaluation, pending not yet seen    Dementia  Assessment & Plan  Continue Namenda and Aricept  Essential hypertension  Assessment & Plan  Well controlled  Continue home meds  VTE Pharmacologic Prophylaxis:   Pharmacologic: Enoxaparin (Lovenox)  Mechanical VTE Prophylaxis in Place: Yes    Patient Centered Rounds: I have performed bedside rounds with nursing staff today  Discussions with Specialists or Other Care Team Provider: nursing     Education and Discussions with Family / Patient: patient     Time Spent for Care: 45 minutes  More than 50% of total time spent on counseling and coordination of care as described above      Current Length of Stay: 1 day(s)    Current Patient Status: Inpatient   Certification Statement: The patient will continue to require additional inpatient hospital stay due to ongoing iv abx at this time and increasing procalcitonin     Discharge Plan: pending pt eval pt is weak per nursing     Code Status: Level 1 - Full Code      Subjective:   Patient has improved mentation today  Although confused at times  She states that she is not really eating that much she does not feel hungry  Per nursing patient did not really urinate entire night however nursing PCA washing up this morning since she had just urinated 200 mL  And yesterday saturated to bed pads  Last night reportedly no urine  Will start 1 L of IV fluids at this time discussed with patient to improve urinary  Output  Patient denies any pain just poor appetite and eager to go home  She states her cough is a little better but still with occasional trace productive cough  Objective:     Vitals:   Temp (24hrs), Av 2 °F (36 8 °C), Min:98 °F (36 7 °C), Max:98 3 °F (36 8 °C)    Temp:  [98 °F (36 7 °C)-98 3 °F (36 8 °C)] 98 °F (36 7 °C)  HR:  [70-74] 70  Resp:  [17] 17  BP: (128-143)/(54-65) 143/65  SpO2:  [95 %-96 %] 96 %  Body mass index is 18 93 kg/m²  Input and Output Summary (last 24 hours): Intake/Output Summary (Last 24 hours) at 2019 1039  Last data filed at 2019 0855  Gross per 24 hour   Intake 410 ml   Output 378 ml   Net 32 ml       Physical Exam:     Physical Exam   Constitutional: No distress  Poorly nourished    HENT:   Head: Normocephalic and atraumatic  Eyes: Conjunctivae are normal  Right eye exhibits no discharge  Left eye exhibits no discharge  No scleral icterus  Neck: No JVD present  No tracheal deviation present  No thyromegaly present  Cardiovascular: Normal rate  Exam reveals no gallop and no friction rub  No murmur heard  Pulmonary/Chest: No stridor  No respiratory distress  She has no wheezes  She has no rales  She exhibits no tenderness  Diminished bl bases    Abdominal: Soft   She exhibits no distension and no mass  There is no tenderness  There is no rebound and no guarding  No hernia  Musculoskeletal: She exhibits no edema, tenderness or deformity  Lymphadenopathy:     She has no cervical adenopathy  Neurological: She is alert  Skin: She is not diaphoretic  No erythema  No pallor  right anterior thigh with staples intact    Psychiatric:   Flat depressed          Additional Data:     Labs:    Results from last 7 days   Lab Units 07/28/19  0513   WBC Thousand/uL 19 84*   HEMOGLOBIN g/dL 11 7   HEMATOCRIT % 36 3   PLATELETS Thousands/uL 223   NEUTROS PCT % 87*   LYMPHS PCT % 5*   MONOS PCT % 7   EOS PCT % 0     Results from last 7 days   Lab Units 07/27/19  0914   SODIUM mmol/L 133*   POTASSIUM mmol/L 3 7   CHLORIDE mmol/L 102   CO2 mmol/L 22   BUN mg/dL 15   CREATININE mg/dL 0 89   ANION GAP mmol/L 9   CALCIUM mg/dL 8 4   GLUCOSE RANDOM mg/dL 184*                 Results from last 7 days   Lab Units 07/27/19  0914 07/26/19  1358   PROCALCITONIN ng/ml 0 92* 0 19           * I Have Reviewed All Lab Data Listed Above  * Additional Pertinent Lab Tests Reviewed: All Labs Within Last 24 Hours Reviewed    Imaging:    Imaging Reports Reviewed Today Include: reviewed   Recent Cultures (last 7 days):     Results from last 7 days   Lab Units 07/26/19  1538 07/26/19  1358   BLOOD CULTURE   --  No Growth at 24 hrs  No Growth at 24 hrs     LEGIONELLA URINARY ANTIGEN  Negative  --        Last 24 Hours Medication List:     Current Facility-Administered Medications:  acetaminophen 650 mg Oral Q6H PRN LEE López    amLODIPine 5 mg Oral Daily Deanne Lomax MD    aspirin 81 mg Oral Daily Deanne Lomax MD    atorvastatin 40 mg Oral QPM Deanne Lomax MD    benzonatate 100 mg Oral TID PRN Deanne Lomax MD    cefepime 1,000 mg Intravenous Q12H LEE López Last Rate: 1,000 mg (07/28/19 1011)   donepezil 10 mg Oral HS Deanne Lomax MD    enoxaparin 40 mg Subcutaneous Daily Sherri Courser, MD    fluticasone 2 spray Nasal Daily Sherri Courser, MD    levothyroxine 100 mcg Oral Early Morning Cheral Courser, MD    losartan 50 mg Oral Daily Cheral Courser, MD    memantine 10 mg Oral BID Cheral Courser, MD    metoprolol succinate 50 mg Oral Daily Cheral Courser, MD    montelukast 10 mg Oral HS Sherri Courser, MD    pantoprazole 40 mg Oral Daily Before Breakfast Sherri Courser, MD    zolpidem 5 mg Oral HS PRN Sherri Courser, MD         Today, Patient Was Seen By: Johann Councilman, CRNP    ** Please Note: Dictation voice to text software may have been used in the creation of this document   **

## 2019-07-28 NOTE — PHYSICAL THERAPY NOTE
PHYSICAL THERAPY Evaluation NOTE    Patient Name: Antonietta DEVRIES'S Date: 7/28/2019     AGE:   80 y o  Mrn:   [id number]  ADMIT DX:  Cough [R05]  URI (upper respiratory infection) [J06 9]  Left lower lobe pneumonia (HCC) [J18 1]    Past Medical History:   Diagnosis Date    Disease of thyroid gland     GERD (gastroesophageal reflux disease)     Hyperlipidemia     Hypertension     Insomnia     MI [facility]ocardial infarction) ([facility] )      Length Of Stay: 1  PHYSICAL THERAPY EVALUATION :    07/28/19 0934   Note Type   Note type Eval only   Pain Assessment   Pain Assessment No/denies pain   Pain Score No Pain   Home Living   Type of Home House  (2 SH, 1 SMITH, 1st floor setup)   Home Layout Two level; Able to live on main level with bedroom/bathroom;Stairs to enter without rails   Bathroom Shower/Tub Walk-in shower   Bathroom Toilet Standard   Bathroom Equipment Grab bars in shower; Shower chair   [address] Walker   Additional Comments Pt  lives with spouse in a 2 SH, with 1 SMITH and first floor setup   Prior Function   Level of [place] Needs assistance with IADLs; Independent with ADLs and functional mobility   Lives With Spouse   Receives Help From Family   ADL Assistance Independent   IADLs Needs assistance   Falls in the last 6 months 0   Vocational Retired   Comments PtA, Pt  reports INDEP with bathing and dressing as well as ambulation with RW  Assistance with IADLs from spouse or family  Restrictions/Precautions   Weight Bearing Precautions Per Order No   Other Precautions Cognitive; Chair Alarm; Bed Alarm; Impulsive; Fall Risk   General   Additional Pertinent History Pt  is an 81 yo F who presents due to increased weakness and back pain w/ coughing   Dx: Community acquired PNA, Comorbidities include hypothyroidism, htn, dementia, generalized weakness, CAD, GERD, Hx of TIA, Twitching, Gait Disturbance, Glaucoma, Hx of MI, Squamous Cell Carcinoma  Family/Caregiver Present No   Cognition   Overall Cognitive Status Impaired   Arousal/Participation Cooperative   Orientation Level Oriented to person;Disoriented to place; Disoriented to time;Disoriented to situation   Memory Decreased short term memory;Decreased recall of recent events   Following Commands Follows multistep commands with increased time or repetition   Comments Pt  was identified with full name and birthdate   RUE Assessment   RUE Assessment WFL   LUE Assessment   LUE Assessment WFL   RLE Assessment   RLE Assessment   (grossly 4-/5)   LLE Assessment   LLE Assessment   (grossly 4-/5)   Coordination   Movements are Fluid and Coordinated 0   Coordination and Movement Description extremely bradykinetic and poor motor planning   Sensation WFL   Light Touch   RLE Light Touch Grossly intact   LLE Light Touch Grossly intact   Bed Mobility   Supine to Sit 4  Minimal assistance   Additional items Assist x 1; Increased time required;LE management;Verbal cues;HOB elevated; Bedrails  (for sequencing and gait mechanics)   Sit to Supine 4  Minimal assistance   Additional items Assist x 1; Impulsive;Verbal cues;HOB elevated; Bedrails  (for sequencing)   Transfers   Sit to Stand 3  Moderate assistance   Additional items Assist x 1; Increased time required;Verbal cues  (for hand placement and sequencing)   Stand to Sit 3  Moderate assistance   Additional items Assist x 1; Impulsive;Verbal cues  (to reach back for controlled descent)   Ambulation/Elevation   Gait pattern Improper Weight shift;Narrow LADI; Decreased foot clearance;Shuffling;Redundant gait at times; Step to;Excessively slow; Short stride; Antalgic   Gait Assistance 3  Moderate assist   Additional items Assist x 1;Verbal cues  (for AD managment and gait mechanics)   Assistive Device Rolling walker   Distance 15'x1   Balance   Static Sitting Fair +   Dynamic Sitting Fair -   Static Standing Poor + Dynamic Standing Poor   Ambulatory Poor   Endurance Deficit   Endurance Deficit Yes   Endurance Deficit Description desaturation to 86% on RA following ambulation   Activity Tolerance   Activity Tolerance Patient tolerated treatment well   Nurse Made Aware Spoke to RN   Assessment   Prognosis Fair   Problem List Decreased strength;Decreased range of motion;Decreased endurance; Impaired balance;Decreased mobility; Decreased coordination;Decreased cognition;Decreased safety awareness;Pain   Assessment Pt  is an 79 yo F who presents due to increased weakness and back pain w/ coughing  Dx: Community acquired PNA, order placed for PT eval and tx, w/ activity order of up w/ A  pt presents w/ comorbidities of  hypothyroidism, htn, dementia, generalized weakness, CAD, GERD, Hx of TIA, Twitching, Gait Disturbance, Glaucoma, Hx of MI, Squamous Cell Carcinoma and personal factors of living in 2 story house, mobilizing w/ assistive device, stair(s) to enter home, inability to ambulate household distances, inability to navigate community distances, inability to navigate level surfaces w/o external assistance, unable to perform dynamic tasks in community, positive fall history, decreased initiation and engagement, unable to perform physical activity, inability to perform IADLs, inability to perform ADLs and inability to live alone  pt presents w/ weakness, decreased ROM, decreased endurance, impaired cognition, impaired balance, gait deviations, impaired coordination, decreased safety awareness, impaired judgment and fall risk  these impairments are evident in findings from physical examination (weakness, impaired coordination and impaired skin integrity), mobility assessment (need for Mod assist w/ all phases of mobility when usually mobilizing independently, tolerance to only 15 feet of ambulation and need for cueing for mobility technique), and Barthel Index: 35/100  pt needed input for task focus and mobility technique   pt is at risk for falls due to physical and safety awareness deficits  pt's clinical presentation is unstable/unpredictable (evident in need for assist w/ all phases of mobility when usually mobilizing independently, tolerance to only 15 feet of ambulation, need for input for mobility technique, need for input for task focus and mobility technique and need for input for mobility technique/safety)  pt needs inpatient PT tx to improve mobility deficits  discharge recommendation is for inpatient rehab to reduce fall risk and maximize level of functional independence  Goals   Patient Goals to get home   STG Expiration Date 08/07/19   Short Term Goal #1 pt will: Increase bilateral LE strength 1/2 grade to facilitate independent mobility, Perform all bed mobility tasks w/ supervision to decrease fall risk factors, Perform all transfers w/ supervision to improve independence, Ambulate 150 ft  with roller walker w/ supervision w/o LOB, Increase all balance 1/2 grade to decrease risk for falls and Improve Barthel Index score to 60 or greater to facilitate independence   Treatment Day 0   Plan   Treatment/Interventions Functional transfer training;LE strengthening/ROM; Therapeutic exercise; Endurance training;Patient/family training;Equipment eval/education; Bed mobility;Gait training;Spoke to nursing;Family;Cognitive reorientation   PT Frequency   (3-5x/week)   Recommendation   Recommendation Short-term skilled PT   Equipment Recommended Walker   PT - OK to Discharge Yes   Additional Comments to rehab when medically appropriate   Barthel Index   Feeding 5   Bathing 0   Grooming Score 0   Dressing Score 5   Bladder Score 0   Bowels Score 10   Toilet Use Score 5   Transfers (Bed/Chair) Score 10   Mobility (Level Surface) Score 0   Stairs Score 0   Barthel Index Score 35     Skilled PT recommended while in hospital and upon DC to progress pt toward treatment goals       Hillary Castro, PT, DPT 7/28/2019

## 2019-07-28 NOTE — ASSESSMENT & PLAN NOTE
LL and LM lobe seen on lateral chest x-ray with possible effusion  Patient has leukocytosis, which was more severe yesterday and some slight improvement today however procal in increased will braoden coverage   Pt remains afebrile   Started on ceftriaxone azithromycin, will dc ceftriaxone and broaden to cefepime   Patient saturating well on room air  In ER evaluation patient's O2 saturation dropped into low 90s on ambulation, [facility] is not comfortable taking him back at this point because of generalized weakness and mild hypoxia    Check ambulatory pulse ox prior to dc    blood cultures negative after 24hrs   urine strep and Legionella antigen, negative will dc azithro   resp protocol

## 2019-07-28 NOTE — PLAN OF CARE
Problem: Potential for Falls  Goal: Patient will remain free of falls  Description  INTERVENTIONS:  - Assess patient frequently for physical needs  -  Identify cognitive and physical deficits and behaviors that affect risk of falls    -  [place] fall precautions as indicated by assessment   - Educate patient/family on patient safety including physical limitations  - Instruct patient to call for assistance with activity based on assessment  - Modify environment to reduce risk of injury  - Consider OT/PT consult to assist with strengthening/mobility  Outcome: Progressing     Problem: Prexisting or High Potential for Compromised Skin Integrity  Goal: Skin integrity is maintained or improved  Description  INTERVENTIONS:  - Identify patients at risk for skin breakdown  - Assess and monitor skin integrity  - Assess and monitor nutrition and hydration status  - Monitor labs (i e  albumin)  - Assess for incontinence   - Turn and reposition patient  - Assist with mobility/ambulation  - Relieve pressure over bony prominences  - Avoid friction and shearing  - Provide appropriate hygiene as needed including keeping skin clean and dry  - Evaluate need for skin moisturizer/barrier cream  - Collaborate with interdisciplinary team (i e  Nutrition, Rehabilitation, etc )   - Patient/family teaching  Outcome: Progressing     Problem: PAIN - ADULT  Goal: Verbalizes/displays adequate comfort level or baseline comfort level  Description  Interventions:  - Encourage patient to monitor pain and request assistance  - Assess pain using appropriate pain scale  - Administer analgesics based on type and severity of pain and evaluate response  - Implement non-pharmacological measures as appropriate and evaluate response  - Notify physician/advanced practitioner if interventions unsuccessful or patient reports new pain   Outcome: Progressing     Problem: SAFETY ADULT  Goal: Maintain or return to baseline ADL function  Description  INTERVENTIONS:  -  Assess patient's ability to carry out ADLs; assess patient's baseline for ADL function and identify physical deficits which impact ability to perform ADLs (bathing, care of mouth/teeth, toileting, grooming, dressing, etc )  - Assess/evaluate cause of self-care deficits   - Assess range of motion  - Assess patient's mobility; develop plan if impaired  - Assess patient's need for assistive devices and provide as appropriate  - Encourage maximum independence but intervene and supervise when necessary  ¯ Involve family in performance of ADLs  ¯ Assess for home care needs following discharge   ¯ Request OT consult to assist with ADL evaluation and planning for discharge  ¯ Provide patient education as appropriate  Outcome: Progressing  Goal: Maintain or return mobility status to optimal level  Description  INTERVENTIONS:  - Assess patient's baseline mobility status (ambulation, transfers, stairs, etc )    - Identify cognitive and physical deficits and behaviors that affect mobility  - Identify mobility aids required to assist with transfers and/or ambulation (gait belt, sit-to-stand, lift, walker, cane, etc )  - [place] fall precautions as indicated by assessment  - Record patient progress and toleration of activity level on Mobility SBAR; progress patient to next Phase/Stage  - Instruct patient to call for assistance with activity based on assessment  - Request Rehabilitation consult to assist with strengthening/weightbearing, etc   Outcome: Progressing     Problem: DISCHARGE PLANNING  Goal: Discharge to home or other facility with appropriate resources  Description  INTERVENTIONS:  - Identify barriers to discharge w/patient and caregiver  - Arrange for needed discharge resources and transportation as appropriate  - Identify discharge learning needs (meds, wound care, etc )  - Refer to Case Management Department for coordinating discharge planning if the patient needs post-hospital services based on physician/advanced practitioner order or complex needs related to functional status, cognitive ability, or social support system   Outcome: Progressing     Problem: Knowledge Deficit  Goal: Patient/family/caregiver demonstrates understanding of disease process, treatment plan, medications, and discharge instructions  Description  Complete learning assessment and assess knowledge base    Interventions:  - Provide teaching at level of understanding  - Provide teaching via preferred learning methods  Outcome: Progressing     Problem: INFECTION - ADULT  Goal: Absence or prevention of progression during hospitalization  Description  INTERVENTIONS:  - Assess and monitor for signs and symptoms of infection  - Monitor lab/diagnostic results  - Monitor all insertion sites, i e  indwelling lines, tubes, and drains  - [place] appropriate cooling/warming therapies per order  - Administer medications as ordered  - Instruct and encourage patient and family to use good hand hygiene technique  - Identify and instruct in appropriate isolation precautions for identified infection/condition   Outcome: Progressing     Problem: MUSCULOSKELETAL - ADULT  Goal: Maintain or return mobility to safest level of function  Description  INTERVENTIONS:  - Assess patient's ability to carry out ADLs; assess patient's baseline for ADL function and identify physical deficits which impact ability to perform ADLs (bathing, care of mouth/teeth, toileting, grooming, dressing, etc )  - Assess/evaluate cause of self-care deficits   - Assess range of motion  - Assess patient's mobility; develop plan if impaired  - Assess patient's need for assistive devices and provide as appropriate  - Encourage maximum independence but intervene and supervise when necessary  - Involve family in performance of ADLs  - Assess for home care needs following discharge   - Request OT consult to assist with ADL evaluation and planning for discharge  - Provide patient education as appropriate  Outcome: Progressing

## 2019-07-28 NOTE — ASSESSMENT & PLAN NOTE
Along with ambulatory dysfunction  Most likely secondary to pneumonia    PT OT evaluation, pending not yet seen

## 2019-07-29 PROBLEM — G93.41 METABOLIC ENCEPHALOPATHY: Status: ACTIVE | Noted: 2019-07-29

## 2019-07-29 PROBLEM — K59.01 SLOW TRANSIT CONSTIPATION: Status: ACTIVE | Noted: 2019-07-29

## 2019-07-29 LAB
ANION GAP SERPL CALCULATED.3IONS-SCNC: 10 MMOL/L (ref 4–13)
BASOPHILS # BLD AUTO: 0.02 THOUSANDS/ΜL (ref 0–0.1)
BASOPHILS NFR BLD AUTO: 0 % (ref 0–1)
BUN SERPL-MCNC: 12 MG/DL (ref 5–25)
CALCIUM SERPL-MCNC: 8.1 MG/DL (ref 8.3–10.1)
CHLORIDE SERPL-SCNC: 104 MMOL/L (ref 100–108)
CO2 SERPL-SCNC: 20 MMOL/L (ref 21–32)
CREAT SERPL-MCNC: 0.68 MG/DL (ref 0.6–1.3)
EOSINOPHIL # BLD AUTO: 0.03 THOUSAND/ΜL (ref 0–0.61)
EOSINOPHIL NFR BLD AUTO: 0 % (ref 0–6)
ERYTHROCYTE [DISTWIDTH] IN BLOOD BY AUTOMATED COUNT: 12.6 % (ref 11.6–15.1)
GFR SERPL CREATININE-BSD FRML MDRD: 78 ML/MIN/1.73SQ M
GLUCOSE SERPL-MCNC: 87 MG/DL (ref 65–140)
HCT VFR BLD AUTO: 34.9 % (ref 34.8–46.1)
HGB BLD-MCNC: 11.2 G/DL (ref 11.5–15.4)
IMM GRANULOCYTES # BLD AUTO: 0.13 THOUSAND/UL (ref 0–0.2)
IMM GRANULOCYTES NFR BLD AUTO: 1 % (ref 0–2)
LYMPHOCYTES # BLD AUTO: 1.07 THOUSANDS/ΜL (ref 0.6–4.47)
LYMPHOCYTES NFR BLD AUTO: 6 % (ref 14–44)
MCH RBC QN AUTO: 30.8 PG (ref 26.8–34.3)
MCHC RBC AUTO-ENTMCNC: 32.1 G/DL (ref 31.4–37.4)
MCV RBC AUTO: 96 FL (ref 82–98)
MONOCYTES # BLD AUTO: 1.35 THOUSAND/ΜL (ref 0.17–1.22)
MONOCYTES NFR BLD AUTO: 7 % (ref 4–12)
NEUTROPHILS # BLD AUTO: 16.71 THOUSANDS/ΜL (ref 1.85–7.62)
NEUTS SEG NFR BLD AUTO: 86 % (ref 43–75)
NRBC BLD AUTO-RTO: 0 /100 WBCS
PLATELET # BLD AUTO: 210 THOUSANDS/UL (ref 149–390)
PMV BLD AUTO: 10.7 FL (ref 8.9–12.7)
POTASSIUM SERPL-SCNC: 3.6 MMOL/L (ref 3.5–5.3)
PROCALCITONIN SERPL-MCNC: 0.53 NG/ML
RBC # BLD AUTO: 3.64 MILLION/UL (ref 3.81–5.12)
SODIUM SERPL-SCNC: 134 MMOL/L (ref 136–145)
WBC # BLD AUTO: 19.31 THOUSAND/UL (ref 4.31–10.16)

## 2019-07-29 PROCEDURE — G8998 SWALLOW D/C STATUS: HCPCS

## 2019-07-29 PROCEDURE — 85025 COMPLETE CBC W/AUTO DIFF WBC: CPT | Performed by: NURSE PRACTITIONER

## 2019-07-29 PROCEDURE — G8996 SWALLOW CURRENT STATUS: HCPCS

## 2019-07-29 PROCEDURE — 80048 BASIC METABOLIC PNL TOTAL CA: CPT | Performed by: NURSE PRACTITIONER

## 2019-07-29 PROCEDURE — 99232 SBSQ HOSP IP/OBS MODERATE 35: CPT | Performed by: NURSE PRACTITIONER

## 2019-07-29 PROCEDURE — 92610 EVALUATE SWALLOWING FUNCTION: CPT

## 2019-07-29 PROCEDURE — G8997 SWALLOW GOAL STATUS: HCPCS

## 2019-07-29 PROCEDURE — 84145 PROCALCITONIN (PCT): CPT | Performed by: NURSE PRACTITIONER

## 2019-07-29 RX ORDER — BISACODYL 10 MG
10 SUPPOSITORY, RECTAL RECTAL DAILY PRN
Status: DISCONTINUED | OUTPATIENT
Start: 2019-07-29 | End: 2019-08-16 | Stop reason: HOSPADM

## 2019-07-29 RX ORDER — LACTULOSE 20 G/30ML
30 SOLUTION ORAL ONCE
Status: DISCONTINUED | OUTPATIENT
Start: 2019-07-29 | End: 2019-08-16 | Stop reason: HOSPADM

## 2019-07-29 RX ORDER — POLYETHYLENE GLYCOL 3350 17 G/17G
17 POWDER, FOR SOLUTION ORAL DAILY
Status: DISCONTINUED | OUTPATIENT
Start: 2019-07-29 | End: 2019-08-01

## 2019-07-29 RX ADMIN — LOSARTAN POTASSIUM 50 MG: 50 TABLET, FILM COATED ORAL at 08:39

## 2019-07-29 RX ADMIN — MONTELUKAST SODIUM 10 MG: 10 TABLET, FILM COATED ORAL at 21:55

## 2019-07-29 RX ADMIN — METOPROLOL SUCCINATE 50 MG: 50 TABLET, EXTENDED RELEASE ORAL at 08:39

## 2019-07-29 RX ADMIN — POLYETHYLENE GLYCOL 3350 17 G: 17 POWDER, FOR SOLUTION ORAL at 16:33

## 2019-07-29 RX ADMIN — CEFTRIAXONE SODIUM 1000 MG: 10 INJECTION, POWDER, FOR SOLUTION INTRAVENOUS at 21:55

## 2019-07-29 RX ADMIN — ENOXAPARIN SODIUM 40 MG: 40 INJECTION SUBCUTANEOUS at 08:39

## 2019-07-29 RX ADMIN — MEMANTINE 10 MG: 10 TABLET ORAL at 16:32

## 2019-07-29 RX ADMIN — AMLODIPINE BESYLATE 5 MG: 5 TABLET ORAL at 08:39

## 2019-07-29 RX ADMIN — ACETAMINOPHEN 650 MG: 325 TABLET ORAL at 12:18

## 2019-07-29 RX ADMIN — PANTOPRAZOLE SODIUM 40 MG: 40 TABLET, DELAYED RELEASE ORAL at 08:39

## 2019-07-29 RX ADMIN — MEMANTINE 10 MG: 10 TABLET ORAL at 08:39

## 2019-07-29 RX ADMIN — LEVOTHYROXINE SODIUM 100 MCG: 100 TABLET ORAL at 05:16

## 2019-07-29 RX ADMIN — ASPIRIN 81 MG 81 MG: 81 TABLET ORAL at 08:39

## 2019-07-29 RX ADMIN — ATORVASTATIN CALCIUM 40 MG: 40 TABLET, FILM COATED ORAL at 16:32

## 2019-07-29 RX ADMIN — FLUTICASONE PROPIONATE 2 SPRAY: 50 SPRAY, METERED NASAL at 08:39

## 2019-07-29 RX ADMIN — CEFEPIME HYDROCHLORIDE 1000 MG: 1 INJECTION, POWDER, FOR SOLUTION INTRAMUSCULAR; INTRAVENOUS at 08:39

## 2019-07-29 RX ADMIN — DONEPEZIL HYDROCHLORIDE 10 MG: 10 TABLET ORAL at 21:55

## 2019-07-29 NOTE — SOCIAL WORK
A post acute care recommendation was made by your care team for STR  Discussed [place] [facility] with patients spouse  List of facilities given to caregiver over the phone  caregiver aware the list is custom filtered for them by preference  and that [facility] post acute providers are designated  Referral sent to [facility] for rehab, patient is a current resident in the [facility]

## 2019-07-29 NOTE — PLAN OF CARE
Problem: Potential for Falls  Goal: Patient will remain free of falls  Description  INTERVENTIONS:  - Assess patient frequently for physical needs  -  Identify cognitive and physical deficits and behaviors that affect risk of falls    -  [place] fall precautions as indicated by assessment   - Educate patient/family on patient safety including physical limitations  - Instruct patient to call for assistance with activity based on assessment  - Modify environment to reduce risk of injury  - Consider OT/PT consult to assist with strengthening/mobility  Outcome: Progressing     Problem: Prexisting or High Potential for Compromised Skin Integrity  Goal: Skin integrity is maintained or improved  Description  INTERVENTIONS:  - Identify patients at risk for skin breakdown  - Assess and monitor skin integrity  - Assess and monitor nutrition and hydration status  - Monitor labs (i e  albumin)  - Assess for incontinence   - Turn and reposition patient  - Assist with mobility/ambulation  - Relieve pressure over bony prominences  - Avoid friction and shearing  - Provide appropriate hygiene as needed including keeping skin clean and dry  - Evaluate need for skin moisturizer/barrier cream  - Collaborate with interdisciplinary team (i e  Nutrition, Rehabilitation, etc )   - Patient/family teaching  Outcome: Progressing     Problem: PAIN - ADULT  Goal: Verbalizes/displays adequate comfort level or baseline comfort level  Description  Interventions:  - Encourage patient to monitor pain and request assistance  - Assess pain using appropriate pain scale  - Administer analgesics based on type and severity of pain and evaluate response  - Implement non-pharmacological measures as appropriate and evaluate response  - Notify physician/advanced practitioner if interventions unsuccessful or patient reports new pain   Outcome: Progressing     Problem: SAFETY ADULT  Goal: Maintain or return to baseline ADL function  Description  INTERVENTIONS:  -  Assess patient's ability to carry out ADLs; assess patient's baseline for ADL function and identify physical deficits which impact ability to perform ADLs (bathing, care of mouth/teeth, toileting, grooming, dressing, etc )  - Assess/evaluate cause of self-care deficits   - Assess range of motion  - Assess patient's mobility; develop plan if impaired  - Assess patient's need for assistive devices and provide as appropriate  - Encourage maximum independence but intervene and supervise when necessary  ¯ Involve family in performance of ADLs  ¯ Assess for home care needs following discharge   ¯ Request OT consult to assist with ADL evaluation and planning for discharge  ¯ Provide patient education as appropriate  Outcome: Progressing  Goal: Maintain or return mobility status to optimal level  Description  INTERVENTIONS:  - Assess patient's baseline mobility status (ambulation, transfers, stairs, etc )    - Identify cognitive and physical deficits and behaviors that affect mobility  - Identify mobility aids required to assist with transfers and/or ambulation (gait belt, sit-to-stand, lift, walker, cane, etc )  - [place] fall precautions as indicated by assessment  - Record patient progress and toleration of activity level on Mobility SBAR; progress patient to next Phase/Stage  - Instruct patient to call for assistance with activity based on assessment  - Request Rehabilitation consult to assist with strengthening/weightbearing, etc   Outcome: Progressing     Problem: DISCHARGE PLANNING  Goal: Discharge to home or other facility with appropriate resources  Description  INTERVENTIONS:  - Identify barriers to discharge w/patient and caregiver  - Arrange for needed discharge resources and transportation as appropriate  - Identify discharge learning needs (meds, wound care, etc )  - Refer to Case Management Department for coordinating discharge planning if the patient needs post-hospital services based on physician/advanced practitioner order or complex needs related to functional status, cognitive ability, or social support system   Outcome: Progressing     Problem: Knowledge Deficit  Goal: Patient/family/caregiver demonstrates understanding of disease process, treatment plan, medications, and discharge instructions  Description  Complete learning assessment and assess knowledge base    Interventions:  - Provide teaching at level of understanding  - Provide teaching via preferred learning methods  Outcome: Progressing     Problem: INFECTION - ADULT  Goal: Absence or prevention of progression during hospitalization  Description  INTERVENTIONS:  - Assess and monitor for signs and symptoms of infection  - Monitor lab/diagnostic results  - Monitor all insertion sites, i e  indwelling lines, tubes, and drains  - [place] appropriate cooling/warming therapies per order  - Administer medications as ordered  - Instruct and encourage patient and family to use good hand hygiene technique  - Identify and instruct in appropriate isolation precautions for identified infection/condition   Outcome: Progressing     Problem: MUSCULOSKELETAL - ADULT  Goal: Maintain or return mobility to safest level of function  Description  INTERVENTIONS:  - Assess patient's ability to carry out ADLs; assess patient's baseline for ADL function and identify physical deficits which impact ability to perform ADLs (bathing, care of mouth/teeth, toileting, grooming, dressing, etc )  - Assess/evaluate cause of self-care deficits   - Assess range of motion  - Assess patient's mobility; develop plan if impaired  - Assess patient's need for assistive devices and provide as appropriate  - Encourage maximum independence but intervene and supervise when necessary  - Involve family in performance of ADLs  - Assess for home care needs following discharge   - Request OT consult to assist with ADL evaluation and planning for discharge  - Provide patient education as appropriate  Outcome: Progressing

## 2019-07-29 NOTE — SPEECH THERAPY NOTE
Speech Language/Pathology  Speech-Language Pathology Bedside Swallow Evaluation        Patient Name: Lefty Arias    IRLSI'R Date: 7/29/2019     Problem List  Patient Active Problem List   Diagnosis    Acquired hypothyroidism    Essential hypertension    CAD (coronary artery disease)    GERD (gastroesophageal reflux disease)    Chest pain    Weakness    Dementia    History of TIA (transient ischemic attack)    Twitching    Abnormal EKG    Gait disturbance    Cataracts, bilateral    Glaucoma    History of appendectomy    History of MI (myocardial infarction)    Macular degeneration of both eyes    Actinic keratosis    Post-nasal drip    Squamous cell carcinoma    Urinary leakage    Pneumonia, unspecified organism    Community acquired pneumonia    Generalized weakness       Past Medical History  Past Medical History:   Diagnosis Date    Disease of thyroid gland     GERD (gastroesophageal reflux disease)     Hyperlipidemia     Hypertension     Insomnia     MI (myocardial infarction) ([address] )        Past Surgical History  Past Surgical History:   Procedure Laterality Date    APPENDECTOMY      COLONOSCOPY      [id number]  LAST ASSESSED       Summary    Pt presents with a functional oropharyngeal swallow from today's evaluation  However, pt does request soft foods and was placed on a dental soft diet yesterday  Pt reported that her meals were enjoyable today and how she likes  When asked, stated she does home some difficulty masticating and hard/solid foods don't always agree wit her  She displayed no overt s/s of aspiration or dysphagia with SLP today        Recommendations:   Diet: soft/level 3 diet and thin liquids   Meds: whole with liquid   Frequent Oral care: 2x/day  Aspiration precautions and compensatory swallowing strategies: upright posture, only feed when fully alert, slow rate of feeding, small bites/sips and alternating bites and sips  Other Recommendations/ considerations: -Dental soft diet per pt request       Current Medical Status  Pt is a 80 y o  female who presented to [facility] with LM/LL lobe pna  Pt c/o increased weakness w/ back pain and coughing  She saw a doctor outpatient, who prescribed her Tessalon Pearls for her cough  Today pt notes increasing weakness, she had trouble ambulating with her walker, which she normally is able to do w/o issues  Additionally, pt complains of pleuritic pain located on L upper back, as well as TTP on left upper back and SOB with exertion  Pt was placed on a dental soft diet per her request  CRNP ordered ST eval to assess swallowing 2/2 pt's request of a softer diet  RN reporting no difficulty with PO meds whole with water or intake  Does have a decreased appetite  Past medical history:   Please see H&P for details    Special Studies:  CXR 7/26/19-Left mid to lower lung consolidation and moderate pleural effusion  Social/Education/Vocational Hx:  Pt lives in assisted living facility    Swallow Information   Current Risks for Dysphagia & Aspiration: pna, SOB  Current Symptoms/Concerns: pt requesting softer foods-some trouble swallowin hard textures  Current Diet: soft/level 3 diet and thin liquids   Baseline Diet: regular diet and thin liquids    Baseline Assessment   Behavior/Cognition: alert  Speech/Language Status: able to participate in conversation and able to follow commands  Patient Positioning: upright in bed     Swallow Mechanism Exam   Facial: symmetrical  Labial: WFL  Lingual: WFL  Velum: unable to visualize  Mandible: adequate ROM  Dentition: adequate  Vocal quality:clear/adequate   Volitional Cough: strong/productive     Consistencies Assessed and Performance   Consistencies Administered: thin liquids, puree and hard solids    Oral Stage: Pt self fed-difficulty seeing straw/coordinating putting it to her mouth  Oral control and transfer were appropriate  No anterior spill   Mastication was appropriate with trials today  Pt reporting she desires softer foods 2/2 mastication and hard/dry foods not always going down easy  Does have dry mouth  Pharyngeal Stage: Timely swallow across consistencies provided  No overt s/s of aspiration  Voice was strong throughout no coughing  Did have some noticeable SOB but pt w/o complaints saying she can breathe fine        Esophageal Concerns: none reported      Results Reviewed with: patient and RN   Dysphagia Goals: none at this time

## 2019-07-29 NOTE — SOCIAL WORK
CM spoke with patient's son and explained CM role, patient is disoriented  Patient is a resident at [facility] on the independent side with aids that come and assist with pill reminders  Patients son states patient may needs assistance with ADL's  Patient does not drive and ambulates with a walker  Pt has hx of [facility] with [facility]  Pt's PCP is Dorothy Kayser, MD  Pt has rx coverage and uses [address] in [facility]  Patients  drives  Primary contact is  Jakob Meza [phone]  CM reviewed d/c planning process including the following: identifying help at home, patient preference for d/c planning needs, Discharge Lounge, [facility] Meds to Bed program, availability of treatment team to discuss questions or concerns patient and/or family may have regarding understanding medications and recognizing signs and symptoms once discharged  CM also encouraged patient to follow up with all recommended appointments after discharge  Patient advised of importance for patient and family to participate in managing patients medical well being

## 2019-07-30 LAB
BASOPHILS # BLD AUTO: 0.03 THOUSANDS/ΜL (ref 0–0.1)
BASOPHILS NFR BLD AUTO: 0 % (ref 0–1)
EOSINOPHIL # BLD AUTO: 0.08 THOUSAND/ΜL (ref 0–0.61)
EOSINOPHIL NFR BLD AUTO: 1 % (ref 0–6)
ERYTHROCYTE [DISTWIDTH] IN BLOOD BY AUTOMATED COUNT: 12.7 % (ref 11.6–15.1)
HCT VFR BLD AUTO: 33.2 % (ref 34.8–46.1)
HGB BLD-MCNC: 10.8 G/DL (ref 11.5–15.4)
IMM GRANULOCYTES # BLD AUTO: 0.08 THOUSAND/UL (ref 0–0.2)
IMM GRANULOCYTES NFR BLD AUTO: 1 % (ref 0–2)
LYMPHOCYTES # BLD AUTO: 1.12 THOUSANDS/ΜL (ref 0.6–4.47)
LYMPHOCYTES NFR BLD AUTO: 7 % (ref 14–44)
MCH RBC QN AUTO: 30.9 PG (ref 26.8–34.3)
MCHC RBC AUTO-ENTMCNC: 32.5 G/DL (ref 31.4–37.4)
MCV RBC AUTO: 95 FL (ref 82–98)
MONOCYTES # BLD AUTO: 1.4 THOUSAND/ΜL (ref 0.17–1.22)
MONOCYTES NFR BLD AUTO: 8 % (ref 4–12)
NEUTROPHILS # BLD AUTO: 14.4 THOUSANDS/ΜL (ref 1.85–7.62)
NEUTS SEG NFR BLD AUTO: 83 % (ref 43–75)
NRBC BLD AUTO-RTO: 0 /100 WBCS
PLATELET # BLD AUTO: 226 THOUSANDS/UL (ref 149–390)
PMV BLD AUTO: 10.4 FL (ref 8.9–12.7)
PROCALCITONIN SERPL-MCNC: 0.47 NG/ML
RBC # BLD AUTO: 3.5 MILLION/UL (ref 3.81–5.12)
WBC # BLD AUTO: 17.11 THOUSAND/UL (ref 4.31–10.16)

## 2019-07-30 PROCEDURE — 85025 COMPLETE CBC W/AUTO DIFF WBC: CPT | Performed by: NURSE PRACTITIONER

## 2019-07-30 PROCEDURE — 97167 OT EVAL HIGH COMPLEX 60 MIN: CPT

## 2019-07-30 PROCEDURE — G8988 SELF CARE GOAL STATUS: HCPCS

## 2019-07-30 PROCEDURE — 99232 SBSQ HOSP IP/OBS MODERATE 35: CPT | Performed by: INTERNAL MEDICINE

## 2019-07-30 PROCEDURE — 84145 PROCALCITONIN (PCT): CPT | Performed by: NURSE PRACTITIONER

## 2019-07-30 PROCEDURE — G8989 SELF CARE D/C STATUS: HCPCS

## 2019-07-30 RX ADMIN — ACETAMINOPHEN 650 MG: 325 TABLET ORAL at 14:22

## 2019-07-30 RX ADMIN — DONEPEZIL HYDROCHLORIDE 10 MG: 10 TABLET ORAL at 21:50

## 2019-07-30 RX ADMIN — CEFTRIAXONE SODIUM 1000 MG: 10 INJECTION, POWDER, FOR SOLUTION INTRAVENOUS at 19:22

## 2019-07-30 RX ADMIN — LEVOTHYROXINE SODIUM 100 MCG: 100 TABLET ORAL at 06:01

## 2019-07-30 RX ADMIN — POLYETHYLENE GLYCOL 3350 17 G: 17 POWDER, FOR SOLUTION ORAL at 08:26

## 2019-07-30 RX ADMIN — ATORVASTATIN CALCIUM 40 MG: 40 TABLET, FILM COATED ORAL at 17:10

## 2019-07-30 RX ADMIN — MEMANTINE 10 MG: 10 TABLET ORAL at 17:10

## 2019-07-30 RX ADMIN — PANTOPRAZOLE SODIUM 40 MG: 40 TABLET, DELAYED RELEASE ORAL at 06:01

## 2019-07-30 RX ADMIN — ENOXAPARIN SODIUM 40 MG: 40 INJECTION SUBCUTANEOUS at 08:26

## 2019-07-30 RX ADMIN — MEMANTINE 10 MG: 10 TABLET ORAL at 08:27

## 2019-07-30 RX ADMIN — ASPIRIN 81 MG 81 MG: 81 TABLET ORAL at 08:28

## 2019-07-30 RX ADMIN — METOPROLOL SUCCINATE 50 MG: 50 TABLET, EXTENDED RELEASE ORAL at 08:27

## 2019-07-30 RX ADMIN — MONTELUKAST SODIUM 10 MG: 10 TABLET, FILM COATED ORAL at 21:50

## 2019-07-30 RX ADMIN — FLUTICASONE PROPIONATE 2 SPRAY: 50 SPRAY, METERED NASAL at 08:28

## 2019-07-30 RX ADMIN — ACETAMINOPHEN 650 MG: 325 TABLET ORAL at 20:35

## 2019-07-30 RX ADMIN — ACETAMINOPHEN 650 MG: 325 TABLET ORAL at 08:34

## 2019-07-30 RX ADMIN — AMLODIPINE BESYLATE 5 MG: 5 TABLET ORAL at 08:26

## 2019-07-30 RX ADMIN — LOSARTAN POTASSIUM 50 MG: 50 TABLET, FILM COATED ORAL at 08:27

## 2019-07-30 NOTE — PLAN OF CARE
Problem: OCCUPATIONAL THERAPY ADULT  Goal: Performs self-care activities at highest level of function for planned discharge setting  See evaluation for individualized goals  Description  Treatment Interventions: ADL retraining, Functional transfer training, UE strengthening/ROM, Endurance training, Patient/family training, Cognitive reorientation, Equipment evaluation/education, Compensatory technique education, Activityengagement, Energy conservation          See flowsheet documentation for full assessment, interventions and recommendations  Note:   Limitation: Decreased ADL status, Decreased UE strength, Decreased Safe judgement during ADL, Decreased cognition, Decreased endurance, Decreased self-care trans  Prognosis: Good, Fair  Assessment: Pt is a 80 y o  female who was admitted to [facility] on 7/26/2019 with Community acquired pneumonia   Pt's problem list also includes PMH of HTN and thyroid disease, GERD, hld, insomnia, MI  At baseline pt was completing adls and mobility independently with occasional assist from spouse and facility staff- required assist with iadls  Pt lives with spouse in [facility]  Currently pt requires mod to max assist for overall ADLS and moderate assist for functional mobility/transfers  Pt currently presents with impairments in the following categories -difficulty performing ADLS, decreased initiation and engagement  and health management  activity tolerance, endurance, standing balance/tolerance, sitting balance/tolerance, UE strength, memory, insight, safety , attention  and sequencing   These impairments, as well as pt's fatigue, pain and risk for falls  limit pt's ability to safely engage in all baseline areas of occupation, includingeating, grooming, bathing, dressing, toileting, functional mobility/transfers, social participation  and leisure activities  From OT standpoint, recommend inpt rehab  upon D/C   OT will continue to follow to address the below stated goals        OT Discharge Recommendation: Short Term Rehab

## 2019-07-30 NOTE — ASSESSMENT & PLAN NOTE
In ER evaluation patient's O2 saturation dropped into low 90s on ambulation, [facility] is not comfortable taking him back at this point because of generalized weakness and mild hypoxia  LL and LM lobe seen on lateral chest x-ray with possible effusion    Patient has leukocytosis, which has peaked now trending down however still high  Procalcitonin on admission negative    With increase to  0 92 and on 7/29 0 53  Pt remains afebrile   Started on ceftriaxone azithromycin,  azithro dc due to negative urine    Patient saturating well on room air, lungs diminished   Pt does have difficulty swallowing per the pt she reports only a comfort for swallowing very soft food such as oatmeal this am  Question possible silent aspiration, would ask speech to eval ?  Check ambulatory pulse ox prior to dc    blood cultures negative after 72 hrs   urine strep and Legionella antigen, negative  azithro discontinued  resp protocol although pt doesn't really have much resp symptoms

## 2019-07-30 NOTE — PROGRESS NOTES
Progress Note - Arlin Bucio 3/23/1931, 80 y o  female MRN: [id number]    Unit/Bed#: -01 Encounter: [id number]    Primary Care Provider: Aditya An MD   Date and time admitted to hospital: 7/26/2019 12:24 PM        * Community acquired pneumonia  Assessment & Plan  In ER evaluation patient's O2 saturation dropped into low 90s on ambulation, [facility] is not comfortable taking him back at this point because of generalized weakness and mild hypoxia  LL and LM lobe seen on lateral chest x-ray with possible effusion  Patient has leukocytosis, which has peaked now trending down however still high  Procalcitonin on admission negative    With increase to  0 92 and on 7/29 0 53  Pt remains afebrile   Started on ceftriaxone azithromycin,  azithro dc due to negative urine    Patient saturating well on room air, lungs diminished   Pt does have difficulty swallowing per the pt she reports only a comfort for swallowing very soft food such as oatmeal this am  Question possible silent aspiration, would ask speech to eval ?  Check ambulatory pulse ox prior to dc    blood cultures negative after 72 hrs   urine strep and Legionella antigen, negative  azithro discontinued  resp protocol although pt doesn't really have much resp symptoms      Squamous cell carcinoma  Assessment & Plan  S/p recent removal of anterior right  Thigh mass   - now with incision intact with staples no drainage, erythema noted   - pt with increased wbc count more likely due to suspected pna  - should continue follow up with her plastic surgeon and dermatologist    - no hx noted in epic at this time except pcp note           Acquired hypothyroidism  Assessment & Plan  Continue levothyroxine  Metabolic encephalopathy  Assessment & Plan  In setting of acute infection/pneumonia as evidenced by improving mentation  Although there is some dementia and pt can be labile with mental status     Pt with improving wbc count     Generalized weakness  Assessment & Plan  Along with ambulatory dysfunction  Most likely secondary to pneumonia, however  reports that pts appetite has been decreasing and she has lost weight  Pt states she cant really eat most food but does ok with softer foods   Diet was changed to accommodate this   Added chocolate ensure tid 7/28  Son was at bedside Sunday with fruit which I informed him pt having difficulty swallowing hard fruit   PT OT evaluation, recommending rehab     Dementia  Assessment & Plan  Continue Namenda and Aricept  Please update  (sometimes difficulty to reach as well as son)       Essential hypertension  Assessment & Plan  Well controlled  Continue home meds  Slow transit constipation  Assessment & Plan  Pt with no bm as of yet   Given one time dose of lactulose and started on miralax       VTE Pharmacologic Prophylaxis:   Pharmacologic: Enoxaparin (Lovenox)  Mechanical VTE Prophylaxis in Place: Yes    Patient Centered Rounds: I have performed bedside rounds with nursing staff today  Discussions with Specialists or Other Care Team Provider: nursing     Education and Discussions with Family / Patient: patient   Called to speak with  (busy) called to speak with son and phone rang to answering  Machine   Yesterday left message on answering machine  Per son yesterday "brother never answers his phone"  Time Spent for Care: 45 minutes  More than 50% of total time spent on counseling and coordination of care as described above  Current Length of Stay: 2 day(s)    Current Patient Status: Inpatient   Certification Statement: The patient will continue to require additional inpatient hospital stay due to ongoing iv abx and elevated wbc count     Discharge Plan: to rehab when medically stable     Code Status: Level 1 - Full Code      Subjective:   Pt reports that she enjoyed her oatmeal today that was really good as she cant eat hard food   Diet transitioned yesterday to accommodate pts preference  Pt reports rare cough  She states that she feels really weak has no real  Discomfort just wants to go home  Objective:     Vitals:   Temp (24hrs), Av °F (37 2 °C), Min:98 1 °F (36 7 °C), Max:100 2 °F (37 9 °C)    Temp:  [98 1 °F (36 7 °C)-100 2 °F (37 9 °C)] 100 2 °F (37 9 °C)  HR:  [70-78] 78  Resp:  [17-20] 18  BP: (120-150)/(55-71) 120/55  SpO2:  [94 %-96 %] 96 %  Body mass index is 18 93 kg/m²  Input and Output Summary (last 24 hours): Intake/Output Summary (Last 24 hours) at 2019  Last data filed at 2019 1700  Gross per 24 hour   Intake 1530 ml   Output 981 ml   Net 549 ml       Physical Exam:     Physical Exam   Constitutional: No distress  Pt a little cachectic    HENT:   Head: Normocephalic and atraumatic  Eyes: Conjunctivae are normal  Right eye exhibits no discharge  Left eye exhibits no discharge  Neck: No tracheal deviation present  No thyromegaly present  Cardiovascular: Normal rate  Exam reveals no gallop and no friction rub  Murmur heard  Pulmonary/Chest: No stridor  No respiratory distress  She has no wheezes  She has no rales  She exhibits no tenderness  Poor effort very diminished    Abdominal: Soft  She exhibits no distension and no mass  There is no tenderness  There is no guarding  Musculoskeletal: She exhibits deformity (bl lower extremity muscle atrophy)  Lymphadenopathy:     She has no cervical adenopathy  Neurological: She is alert  2 -3 forgetful    Skin: No rash noted  She is not diaphoretic  No erythema  No pallor           Additional Data:     Labs:    Results from last 7 days   Lab Units 19  0551   WBC Thousand/uL 19 31*   HEMOGLOBIN g/dL 11 2*   HEMATOCRIT % 34 9   PLATELETS Thousands/uL 210   NEUTROS PCT % 86*   LYMPHS PCT % 6*   MONOS PCT % 7   EOS PCT % 0     Results from last 7 days   Lab Units 19  0551   SODIUM mmol/L 134*   POTASSIUM mmol/L 3 6   CHLORIDE mmol/L 104   CO2 mmol/L 20*   BUN mg/dL 12 CREATININE mg/dL 0 68   ANION GAP mmol/L 10   CALCIUM mg/dL 8 1*   GLUCOSE RANDOM mg/dL 87                 Results from last 7 days   Lab Units 07/29/19  0551 07/27/19  0914 07/26/19  1358   PROCALCITONIN ng/ml 0 53* 0 92* 0 19           * I Have Reviewed All Lab Data Listed Above  * Additional Pertinent Lab Tests Reviewed: All Labs Within Last 24 Hours Reviewed    Imaging:    Imaging Reports Reviewed Today Include: reviewed     Recent Cultures (last 7 days):     Results from last 7 days   Lab Units 07/26/19  1538 07/26/19  1358   BLOOD CULTURE   --  No Growth at 72 hrs  No Growth at 72 hrs     LEGIONELLA URINARY ANTIGEN  Negative  --        Last 24 Hours Medication List:     Current Facility-Administered Medications:  acetaminophen 650 mg Oral Q6H PRN LEE Frazier    amLODIPine 5 mg Oral Daily Emily Brown, MD    aspirin 81 mg Oral Daily Emily Brown, MD    atorvastatin 40 mg Oral QPM Emily Brown, MD    benzonatate 100 mg Oral TID PRN Emily Brown, MD    bisacodyl 10 mg Rectal Daily PRN LEE Frazier    cefTRIAXone 1,000 mg Intravenous Q24H LEE Frazier Last Rate: 1,000 mg (07/29/19 2155)   donepezil 10 mg Oral HS Emily Brown, MD    enoxaparin 40 mg Subcutaneous Daily Emily Brown, MD    fluticasone 2 spray Nasal Daily Emily Brown, MD    lactulose 30 g Oral Once LEE Frazier    levothyroxine 100 mcg Oral Early Morning Emily Brown, MD    losartan 50 mg Oral Daily Emily Brown, MD    memantine 10 mg Oral BID Emily Brown, MD    metoprolol succinate 50 mg Oral Daily Emily Brown, MD    montelukast 10 mg Oral HS Emily Brown, MD    pantoprazole 40 mg Oral Daily Before Breakfast Emily Brown, MD    polyethylene glycol 17 g Oral Daily LEE Frazier    zolpidem 5 mg Oral HS PRN Emily Brown, MD         Today, Patient Was Seen By: LEE Frazier    ** Please Note: Dictation voice to text software may have been used in the creation of this document   **

## 2019-07-30 NOTE — ASSESSMENT & PLAN NOTE
Along with ambulatory dysfunction  Most likely secondary to pneumonia, however  reports that pts appetite has been decreasing and she has lost weight  Pt states she cant really eat most food but does ok with softer foods     Diet was changed to accommodate this   Added chocolate ensure tid 7/28    PT OT evaluation, recommending rehab

## 2019-07-30 NOTE — ASSESSMENT & PLAN NOTE
In ER evaluation patient's O2 saturation dropped into low 90s on ambulation, [facility] is not comfortable taking him back at this point because of generalized weakness and mild hypoxia  LL and LM lobe seen on lateral chest x-ray with possible effusion    Patient has leukocytosis, which has peaked now trending down however still high  Procalcitonin on admission negative    With increase to  0 92 and on 7/29 0 53  Pt remains afebrile   Started on ceftriaxone azithromycin,  azithro dc due to negative urine    Patient saturating well on room air, lungs diminished   Pt does have difficulty swallowing per the pt she reports only a comfort for swallowing very soft food such as oatmeal this am  Question possible silent aspiration, would ask speech to eval ?  Check ambulatory pulse ox prior to dc    blood cultures negative after 72 hrs   urine strep and Legionella antigen, negative  azithro discontinued  resp protocol although pt doesn't really have much resp symptoms

## 2019-07-30 NOTE — ASSESSMENT & PLAN NOTE
Along with ambulatory dysfunction  Most likely secondary to pneumonia, however  reports that pts appetite has been decreasing and she has lost weight  Pt states she cant really eat most food but does ok with softer foods     Diet was changed to accommodate this   Added chocolate ensure tid 7/28  Son was at bedside Sunday with fruit which I informed him pt having difficulty swallowing hard fruit   PT OT evaluation, recommending rehab

## 2019-07-30 NOTE — RESTORATIVE TECHNICIAN NOTE
Restorative Specialist Mobility Note       Activity: Ambulate in room, Bathroom privileges, Chair, Dangle, Stand at bedside(Educated/encouraged pt to ambulate with assistance 3-4 x's/day  Bed alarm on   Pt callbell, phone/tray within reach )     Assistive Device: Front wheel walker       Elmo LAKHANI, Restorative Technician, [employer] [facility]

## 2019-07-30 NOTE — ASSESSMENT & PLAN NOTE
In setting of acute infection/pneumonia as evidenced by improving mentation  Although there is some dementia and pt can be labile with mental status     Pt with improving wbc count

## 2019-07-30 NOTE — PLAN OF CARE
Problem: Potential for Falls  Goal: Patient will remain free of falls  Description  INTERVENTIONS:  - Assess patient frequently for physical needs  -  Identify cognitive and physical deficits and behaviors that affect risk of falls    -  [place] fall precautions as indicated by assessment   - Educate patient/family on patient safety including physical limitations  - Instruct patient to call for assistance with activity based on assessment  - Modify environment to reduce risk of injury  - Consider OT/PT consult to assist with strengthening/mobility  Outcome: Progressing     Problem: Prexisting or High Potential for Compromised Skin Integrity  Goal: Skin integrity is maintained or improved  Description  INTERVENTIONS:  - Identify patients at risk for skin breakdown  - Assess and monitor skin integrity  - Assess and monitor nutrition and hydration status  - Monitor labs (i e  albumin)  - Assess for incontinence   - Turn and reposition patient  - Assist with mobility/ambulation  - Relieve pressure over bony prominences  - Avoid friction and shearing  - Provide appropriate hygiene as needed including keeping skin clean and dry  - Evaluate need for skin moisturizer/barrier cream  - Collaborate with interdisciplinary team (i e  Nutrition, Rehabilitation, etc )   - Patient/family teaching  Outcome: Progressing     Problem: PAIN - ADULT  Goal: Verbalizes/displays adequate comfort level or baseline comfort level  Description  Interventions:  - Encourage patient to monitor pain and request assistance  - Assess pain using appropriate pain scale  - Administer analgesics based on type and severity of pain and evaluate response  - Implement non-pharmacological measures as appropriate and evaluate response  - Notify physician/advanced practitioner if interventions unsuccessful or patient reports new pain   Outcome: Progressing     Problem: SAFETY ADULT  Goal: Maintain or return to baseline ADL function  Description  INTERVENTIONS:  -  Assess patient's ability to carry out ADLs; assess patient's baseline for ADL function and identify physical deficits which impact ability to perform ADLs (bathing, care of mouth/teeth, toileting, grooming, dressing, etc )  - Assess/evaluate cause of self-care deficits   - Assess range of motion  - Assess patient's mobility; develop plan if impaired  - Assess patient's need for assistive devices and provide as appropriate  - Encourage maximum independence but intervene and supervise when necessary  ¯ Involve family in performance of ADLs  ¯ Assess for home care needs following discharge   ¯ Request OT consult to assist with ADL evaluation and planning for discharge  ¯ Provide patient education as appropriate  Outcome: Progressing  Goal: Maintain or return mobility status to optimal level  Description  INTERVENTIONS:  - Assess patient's baseline mobility status (ambulation, transfers, stairs, etc )    - Identify cognitive and physical deficits and behaviors that affect mobility  - Identify mobility aids required to assist with transfers and/or ambulation (gait belt, sit-to-stand, lift, walker, cane, etc )  - [place] fall precautions as indicated by assessment  - Record patient progress and toleration of activity level on Mobility SBAR; progress patient to next Phase/Stage  - Instruct patient to call for assistance with activity based on assessment  - Request Rehabilitation consult to assist with strengthening/weightbearing, etc   Outcome: Progressing     Problem: DISCHARGE PLANNING  Goal: Discharge to home or other facility with appropriate resources  Description  INTERVENTIONS:  - Identify barriers to discharge w/patient and caregiver  - Arrange for needed discharge resources and transportation as appropriate  - Identify discharge learning needs (meds, wound care, etc )  - Refer to Case Management Department for coordinating discharge planning if the patient needs post-hospital services based on physician/advanced practitioner order or complex needs related to functional status, cognitive ability, or social support system   Outcome: Progressing     Problem: Knowledge Deficit  Goal: Patient/family/caregiver demonstrates understanding of disease process, treatment plan, medications, and discharge instructions  Description  Complete learning assessment and assess knowledge base    Interventions:  - Provide teaching at level of understanding  - Provide teaching via preferred learning methods  Outcome: Progressing     Problem: INFECTION - ADULT  Goal: Absence or prevention of progression during hospitalization  Description  INTERVENTIONS:  - Assess and monitor for signs and symptoms of infection  - Monitor lab/diagnostic results  - Monitor all insertion sites, i e  indwelling lines, tubes, and drains  - [place] appropriate cooling/warming therapies per order  - Administer medications as ordered  - Instruct and encourage patient and family to use good hand hygiene technique  - Identify and instruct in appropriate isolation precautions for identified infection/condition   Outcome: Progressing     Problem: MUSCULOSKELETAL - ADULT  Goal: Maintain or return mobility to safest level of function  Description  INTERVENTIONS:  - Assess patient's ability to carry out ADLs; assess patient's baseline for ADL function and identify physical deficits which impact ability to perform ADLs (bathing, care of mouth/teeth, toileting, grooming, dressing, etc )  - Assess/evaluate cause of self-care deficits   - Assess range of motion  - Assess patient's mobility; develop plan if impaired  - Assess patient's need for assistive devices and provide as appropriate  - Encourage maximum independence but intervene and supervise when necessary  - Involve family in performance of ADLs  - Assess for home care needs following discharge   - Request OT consult to assist with ADL evaluation and planning for discharge  - Provide patient education as appropriate  Outcome: Progressing

## 2019-07-30 NOTE — OCCUPATIONAL THERAPY NOTE
[address] Evaluation     Patient Name: Arlin Bucio  RFETV'L Date: 7/30/2019  Problem List  Patient Active Problem List   Diagnosis    Acquired hypothyroidism    Essential hypertension    CAD (coronary artery disease)    GERD (gastroesophageal reflux disease)    Chest pain    Weakness    Dementia    History of TIA (transient ischemic attack)    Twitching    Abnormal EKG    Gait disturbance    Cataracts, bilateral    Glaucoma    History of appendectomy    History of MI (myocardial infarction)    Macular degeneration of both eyes    Actinic keratosis    Post-nasal drip    Squamous cell carcinoma    Urinary leakage    Pneumonia, unspecified organism    Community acquired pneumonia    Generalized weakness    Metabolic encephalopathy    Slow transit constipation     Past Medical History  Past Medical History:   Diagnosis Date    Disease of thyroid gland     GERD (gastroesophageal reflux disease)     Hyperlipidemia     Hypertension     Insomnia     MI (myocardial infarction) ([facility] [address] )      Past Surgical History  Past Surgical History:   Procedure Laterality Date    APPENDECTOMY      COLONOSCOPY      [id number]  LAST ASSESSED      07/30/19 1615   Note Type   Note type Eval/Treat   Restrictions/Precautions   Weight Bearing Precautions Per Order No   Other Precautions Cognitive; Chair Alarm; Bed Alarm; Fall Risk;Pain   Pain Assessment   Pain Assessment 0-10   Pain Score 7   Pain Type Acute pain   Pain Location Back   Hospital Pain Intervention(s) Repositioned; Ambulation/increased activity; Emotional support   Home Living   Type of Home Apartment   Home Layout One level   Bathroom Shower/Tub Walk-in shower   Bathroom Toilet Raised   Bathroom Equipment Grab bars in shower; Shower chair   Home Equipment Walker   Additional Comments Lives with spouse in apt at [facility] living -   Prior Function   Level of [place] Independent with ADLs and functional mobility; Needs assistance with IADLs Lives With Spouse; Facility staff   Receives Help From Family;Personal care attendant   ADL Assistance Independent   IADLs Needs assistance   Falls in the last 6 months 0   Vocational Retired   Lifestyle   Autonomy pt reports she is independent with basic adls and mobility with rw- spouse and facility staff assist prn - meals/homemaking provided    Reciprocal Relationships supportive family    Service to Others retired   Intrinsic Gratification  mostly sedentary    Subjective   Subjective "i need to get moving, i haven't done much here and i'm getting weak"   ADL   Eating Assistance 5  Supervision/Setup   Grooming Assistance 4  Minimal Assistance   UB Bathing Assistance 3  Moderate Assistance   LB [address] 2  Maximal Assistance   [address] 3  Moderate Assistance   LB Dressing Assistance 2  Maximal [address]  2  Maximal Assistance   Bed Mobility   Supine to Sit 3  Moderate assistance   Transfers   Sit to Stand 3  Moderate assistance   Stand to Sit 3  Moderate assistance   Stand pivot 3  Moderate assistance   Toilet transfer 3  Moderate assistance   Additional items Standard toilet   Functional Mobility   Functional Mobility 3  Moderate assistance   Additional items Rolling walker   Balance   Static Sitting Fair +   Dynamic Sitting Fair   Static Standing Fair -   Dynamic Standing Poor   Ambulatory Poor   Activity Tolerance   Activity Tolerance Patient limited by fatigue;Treatment limited secondary to medical complications (Comment); Patient limited by pain   RUE Assessment   RUE Assessment WFL   LUE Assessment   LUE Assessment WFL   Cognition   Overall Cognitive Status Impaired   Arousal/Participation Arousable; Cooperative   Attention Attends with cues to redirect   Orientation Level Oriented to person;Oriented to place;Oriented to time;Oriented to situation  (general time)   Memory Decreased short term memory;Decreased recall of recent events;Decreased recall of precautions   Following Commands Follows one step commands with increased time or repetition   Assessment   Limitation Decreased ADL status; Decreased UE strength;Decreased Safe judgement during ADL;Decreased cognition;Decreased endurance;Decreased self-care trans   Prognosis Good;Fair   Assessment Pt is a 80 y o  female who was [facility] to [facility] on 7/26/2019 with Community acquired pneumonia   Pt's problem list also includes PMH of HTN and thyroid disease, GERD, hld, insomnia, MI  At baseline pt was completing adls and mobility independently with occasional assist from spouse and facility staff- required assist with iadls  Pt lives with spouse in [facility]  Currently pt requires mod to max assist for overall ADLS and moderate assist for functional mobility/transfers  Pt currently presents with impairments in the following categories -difficulty performing ADLS, decreased initiation and engagement  and health management  activity tolerance, endurance, standing balance/tolerance, sitting balance/tolerance, UE strength, memory, insight, safety , attention  and sequencing   These impairments, as well as pt's fatigue, pain and risk for falls  limit pt's ability to safely engage in all baseline areas of occupation, includingeating, grooming, bathing, dressing, toileting, functional mobility/transfers, social participation  and leisure activities  From OT standpoint, recommend inpt rehab  upon D/C  OT will continue to follow to address the below stated goals  Goals   Patient Goals get stronger    LTG Time Frame 10-14   Long Term Goal #1 refer to established goals below   Plan   Treatment Interventions ADL retraining;Functional transfer training;UE strengthening/ROM; Endurance training;Patient/family training;Cognitive reorientation;Equipment evaluation/education; Compensatory technique education; Activityengagement; Energy conservation   Goal Expiration Date 08/13/19   OT Frequency 2-3x/wk Recommendation   OT Discharge Recommendation Short Term Rehab   Barthel Index   Feeding 5   Bathing 0   Grooming Score 0   Dressing Score 5   Bladder Score 5   Bowels Score 10   Toilet Use Score 5   Transfers (Bed/Chair) Score 5   Mobility (Level Surface) Score 0   Stairs Score 0   Barthel Index Score 35         OCCUPATIONAL THERAPY GOALS:    *SBA feeding/grooming after setup   *Min a adls after setup with use of AE PRN  *Min a toileting and clothing management   *Min a  functional mobility and transfers to/from all surfaces with fair to fair+ dynamic balance and safety for participation in dynamic adls and iadl tasks   *Demonstrate fair+ carryover with safe use of RW during functional tasks   *Assess DME needs   *Increase activity tolerance to 30-35 minutes for participation in adls and enjoyable activities  *Assist with safe d/c recommendations     Alcides Macias, OT

## 2019-07-30 NOTE — PROGRESS NOTES
Progress Note - Ginette Perez 3/23/1931, 80 y o  female MRN: [id number]    Unit/Bed#: -[facility] Encounter: [id number]    Primary Care Provider: Amber Srinivasan MD   Date and time admitted to hospital: 7/26/2019 12:24 PM        * Community acquired pneumonia  Assessment & Plan  In ER evaluation patient's O2 saturation dropped into low 90s on ambulation, [place] is not comfortable taking him back at this point because of generalized weakness and mild hypoxia  LL and LM lobe seen on lateral chest x-ray with possible effusion  Patient has leukocytosis, which has peaked now trending down however still high  Procalcitonin on admission negative    With increase to  0 92 and on 7/29 0 53  Pt remains afebrile   Started on ceftriaxone azithromycin,  azithro dc due to negative urine    Patient saturating well on room air, lungs diminished   Pt does have difficulty swallowing per the pt she reports only a comfort for swallowing very soft food such as oatmeal this am  Question possible silent aspiration, would ask speech to eval ?  Check ambulatory pulse ox prior to dc    blood cultures negative after 72 hrs   urine strep and Legionella antigen, negative  azithro discontinued  resp protocol although pt doesn't really have much resp symptoms      Slow transit constipation  Assessment & Plan  Pt with no bm as of yet   Given one time dose of lactulose and started on miralax     Metabolic encephalopathy  Assessment & Plan  In setting of acute infection/pneumonia as evidenced by improving mentation  Although there is some dementia and pt can be labile with mental status  Pt with improving wbc count     Generalized weakness  Assessment & Plan  Along with ambulatory dysfunction  Most likely secondary to pneumonia, however  reports that pts appetite has been decreasing and she has lost weight  Pt states she cant really eat most food but does ok with softer foods     Diet was changed to accommodate this   Added chocolate ensure tid     PT OT evaluation, recommending rehab     Squamous cell carcinoma  Assessment & Plan  S/p recent removal of anterior right  Thigh mass   - now with incision intact with staples no drainage, erythema noted   - pt with increased wbc count more likely due to suspected pna  - should continue follow up with her plastic surgeon and dermatologist    - no hx noted in epic at this time except pcp note           Dementia  Assessment & Plan  Continue Namenda and Aricept  Please update  (sometimes difficulty to reach as well as son)       Essential hypertension  Assessment & Plan  Well controlled  Continue home meds  Acquired hypothyroidism  Assessment & Plan  Continue levothyroxine  VTE Pharmacologic Prophylaxis:   Pharmacologic: Enoxaparin (Lovenox)  Mechanical VTE Prophylaxis in Place: No    Patient Centered Rounds: I have performed bedside rounds with nursing staff today  Time Spent for Care: 15 minutes  More than 50% of total time spent on counseling and coordination of care as described above  Current Length of Stay: 3 day(s)    Current Patient Status: Inpatient   Certification Statement: The patient will continue to require additional inpatient hospital stay due to need to monitor symptoms    Discharge Plan: monitor wbc    Code Status: Level 1 - Full Code      Subjective:   nad    Objective:     Vitals:   Temp (24hrs), Av 9 °F (37 2 °C), Min:97 °F (36 1 °C), Max:100 2 °F (37 9 °C)    Temp:  [97 °F (36 1 °C)-100 2 °F (37 9 °C)] 99 5 °F (37 5 °C)  HR:  [65-78] 69  Resp:  [16-19] 16  BP: (116-121)/(55-56) 121/56  SpO2:  [93 %-96 %] 93 %  Body mass index is 18 93 kg/m²  Input and Output Summary (last 24 hours):        Intake/Output Summary (Last 24 hours) at 2019 1106  Last data filed at 2019 0810  Gross per 24 hour   Intake 630 ml   Output 395 ml   Net 235 ml       Physical Exam:     Physical Exam   Constitutional: She is oriented to person, place, and time    HENT:   Head: Normocephalic and atraumatic  Eyes: Pupils are equal, round, and reactive to light  EOM are normal    Cardiovascular: Normal rate and regular rhythm  No murmur heard  Pulmonary/Chest: Effort normal and breath sounds normal  No respiratory distress  Abdominal: She exhibits no distension  There is no tenderness  Musculoskeletal: She exhibits no edema  Neurological: She is alert and oriented to person, place, and time  Skin: Skin is warm and dry  Additional Data:     Labs:    Results from last 7 days   Lab Units 07/30/19  0443   WBC Thousand/uL 17 11*   HEMOGLOBIN g/dL 10 8*   HEMATOCRIT % 33 2*   PLATELETS Thousands/uL 226   NEUTROS PCT % 83*   LYMPHS PCT % 7*   MONOS PCT % 8   EOS PCT % 1     Results from last 7 days   Lab Units 07/29/19  0551   POTASSIUM mmol/L 3 6   CHLORIDE mmol/L 104   CO2 mmol/L 20*   BUN mg/dL 12   CREATININE mg/dL 0 68   CALCIUM mg/dL 8 1*           * I Have Reviewed All Lab Data Listed Above  * Additional Pertinent Lab Tests Reviewed: All Labs Within Last 24 Hours Reviewed        Recent Cultures (last 7 days):     Results from last 7 days   Lab Units 07/26/19  1538 07/26/19  1358   BLOOD CULTURE   --  No Growth at 72 hrs  No Growth at 72 hrs     LEGIONELLA URINARY ANTIGEN  Negative  --        Last 24 Hours Medication List:     Current Facility-Administered Medications:  acetaminophen 650 mg Oral Q6H PRN LEE Haley    amLODIPine 5 mg Oral Daily Bowen Quiroga MD    aspirin 81 mg Oral Daily Bowen Quiroga MD    atorvastatin 40 mg Oral QPM Bowen Quiroga MD    benzonatate 100 mg Oral TID PRN Bowen Quiroga MD    bisacodyl 10 mg Rectal Daily PRN LEE Haley    cefTRIAXone 1,000 mg Intravenous Q24H LEE Haley Last Rate: 1,000 mg (07/29/19 5149)   donepezil 10 mg Oral HS Bowen Quiroga MD    enoxaparin 40 mg Subcutaneous Daily Bowen Quiroga MD    fluticasone 2 spray Nasal Daily Bowen Quiroga MD    lactulose 30 g Oral Once LEE Lozada    levothyroxine 100 mcg Oral Early Morning Ramiro Murdock MD    losartan 50 mg Oral Daily Ramiro Murdock MD    memantine 10 mg Oral BID Ramiro Murdock MD    metoprolol succinate 50 mg Oral Daily Ramiro Murdock MD    montelukast 10 mg Oral HS Ramiro Murdock MD    pantoprazole 40 mg Oral Daily Before Breakfast Ramiro Murdock MD    polyethylene glycol 17 g Oral Daily LEE Lozada    zolpidem 5 mg Oral HS PRN Ramiro Murdock MD         Today, Patient Was Seen By: Beatrice Barrera DO    ** Please Note: Dictation voice to text software may have been used in the creation of this document   **

## 2019-07-30 NOTE — ASSESSMENT & PLAN NOTE
S/p recent removal of anterior right  Thigh mass   - now with incision intact with staples no drainage, erythema noted   - pt with increased wbc count more likely due to suspected pna  - should continue follow up with her plastic surgeon and dermatologist    - no hx noted in epic at this time except pcp note

## 2019-07-31 LAB
BACTERIA BLD CULT: NORMAL
BACTERIA BLD CULT: NORMAL

## 2019-07-31 PROCEDURE — 99232 SBSQ HOSP IP/OBS MODERATE 35: CPT | Performed by: INTERNAL MEDICINE

## 2019-07-31 PROCEDURE — 97530 THERAPEUTIC ACTIVITIES: CPT

## 2019-07-31 PROCEDURE — 97116 GAIT TRAINING THERAPY: CPT

## 2019-07-31 RX ADMIN — ACETAMINOPHEN 650 MG: 325 TABLET ORAL at 21:20

## 2019-07-31 RX ADMIN — MEMANTINE 10 MG: 10 TABLET ORAL at 08:06

## 2019-07-31 RX ADMIN — ENOXAPARIN SODIUM 40 MG: 40 INJECTION SUBCUTANEOUS at 08:05

## 2019-07-31 RX ADMIN — AMLODIPINE BESYLATE 5 MG: 5 TABLET ORAL at 08:06

## 2019-07-31 RX ADMIN — ASPIRIN 81 MG 81 MG: 81 TABLET ORAL at 08:06

## 2019-07-31 RX ADMIN — BENZONATATE 100 MG: 100 CAPSULE ORAL at 10:12

## 2019-07-31 RX ADMIN — ATORVASTATIN CALCIUM 40 MG: 40 TABLET, FILM COATED ORAL at 16:41

## 2019-07-31 RX ADMIN — METOPROLOL SUCCINATE 50 MG: 50 TABLET, EXTENDED RELEASE ORAL at 08:06

## 2019-07-31 RX ADMIN — BENZONATATE 100 MG: 100 CAPSULE ORAL at 21:19

## 2019-07-31 RX ADMIN — LEVOTHYROXINE SODIUM 100 MCG: 100 TABLET ORAL at 05:28

## 2019-07-31 RX ADMIN — MEMANTINE 10 MG: 10 TABLET ORAL at 16:40

## 2019-07-31 RX ADMIN — MONTELUKAST SODIUM 10 MG: 10 TABLET, FILM COATED ORAL at 21:19

## 2019-07-31 RX ADMIN — CEFTRIAXONE SODIUM 1000 MG: 10 INJECTION, POWDER, FOR SOLUTION INTRAVENOUS at 20:14

## 2019-07-31 RX ADMIN — ACETAMINOPHEN 650 MG: 325 TABLET ORAL at 10:12

## 2019-07-31 RX ADMIN — POLYETHYLENE GLYCOL 3350 17 G: 17 POWDER, FOR SOLUTION ORAL at 08:06

## 2019-07-31 RX ADMIN — DONEPEZIL HYDROCHLORIDE 10 MG: 10 TABLET ORAL at 21:20

## 2019-07-31 RX ADMIN — PANTOPRAZOLE SODIUM 40 MG: 40 TABLET, DELAYED RELEASE ORAL at 05:28

## 2019-07-31 RX ADMIN — LOSARTAN POTASSIUM 50 MG: 50 TABLET, FILM COATED ORAL at 08:06

## 2019-07-31 RX ADMIN — ACETAMINOPHEN 650 MG: 325 TABLET ORAL at 15:03

## 2019-07-31 NOTE — PHYSICAL THERAPY NOTE
PHYSICAL THERAPY NOTE          Patient Name: Jannet Oscar  NDKEJ'N Date: 7/31/2019 07/31/19 1351   Pain Assessment   Pain Assessment No/denies pain   Pain Score No Pain   Pain Location Back   Hospital Pain Intervention(s) Repositioned; Ambulation/increased activity  (pain increased to 8/10 with activity)   Restrictions/Precautions   Weight Bearing Precautions Per Order No   Other Precautions Cognitive; Chair Alarm;Multiple lines; Fall Risk;Pain   General   Chart Reviewed Yes   Family/Caregiver Present Yes  (pt )   Cognition   Overall Cognitive Status Impaired   Arousal/Participation Arousable; Cooperative   Attention Attends with cues to redirect   Orientation Level Oriented to person;Oriented to place; Disoriented to time;Disoriented to situation   Following Commands Follows one step commands with increased time or repetition   Subjective   Subjective "I would love to get up"   Bed Mobility   Additional Comments Not assessed, pt received sitting in bedside chair, pt left sitting in bedside chair at termination of session, all needs within reach and chair alarm intact   Transfers   Sit to Stand 3  Moderate assistance   Additional items Assist x 1; Increased time required;Verbal cues   Stand to Sit 3  Moderate assistance   Additional items Assist x 1; Increased time required;Verbal cues   Ambulation/Elevation   Gait pattern Improper Weight shift; Forward Flexion;Decreased foot clearance; Short stride; Step to;Excessively slow   Gait Assistance 3  Moderate assist   Additional items Assist x 1;Verbal cues; Tactile cues   Assistive Device Rolling walker   Distance 12' x 1   Balance   Static Sitting Fair   Dynamic Sitting Fair -   Static Standing Poor +  (RW)   Dynamic Standing Poor  (RW)   Ambulatory Poor  (RW)   Endurance Deficit   Endurance Deficit Yes   Endurance Deficit Description weakness, fatigue   Activity Tolerance   Activity Tolerance Patient limited by fatigue;Patient limited by pain   Nurse Made Aware yes   Assessment   Prognosis Fair   Problem List Decreased strength;Decreased endurance; Impaired balance;Decreased mobility; Decreased coordination;Decreased cognition; Impaired judgement;Decreased safety awareness;Pain   Assessment Pt participated in therapy session focusing on functional mobility tasks  Pt performed sit to stand transfer with mod A x 1 and verbal cues for hand placement and safety  Pt ambulated 12' x 1 with mod A x 1 and RW and verbal cues for hand placement and safety  Pt displays decreased step and stride length, decreased gait speed, decreased foot clearance  Provided pt and pt  education regarding safe functional mobility strategies, goal of therapy session, energy conservation techniques  Pt requires frequent verbal cues for safe hand placement throughout session and cues for appropriate posture  Skilled physical therapy is indicated to address listed functional deficits focusing on strength, endurance and functional mobility  Goals   Patient Goals get better   [facility] 08/07/19   Treatment Day 1   Plan   Treatment/Interventions Functional transfer training;LE strengthening/ROM; Therapeutic exercise; Endurance training;Cognitive reorientation;Patient/family training;Equipment eval/education; Bed mobility;Gait training;Spoke to nursing;Family   Progress Slow progress, decreased activity tolerance   PT Frequency Other (Comment)  (3-5x/wk)   Recommendation   Recommendation Post acute IP rehab   Equipment Recommended Walker  (RW)   PT - OK to Discharge Yes   Additional Comments to rehab when medically stable       Abdulaziz Cheek PT, DPT

## 2019-07-31 NOTE — ASSESSMENT & PLAN NOTE
In ER evaluation patient's O2 saturation dropped into low 90s on ambulation, [facility] is not comfortable taking him back at this point because of generalized weakness and mild hypoxia  LL and LM lobe seen on lateral chest x-ray with possible effusion    Patient has leukocytosis, which has peaked now trending down however still high  Procalcitonin on admission negative    With increase to  0 92 and on 7/29 0 53  Pt remains afebrile   Started on ceftriaxone azithromycin,  azithro dc due to negative urine    Patient saturating well on room air, lungs diminished   Pt does have difficulty swallowing per the pt she reports only a comfort for swallowing very soft food such as oatmeal this am  Question possible silent aspiration, would ask speech to eval ?  Check ambulatory pulse ox prior to dc    blood cultures negative after 72 hrs   urine strep and Legionella antigen, negative  azithro discontinued  resp protocol although pt doesn't really have much resp symptoms

## 2019-07-31 NOTE — PROGRESS NOTES
Progress Note - Eual Dies 3/23/1931, 80 y o  female MRN: [id number]    Unit/Bed#: -01 Encounter: [id number]    Primary Care Provider: Westley Bal MD   Date and time admitted to hospital: 7/26/2019 12:24 PM        * Community acquired pneumonia  Assessment & Plan  In ER evaluation patient's O2 saturation dropped into low 90s on ambulation, [facility] is not comfortable taking him back at this point because of generalized weakness and mild hypoxia  LL and LM lobe seen on lateral chest x-ray with possible effusion  Patient has leukocytosis, which has peaked now trending down however still high  Procalcitonin on admission negative    With increase to  0 92 and on 7/29 0 53  Pt remains afebrile   Started on ceftriaxone azithromycin,  azithro dc due to negative urine    Patient saturating well on room air, lungs diminished   Pt does have difficulty swallowing per the pt she reports only a comfort for swallowing very soft food such as oatmeal this am  Question possible silent aspiration, would ask speech to eval ?  Check ambulatory pulse ox prior to dc    blood cultures negative after 72 hrs   urine strep and Legionella antigen, negative  azithro discontinued  resp protocol although pt doesn't really have much resp symptoms      Slow transit constipation  Assessment & Plan  Pt with no bm as of yet   Given one time dose of lactulose and started on miralax     Metabolic encephalopathy  Assessment & Plan  In setting of acute infection/pneumonia as evidenced by improving mentation  Although there is some dementia and pt can be labile with mental status  Pt with improving wbc count     Generalized weakness  Assessment & Plan  Along with ambulatory dysfunction  Most likely secondary to pneumonia, however  reports that pts appetite has been decreasing and she has lost weight  Pt states she cant really eat most food but does ok with softer foods     Diet was changed to accommodate this   Added chocolate ensure tid     PT OT evaluation, recommending rehab     Squamous cell carcinoma  Assessment & Plan  S/p recent removal of anterior right  Thigh mass   - now with incision intact with staples no drainage, erythema noted   - pt with increased wbc count more likely due to suspected pna  - should continue follow up with her plastic surgeon and dermatologist    - no hx noted in epic at this time except pcp note           Dementia  Assessment & Plan  Continue Namenda and Aricept  Please update  (sometimes difficulty to reach as well as son)       Essential hypertension  Assessment & Plan  Well controlled  Continue home meds  Acquired hypothyroidism  Assessment & Plan  Continue levothyroxine  VTE Pharmacologic Prophylaxis:   Pharmacologic: Enoxaparin (Lovenox)  Mechanical VTE Prophylaxis in Place: No    Patient Centered Rounds: I have performed bedside rounds with nursing staff today  Time Spent for Care: 15 minutes  More than 50% of total time spent on counseling and coordination of care as described above  Current Length of Stay: 4 day(s)    Current Patient Status: Inpatient   Certification Statement: The patient will continue to require additional inpatient hospital stay due to Need to monitor symptoms    Discharge Plan: * tentative discharge in 24 hours if symptoms improve and white count stable  Code Status: Level 1 - Full Code      Subjective:   Patient comfortable  Objective:     Vitals:   Temp (24hrs), Av 8 °F (36 6 °C), Min:97 5 °F (36 4 °C), Max:98 °F (36 7 °C)    Temp:  [97 5 °F (36 4 °C)-98 °F (36 7 °C)] 98 °F (36 7 °C)  HR:  [69-73] 71  Resp:  [16-22] 16  BP: (122-127)/(57-60) 127/60  SpO2:  [92 %-96 %] 92 %  Body mass index is 18 93 kg/m²  Input and Output Summary (last 24 hours):        Intake/Output Summary (Last 24 hours) at 2019 1146  Last data filed at 2019 0930  Gross per 24 hour   Intake 983 ml   Output 637 ml   Net 346 ml       Physical Exam:     Physical Exam   Constitutional: She is oriented to person, place, and time  She appears well-developed and well-nourished  HENT:   Head: Normocephalic and atraumatic  Neck: Normal range of motion  Neck supple  Cardiovascular:   No murmur heard  Pulmonary/Chest: Effort normal  No stridor  No respiratory distress  Musculoskeletal: Normal range of motion  She exhibits no edema  Neurological: She is alert and oriented to person, place, and time  Additional Data:     Labs:    Results from last 7 days   Lab Units 07/30/19  0443   WBC Thousand/uL 17 11*   HEMOGLOBIN g/dL 10 8*   HEMATOCRIT % 33 2*   PLATELETS Thousands/uL 226   NEUTROS PCT % 83*   LYMPHS PCT % 7*   MONOS PCT % 8   EOS PCT % 1     Results from last 7 days   Lab Units 07/29/19  0551   POTASSIUM mmol/L 3 6   CHLORIDE mmol/L 104   CO2 mmol/L 20*   BUN mg/dL 12   CREATININE mg/dL 0 68   CALCIUM mg/dL 8 1*           * I Have Reviewed All Lab Data Listed Above  * Additional Pertinent Lab Tests Reviewed: All Labs Within Last 24 Hours Reviewed        Recent Cultures (last 7 days):     Results from last 7 days   Lab Units 07/26/19  1538 07/26/19  1358   BLOOD CULTURE   --  No Growth After 4 Days  No Growth After 4 Days     LEGIONELLA URINARY ANTIGEN  Negative  --        Last 24 Hours Medication List:     Current Facility-Administered Medications:  acetaminophen 650 mg Oral Q6H PRN LEE Kaplan    amLODIPine 5 mg Oral Daily Dana Choi MD    aspirin 81 mg Oral Daily Dana Choi MD    atorvastatin 40 mg Oral QPM Dana Choi MD    benzonatate 100 mg Oral TID PRN Dana Choi MD    bisacodyl 10 mg Rectal Daily PRN LEE Kaplan    cefTRIAXone 1,000 mg Intravenous Q24H LEE Kaplan Last Rate: Stopped (07/30/19 1952)   donepezil 10 mg Oral HS Dana Choi MD    enoxaparin 40 mg Subcutaneous Daily Dana Choi MD    fluticasone 2 spray Nasal Daily Dana Choi MD lactulose 30 g Oral Once LEE Hunt    levothyroxine 100 mcg Oral Early Morning Oliva Ngyuen MD    losartan 50 mg Oral Daily Oliva Nguyen MD    memantine 10 mg Oral BID Oliva Nguyen MD    metoprolol succinate 50 mg Oral Daily Oliva Nguyen MD    montelukast 10 mg Oral HS Oliva Nguyen MD    pantoprazole 40 mg Oral Daily Before Breakfast Oliva Nguyen MD    polyethylene glycol 17 g Oral Daily LEE Hunt    zolpidem 5 mg Oral HS PRN Oliva Nguyen MD         Today, Patient Was Seen By: Jensen De Leon DO    ** Please Note: Dictation voice to text software may have been used in the creation of this document   **

## 2019-07-31 NOTE — PLAN OF CARE
Problem: Potential for Falls  Goal: Patient will remain free of falls  Description  INTERVENTIONS:  - Assess patient frequently for physical needs  -  Identify cognitive and physical deficits and behaviors that affect risk of falls    -  [place] fall precautions as indicated by assessment   - Educate patient/family on patient safety including physical limitations  - Instruct patient to call for assistance with activity based on assessment  - Modify environment to reduce risk of injury  - Consider OT/PT consult to assist with strengthening/mobility  Outcome: Progressing     Problem: Prexisting or High Potential for Compromised Skin Integrity  Goal: Skin integrity is maintained or improved  Description  INTERVENTIONS:  - Identify patients at risk for skin breakdown  - Assess and monitor skin integrity  - Assess and monitor nutrition and hydration status  - Monitor labs (i e  albumin)  - Assess for incontinence   - Turn and reposition patient  - Assist with mobility/ambulation  - Relieve pressure over bony prominences  - Avoid friction and shearing  - Provide appropriate hygiene as needed including keeping skin clean and dry  - Evaluate need for skin moisturizer/barrier cream  - Collaborate with interdisciplinary team (i e  Nutrition, Rehabilitation, etc )   - Patient/family teaching  Outcome: Progressing     Problem: PAIN - ADULT  Goal: Verbalizes/displays adequate comfort level or baseline comfort level  Description  Interventions:  - Encourage patient to monitor pain and request assistance  - Assess pain using appropriate pain scale  - Administer analgesics based on type and severity of pain and evaluate response  - Implement non-pharmacological measures as appropriate and evaluate response  - Notify physician/advanced practitioner if interventions unsuccessful or patient reports new pain   Outcome: Progressing     Problem: SAFETY ADULT  Goal: Maintain or return to baseline ADL function  Description  INTERVENTIONS:  -  Assess patient's ability to carry out ADLs; assess patient's baseline for ADL function and identify physical deficits which impact ability to perform ADLs (bathing, care of mouth/teeth, toileting, grooming, dressing, etc )  - Assess/evaluate cause of self-care deficits   - Assess range of motion  - Assess patient's mobility; develop plan if impaired  - Assess patient's need for assistive devices and provide as appropriate  - Encourage maximum independence but intervene and supervise when necessary  ¯ Involve family in performance of ADLs  ¯ Assess for home care needs following discharge   ¯ Request OT consult to assist with ADL evaluation and planning for discharge  ¯ Provide patient education as appropriate  Outcome: Progressing  Goal: Maintain or return mobility status to optimal level  Description  INTERVENTIONS:  - Assess patient's baseline mobility status (ambulation, transfers, stairs, etc )    - Identify cognitive and physical deficits and behaviors that affect mobility  - Identify mobility aids required to assist with transfers and/or ambulation (gait belt, sit-to-stand, lift, walker, cane, etc )  - [place] fall precautions as indicated by assessment  - Record patient progress and toleration of activity level on Mobility SBAR; progress patient to next Phase/Stage  - Instruct patient to call for assistance with activity based on assessment  - Request Rehabilitation consult to assist with strengthening/weightbearing, etc   Outcome: Progressing     Problem: DISCHARGE PLANNING  Goal: Discharge to home or other facility with appropriate resources  Description  INTERVENTIONS:  - Identify barriers to discharge w/patient and caregiver  - Arrange for needed discharge resources and transportation as appropriate  - Identify discharge learning needs (meds, wound care, etc )  - Refer to Case Management Department for coordinating discharge planning if the patient needs post-hospital services based on physician/advanced practitioner order or complex needs related to functional status, cognitive ability, or social support system   Outcome: Progressing     Problem: Knowledge Deficit  Goal: Patient/family/caregiver demonstrates understanding of disease process, treatment plan, medications, and discharge instructions  Description  Complete learning assessment and assess knowledge base    Interventions:  - Provide teaching at level of understanding  - Provide teaching via preferred learning methods  Outcome: Progressing     Problem: INFECTION - ADULT  Goal: Absence or prevention of progression during hospitalization  Description  INTERVENTIONS:  - Assess and monitor for signs and symptoms of infection  - Monitor lab/diagnostic results  - Monitor all insertion sites, i e  indwelling lines, tubes, and drains  - [place] appropriate cooling/warming therapies per order  - Administer medications as ordered  - Instruct and encourage patient and family to use good hand hygiene technique  - Identify and instruct in appropriate isolation precautions for identified infection/condition   Outcome: Progressing     Problem: MUSCULOSKELETAL - ADULT  Goal: Maintain or return mobility to safest level of function  Description  INTERVENTIONS:  - Assess patient's ability to carry out ADLs; assess patient's baseline for ADL function and identify physical deficits which impact ability to perform ADLs (bathing, care of mouth/teeth, toileting, grooming, dressing, etc )  - Assess/evaluate cause of self-care deficits   - Assess range of motion  - Assess patient's mobility; develop plan if impaired  - Assess patient's need for assistive devices and provide as appropriate  - Encourage maximum independence but intervene and supervise when necessary  - Involve family in performance of ADLs  - Assess for home care needs following discharge   - Request OT consult to assist with ADL evaluation and planning for discharge  - Provide patient education as appropriate  Outcome: Progressing

## 2019-07-31 NOTE — PLAN OF CARE
Problem: PHYSICAL THERAPY ADULT  Goal: Performs mobility at highest level of function for planned discharge setting  See evaluation for individualized goals  Description  Treatment/Interventions: Functional transfer training, LE strengthening/ROM, Therapeutic exercise, Endurance training, Patient/family training, Equipment eval/education, Bed mobility, Gait training, Spoke to nursing, Family, Cognitive reorientation  Equipment Recommended: Carmen Redder       See flowsheet documentation for full assessment, interventions and recommendations  Note:   Prognosis: Fair  Problem List: Decreased strength, Decreased endurance, Impaired balance, Decreased mobility, Decreased coordination, Decreased cognition, Impaired judgement, Decreased safety awareness, Pain  Assessment: Pt participated in therapy session focusing on functional mobility tasks  Pt performed sit to stand transfer with mod A x 1 and verbal cues for hand placement and safety  Pt ambulated 12' x 1 with mod A x 1 and RW and verbal cues for hand placement and safety  Pt displays decreased step and stride length, decreased gait speed, decreased foot clearance  Provided pt and pt  education regarding safe functional mobility strategies, goal of therapy session, energy conservation techniques  Pt requires frequent verbal cues for safe hand placement throughout session and cues for appropriate posture  Skilled physical therapy is indicated to address listed functional deficits focusing on strength, endurance and functional mobility  Recommendation: Post acute IP rehab     PT - OK to Discharge: Yes    See flowsheet documentation for full assessment

## 2019-07-31 NOTE — RESTORATIVE TECHNICIAN NOTE
Restorative Specialist Mobility Note       Activity: Ambulate in room, Bathroom privileges, Chair, Dangle, Stand at bedside(Educated/encouraged pt to ambulate with assistance 3-4 x's/day  Chair alarm on   Pt callbell, phone/tray within reach )     Assistive Device: Front wheel walker             Trisha LAKHANI, Restorative Technician, [employer]

## 2019-08-01 ENCOUNTER — APPOINTMENT (INPATIENT)
Dept: RADIOLOGY | Facility: HOSPITAL | Age: 84
DRG: 193 | End: 2019-08-01
Payer: MEDICARE

## 2019-08-01 PROBLEM — D72.829 LEUKOCYTOSIS: Status: ACTIVE | Noted: 2019-08-01

## 2019-08-01 LAB
ANION GAP SERPL CALCULATED.3IONS-SCNC: 9 MMOL/L (ref 4–13)
BASOPHILS # BLD AUTO: 0.04 THOUSANDS/ΜL (ref 0–0.1)
BASOPHILS NFR BLD AUTO: 0 % (ref 0–1)
BUN SERPL-MCNC: 11 MG/DL (ref 5–25)
CALCIUM SERPL-MCNC: 8.9 MG/DL (ref 8.3–10.1)
CHLORIDE SERPL-SCNC: 102 MMOL/L (ref 100–108)
CO2 SERPL-SCNC: 25 MMOL/L (ref 21–32)
CREAT SERPL-MCNC: 0.67 MG/DL (ref 0.6–1.3)
EOSINOPHIL # BLD AUTO: 0.17 THOUSAND/ΜL (ref 0–0.61)
EOSINOPHIL NFR BLD AUTO: 1 % (ref 0–6)
ERYTHROCYTE [DISTWIDTH] IN BLOOD BY AUTOMATED COUNT: 12.6 % (ref 11.6–15.1)
GFR SERPL CREATININE-BSD FRML MDRD: 79 ML/MIN/1.73SQ M
GLUCOSE SERPL-MCNC: 105 MG/DL (ref 65–140)
GLUCOSE SERPL-MCNC: 201 MG/DL (ref 65–140)
GLUCOSE SERPL-MCNC: 210 MG/DL (ref 65–140)
GLUCOSE SERPL-MCNC: 255 MG/DL (ref 65–140)
GLUCOSE SERPL-MCNC: 45 MG/DL (ref 65–140)
HCT VFR BLD AUTO: 36 % (ref 34.8–46.1)
HGB BLD-MCNC: 11.5 G/DL (ref 11.5–15.4)
IMM GRANULOCYTES # BLD AUTO: 0.29 THOUSAND/UL (ref 0–0.2)
IMM GRANULOCYTES NFR BLD AUTO: 1 % (ref 0–2)
LYMPHOCYTES # BLD AUTO: 1.06 THOUSANDS/ΜL (ref 0.6–4.47)
LYMPHOCYTES NFR BLD AUTO: 5 % (ref 14–44)
MCH RBC QN AUTO: 30.2 PG (ref 26.8–34.3)
MCHC RBC AUTO-ENTMCNC: 31.9 G/DL (ref 31.4–37.4)
MCV RBC AUTO: 95 FL (ref 82–98)
MONOCYTES # BLD AUTO: 1.4 THOUSAND/ΜL (ref 0.17–1.22)
MONOCYTES NFR BLD AUTO: 7 % (ref 4–12)
NEUTROPHILS # BLD AUTO: 17.56 THOUSANDS/ΜL (ref 1.85–7.62)
NEUTS SEG NFR BLD AUTO: 86 % (ref 43–75)
NRBC BLD AUTO-RTO: 0 /100 WBCS
PLATELET # BLD AUTO: 267 THOUSANDS/UL (ref 149–390)
PMV BLD AUTO: 10.3 FL (ref 8.9–12.7)
POTASSIUM SERPL-SCNC: 4.2 MMOL/L (ref 3.5–5.3)
PROCALCITONIN SERPL-MCNC: 0.2 NG/ML
RBC # BLD AUTO: 3.81 MILLION/UL (ref 3.81–5.12)
SODIUM SERPL-SCNC: 136 MMOL/L (ref 136–145)
WBC # BLD AUTO: 20.52 THOUSAND/UL (ref 4.31–10.16)

## 2019-08-01 PROCEDURE — 99223 1ST HOSP IP/OBS HIGH 75: CPT | Performed by: INTERNAL MEDICINE

## 2019-08-01 PROCEDURE — 99232 SBSQ HOSP IP/OBS MODERATE 35: CPT | Performed by: INTERNAL MEDICINE

## 2019-08-01 PROCEDURE — 82948 REAGENT STRIP/BLOOD GLUCOSE: CPT

## 2019-08-01 PROCEDURE — 85025 COMPLETE CBC W/AUTO DIFF WBC: CPT | Performed by: INTERNAL MEDICINE

## 2019-08-01 PROCEDURE — 71250 CT THORAX DX C-: CPT

## 2019-08-01 PROCEDURE — 80048 BASIC METABOLIC PNL TOTAL CA: CPT | Performed by: INTERNAL MEDICINE

## 2019-08-01 PROCEDURE — 84145 PROCALCITONIN (PCT): CPT | Performed by: INTERNAL MEDICINE

## 2019-08-01 RX ADMIN — BENZONATATE 100 MG: 100 CAPSULE ORAL at 21:41

## 2019-08-01 RX ADMIN — ENOXAPARIN SODIUM 40 MG: 40 INJECTION SUBCUTANEOUS at 08:25

## 2019-08-01 RX ADMIN — MEMANTINE 10 MG: 10 TABLET ORAL at 08:25

## 2019-08-01 RX ADMIN — POLYETHYLENE GLYCOL 3350 17 G: 17 POWDER, FOR SOLUTION ORAL at 08:25

## 2019-08-01 RX ADMIN — PANTOPRAZOLE SODIUM 40 MG: 40 TABLET, DELAYED RELEASE ORAL at 05:32

## 2019-08-01 RX ADMIN — METOPROLOL SUCCINATE 50 MG: 50 TABLET, EXTENDED RELEASE ORAL at 08:25

## 2019-08-01 RX ADMIN — ASPIRIN 81 MG 81 MG: 81 TABLET ORAL at 08:25

## 2019-08-01 RX ADMIN — FLUTICASONE PROPIONATE 2 SPRAY: 50 SPRAY, METERED NASAL at 08:26

## 2019-08-01 RX ADMIN — ATORVASTATIN CALCIUM 40 MG: 40 TABLET, FILM COATED ORAL at 17:35

## 2019-08-01 RX ADMIN — LOSARTAN POTASSIUM 50 MG: 50 TABLET, FILM COATED ORAL at 08:25

## 2019-08-01 RX ADMIN — AMLODIPINE BESYLATE 5 MG: 5 TABLET ORAL at 08:26

## 2019-08-01 RX ADMIN — MONTELUKAST SODIUM 10 MG: 10 TABLET, FILM COATED ORAL at 21:19

## 2019-08-01 RX ADMIN — CEFTRIAXONE SODIUM 1000 MG: 10 INJECTION, POWDER, FOR SOLUTION INTRAVENOUS at 20:06

## 2019-08-01 RX ADMIN — DONEPEZIL HYDROCHLORIDE 10 MG: 10 TABLET ORAL at 21:19

## 2019-08-01 RX ADMIN — ACETAMINOPHEN 650 MG: 325 TABLET ORAL at 21:19

## 2019-08-01 RX ADMIN — LEVOTHYROXINE SODIUM 100 MCG: 100 TABLET ORAL at 05:32

## 2019-08-01 RX ADMIN — ACETAMINOPHEN 650 MG: 325 TABLET ORAL at 12:53

## 2019-08-01 RX ADMIN — ACETAMINOPHEN 650 MG: 325 TABLET ORAL at 05:32

## 2019-08-01 RX ADMIN — MEMANTINE 10 MG: 10 TABLET ORAL at 17:35

## 2019-08-01 NOTE — SOCIAL WORK
Patient not medically clear for discharge, once stable patient will return to [facility] for rehab and continued residence  LVM for patient's  to advise of the same  Left message in [address] for Tracy Morris at [facility] advising the same

## 2019-08-01 NOTE — RESTORATIVE TECHNICIAN NOTE
Restorative Specialist Mobility Note       Activity: Ambulate in dee, Ambulate in room, Bathroom privileges, Chair, Dangle, Stand at bedside(Educated/encouraged pt to ambulate with assistance 3-4 x's/day  Chair alarm on   Pt callbell, phone/tray within reach )     Assistive Device: Front wheel walker          Lloyd LAKHANI, Restorative Technician, [employer]

## 2019-08-01 NOTE — CONSULTS
Consultation - Infectious Disease   Surya Rosario 80 y o  female MRN: [id number]  Unit/Bed#: -01 Encounter: [id number]      IMPRESSION & RECOMMENDATIONS:   1  Community-acquired pneumonia  Patient presents at this time with progressive weakness and shortness of breath at home along with leukocytosis on admission  Chest x-ray consistent with left lower lobe pneumonia along with significant infusion  Patient is hemodynamically approved however she continues with leukocytosis without clear cause  Continue on ceftriaxone  Continue to trend fever curve/vitals  Repeat CBC and chemistry tomorrow  Follow up any pending cultures  Would obtain CT of the chest without contrast to evaluate this effusion seen on chest x-ray and to rule out developing pulmonary abscess  Patient may require pulmonary evaluation pending imaging  Additional care as per primary  If CT is unremarkable then will likely transition patient to oral antibiotics and plan for 7-10 days of therapy  2  Leukocytosis, persistent  Patient noted with persistent leukocytosis  Unclear at this time if this represents developing pulmonary process or if this is largely inflammatory in nature  Patient does not have leukocytosis chronically  There are no other obvious sources for elevated white count on her exam   Speech evaluation without signs of aspiration  Repeat CBC tomorrow  Continue antibiotics as above  Additional imaging as above  Additional care as per primary    3  Dementia  Patient has underlying history of dementia for which she is on medications for  He seems to be at her reported baseline  Continue monitor mental status  Continue antibiotics as above  Additional care as per primary    Above plan discussed in detail with the patient, nursing and with primary attending  ID consult service will continue to follow      HISTORY OF PRESENT ILLNESS:  Reason for Consult:  Pneumonia    HPI: Surya Rosario is a 80y o  year old female with past medical history significant for hyperlipidemia, hypertension and history of MI who presented with increased weakness and back pain along with cough  It had reported ongoing cough and upper respiratory symptoms for about a week  She denied having any fevers, chills, nausea, vomiting  She had seen her doctor as an outpatient was given symptomatic therapy for her cough  She subsequently developed progressing weakness along with difficulty with ambulation  She also reported some pleuritic pain located in the left upper back  On admission she had a leukocytosis to 20,000  Procalcitonin was negative  Chest x-ray consistent with left lower lobe pneumonia  She was admitted for this pneumonia  She was started on antibiotics with ceftriaxone and azithromycin  Patient also has underlying dementia so was continued on home medication  Patient on this admission did report some discomfort with swallowing mentioned she was more comfortable with soft foods  Question of possible aspiration was brought up  Patient was evaluated by speech and swallow and no overt dysphagia was noted  No other acute events were noted overnight on chart review  Patient remains afebrile at this time  White blood cell count remains elevated at 20,000  She remains on ceftriaxone alone  Cultures and urinary antigens negative  Patient's other vitals are stable  Patient is also noted to have a moderate pleural effusion on chest x-ray  Procalcitonin 10 use to down trend  No other prior culture data in our system  Leukocytosis does not seem to be chronic  We are consulted at this time for further assistance in management given this presentation of pneumonia however with ongoing leukocytosis  On evaluation, patient is a very pleasant 70-year-old female  She currently denies having any nausea, vomiting, chest pain  She reports that she does not feel like she is getting better since she has been here    She is very anxious to go home and believe she will get better at home  She was able to walk to the bathroom today with walker and denied getting winded  She denies having any rash on her skin  She continues to report the sensation of food getting stuck midway down her chest   She is unable to tell me how long the sensation has existed and if it was present prior to admission  She does recall feeling weak at home  Visibly though she does appear to have increased work of breathing  She continues to reiterated that she would like to go home as soon as possible  REVIEW OF SYSTEMS:  A complete 12 point system-based review of systems is negative other than that noted in the HPI  PAST MEDICAL HISTORY:  Past Medical History:   Diagnosis Date    Disease of thyroid gland     GERD (gastroesophageal reflux disease)     Hyperlipidemia     Hypertension     Insomnia     MI (myocardial infarction) ([address] )      Past Surgical History:   Procedure Laterality Date    APPENDECTOMY      COLONOSCOPY      2012  LAST ASSESSED       FAMILY HISTORY:  Non-contributory    SOCIAL HISTORY:  Social History   Social History     Substance and Sexual Activity   Alcohol Use Yes     Social History     Substance and Sexual Activity   Drug Use No     Social History     Tobacco Use   Smoking Status Never Smoker   Smokeless Tobacco Never Used       ALLERGIES:  Allergies   Allergen Reactions    Alendronate      Other reaction(s): tooth decay    Diphenhydramine Hyperactivity    Penicillins Other (See Comments)     Reaction not listed; however, has tolerated Ceftriaxone and Cefepime which have different side chains  MEDICATIONS:  All current active medications have been reviewed      PHYSICAL EXAM:  Temp:  [97 7 °F (36 5 °C)-98 °F (36 7 °C)] 97 7 °F (36 5 °C)  HR:  [70-71] 70  Resp:  [16-18] 18  BP: (130-134)/(59-60) 134/59  SpO2:  [96 %] 96 %  Temp (24hrs), Av 8 °F (36 6 °C), Min:97 7 °F (36 5 °C), Max:98 °F (36 7 °C)  Current: Temperature: 97 7 °F (36 5 °C)    Intake/Output Summary (Last 24 hours) at 8/1/2019 1120  Last data filed at 8/1/2019 0700  Gross per 24 hour   Intake 1417 ml   Output 819 ml   Net 598 ml       General Appearance:  Elderly appearing female, nontoxic, and visibly appears to have increased work of breathing low oxygen saturations remain in the 90s   Head:  Normocephalic, without obvious abnormality, atraumatic   Eyes:  Conjunctiva pink and sclera anicteric, both eyes   Nose: Nares normal, mucosa normal, no drainage   Throat: Oropharynx moist without lesions; no lesions consistent with thrush  Neck: Supple, symmetrical, no adenopathy, no tenderness/mass/nodules   Back:   Symmetric, no curvature, ROM normal, no CVA tenderness; no spinal or paraspinal muscle tenderness to palpation   Lungs:   Patient has very good air movement in the right along any rales or rhonchi  She has significantly decreased breath sounds on the left up to the mid lung  Can only appreciate transmitted upper airway sounds on the left   Chest Wall:  No tenderness or deformity   Heart:  RRR; no murmur, rub or gallop   Abdomen:   Soft, non-tender, non-distended, positive bowel sounds    Extremities: No cyanosis, clubbing or edema   Skin: No rashes or lesions  No draining wounds noted  Lymph nodes: Cervical, supraclavicular nodes normal   Neurologic: Alert and oriented times 2, patient is able to flex and extend at the knees  She has difficulty pulling her own weight to sit up in chair  LABS, IMAGING, & OTHER STUDIES:  Lab Results:  I have personally reviewed pertinent labs    Results from last 7 days   Lab Units 08/01/19  0524 07/30/19  0443 07/29/19  0551   WBC Thousand/uL 20 52* 17 11* 19 31*   HEMOGLOBIN g/dL 11 5 10 8* 11 2*   PLATELETS Thousands/uL 267 226 210     Results from last 7 days   Lab Units 08/01/19  0524   POTASSIUM mmol/L 4 2   CHLORIDE mmol/L 102   CO2 mmol/L 25   BUN mg/dL 11   CREATININE mg/dL 0 67   EGFR ml/min/1 73sq m 79   CALCIUM mg/dL 8 9     Results from last 7 days   Lab Units 07/26/19  1538 07/26/19  1358   BLOOD CULTURE   --  No Growth After 5 Days  No Growth After 5 Days  LEGIONELLA URINARY ANTIGEN  Negative  --        Imaging Studies:   I have personally reviewed pertinent imaging study reports and images in PACS  Other Studies:   I have personally reviewed pertinent reports

## 2019-08-01 NOTE — NURSING NOTE
Patient BS reading 45 from morning lab work  Patient given orange juice and kristen cracker  Notified Rollyia with [facility] awaiting any orders

## 2019-08-01 NOTE — PROGRESS NOTES
Progress Note - Byron Montana 3/23/1931, 80 y o  female MRN: [id number]    Unit/Bed#: -01 Encounter: [id number]    Primary Care Provider: Dale Doll MD   Date and time admitted to hospital: 7/26/2019 12:24 PM        * Community acquired pneumonia  Assessment & Plan  In ER evaluation patient's O2 saturation dropped into low 90s on ambulation, [facility] is not comfortable taking him back at this point because of generalized weakness and mild hypoxia  LL and LM lobe seen on lateral chest x-ray with possible effusion  Patient has leukocytosis, which has peaked now trending down however still high  Procalcitonin on admission negative    With increase to  0 92 and on 7/29 0 53  Pt remains afebrile   Started on ceftriaxone azithromycin,  azithro dc due to negative urine    Patient saturating well on room air, lungs diminished   Pt does have difficulty swallowing per the pt she reports only a comfort for swallowing very soft food such as oatmeal this am  Question possible silent aspiration, would ask speech to eval ?  Check ambulatory pulse ox prior to dc    blood cultures negative after 5 days   urine strep and Legionella antigen, negative  azithro discontinued  resp protocol although pt doesn't really have much resp symptoms      Leukocytosis  Assessment & Plan  Patient with increase in white count  No reported diarrhea symptoms  Question bone marrow process versus infection  Repeat procalcitonin and trend        Slow transit constipation  Assessment & Plan  Pt with no bm as of yet   Given one time dose of lactulose and started on miralax     Metabolic encephalopathy  Assessment & Plan  In setting of acute infection/pneumonia as evidenced by improving mentation  Although there is some dementia and pt can be labile with mental status  Pt with improving wbc count     Generalized weakness  Assessment & Plan  Along with ambulatory dysfunction    Most likely secondary to pneumonia, however  reports that pts appetite has been decreasing and she has lost weight  Pt states she cant really eat most food but does ok with softer foods   Diet was changed to accommodate this   Added chocolate ensure tid     PT OT evaluation, recommending rehab     Squamous cell carcinoma  Assessment & Plan  S/p recent removal of anterior right  Thigh mass   - now with incision intact with staples no drainage, erythema noted   - pt with increased wbc count more likely due to suspected pna  - should continue follow up with her plastic surgeon and dermatologist    - no hx noted in epic at this time except pcp note           Dementia  Assessment & Plan  Continue Namenda and Aricept  Please update  (sometimes difficulty to reach as well as son)       Essential hypertension  Assessment & Plan  Well controlled  Continue home meds  Acquired hypothyroidism  Assessment & Plan  Continue levothyroxine  VTE Pharmacologic Prophylaxis:   Pharmacologic: Enoxaparin (Lovenox)  Mechanical VTE Prophylaxis in Place: No    Patient Centered Rounds: I have performed bedside rounds with nursing staff today  Time Spent for Care: 15 minutes  More than 50% of total time spent on counseling and coordination of care as described above  Current Length of Stay: 5 day(s)    Current Patient Status: Inpatient   Certification Statement: The patient will continue to require additional inpatient hospital stay due to Need to monitor symptoms    Code Status: Level 1 - Full Code      Subjective:   No acute distress    Objective:     Vitals:   Temp (24hrs), Av 8 °F (36 6 °C), Min:97 7 °F (36 5 °C), Max:98 °F (36 7 °C)    Temp:  [97 7 °F (36 5 °C)-98 °F (36 7 °C)] 97 7 °F (36 5 °C)  HR:  [70-71] 70  Resp:  [16-18] 18  BP: (130-134)/(59-60) 134/59  SpO2:  [96 %] 96 %  Body mass index is 18 93 kg/m²  Input and Output Summary (last 24 hours):        Intake/Output Summary (Last 24 hours) at 2019 1117  Last data filed at 2019 0700  Gross per 24 hour   Intake 1417 ml   Output 819 ml   Net 598 ml       Physical Exam:     Physical Exam   Constitutional: She is oriented to person, place, and time  HENT:   Head: Normocephalic and atraumatic  Eyes: Pupils are equal, round, and reactive to light  EOM are normal    Neck: Normal range of motion  Neck supple  Cardiovascular: Normal rate  Pulmonary/Chest: No respiratory distress  She has no wheezes  Abdominal: Soft  Bowel sounds are normal  She exhibits no distension  There is no tenderness  Musculoskeletal: Normal range of motion  She exhibits no edema  Neurological: She is alert and oriented to person, place, and time  Skin: Skin is warm and dry  Additional Data:     Labs:    Results from last 7 days   Lab Units 08/01/19  0524   WBC Thousand/uL 20 52*   HEMOGLOBIN g/dL 11 5   HEMATOCRIT % 36 0   PLATELETS Thousands/uL 267   NEUTROS PCT % 86*   LYMPHS PCT % 5*   MONOS PCT % 7   EOS PCT % 1     Results from last 7 days   Lab Units 08/01/19  0524   POTASSIUM mmol/L 4 2   CHLORIDE mmol/L 102   CO2 mmol/L 25   BUN mg/dL 11   CREATININE mg/dL 0 67   CALCIUM mg/dL 8 9           * I Have Reviewed All Lab Data Listed Above  * Additional Pertinent Lab Tests Reviewed: All Labs Within Last 24 Hours Reviewed        Recent Cultures (last 7 days):     Results from last 7 days   Lab Units 07/26/19  1538 07/26/19  1358   BLOOD CULTURE   --  No Growth After 5 Days  No Growth After 5 Days     LEGIONELLA URINARY ANTIGEN  Negative  --        Last 24 Hours Medication List:     Current Facility-Administered Medications:  acetaminophen 650 mg Oral Q6H PRN [place] Night, CRNP    amLODIPine 5 mg Oral Daily Josephine Hernández MD    aspirin 81 mg Oral Daily Josephine Hernández MD    atorvastatin 40 mg Oral QPM Josephine Hernández MD    benzonatate 100 mg Oral TID PRN Josephine Hernández MD    bisacodyl 10 mg Rectal Daily PRN Selvin Night, CRNP    cefTRIAXone 1,000 mg Intravenous Q24H Sabrina Rose LEE Pederson Last Rate: 1,000 mg (07/31/19 2014)   donepezil 10 mg Oral HS Kirit Don MD    enoxaparin 40 mg Subcutaneous Daily Kirit Don MD    fluticasone 2 spray Nasal Daily Kirit Don MD    lactulose 30 g Oral Once LEE Spivey    levothyroxine 100 mcg Oral Early Morning Kirit Don MD    losartan 50 mg Oral Daily Kirit Don MD    memantine 10 mg Oral BID Kirit Don MD    metoprolol succinate 50 mg Oral Daily Kirit Don MD    montelukast 10 mg Oral HS Kirit Don MD    pantoprazole 40 mg Oral Daily Before Breakfast Kirit Don MD    polyethylene glycol 17 g Oral Daily LEE Spivey    zolpidem 5 mg Oral HS PRN Kirit Don MD         Today, Patient Was Seen By: Marianna Nichols DO    ** Please Note: Dictation voice to text software may have been used in the creation of this document   **

## 2019-08-01 NOTE — ASSESSMENT & PLAN NOTE
Patient with increase in white count    No reported diarrhea symptoms  Question bone marrow process versus infection  Repeat procalcitonin and trend

## 2019-08-02 ENCOUNTER — APPOINTMENT (INPATIENT)
Dept: RADIOLOGY | Facility: HOSPITAL | Age: 84
DRG: 193 | End: 2019-08-02
Payer: MEDICARE

## 2019-08-02 LAB
ANION GAP SERPL CALCULATED.3IONS-SCNC: 7 MMOL/L (ref 4–13)
BASOPHILS # BLD AUTO: 0.07 THOUSANDS/ΜL (ref 0–0.1)
BASOPHILS NFR BLD AUTO: 0 % (ref 0–1)
BUN SERPL-MCNC: 16 MG/DL (ref 5–25)
CALCIUM SERPL-MCNC: 9.2 MG/DL (ref 8.3–10.1)
CHLORIDE SERPL-SCNC: 100 MMOL/L (ref 100–108)
CO2 SERPL-SCNC: 27 MMOL/L (ref 21–32)
CREAT SERPL-MCNC: 0.7 MG/DL (ref 0.6–1.3)
EOSINOPHIL # BLD AUTO: 0.16 THOUSAND/ΜL (ref 0–0.61)
EOSINOPHIL NFR BLD AUTO: 1 % (ref 0–6)
EOSINOPHIL NFR FLD MANUAL: 3 %
ERYTHROCYTE [DISTWIDTH] IN BLOOD BY AUTOMATED COUNT: 12.8 % (ref 11.6–15.1)
GFR SERPL CREATININE-BSD FRML MDRD: 78 ML/MIN/1.73SQ M
GLUCOSE FLD-MCNC: 91 MG/DL
GLUCOSE SERPL-MCNC: 111 MG/DL (ref 65–140)
GLUCOSE SERPL-MCNC: 116 MG/DL (ref 65–140)
GLUCOSE SERPL-MCNC: 87 MG/DL (ref 65–140)
HCT VFR BLD AUTO: 36.4 % (ref 34.8–46.1)
HGB BLD-MCNC: 11.7 G/DL (ref 11.5–15.4)
IMM GRANULOCYTES # BLD AUTO: 0.41 THOUSAND/UL (ref 0–0.2)
IMM GRANULOCYTES NFR BLD AUTO: 2 % (ref 0–2)
LDH FLD L TO P-CCNC: 1631 U/L
LDH SERPL-CCNC: 318 U/L (ref 81–234)
LYMPHOCYTES # BLD AUTO: 1.27 THOUSANDS/ΜL (ref 0.6–4.47)
LYMPHOCYTES NFR BLD AUTO: 4 %
LYMPHOCYTES NFR BLD AUTO: 7 % (ref 14–44)
MCH RBC QN AUTO: 30.8 PG (ref 26.8–34.3)
MCHC RBC AUTO-ENTMCNC: 32.1 G/DL (ref 31.4–37.4)
MCV RBC AUTO: 96 FL (ref 82–98)
MONOCYTES # BLD AUTO: 1.07 THOUSAND/ΜL (ref 0.17–1.22)
MONOCYTES NFR BLD AUTO: 6 %
MONOCYTES NFR BLD AUTO: 6 % (ref 4–12)
NEUTROPHILS # BLD AUTO: 14.09 THOUSANDS/ΜL (ref 1.85–7.62)
NEUTS BAND NFR FLD MANUAL: 2 %
NEUTS SEG NFR BLD AUTO: 84 % (ref 43–75)
NEUTS SEG NFR BLD AUTO: 85 %
NRBC BLD AUTO-RTO: 0 /100 WBCS
PH BODY FLUID: 6
PLATELET # BLD AUTO: 305 THOUSANDS/UL (ref 149–390)
PMV BLD AUTO: 10.4 FL (ref 8.9–12.7)
POTASSIUM SERPL-SCNC: 4.3 MMOL/L (ref 3.5–5.3)
PROT FLD-MCNC: 4.1 G/DL
RBC # BLD AUTO: 3.8 MILLION/UL (ref 3.81–5.12)
SODIUM SERPL-SCNC: 134 MMOL/L (ref 136–145)
TOTAL CELLS COUNTED SPEC: 100
WBC # BLD AUTO: 17.07 THOUSAND/UL (ref 4.31–10.16)
WBC # FLD MANUAL: 608 /UL

## 2019-08-02 PROCEDURE — 87116 MYCOBACTERIA CULTURE: CPT | Performed by: NURSE PRACTITIONER

## 2019-08-02 PROCEDURE — 87206 SMEAR FLUORESCENT/ACID STAI: CPT | Performed by: NURSE PRACTITIONER

## 2019-08-02 PROCEDURE — 82948 REAGENT STRIP/BLOOD GLUCOSE: CPT

## 2019-08-02 PROCEDURE — 97116 GAIT TRAINING THERAPY: CPT

## 2019-08-02 PROCEDURE — 87070 CULTURE OTHR SPECIMN AEROBIC: CPT | Performed by: NURSE PRACTITIONER

## 2019-08-02 PROCEDURE — 71045 X-RAY EXAM CHEST 1 VIEW: CPT

## 2019-08-02 PROCEDURE — C1769 GUIDE WIRE: HCPCS

## 2019-08-02 PROCEDURE — 97110 THERAPEUTIC EXERCISES: CPT

## 2019-08-02 PROCEDURE — 87102 FUNGUS ISOLATION CULTURE: CPT | Performed by: NURSE PRACTITIONER

## 2019-08-02 PROCEDURE — NC001 PR NO CHARGE: Performed by: INTERNAL MEDICINE

## 2019-08-02 PROCEDURE — 85025 COMPLETE CBC W/AUTO DIFF WBC: CPT | Performed by: INTERNAL MEDICINE

## 2019-08-02 PROCEDURE — C1729 CATH, DRAINAGE: HCPCS

## 2019-08-02 PROCEDURE — 80048 BASIC METABOLIC PNL TOTAL CA: CPT | Performed by: INTERNAL MEDICINE

## 2019-08-02 PROCEDURE — 99232 SBSQ HOSP IP/OBS MODERATE 35: CPT | Performed by: INTERNAL MEDICINE

## 2019-08-02 PROCEDURE — 0W9B3ZZ DRAINAGE OF LEFT PLEURAL CAVITY, PERCUTANEOUS APPROACH: ICD-10-PCS | Performed by: INTERNAL MEDICINE

## 2019-08-02 PROCEDURE — 83986 ASSAY PH BODY FLUID NOS: CPT | Performed by: NURSE PRACTITIONER

## 2019-08-02 PROCEDURE — 99223 1ST HOSP IP/OBS HIGH 75: CPT | Performed by: INTERNAL MEDICINE

## 2019-08-02 PROCEDURE — 99233 SBSQ HOSP IP/OBS HIGH 50: CPT | Performed by: INTERNAL MEDICINE

## 2019-08-02 PROCEDURE — 0W9B30Z DRAINAGE OF LEFT PLEURAL CAVITY WITH DRAINAGE DEVICE, PERCUTANEOUS APPROACH: ICD-10-PCS | Performed by: RADIOLOGY

## 2019-08-02 PROCEDURE — 88112 CYTOPATH CELL ENHANCE TECH: CPT | Performed by: PATHOLOGY

## 2019-08-02 PROCEDURE — 83615 LACTATE (LD) (LDH) ENZYME: CPT | Performed by: NURSE PRACTITIONER

## 2019-08-02 PROCEDURE — 82945 GLUCOSE OTHER FLUID: CPT | Performed by: NURSE PRACTITIONER

## 2019-08-02 PROCEDURE — 88305 TISSUE EXAM BY PATHOLOGIST: CPT | Performed by: PATHOLOGY

## 2019-08-02 PROCEDURE — 84157 ASSAY OF PROTEIN OTHER: CPT | Performed by: NURSE PRACTITIONER

## 2019-08-02 PROCEDURE — 32555 ASPIRATE PLEURA W/ IMAGING: CPT | Performed by: INTERNAL MEDICINE

## 2019-08-02 PROCEDURE — NC001 PR NO CHARGE: Performed by: RADIOLOGY

## 2019-08-02 PROCEDURE — 32557 INSERT CATH PLEURA W/ IMAGE: CPT

## 2019-08-02 PROCEDURE — 89051 BODY FLUID CELL COUNT: CPT | Performed by: NURSE PRACTITIONER

## 2019-08-02 PROCEDURE — 87205 SMEAR GRAM STAIN: CPT | Performed by: NURSE PRACTITIONER

## 2019-08-02 RX ORDER — FENTANYL CITRATE 50 UG/ML
INJECTION, SOLUTION INTRAMUSCULAR; INTRAVENOUS CODE/TRAUMA/SEDATION MEDICATION
Status: COMPLETED | OUTPATIENT
Start: 2019-08-02 | End: 2019-08-02

## 2019-08-02 RX ADMIN — LEVOTHYROXINE SODIUM 100 MCG: 100 TABLET ORAL at 06:26

## 2019-08-02 RX ADMIN — CEFTRIAXONE SODIUM 1000 MG: 10 INJECTION, POWDER, FOR SOLUTION INTRAVENOUS at 21:27

## 2019-08-02 RX ADMIN — ENOXAPARIN SODIUM 40 MG: 40 INJECTION SUBCUTANEOUS at 09:25

## 2019-08-02 RX ADMIN — ATORVASTATIN CALCIUM 40 MG: 40 TABLET, FILM COATED ORAL at 15:54

## 2019-08-02 RX ADMIN — AMLODIPINE BESYLATE 5 MG: 5 TABLET ORAL at 09:25

## 2019-08-02 RX ADMIN — DONEPEZIL HYDROCHLORIDE 10 MG: 10 TABLET ORAL at 21:07

## 2019-08-02 RX ADMIN — FENTANYL CITRATE 25 MCG: 50 INJECTION INTRAMUSCULAR; INTRAVENOUS at 18:07

## 2019-08-02 RX ADMIN — MEMANTINE 10 MG: 10 TABLET ORAL at 16:03

## 2019-08-02 RX ADMIN — METOPROLOL SUCCINATE 50 MG: 50 TABLET, EXTENDED RELEASE ORAL at 09:25

## 2019-08-02 RX ADMIN — PANTOPRAZOLE SODIUM 40 MG: 40 TABLET, DELAYED RELEASE ORAL at 06:26

## 2019-08-02 RX ADMIN — ASPIRIN 81 MG 81 MG: 81 TABLET ORAL at 09:25

## 2019-08-02 RX ADMIN — LOSARTAN POTASSIUM 50 MG: 50 TABLET, FILM COATED ORAL at 09:25

## 2019-08-02 RX ADMIN — FLUTICASONE PROPIONATE 2 SPRAY: 50 SPRAY, METERED NASAL at 09:27

## 2019-08-02 RX ADMIN — MEMANTINE 10 MG: 10 TABLET ORAL at 09:25

## 2019-08-02 RX ADMIN — MONTELUKAST SODIUM 10 MG: 10 TABLET, FILM COATED ORAL at 21:07

## 2019-08-02 NOTE — PROGRESS NOTES
Progress Note - Sarthak Iniguez 3/23/1931, 80 y o  female MRN: [id number]    Unit/Bed#: -01 Encounter: [id number]    Primary Care Provider: Karuna Lopez MD   Date and time admitted to hospital: 7/26/2019 12:24 PM        * Community acquired pneumonia  Assessment & Plan  In ER evaluation patient's O2 saturation dropped into low 90s on ambulation, [facility] is not comfortable taking him back at this point because of generalized weakness and mild hypoxia  LL and LM lobe seen on lateral chest x-ray with possible effusion  Patient has leukocytosis, which has peaked now trending down however still high  Procalcitonin on admission negative    With increase to  0 92 and on 7/29 0 53  Pt remains afebrile   Started on ceftriaxone azithromycin,  azithro dc due to negative urine    Patient saturating well on room air, lungs diminished   Pt does have difficulty swallowing per the pt she reports only a comfort for swallowing very soft food such as oatmeal this am  Question possible silent aspiration, would ask speech to eval ?  Check ambulatory pulse ox prior to dc    blood cultures negative after 5 days   urine strep and Legionella antigen, negative  azithro discontinued  resp protocol although pt doesn't really have much resp symptoms    Note CT findings  Left-sided pleural effusion greater than right  Will consult Pulmonary for further evaluation  Will likely need tap  Leukocytosis  Assessment & Plan  White count slightly improved  No reported diarrhea symptoms  Question bone marrow process versus infection  Repeat procalcitonin and trend        Slow transit constipation  Assessment & Plan  Pt with no bm as of yet   Given one time dose of lactulose and started on miralax     Metabolic encephalopathy  Assessment & Plan  In setting of acute infection/pneumonia as evidenced by improving mentation  Although there is some dementia and pt can be labile with mental status     Pt with improving wbc count Generalized weakness  Assessment & Plan  Along with ambulatory dysfunction  Most likely secondary to pneumonia, however  reports that pts appetite has been decreasing and she has lost weight  Pt states she cant really eat most food but does ok with softer foods   Diet was changed to accommodate this   Added chocolate ensure tid     PT OT evaluation, recommending rehab     Squamous cell carcinoma  Assessment & Plan  S/p recent removal of anterior right  Thigh mass   - now with incision intact with staples no drainage, erythema noted   - pt with increased wbc count more likely due to suspected pna  - should continue follow up with her plastic surgeon and dermatologist    - no hx noted in epic at this time except pcp note           Dementia  Assessment & Plan  Continue Namenda and Aricept  Please update  (sometimes difficulty to reach as well as son)       Essential hypertension  Assessment & Plan  Well controlled  Continue home meds  Acquired hypothyroidism  Assessment & Plan  Continue levothyroxine  VTE Pharmacologic Prophylaxis:   Pharmacologic: Enoxaparin (Lovenox)  Mechanical VTE Prophylaxis in Place: No    Patient Centered Rounds: I have performed bedside rounds with nursing staff today  Left  for SON KINSEY--[phone]      Time Spent for Care: 15 minutes  More than 50% of total time spent on counseling and coordination of care as described above      Current Length of Stay: 6 day(s)    Current Patient Status: Inpatient   Certification Statement: The patient will continue to require additional inpatient hospital stay due to need to monitor resp status w chest tube        Code Status: Level 1 - Full Code      Subjective:   nad    Objective:     Vitals:   Temp (24hrs), Av 5 °F (36 9 °C), Min:98 2 °F (36 8 °C), Max:98 8 °F (37 1 °C)    Temp:  [98 2 °F (36 8 °C)-98 8 °F (37 1 °C)] 98 4 °F (36 9 °C)  HR:  [69-82] 78  Resp:  [17-20] 17  BP: (151-153)/(64-65) 151/65  SpO2: [91 %-98 %] 93 %  Body mass index is 18 93 kg/m²  Input and Output Summary (last 24 hours): Intake/Output Summary (Last 24 hours) at 8/2/2019 0920  Last data filed at 8/2/2019 0900  Gross per 24 hour   Intake 540 ml   Output 992 ml   Net -452 ml       Physical Exam:     Physical Exam   Constitutional: She is oriented to person, place, and time  HENT:   Head: Normocephalic and atraumatic  Pulmonary/Chest: Effort normal  No respiratory distress  Abdominal: Soft  Bowel sounds are normal  She exhibits no distension  Musculoskeletal: Normal range of motion  She exhibits no edema  Neurological: She is alert and oriented to person, place, and time  Skin: Skin is warm  Additional Data:     Labs:    Results from last 7 days   Lab Units 08/02/19  0457   WBC Thousand/uL 17 07*   HEMOGLOBIN g/dL 11 7   HEMATOCRIT % 36 4   PLATELETS Thousands/uL 305   NEUTROS PCT % 84*   LYMPHS PCT % 7*   MONOS PCT % 6   EOS PCT % 1     Results from last 7 days   Lab Units 08/02/19  0457   POTASSIUM mmol/L 4 3   CHLORIDE mmol/L 100   CO2 mmol/L 27   BUN mg/dL 16   CREATININE mg/dL 0 70   CALCIUM mg/dL 9 2           * I Have Reviewed All Lab Data Listed Above  * Additional Pertinent Lab Tests Reviewed: All Labs Within Last 24 Hours Reviewed        Recent Cultures (last 7 days):     Results from last 7 days   Lab Units 07/26/19  1538 07/26/19  1358   BLOOD CULTURE   --  No Growth After 5 Days  No Growth After 5 Days     LEGIONELLA URINARY ANTIGEN  Negative  --        Last 24 Hours Medication List:     Current Facility-Administered Medications:  acetaminophen 650 mg Oral Q6H PRN LEE López    amLODIPine 5 mg Oral Daily Deanne Lomax MD    aspirin 81 mg Oral Daily Deanne Lomax MD    atorvastatin 40 mg Oral QPM Deanne Lomax MD    benzonatate 100 mg Oral TID PRN Deanne Lomax MD    bisacodyl 10 mg Rectal Daily PRN LEE López    cefTRIAXone 1,000 mg Intravenous Q24H Cherelle Valenzuela LEE Pederson Last Rate: 1,000 mg (08/01/19 2006)   donepezil 10 mg Oral HS Yumiko Jewell MD    enoxaparin 40 mg Subcutaneous Daily Yumiko Jewell MD    fluticasone 2 spray Nasal Daily Yumiko Jewell MD    lactulose 30 g Oral Once LEE Deleon    levothyroxine 100 mcg Oral Early Morning Yumiko Jewell MD    losartan 50 mg Oral Daily Yumiko Jewell MD    memantine 10 mg Oral BID Yumiko Jewell MD    metoprolol succinate 50 mg Oral Daily Yumiko Jewell MD    montelukast 10 mg Oral HS Yumiko Jewell MD    pantoprazole 40 mg Oral Daily Before Breakfast Yumiko Jewell MD    zolpidem 5 mg Oral HS PRN Yumiko Jewell MD         Today, Patient Was Seen By: Ye Miranda DO    ** Please Note: Dictation voice to text software may have been used in the creation of this document   **

## 2019-08-02 NOTE — PHYSICAL THERAPY NOTE
Physical Therapy Progress Note     08/02/19 1518   Pain Assessment   Pain Assessment 0-10   Pain Score 8   Pain Type Acute pain   Pain Location Back; Chest   Hospital Pain Intervention(s) Ambulation/increased activity   Response to Interventions tolerated   Restrictions/Precautions   Weight Bearing Precautions Per Order No   Other Precautions Cognitive; Bed Alarm; Chair Alarm;Multiple lines;Telemetry; Fall Risk;Pain   General   Chart Reviewed Yes   Response to Previous Treatment Patient with no complaints from previous session  Family/Caregiver Present Yes   Cognition   Overall Cognitive Status Impaired   Arousal/Participation Arousable; Cooperative   Attention Attends with cues to redirect   Following Commands Follows one step commands with increased time or repetition   Subjective   Subjective Patient in bed, reporting pain and fatigue, requiring some encouragement to participate in session today  Bed Mobility   Supine to Sit 3  Moderate assistance   Additional items Assist x 1;HOB elevated; Bedrails; Increased time required;Verbal cues;LE management   Sit to Supine 3  Moderate assistance   Additional items Assist x 1;HOB elevated; Bedrails; Increased time required;Verbal cues;LE management   Transfers   Sit to Stand 3  Moderate assistance   Additional items Assist x 1; Increased time required;Verbal cues  (standby assist of 2nd for safety)   Stand to Sit 3  Moderate assistance   Additional items Assist x 1; Increased time required;Verbal cues; Impulsive  (standby assist of 2nd )   Ambulation/Elevation   Gait pattern Excessively slow; Step to; Foward flexed;Decreased foot clearance; Improper Weight shift;Narrow LADI; Antalgic   Gait Assistance 3  Moderate assist   Additional items Assist x 2  (min assist of 2nd )   Assistive Device Rolling walker   Distance 4 steps fwd/backward    Balance   Static Sitting Fair   Dynamic Sitting Fair -   Static Standing Poor +   Ambulatory Poor   Endurance Deficit   Endurance Deficit Yes Endurance Deficit Description weakness, fatigue, sob   Activity Tolerance   Activity Tolerance Patient limited by fatigue;Patient limited by pain   Nurse Made Aware appropriate to see   Exercises   Quad Sets Supine;5 reps;AROM; Bilateral   Hip Abduction Sitting;10 reps;AROM; Bilateral   Hip Adduction Sitting;10 reps;AROM; Bilateral   Knee AROM Long Arc Quad Sitting;20 reps;AROM; Bilateral   Ankle Pumps Sitting;20 reps;AROM; Bilateral;Supine   Assessment   Prognosis Fair   Problem List Decreased strength;Decreased range of motion;Decreased endurance; Impaired balance;Decreased mobility;Pain;Decreased cognition;Decreased coordination   Assessment Patient requiring encouragement to participate in session initially  Patient 02 levels start of session 94%, with patient reporting SOB  Patient was moderate assist for supine to sit/sit to supine transfers  Increased assistance required for standing and ambulation trials this session secondary to bilateral knee buckling and patient demonstrating difficulty maintaining upright posture  Patient able to advance 4 steps foward/backward, with patient demonstrating impulsivity upon sitting, cues required for safety  Patient requesting to get back into bed, limited this session by pain and fatigue  Patients 02 levels varying from 89-84% during session today  Patient educated on proper breathing  Patient would continue to benefit from physical therapy to improve functional mobility until medically cleared  Goals   Patient Goals to get better   [facility] Expiration Date 08/07/19   Short Term Goal #1 pt will:  Increase bilateral LE strength 1/2 grade to facilitate independent mobility, Perform all bed mobility tasks w/ supervision to decrease fall risk factors, Perform all transfers w/ supervision to improve independence, Ambulate 150 ft  with roller walker w/ supervision w/o LOB, Increase all balance 1/2 grade to decrease risk for falls and Improve Barthel Index score to 60 or greater to facilitate independence   Treatment Day 2   Plan   Treatment/Interventions Functional transfer training;LE strengthening/ROM; Therapeutic exercise; Endurance training;Patient/family training;Gait training;Spoke to nursing   Progress Slow progress, decreased activity tolerance   PT Frequency Other (Comment)  (3-5x/week)   Recommendation   Recommendation Post acute IP rehab   Equipment Recommended Walker   PT - OK to Discharge Yes  (to rehab once medically cleared)     Rishabh Weir, PTA

## 2019-08-02 NOTE — BRIEF OP NOTE (RAD/CATH)
Left chest tube placement  Procedure Note    PATIENT NAME: Jonathan Gutierrez  : 3/23/1931  MRN: [id number]     Pre-op Diagnosis:   1  Left lower lobe pneumonia ([address] )    2  Cough    3  Pleural effusion      Post-op Diagnosis:   1  Left lower lobe pneumonia ([address] )    2  Cough    3  Pleural effusion        Surgeon:   Raoul Palm MD    Estimated Blood Loss: None    Findings: Successful left posterior chest tube placement using 10 2 Fr tube      Specimens: None    Complications:  None    Anesthesia: Conscious sedation and Local    Yonatan MD Kailash     Date: 2019  Time: 6:49 PM

## 2019-08-02 NOTE — PROGRESS NOTES
Progress Note - Infectious Disease   Di Alisa 80 y o  female MRN: [id number]  Unit/Bed#: -01 Encounter: [id number]      Impression/Plan:  1  Community-acquired pneumonia with pleural effusions  Patient presents at this time with progressive weakness and shortness of breath at home along with leukocytosis on admission  Chest x-ray consistent with left lower lobe pneumonia along with significant effusion  Similar finding on CT  Patient remains hemodynamically stable, procalcitonin normalized and leukocytosis down trending  Suspect that pleural effusions are hindering patient is further improvement and possible cause of leukocytosis  Continue on ceftriaxone  Continue to trend fever curve/vitals  Repeat CBC and chemistry tomorrow  Follow up any pending cultures  Would obtain pulmonary/IR evaluation for drainage  Would send fluids for pH, glucose, LDH, albumin, cell count differential, and culture  Additional care as per primary  Antibiotic duration pending fluid studies and clinical improvement      2  Leukocytosis, persistent  Patient noted with persistent leukocytosis  Suspect that this is related to a possible complicated parapneumonic effusion  There are no other obvious sources for elevated white count on her exam   Speech evaluation without signs of aspiration  Repeat CBC tomorrow  Continue antibiotics as above  Additional evaluations as above  Additional care as per primary     3  Dementia  Patient has underlying history of dementia for which she is on medications for  He seems to be at her reported baseline  Continue monitor mental status  Continue antibiotics as above  Additional care as per primary     Above plan discussed in detail with the patient and with primary service      ID consult service will formally re-evaluate the patient again on Monday unless there is new culture data in the interim    Please contact ID attending on call if any additional questions or concerns  Antibiotics:  Ceftriaxone    24 hour events:  No acute events noted overnight on chart review  Patient is currently afebrile  White blood cell count 17  CT noted with large pleural effusions with some loculations  Procalcitonin is negative  Patient's other vitals are stable    Subjective:  Patient has no fever, chills, sweats; no nausea, vomiting, diarrhea; no cough; no pain  No new symptoms  She continues to have pain with deep inspiration along with cough with deep inspiration  She does not feel short of breath per se  She overall reports that she continues to feel fatigued and unwell  Objective:  Vitals:  Temp:  [98 2 °F (36 8 °C)-98 8 °F (37 1 °C)] 98 4 °F (36 9 °C)  HR:  [69-82] 78  Resp:  [17-20] 17  BP: (151-153)/(64-65) 151/65  SpO2:  [91 %-98 %] 93 %  Temp (24hrs), Av 5 °F (36 9 °C), Min:98 2 °F (36 8 °C), Max:98 8 °F (37 1 °C)  Current: Temperature: 98 4 °F (36 9 °C)    Physical Exam:   General Appearance:  Alert, interactive, nontoxic, no acute distress  Throat: Oropharynx moist without lesions  Lungs:   Decreased breath sounds throughout the left lung fields; no wheezes, rhonchi or rales; respirations unlabored; patient is unable to take deep breaths without associated pain in the left chest    Heart:  RRR; no murmur, rub or gallop   Abdomen:   Soft, non-tender, non-distended, positive bowel sounds  Extremities: No clubbing, cyanosis or edema   Skin: No new rashes or lesions  No new draining wounds noted         Labs, Imaging, & Other studies:   All pertinent labs and imaging studies were personally reviewed  Results from last 7 days   Lab Units 19  0457 19  0524 19  0443   WBC Thousand/uL 17 07* 20 52* 17 11*   HEMOGLOBIN g/dL 11 7 11 5 10 8*   PLATELETS Thousands/uL 305 267 226     Results from last 7 days   Lab Units 19  0457   POTASSIUM mmol/L 4 3   CHLORIDE mmol/L 100   CO2 mmol/L 27   BUN mg/dL 16   CREATININE mg/dL 0 70   EGFR ml/min/1 73sq m 78   CALCIUM mg/dL 9 2     Results from last 7 days   Lab Units 07/26/19  1538 07/26/19  1358   BLOOD CULTURE   --  No Growth After 5 Days  No Growth After 5 Days     LEGIONELLA URINARY ANTIGEN  Negative  --

## 2019-08-02 NOTE — CONSULTS
IR Consult Note    HPI:  80year old female admitted with pneumonia was found to have left pneumothorax and is referred for chest tube placement  PMH:  GERD  HLD  HTN  MI    PSH:  Appendectomy    Physical exam:  /73   Pulse 78   Temp 99 7 °F (37 6 °C)   Resp 20   Ht 5' 5" (1 651 m)   Wt 51 6 kg (113 lb 12 1 oz)   SpO2 94%   BMI 18 93 kg/m²   Gen: NAD  Pulm: No resp distress    A/P:  80year old female admitted with left pneumothorax      - Left chest tube placement

## 2019-08-02 NOTE — RESTORATIVE TECHNICIAN NOTE
Restorative Specialist Mobility Note       Activity: Ambulate in dee, Ambulate in room, Bathroom privileges, Dangle, Stand at bedside(Educated/encouraged pt to ambulate with assistance 3-4 x's/day  Bed alarm on   Pt callbell, phone/tray within reach )     Assistive Device: Front wheel walker    Angelica LAKHANI, Restorative Technician, [employer] [facility]

## 2019-08-02 NOTE — CONSULTS
Pulmonary Consultation   Dara Denney 80 y o  female MRN: [id number]  Unit/Bed#: -01 Encounter: [id number]      Reason for consultation: pneumonia, pleural effusion    Requesting physician: Dr Juvencio Lala    Impressions/Recommendations:    1  Community acquired pneumonia  1  ABX ceftriaxone day 8-per ID  2  Repeat Chest imaging in 6-8 weeks for resolution  3  Pulmonary toilet: IS, cough deep breath, OOB as tolerated  4  Titrate oxygen as needed to maintain SpO2 greater than 88%-currently oxygenating well on RA  2  Abnormal chest Ct with loculated left sided pleural effusion  1  Thoracentesis today for 460ml clear yellow fluid, additional pockets of fluid noted with ultrasound-based on fluid studies may require chest tube placement  2  Follow cytology and additional fluid studies  3  Repeat CXR pending  3  Dementia  1  Management per IM    History of Present Illness   HPI:  Dara Denney is a 80 y o  female seen in consult for pneumonia and pleural effusion  She has a PMH significant for: dementia, hypothyroidism, CAD, squamous cell carcinoma of the thigh, CAD, GERD, TIA, generalized weakness, MI, and macular degeneration  She originally presented to the ED on 7/26/2019 with increased weakness, SOB and mild hypoxia  She was then diagnosed with community acquired left sided pneumonia and pleural effusions left greater than right  Now followed by ID and treated with ceftriaxone with negative urinary antigens- total 7 days of ABX  Due to persistent SOB and leukocytosis pulmonary consult placed  CT chest with large loculated left sided pleural effusion    At the time of evaluation Sarah Alvarez was seen resting comfortably in bed on RA  She reports SOB over baseline and generalized pain  She continues to report anorexia and weakness  Denies: fevers, chills, night sweats, bronchospasm, sputum production or hemoptysis    Sarah Alvarez reports feeling unwell for atleast one week    From a pulmonary standpoint she denies lung disease history-life long nonsmoker without occupational exposures  Denies inhaler or nebulizer regimen  Does not require oxygen at baseline  Denies: MELODY or GERD  Reports dysphagia  Denies recent sick contact, travel or exposures  Denies limitations at baseline due to SOB/DOLAN    Review of systems:  12 point review of systems was completed and was otherwise negative except as listed in HPI        Historical Information   Past Medical History:   Diagnosis Date    Disease of thyroid gland     GERD (gastroesophageal reflux disease)     Hyperlipidemia     Hypertension     Insomnia     MI (my[address]) [address] [address] )      Past Surgical History:   Procedure Laterality Date    APPENDECTOMY      COLONOSCOPY      [id number]  LAST ASSESSED     Family History   Problem Relation Age of Onset    No Known Problems Father     Heart disease Family        Occupational history: administration for hospital [employer]    Tobacco history: never smoker    Meds/Allergies   Current Facility-Administered Medications   Medication Dose Route Frequency    acetaminophen (TYLENOL) tablet 650 mg  650 mg Oral Q6H PRN    amLODIPine (NORVASC) tablet 5 mg  5 mg Oral Daily    aspirin chewable tablet 81 mg  81 mg Oral Daily    atorvastatin (LIPITOR) tablet 40 mg  40 mg Oral QPM    benzonatate (TESSALON PERLES) capsule 100 mg  100 mg Oral TID PRN    bisacodyl (DULCOLAX) rectal suppository 10 mg  10 mg Rectal Daily PRN    cefTRIAXone (ROCEPHIN) 1,000 mg in dextrose 5 % 50 mL IVPB  1,000 mg Intravenous Q24H    donepezil (ARICEPT) tablet 10 mg  10 mg Oral HS    enoxaparin (LOVENOX) subcutaneous injection 40 mg  40 mg Subcutaneous Daily    fluticasone (FLONASE) 50 mcg/act nasal spray 2 spray  2 spray Nasal Daily    lactulose 20 g/30 mL oral solution 30 g  30 g Oral Once    levothyroxine tablet 100 mcg  100 mcg Oral Early Morning    losartan (COZAAR) tablet 50 mg  50 mg Oral Daily    memantine (NAMENDA) tablet 10 mg  10 mg Oral BID  metoprolol succinate (TOPROL-XL) 24 hr tablet 50 mg  50 mg Oral Daily    montelukast (SINGULAIR) tablet 10 mg  10 mg Oral HS    pantoprazole (PROTONIX) EC tablet 40 mg  40 mg Oral Daily Before Breakfast    zolpidem (AMBIEN) tablet 5 mg  5 mg Oral HS PRN     Medications Prior to Admission   Medication    amLODIPine (NORVASC) 5 mg tablet    aspirin 81 mg chewable tablet    atorvastatin (LIPITOR) 40 mg tablet    benzonatate (TESSALON PERLES) 100 mg capsule    cholecalciferol (VITAMIN D3) 1,000 units tablet    cyanocobalamin (VITAMIN B-12) 1,000 mcg tablet    donepezil (ARICEPT) 10 mg tablet    fluticasone (FLONASE) 50 mcg/act nasal spray    levothyroxine 100 mcg tablet    losartan (COZAAR) 50 mg tablet    memantine (NAMENDA) 10 mg tablet    metoprolol succinate (TOPROL-XL) 50 mg 24 hr tablet    montelukast (SINGULAIR) 10 mg tablet    omeprazole (PriLOSEC) 20 mg delayed release capsule    zolpidem (AMBIEN) 5 mg tablet     Allergies   Allergen Reactions    Alendronate      Other reaction(s): tooth decay    Diphenhydramine Hyperactivity    Penicillins Other (See Comments)     Reaction not listed; however, has tolerated Ceftriaxone and Cefepime which have different side chains  Vitals: Blood pressure 151/65, pulse 78, temperature 98 4 °F (36 9 °C), resp  rate 17, height 5' 5" (1 651 m), weight 51 6 kg (113 lb 12 1 oz), SpO2 93 % , RA, Body mass index is 18 93 kg/m²        Intake/Output Summary (Last 24 hours) at 8/2/2019 1004  Last data filed at 8/2/2019 0900  Gross per 24 hour   Intake 540 ml   Output 992 ml   Net -452 ml       Physical exam:    General Appearance:    Alert, cooperative, no conversational dyspnea no accessory     muscle use       Head/eyes:    Normocephalic, without obvious abnormality, atraumatic,         PERRL, extraocular muscles intact, no scleral icterus    Nose:   Nares normal, septum midline, mucosa normal, no drainage    or sinus tenderness   Throat:   Moist mucous membranes, no thrush   Neck:   Supple, trachea midline, no adenopathy; no carotid    bruit or JVD   Lungs:     Decreased breath sounds on the left, and crackles on the right base   Chest Wall:    No tenderness or deformity    Heart:    Regular rate and rhythm, S1 and S2 normal, no murmur, rub   or gallop   Abdomen:     Soft, non-tender, bowel sounds active all four quadrants,     no masses, no organomegaly   Extremities:   Extremities normal, atraumatic, no cyanosis no edema   Skin:   Warm, dry, turgor normal, no rashes or lesions   Lymph nodes:   Cervical and supraclavicular nodes normal   Neurologic:   CNII-XII intact, normal strength, non-focal         Labs: I have personally reviewed pertinent lab results  , CBC:   Lab Results   Component Value Date    WBC 17 07 (H) 08/02/2019    HGB 11 7 08/02/2019    HCT 36 4 08/02/2019    MCV 96 08/02/2019     08/02/2019    MCH 30 8 08/02/2019    MCHC 32 1 08/02/2019    RDW 12 8 08/02/2019    MPV 10 4 08/02/2019    NRBC 0 08/02/2019   , CMP:   Lab Results   Component Value Date    SODIUM 134 (L) 08/02/2019    K 4 3 08/02/2019     08/02/2019    CO2 27 08/02/2019    BUN 16 08/02/2019    CREATININE 0 70 08/02/2019    CALCIUM 9 2 08/02/2019    EGFR 78 08/02/2019       Imaging and other studies: I have personally reviewed pertinent reports   , I have personally reviewed pertinent films in PACS and Chest CT 8/1/2019  IMPRESSION:  1  Predominantly right-sided groundglass and patchy densities concerning for multilobar infiltrates  Additionally, there are left greater than right pleural effusions with prominent loculation on the left and likely associated compressive atelectasis     3   Small pericardial effusion  Pulmonary function testing: none    EKG, Pathology, and Other Studies: none    Code Status: Level 1 - Full Code      LEE Mccoy

## 2019-08-02 NOTE — PLAN OF CARE
Problem: PHYSICAL THERAPY ADULT  Goal: Performs mobility at highest level of function for planned discharge setting  See evaluation for individualized goals  Description  Treatment/Interventions: Functional transfer training, LE strengthening/ROM, Therapeutic exercise, Endurance training, Patient/family training, Equipment eval/education, Bed mobility, Gait training, Spoke to nursing, Family, Cognitive reorientation  Equipment Recommended: Taran Vieira       See flowsheet documentation for full assessment, interventions and recommendations  Outcome: Progressing  Note:   Prognosis: Fair  Problem List: Decreased strength, Decreased range of motion, Decreased endurance, Impaired balance, Decreased mobility, Pain, Decreased cognition, Decreased coordination  Assessment: Patient requiring encouragement to participate in session initially  Patient 02 levels start of session 94%, with patient reporting SOB  Patient was moderate assist for supine to sit/sit to supine transfers  Increased assistance required for standing and ambulation trials this session secondary to bilateral knee buckling and patient demonstrating difficulty maintaining upright posture  Patient able to advance 4 steps foward/backward, with patient demonstrating impulsivity upon sitting, cues required for safety  Patient requesting to get back into bed, limited this session by pain and fatigue  Patients 02 levels varying from 89-84% during session today  Patient educated on proper breathing  Patient would continue to benefit from physical therapy to improve functional mobility until medically cleared  Recommendation: Post acute IP rehab     PT - OK to Discharge: (S) Yes(to rehab once medically cleared)    See flowsheet documentation for full assessment

## 2019-08-02 NOTE — ASSESSMENT & PLAN NOTE
In ER evaluation patient's O2 saturation dropped into low 90s on ambulation, [facility] is not comfortable taking him back at this point because of generalized weakness and mild hypoxia  LL and LM lobe seen on lateral chest x-ray with possible effusion  Patient has leukocytosis, which has peaked now trending down however still high  Procalcitonin on admission negative    With increase to  0 92 and on 7/29 0 53  Pt remains afebrile   Started on ceftriaxone azithromycin,  azithro dc due to negative urine    Patient saturating well on room air, lungs diminished   Pt does have difficulty swallowing per the pt she reports only a comfort for swallowing very soft food such as oatmeal this am  Question possible silent aspiration, would ask speech to eval ?  Check ambulatory pulse ox prior to dc    blood cultures negative after 5 days   urine strep and Legionella antigen, negative  azithro discontinued  resp protocol although pt doesn't really have much resp symptoms    Note CT findings  Left-sided pleural effusion greater than right  Will consult Pulmonary for further evaluation  Will likely need tap

## 2019-08-02 NOTE — PROCEDURES
Thoracentesis  Date/Time: 8/2/2019 12:29 PM  Performed by: Ernestina Woods MD  Authorized by: Ernestina Woods MD     Patient location:  Bedside  Consent:     Consent obtained:  Verbal    Consent given by:  Spouse    Risks discussed:  Bleeding, infection, pain, pneumothorax, nerve damage and incomplete drainage    Alternatives discussed:  No treatment and delayed treatment  Universal protocol:     Procedure explained and questions answered to patient or proxy's satisfaction: yes      Relevant documents present and verified: yes      Test results available and properly labeled: yes      Radiology Images displayed and confirmed  If images not available, report reviewed: yes      Required blood products, implants, devices and special equipment available: yes      Site/side marked: yes      Immediately prior to procedure a time out was called: yes      Patient identity confirmed:  Verbally with patient and arm band  Indications:     Procedure Purpose: diagnostic and therapeutic      Indications: pleural effusion    Anesthesia (see MAR for exact dosages): Anesthesia method:  Local infiltration    Local anesthetic:  Lidocaine 1% w/o epi  Procedure details:     Standard thoracentesis cath kit used: Yes      Patient position:  Sitting    Laterality:  Left    Location:  Posterior    Puncture method:  Over-the-needle catheter    Ultrasound guidance: yes      Reason for ultrasound: Identify fluid collection and guide cathetar placement  Images stored locally on hard drive      Indwelling catheter placed: no      Number of attempts:  1    Drainage color:  Yellow    Drainage characteristics:  Clear    Fluid removed amount:  460cc  Post-procedure details:     Post-procedure chest x-ray: ordered       Patient tolerance of procedure:   Tolerated well, no immediate complications  Comments:      US post procedure confirmed lung sliding, no obvious pneumothorax in multiple places

## 2019-08-02 NOTE — ASSESSMENT & PLAN NOTE
White count slightly improved  No reported diarrhea symptoms  Question bone marrow process versus infection  Repeat procalcitonin and trend

## 2019-08-02 NOTE — DISCHARGE INSTRUCTIONS
Chest Tubes   WHAT YOU NEED TO KNOW:   What is a chest tube? A chest tube is also known as chest drain or chest drainage tube  It is a plastic tube that is put through the side of your chest  It uses a suction device to remove air, blood, or fluid from around your lungs or heart  A chest tube will help you breathe more easily  Why may I need a chest tube? · Pneumothorax: This is a condition in which air escapes from the lung into the pleural space  This is the area between your lungs and your chest wall  A pneumothorax makes it difficult to breathe  This can happen with a traumatic chest injury, such as a rib fracture  A pneumothorax may happen if you use a ventilator or may be caused by an internal injury  · Pleural effusion: This happens when fluid builds up in the pleural space from certain conditions such as heart failure, infection, or a tumor  · Empyema: This is an infection in the pleural space  · Hemothorax: This is when blood leaks into the pleural space  A traumatic chest injury, a tumor, or bleeding problems may cause a hemothorax  What happens after a chest tube has been inserted? · Medicines:      ¨ Antibiotics: This is used to fight an infection caused by bacteria  ¨ Pain medicine: You may be given medicine to take away or decrease pain  Do not wait until the pain is severe before you take your medicine  · Tests: You may need a chest x-ray or a CT scan after your chest tube is inserted to check if it is in the right place  You may also need an x-ray after your chest tube is removed  How can I help prevent problems with the chest tube? · Find a comfortable position:  You may have pain or discomfort while the chest tube is in  Lie in a different position to help decrease your pain  · Deep breathe and cough:  Deep breathing helps open the air passages in your lungs  Coughing helps to bring up mucus from your lungs   You can deep breathe and cough on your own or with the help of an incentive spirometer  ¨ Take a deep breath and hold it as long as you can  Then push the air out of your lungs with a deep, strong cough  Cough into a tissue and throw it away  Take 10 deep breaths in a row every hour that you are awake  Remember to follow each deep breath with a cough  ¨ An incentive spirometer can help you take deeper breaths  Put the plastic piece into your mouth and take a steady, deep breath in  Hold it as long as you can, and then breathe out  Use your incentive spirometer 10 times every hour that you are awake  · Check your chest tube for kinks or loops:  Keep the tube close to you when you are in bed, but do not lie on it  Do not let loops of tubing hang down the side of your bed  Be sure your tubing is long enough so that you can move and turn in bed without pulling on it  Never clamp the tube yourself  · Keep the suction device below the level of your chest:  This will help fluids drain out from your chest to the container below  This will also help prevent fluids from flowing back into your chest      · Make sure your chest tube is secure: Make sure your chest tube is securely taped to your body  Your chest tube may also be taped to the suction device to help prevent the tubes from coming apart  · Do not turn knobs or change settings on your device unless a healthcare provider tells you to: If your suction device has water in it, the water should bubble gently, with short periods of no bubbling  If there is a lot of bubbling that does not stop, this may mean that there is an air leak  What are the risks of a chest tube? · You may get an infection in the area where the tube was inserted  The tube may damage organs that are close to your lungs  Your chest tube may move out of place when you move or turn  If this happens, you may need to have another chest tube put in      · You may get a blood clot in your leg or arm   This can cause pain and swelling, and it can stop blood from flowing where it needs to go in your body  The blood clot can break loose and travel to your lungs  A blood clot in your lungs can cause chest pain and trouble breathing  This can be life-threatening  When should I contact my healthcare provider? Contact your healthcare provider if:  · You have a fever  · You have severe pain and swelling at your wound area  · Your wound is red, draining pus, or has a bad smell coming from it  · You have questions or concerns about your condition or care  When should I seek immediate care? Seek care immediately or call 911 if:  · Blood or fluid soaks through your bandage  · Your bandage comes off  · Your arm or leg feels warm, tender, and painful  It may look swollen and red  · You suddenly feel lightheaded and have shortness of breath  · You have chest pain  You may have more pain when you take a deep breath or cough  You may cough up blood  CARE AGREEMENT:   You have the right to help plan your care  Learn about your health condition and how it may be treated  Discuss treatment options with your caregivers to decide what care you want to receive  You always have the right to refuse treatment  The above information is an  only  It is not intended as medical advice for individual conditions or treatments  Talk to your doctor, nurse or pharmacist before following any medical regimen to see if it is safe and effective for you  © 2017 [employer] [employer] Information is for End User's use only and may not be sold, redistributed or otherwise used for commercial purposes  All illustrations and images included in CareNotes® are the copyrighted property of [employer]  or [employer]

## 2019-08-02 NOTE — PLAN OF CARE
Problem: Potential for Falls  Goal: Patient will remain free of falls  Description  INTERVENTIONS:  - Assess patient frequently for physical needs  -  Identify cognitive and physical deficits and behaviors that affect risk of falls    -  [place] fall precautions as indicated by assessment   - Educate patient/family on patient safety including physical limitations  - Instruct patient to call for assistance with activity based on assessment  - Modify environment to reduce risk of injury  - Consider OT/PT consult to assist with strengthening/mobility  Outcome: Progressing     Problem: Prexisting or High Potential for Compromised Skin Integrity  Goal: Skin integrity is maintained or improved  Description  INTERVENTIONS:  - Identify patients at risk for skin breakdown  - Assess and monitor skin integrity  - Assess and monitor nutrition and hydration status  - Monitor labs (i e  albumin)  - Assess for incontinence   - Turn and reposition patient  - Assist with mobility/ambulation  - Relieve pressure over bony prominences  - Avoid friction and shearing  - Provide appropriate hygiene as needed including keeping skin clean and dry  - Evaluate need for skin moisturizer/barrier cream  - Collaborate with interdisciplinary team (i e  Nutrition, Rehabilitation, etc )   - Patient/family teaching  Outcome: Progressing     Problem: PAIN - ADULT  Goal: Verbalizes/displays adequate comfort level or baseline comfort level  Description  Interventions:  - Encourage patient to monitor pain and request assistance  - Assess pain using appropriate pain scale  - Administer analgesics based on type and severity of pain and evaluate response  - Implement non-pharmacological measures as appropriate and evaluate response  - Notify physician/advanced practitioner if interventions unsuccessful or patient reports new pain   Outcome: Progressing     Problem: SAFETY ADULT  Goal: Maintain or return to baseline ADL function  Description  INTERVENTIONS:  -  Assess patient's ability to carry out ADLs; assess patient's baseline for ADL function and identify physical deficits which impact ability to perform ADLs (bathing, care of mouth/teeth, toileting, grooming, dressing, etc )  - Assess/evaluate cause of self-care deficits   - Assess range of motion  - Assess patient's mobility; develop plan if impaired  - Assess patient's need for assistive devices and provide as appropriate  - Encourage maximum independence but intervene and supervise when necessary  ¯ Involve family in performance of ADLs  ¯ Assess for home care needs following discharge   ¯ Request OT consult to assist with ADL evaluation and planning for discharge  ¯ Provide patient education as appropriate  Outcome: Progressing  Goal: Maintain or return mobility status to optimal level  Description  INTERVENTIONS:  - Assess patient's baseline mobility status (ambulation, transfers, stairs, etc )    - Identify cognitive and physical deficits and behaviors that affect mobility  - Identify mobility aids required to assist with transfers and/or ambulation (gait belt, sit-to-stand, lift, walker, cane, etc )  - [place] fall precautions as indicated by assessment  - Record patient progress and toleration of activity level on Mobility SBAR; progress patient to next Phase/Stage  - Instruct patient to call for assistance with activity based on assessment  - Request Rehabilitation consult to assist with strengthening/weightbearing, etc   Outcome: Progressing     Problem: DISCHARGE PLANNING  Goal: Discharge to home or other facility with appropriate resources  Description  INTERVENTIONS:  - Identify barriers to discharge w/patient and caregiver  - Arrange for needed discharge resources and transportation as appropriate  - Identify discharge learning needs (meds, wound care, etc )  - Refer to Case Management Department for coordinating discharge planning if the patient needs post-hospital services based on physician/advanced practitioner order or complex needs related to functional status, cognitive ability, or social support system   Outcome: Progressing     Problem: Knowledge Deficit  Goal: Patient/family/caregiver demonstrates understanding of disease process, treatment plan, medications, and discharge instructions  Description  Complete learning assessment and assess knowledge base    Interventions:  - Provide teaching at level of understanding  - Provide teaching via preferred learning methods  Outcome: Progressing     Problem: INFECTION - ADULT  Goal: Absence or prevention of progression during hospitalization  Description  INTERVENTIONS:  - Assess and monitor for signs and symptoms of infection  - Monitor lab/diagnostic results  - Monitor all insertion sites, i e  indwelling lines, tubes, and drains  - [place] appropriate cooling/warming therapies per order  - Administer medications as ordered  - Instruct and encourage patient and family to use good hand hygiene technique  - Identify and instruct in appropriate isolation precautions for identified infection/condition   Outcome: Progressing     Problem: MUSCULOSKELETAL - ADULT  Goal: Maintain or return mobility to safest level of function  Description  INTERVENTIONS:  - Assess patient's ability to carry out ADLs; assess patient's baseline for ADL function and identify physical deficits which impact ability to perform ADLs (bathing, care of mouth/teeth, toileting, grooming, dressing, etc )  - Assess/evaluate cause of self-care deficits   - Assess range of motion  - Assess patient's mobility; develop plan if impaired  - Assess patient's need for assistive devices and provide as appropriate  - Encourage maximum independence but intervene and supervise when necessary  - Involve family in performance of ADLs  - Assess for home care needs following discharge   - Request OT consult to assist with ADL evaluation and planning for discharge  - Provide patient education as appropriate  Outcome: Progressing

## 2019-08-03 LAB
ERYTHROCYTE [DISTWIDTH] IN BLOOD BY AUTOMATED COUNT: 12.9 % (ref 11.6–15.1)
GLUCOSE SERPL-MCNC: 100 MG/DL (ref 65–140)
GLUCOSE SERPL-MCNC: 103 MG/DL (ref 65–140)
GLUCOSE SERPL-MCNC: 157 MG/DL (ref 65–140)
GLUCOSE SERPL-MCNC: 163 MG/DL (ref 65–140)
GLUCOSE SERPL-MCNC: 87 MG/DL (ref 65–140)
HCT VFR BLD AUTO: 32.9 % (ref 34.8–46.1)
HGB BLD-MCNC: 10.6 G/DL (ref 11.5–15.4)
MCH RBC QN AUTO: 31.3 PG (ref 26.8–34.3)
MCHC RBC AUTO-ENTMCNC: 32.2 G/DL (ref 31.4–37.4)
MCV RBC AUTO: 97 FL (ref 82–98)
PLATELET # BLD AUTO: 281 THOUSANDS/UL (ref 149–390)
PMV BLD AUTO: 10.1 FL (ref 8.9–12.7)
RBC # BLD AUTO: 3.39 MILLION/UL (ref 3.81–5.12)
WBC # BLD AUTO: 14.62 THOUSAND/UL (ref 4.31–10.16)

## 2019-08-03 PROCEDURE — 82948 REAGENT STRIP/BLOOD GLUCOSE: CPT

## 2019-08-03 PROCEDURE — 99232 SBSQ HOSP IP/OBS MODERATE 35: CPT | Performed by: INTERNAL MEDICINE

## 2019-08-03 PROCEDURE — 85027 COMPLETE CBC AUTOMATED: CPT | Performed by: INTERNAL MEDICINE

## 2019-08-03 RX ORDER — OXYCODONE HYDROCHLORIDE 5 MG/1
2.5 TABLET ORAL EVERY 4 HOURS PRN
Status: DISCONTINUED | OUTPATIENT
Start: 2019-08-03 | End: 2019-08-07

## 2019-08-03 RX ADMIN — ATORVASTATIN CALCIUM 40 MG: 40 TABLET, FILM COATED ORAL at 16:27

## 2019-08-03 RX ADMIN — ENOXAPARIN SODIUM 40 MG: 40 INJECTION SUBCUTANEOUS at 09:20

## 2019-08-03 RX ADMIN — OXYCODONE HYDROCHLORIDE 2.5 MG: 5 TABLET ORAL at 11:02

## 2019-08-03 RX ADMIN — DONEPEZIL HYDROCHLORIDE 10 MG: 10 TABLET ORAL at 21:26

## 2019-08-03 RX ADMIN — LOSARTAN POTASSIUM 50 MG: 50 TABLET, FILM COATED ORAL at 09:22

## 2019-08-03 RX ADMIN — METOPROLOL SUCCINATE 50 MG: 50 TABLET, EXTENDED RELEASE ORAL at 09:22

## 2019-08-03 RX ADMIN — CEFTRIAXONE SODIUM 1000 MG: 10 INJECTION, POWDER, FOR SOLUTION INTRAVENOUS at 21:26

## 2019-08-03 RX ADMIN — PANTOPRAZOLE SODIUM 40 MG: 40 TABLET, DELAYED RELEASE ORAL at 05:39

## 2019-08-03 RX ADMIN — ACETAMINOPHEN 650 MG: 325 TABLET ORAL at 00:08

## 2019-08-03 RX ADMIN — AMLODIPINE BESYLATE 5 MG: 5 TABLET ORAL at 09:22

## 2019-08-03 RX ADMIN — ASPIRIN 81 MG 81 MG: 81 TABLET ORAL at 09:21

## 2019-08-03 RX ADMIN — MONTELUKAST SODIUM 10 MG: 10 TABLET, FILM COATED ORAL at 21:26

## 2019-08-03 RX ADMIN — MEMANTINE 10 MG: 10 TABLET ORAL at 16:27

## 2019-08-03 RX ADMIN — MEMANTINE 10 MG: 10 TABLET ORAL at 09:21

## 2019-08-03 RX ADMIN — LEVOTHYROXINE SODIUM 100 MCG: 100 TABLET ORAL at 05:39

## 2019-08-03 RX ADMIN — ACETAMINOPHEN 650 MG: 325 TABLET ORAL at 09:20

## 2019-08-03 NOTE — PROGRESS NOTES
Progress Note - Pulmonary   Sarthak Iniguez 80 y o  female MRN: [id number]  Unit/Bed#: -01 Encounter: [id number]    Assessment/Plan:    1  Acute pulmonary insufficiency likely secondary to pneumonia with pleural effusion      *  currently on 3 L-97%- patient reports she does not wear home O2 therapy      *  will continue to maintain saturations greater than 89%      *  initiate pulmonary toileting- deep breathing cough, OOB as tolerated, IS Q 1 hr     2  Community-acquired pneumonia       *  ID following       *  Day #8 ceftriaxone, receive 2 days cefepime, received 2 days azithromycin       *  procalcitonin resolved, WBC trending down 14 62, pleural culture-preliminary negative    3  Loculated left-sided exudative pleural effusion with resolved hydropneumothorax       *  Thoracentesis 8/2/19- 460 mL clear yellow fluid-  chest tube placed s/p procedure       *  repeat chest x-ray- resolved hydropneumothorax improving pleural effusion       *  total output since placement- 60 mL-  no indication for tPA/dornase at this time       *  CT- 10 [place] left pleural chest tube- switch to -20 H2O seal       *  repeat chest x-ray in a m  *  concern for empyema with pH of 6 0 and glucose 91       *  Pending-AFB, cytology, fungal       *  if patient develops significant left pleuritic chest pain- please immediately obtain chest x-ray    Chief Complaint:    "I am having a chest pain"    Subjective:    Gio Meng was comfortably sitting in her bed  She was reporting left pleuritic chest pain with chest tube was placed  She stated that she is like started feeling better  No significant overnight events reported  She currently denies any fever, chills, hemoptysis, cough, headache, or night sweats  Objective:    Vitals: Blood pressure 120/51, pulse 68, temperature 97 7 °F (36 5 °C), resp  rate 20, height 5' 5" (1 651 m), weight 51 6 kg (113 lb 12 1 oz), SpO2 97 %  3L,Body mass index is 18 93 kg/m²        Intake/Output Summary (Last 24 hours) at 8/3/2019 1104  Last data filed at 8/3/2019 9964  Gross per 24 hour   Intake 410 ml   Output 466 ml   Net -56 ml       Invasive Devices     Peripheral Intravenous Line            Peripheral IV 07/31/19 Left;Ventral (anterior) Forearm 2 days          Drain            Chest Tube 1 Left Pleural 10 Fr  less than 1 day                Physical Exam:   Physical Exam   Constitutional: She appears well-developed and well-nourished  HENT:   Head: Normocephalic and atraumatic  Neck: Normal range of motion  Neck supple  No JVD present  No tracheal deviation present  Cardiovascular: Normal rate, regular rhythm and normal heart sounds  Exam reveals no gallop and no friction rub  No murmur heard  Pulmonary/Chest: No accessory muscle usage or stridor  No tachypnea and no bradypnea  No respiratory distress  She has decreased breath sounds in the right upper field, the right middle field, the right lower field, the left upper field, the left middle field and the left lower field  She has no wheezes  She has no rhonchi  She has no rales  She exhibits tenderness  CT- 10 [place] left pleural chest tube- no crepitus noted on chest wall, neck, or back   no bubbling noted in chest tube chamber   Abdominal: Soft  Bowel sounds are normal  She exhibits no distension  There is no tenderness  Musculoskeletal: Normal range of motion  She exhibits no edema or deformity  Neurological: She is alert  Oriented 1-2   Skin: Skin is warm and dry  Psychiatric: She has a normal mood and affect  Her behavior is normal        Labs:    I have personally reviewed pertinent lab results, CBC:   Lab Results   Component Value Date    WBC 14 62 (H) 08/03/2019    HGB 10 6 (L) 08/03/2019    HCT 32 9 (L) 08/03/2019    MCV 97 08/03/2019     08/03/2019    MCH 31 3 08/03/2019    MCHC 32 2 08/03/2019    RDW 12 9 08/03/2019    MPV 10 1 08/03/2019       Imaging and other studies: I have personally reviewed pertinent films in PACS     Chest x-ray-08/02/2019- significant improvement left hydropneumothorax

## 2019-08-03 NOTE — NURSING NOTE
Spk with Dr Zulema Geller regarding patients orders for chest tube to suction  Patient came up from IR with Chest tube to gravity  Order was not very clear if it was to be hooked up to waterseal or wall suction  Was advised by Dr Zulema Geller to place to wall suction 125 on continuous  Placed to wall suction, Patient tolerating well  RN will continue to monitor patient

## 2019-08-03 NOTE — SOCIAL WORK
CM was contacted by pt's son, Ede Flynn [phone]  Orestes Pal was looking for a medical update for pt  CM was able to give him minimal information from the notes  CM gave Orestes Pal the attending providers phone and nurses phone number for medical updates and questions

## 2019-08-03 NOTE — PLAN OF CARE
Problem: Potential for Falls  Goal: Patient will remain free of falls  Description  INTERVENTIONS:  - Assess patient frequently for physical needs  -  Identify cognitive and physical deficits and behaviors that affect risk of falls    -  [place] fall precautions as indicated by assessment   - Educate patient/family on patient safety including physical limitations  - Instruct patient to call for assistance with activity based on assessment  - Modify environment to reduce risk of injury  - Consider OT/PT consult to assist with strengthening/mobility  Outcome: Progressing     Problem: Prexisting or High Potential for Compromised Skin Integrity  Goal: Skin integrity is maintained or improved  Description  INTERVENTIONS:  - Identify patients at risk for skin breakdown  - Assess and monitor skin integrity  - Assess and monitor nutrition and hydration status  - Monitor labs (i e  albumin)  - Assess for incontinence   - Turn and reposition patient  - Assist with mobility/ambulation  - Relieve pressure over bony prominences  - Avoid friction and shearing  - Provide appropriate hygiene as needed including keeping skin clean and dry  - Evaluate need for skin moisturizer/barrier cream  - Collaborate with interdisciplinary team (i e  Nutrition, Rehabilitation, etc )   - Patient/family teaching  Outcome: Progressing     Problem: PAIN - ADULT  Goal: Verbalizes/displays adequate comfort level or baseline comfort level  Description  Interventions:  - Encourage patient to monitor pain and request assistance  - Assess pain using appropriate pain scale  - Administer analgesics based on type and severity of pain and evaluate response  - Implement non-pharmacological measures as appropriate and evaluate response  - Notify physician/advanced practitioner if interventions unsuccessful or patient reports new pain   Outcome: Progressing     Problem: SAFETY ADULT  Goal: Maintain or return to baseline ADL function  Description  INTERVENTIONS:  -  Assess patient's ability to carry out ADLs; assess patient's baseline for ADL function and identify physical deficits which impact ability to perform ADLs (bathing, care of mouth/teeth, toileting, grooming, dressing, etc )  - Assess/evaluate cause of self-care deficits   - Assess range of motion  - Assess patient's mobility; develop plan if impaired  - Assess patient's need for assistive devices and provide as appropriate  - Encourage maximum independence but intervene and supervise when necessary  ¯ Involve family in performance of ADLs  ¯ Assess for home care needs following discharge   ¯ Request OT consult to assist with ADL evaluation and planning for discharge  ¯ Provide patient education as appropriate  Outcome: Progressing  Goal: Maintain or return mobility status to optimal level  Description  INTERVENTIONS:  - Assess patient's baseline mobility status (ambulation, transfers, stairs, etc )    - Identify cognitive and physical deficits and behaviors that affect mobility  - Identify mobility aids required to assist with transfers and/or ambulation (gait belt, sit-to-stand, lift, walker, cane, etc )  - [place] fall precautions as indicated by assessment  - Record patient progress and toleration of activity level on Mobility SBAR; progress patient to next Phase/Stage  - Instruct patient to call for assistance with activity based on assessment  - Request Rehabilitation consult to assist with strengthening/weightbearing, etc   Outcome: Progressing     Problem: DISCHARGE PLANNING  Goal: Discharge to home or other facility with appropriate resources  Description  INTERVENTIONS:  - Identify barriers to discharge w/patient and caregiver  - Arrange for needed discharge resources and transportation as appropriate  - Identify discharge learning needs (meds, wound care, etc )  - Refer to Case Management Department for coordinating discharge planning if the patient needs post-hospital services based on physician/advanced practitioner order or complex needs related to functional status, cognitive ability, or social support system   Outcome: Progressing     Problem: Knowledge Deficit  Goal: Patient/family/caregiver demonstrates understanding of disease process, treatment plan, medications, and discharge instructions  Description  Complete learning assessment and assess knowledge base    Interventions:  - Provide teaching at level of understanding  - Provide teaching via preferred learning methods  Outcome: Progressing     Problem: INFECTION - ADULT  Goal: Absence or prevention of progression during hospitalization  Description  INTERVENTIONS:  - Assess and monitor for signs and symptoms of infection  - Monitor lab/diagnostic results  - Monitor all insertion sites, i e  indwelling lines, tubes, and drains  - [place] appropriate cooling/warming therapies per order  - Administer medications as ordered  - Instruct and encourage patient and family to use good hand hygiene technique  - Identify and instruct in appropriate isolation precautions for identified infection/condition   Outcome: Progressing     Problem: MUSCULOSKELETAL - ADULT  Goal: Maintain or return mobility to safest level of function  Description  INTERVENTIONS:  - Assess patient's ability to carry out ADLs; assess patient's baseline for ADL function and identify physical deficits which impact ability to perform ADLs (bathing, care of mouth/teeth, toileting, grooming, dressing, etc )  - Assess/evaluate cause of self-care deficits   - Assess range of motion  - Assess patient's mobility; develop plan if impaired  - Assess patient's need for assistive devices and provide as appropriate  - Encourage maximum independence but intervene and supervise when necessary  - Involve family in performance of ADLs  - Assess for home care needs following discharge   - Request OT consult to assist with ADL evaluation and planning for discharge  - Provide patient education as appropriate  Outcome: Progressing

## 2019-08-04 ENCOUNTER — APPOINTMENT (INPATIENT)
Dept: RADIOLOGY | Facility: HOSPITAL | Age: 84
DRG: 193 | End: 2019-08-04
Payer: MEDICARE

## 2019-08-04 LAB
BASOPHILS # BLD AUTO: 0.06 THOUSANDS/ΜL (ref 0–0.1)
BASOPHILS NFR BLD AUTO: 0 % (ref 0–1)
EOSINOPHIL # BLD AUTO: 0.14 THOUSAND/ΜL (ref 0–0.61)
EOSINOPHIL NFR BLD AUTO: 1 % (ref 0–6)
ERYTHROCYTE [DISTWIDTH] IN BLOOD BY AUTOMATED COUNT: 12.8 % (ref 11.6–15.1)
GLUCOSE SERPL-MCNC: 121 MG/DL (ref 65–140)
GLUCOSE SERPL-MCNC: 127 MG/DL (ref 65–140)
GLUCOSE SERPL-MCNC: 151 MG/DL (ref 65–140)
GLUCOSE SERPL-MCNC: 93 MG/DL (ref 65–140)
HCT VFR BLD AUTO: 33.6 % (ref 34.8–46.1)
HGB BLD-MCNC: 11 G/DL (ref 11.5–15.4)
IMM GRANULOCYTES # BLD AUTO: 0.32 THOUSAND/UL (ref 0–0.2)
IMM GRANULOCYTES NFR BLD AUTO: 2 % (ref 0–2)
LYMPHOCYTES # BLD AUTO: 1.09 THOUSANDS/ΜL (ref 0.6–4.47)
LYMPHOCYTES NFR BLD AUTO: 7 % (ref 14–44)
MCH RBC QN AUTO: 31.2 PG (ref 26.8–34.3)
MCHC RBC AUTO-ENTMCNC: 32.7 G/DL (ref 31.4–37.4)
MCV RBC AUTO: 95 FL (ref 82–98)
MONOCYTES # BLD AUTO: 0.83 THOUSAND/ΜL (ref 0.17–1.22)
MONOCYTES NFR BLD AUTO: 5 % (ref 4–12)
NEUTROPHILS # BLD AUTO: 13.78 THOUSANDS/ΜL (ref 1.85–7.62)
NEUTS SEG NFR BLD AUTO: 85 % (ref 43–75)
NRBC BLD AUTO-RTO: 0 /100 WBCS
PLATELET # BLD AUTO: 330 THOUSANDS/UL (ref 149–390)
PMV BLD AUTO: 9.5 FL (ref 8.9–12.7)
RBC # BLD AUTO: 3.53 MILLION/UL (ref 3.81–5.12)
WBC # BLD AUTO: 16.22 THOUSAND/UL (ref 4.31–10.16)

## 2019-08-04 PROCEDURE — 85025 COMPLETE CBC W/AUTO DIFF WBC: CPT | Performed by: INTERNAL MEDICINE

## 2019-08-04 PROCEDURE — 99232 SBSQ HOSP IP/OBS MODERATE 35: CPT | Performed by: INTERNAL MEDICINE

## 2019-08-04 PROCEDURE — 82948 REAGENT STRIP/BLOOD GLUCOSE: CPT

## 2019-08-04 PROCEDURE — 71045 X-RAY EXAM CHEST 1 VIEW: CPT

## 2019-08-04 RX ADMIN — FLUTICASONE PROPIONATE 2 SPRAY: 50 SPRAY, METERED NASAL at 09:05

## 2019-08-04 RX ADMIN — LEVOTHYROXINE SODIUM 100 MCG: 100 TABLET ORAL at 05:17

## 2019-08-04 RX ADMIN — PANTOPRAZOLE SODIUM 40 MG: 40 TABLET, DELAYED RELEASE ORAL at 05:17

## 2019-08-04 RX ADMIN — METOPROLOL SUCCINATE 50 MG: 50 TABLET, EXTENDED RELEASE ORAL at 09:04

## 2019-08-04 RX ADMIN — LOSARTAN POTASSIUM 50 MG: 50 TABLET, FILM COATED ORAL at 09:04

## 2019-08-04 RX ADMIN — CEFTRIAXONE SODIUM 1000 MG: 10 INJECTION, POWDER, FOR SOLUTION INTRAVENOUS at 21:08

## 2019-08-04 RX ADMIN — OXYCODONE HYDROCHLORIDE 2.5 MG: 5 TABLET ORAL at 09:03

## 2019-08-04 RX ADMIN — ASPIRIN 81 MG 81 MG: 81 TABLET ORAL at 09:03

## 2019-08-04 RX ADMIN — MEMANTINE 10 MG: 10 TABLET ORAL at 09:04

## 2019-08-04 RX ADMIN — ATORVASTATIN CALCIUM 40 MG: 40 TABLET, FILM COATED ORAL at 17:10

## 2019-08-04 RX ADMIN — MEMANTINE 10 MG: 10 TABLET ORAL at 17:10

## 2019-08-04 RX ADMIN — ACETAMINOPHEN 650 MG: 325 TABLET ORAL at 21:09

## 2019-08-04 RX ADMIN — ENOXAPARIN SODIUM 40 MG: 40 INJECTION SUBCUTANEOUS at 09:00

## 2019-08-04 RX ADMIN — DONEPEZIL HYDROCHLORIDE 10 MG: 10 TABLET ORAL at 21:08

## 2019-08-04 RX ADMIN — AMLODIPINE BESYLATE 5 MG: 5 TABLET ORAL at 09:04

## 2019-08-04 RX ADMIN — MONTELUKAST SODIUM 10 MG: 10 TABLET, FILM COATED ORAL at 21:08

## 2019-08-04 NOTE — PROGRESS NOTES
Progress Note - Pulmonary   Mateusz Jeffries 80 y o  female MRN: [id number]  Unit/Bed#: -01 Encounter: [id number]    Assessment/Plan:    1  Acute pulmonary insufficiency likely secondary to pneumonia with pleural effusion      *  currently on 3 L- saturations at 93%- patient does not wear home O2      *  will continue to encourage pulmonary hygiene- deep breathing cough, IS Q 1 hr as tolerated, OOB to chair as tolerated      *  patient will likely need oxygen therapy upon discharge    2  Community-acquired pneumonia       *  ID following       *  Day #9 ceftriaxone, received 2 days cefepime, 2 days azithromycin       *  procalcitonin resolved, WBC trending down 14 62, pleural cultures, AFB remain negative    3  Loculated left-sided exudative pleural effusion with resolved hydropneumothorax      *  Thoracentesis 8/2/19- 460 mL clear yellow fluid-  chest tube placed s/p procedure       *  repeat chest x-ray- resolved hydropneumothorax improving pleural effusion       *  total output since placement- 83 mL       *  Currently 10 fr CT has been maintained on -20 H2O water seal for over 24 hours       *  repeat chest x-ray shows resolution of pneumothorax-  will DC chest tube today       *  cytology and fungal cultures pending      Chief Complaint:    "I am not doing so good"    Subjective:    Frida Claudio was comfortably lying in her bed  She continues to remain confused  She did state that she is not feeling well however she cannot specify any further the symptoms  No significant overnight events reported  Patient will have her chest tube removed today  Objective:    Vitals: Blood pressure 139/60, pulse 73, temperature 98 2 °F (36 8 °C), resp  rate 16, height 5' 5" (1 651 m), weight 51 6 kg (113 lb 12 1 oz), SpO2 93 %  RA,Body mass index is 18 93 kg/m²        Intake/Output Summary (Last 24 hours) at 8/4/2019 1424  Last data filed at 8/4/2019 0934  Gross per 24 hour   Intake 120 ml   Output 465 ml   Net -345 ml This office note has been dictated.   Dictation Confirmation Code: [id number]  Yany Castellano PA-C  04/27/2017  5:42 PM  Supervising Physician:  Dr. Sowmya Schmidt MD    Aracelis was seen today for pain.    Diagnoses and all orders for this visit:    Finger pain, right    Mucous cyst of finger    -Discussed surgical intervention and conservative treatment with patient in detail. All risks, benefits discussed.     - Patient would like to proceed with exostectomy and excision mucous cyst right index finger      Consents were signed.     Pre, dorinda, and post operative procedure and expectations were discussed.  Questions were answered. The patient has been educated and is ready to proceed with surgery.  Approximately 30 minutes was spent discussing surgical outcomes, plans, procedures, pre, dorinda, and post operative expectations and care.  The patient will contact us if the have any questions, concerns, and changes in their medical condition prior to surgery.     Invasive Devices     Peripheral Intravenous Line            Peripheral IV 08/04/19 Distal;Right;Ventral (anterior) Forearm less than 1 day          Drain            Chest Tube 1 Left Pleural 10 Fr  1 day                Physical Exam:   Physical Exam   Constitutional: She appears cachectic  No distress  HENT:   Head: Normocephalic and atraumatic  Neck: Normal range of motion  Neck supple  No tracheal deviation present  No thyromegaly present  Cardiovascular: Normal rate, regular rhythm and normal heart sounds  Exam reveals no gallop and no friction rub  No murmur heard  Pulmonary/Chest: Effort normal  No accessory muscle usage or stridor  No tachypnea and no bradypnea  No respiratory distress  She has decreased breath sounds in the right lower field and the left lower field  She has no wheezes  She has no rhonchi  She has no rales  She exhibits no tenderness  Improved aeration from the previous day, 10 fr left pleuritic chest tube- no bubbling noted in chamber, no crepitus noted on chest wall back or neck   Abdominal: Soft  Bowel sounds are normal  She exhibits no distension  There is no tenderness  Musculoskeletal: Normal range of motion  She exhibits no edema or deformity  Neurological: She is alert  Oriented to self   Skin: Skin is warm and dry  She is not diaphoretic  Psychiatric: She has a normal mood and affect  Her behavior is normal        Labs: I have personally reviewed pertinent lab results   CBC:   Lab Results   Component Value Date    WBC 16 22 (H) 08/04/2019    HGB 11 0 (L) 08/04/2019    HCT 33 6 (L) 08/04/2019    MCV 95 08/04/2019     08/04/2019    MCH 31 2 08/04/2019    MCHC 32 7 08/04/2019    RDW 12 8 08/04/2019    MPV 9 5 08/04/2019    NRBC 0 08/04/2019       Imaging and other studies: I have personally reviewed pertinent films in PACS     Chest x-ray- resolution of left hydropneumothorax

## 2019-08-04 NOTE — ASSESSMENT & PLAN NOTE
In ER evaluation patient's O2 saturation dropped into low 90s on ambulation, [facility] is not comfortable taking him back at this point because of generalized weakness and mild hypoxia  LL and LM lobe seen on lateral chest x-ray with possible effusion    Patient has leukocytosis, which has peaked now trending down however still high  Procalcitonin on admission negative    With increase to  0 92 and on 7/29 0 53  Pt remains afebrile   Started on ceftriaxone azithromycin,  azithro dc due to negative urine    Patient saturating well on room air, lungs diminished   Pt does have difficulty swallowing per the pt she reports only a comfort for swallowing very soft food such as oatmeal this am  Question possible silent aspiration, would ask speech to eval ?  Check ambulatory pulse ox prior to dc    blood cultures negative after 5 days   urine strep and Legionella antigen, negative  azithro discontinued  resp protocol although pt doesn't really have much resp symptoms    Note Pulmonary input  Repeat x-ray

## 2019-08-04 NOTE — PLAN OF CARE
Problem: Potential for Falls  Goal: Patient will remain free of falls  Description  INTERVENTIONS:  - Assess patient frequently for physical needs  -  Identify cognitive and physical deficits and behaviors that affect risk of falls    -  [place] fall precautions as indicated by assessment   - Educate patient/family on patient safety including physical limitations  - Instruct patient to call for assistance with activity based on assessment  - Modify environment to reduce risk of injury  - Consider OT/PT consult to assist with strengthening/mobility  Outcome: Progressing     Problem: Prexisting or High Potential for Compromised Skin Integrity  Goal: Skin integrity is maintained or improved  Description  INTERVENTIONS:  - Identify patients at risk for skin breakdown  - Assess and monitor skin integrity  - Assess and monitor nutrition and hydration status  - Monitor labs (i e  albumin)  - Assess for incontinence   - Turn and reposition patient  - Assist with mobility/ambulation  - Relieve pressure over bony prominences  - Avoid friction and shearing  - Provide appropriate hygiene as needed including keeping skin clean and dry  - Evaluate need for skin moisturizer/barrier cream  - Collaborate with interdisciplinary team (i e  Nutrition, Rehabilitation, etc )   - Patient/family teaching  Outcome: Progressing     Problem: PAIN - ADULT  Goal: Verbalizes/displays adequate comfort level or baseline comfort level  Description  Interventions:  - Encourage patient to monitor pain and request assistance  - Assess pain using appropriate pain scale  - Administer analgesics based on type and severity of pain and evaluate response  - Implement non-pharmacological measures as appropriate and evaluate response  - Notify physician/advanced practitioner if interventions unsuccessful or patient reports new pain   Outcome: Progressing     Problem: SAFETY ADULT  Goal: Maintain or return to baseline ADL function  Description  INTERVENTIONS:  -  Assess patient's ability to carry out ADLs; assess patient's baseline for ADL function and identify physical deficits which impact ability to perform ADLs (bathing, care of mouth/teeth, toileting, grooming, dressing, etc )  - Assess/evaluate cause of self-care deficits   - Assess range of motion  - Assess patient's mobility; develop plan if impaired  - Assess patient's need for assistive devices and provide as appropriate  - Encourage maximum independence but intervene and supervise when necessary  ¯ Involve family in performance of ADLs  ¯ Assess for home care needs following discharge   ¯ Request OT consult to assist with ADL evaluation and planning for discharge  ¯ Provide patient education as appropriate  Outcome: Progressing  Goal: Maintain or return mobility status to optimal level  Description  INTERVENTIONS:  - Assess patient's baseline mobility status (ambulation, transfers, stairs, etc )    - Identify cognitive and physical deficits and behaviors that affect mobility  - Identify mobility aids required to assist with transfers and/or ambulation (gait belt, sit-to-stand, lift, walker, cane, etc )  - [place] fall precautions as indicated by assessment  - Record patient progress and toleration of activity level on Mobility SBAR; progress patient to next Phase/Stage  - Instruct patient to call for assistance with activity based on assessment  - Request Rehabilitation consult to assist with strengthening/weightbearing, etc   Outcome: Progressing     Problem: DISCHARGE PLANNING  Goal: Discharge to home or other facility with appropriate resources  Description  INTERVENTIONS:  - Identify barriers to discharge w/patient and caregiver  - Arrange for needed discharge resources and transportation as appropriate  - Identify discharge learning needs (meds, wound care, etc )  - Refer to Case Management Department for coordinating discharge planning if the patient needs post-hospital services based on physician/advanced practitioner order or complex needs related to functional status, cognitive ability, or social support system   Outcome: Progressing     Problem: Knowledge Deficit  Goal: Patient/family/caregiver demonstrates understanding of disease process, treatment plan, medications, and discharge instructions  Description  Complete learning assessment and assess knowledge base    Interventions:  - Provide teaching at level of understanding  - Provide teaching via preferred learning methods  Outcome: Progressing     Problem: INFECTION - ADULT  Goal: Absence or prevention of progression during hospitalization  Description  INTERVENTIONS:  - Assess and monitor for signs and symptoms of infection  - Monitor lab/diagnostic results  - Monitor all insertion sites, i e  indwelling lines, tubes, and drains  - [place] appropriate cooling/warming therapies per order  - Administer medications as ordered  - Instruct and encourage patient and family to use good hand hygiene technique  - Identify and instruct in appropriate isolation precautions for identified infection/condition   Outcome: Progressing     Problem: MUSCULOSKELETAL - ADULT  Goal: Maintain or return mobility to safest level of function  Description  INTERVENTIONS:  - Assess patient's ability to carry out ADLs; assess patient's baseline for ADL function and identify physical deficits which impact ability to perform ADLs (bathing, care of mouth/teeth, toileting, grooming, dressing, etc )  - Assess/evaluate cause of self-care deficits   - Assess range of motion  - Assess patient's mobility; develop plan if impaired  - Assess patient's need for assistive devices and provide as appropriate  - Encourage maximum independence but intervene and supervise when necessary  - Involve family in performance of ADLs  - Assess for home care needs following discharge   - Request OT consult to assist with ADL evaluation and planning for discharge  - Provide patient education as appropriate  Outcome: Progressing

## 2019-08-04 NOTE — PROGRESS NOTES
Progress Note - Akilah Yeh 3/23/1931, 80 y o  female MRN: [id number]    Unit/Bed#: -01 Encounter: [id number]    Primary Care Provider: Netta Jaeger MD   Date and time admitted to hospital: 7/26/2019 12:24 PM        * Community acquired pneumonia  Assessment & Plan  In ER evaluation patient's O2 saturation dropped into low 90s on ambulation, [facility] is not comfortable taking him back at this point because of generalized weakness and mild hypoxia  LL and LM lobe seen on lateral chest x-ray with possible effusion  Patient has leukocytosis, which has peaked now trending down however still high  Procalcitonin on admission negative    With increase to  0 92 and on 7/29 0 53  Pt remains afebrile   Started on ceftriaxone azithromycin,  azithro dc due to negative urine    Patient saturating well on room air, lungs diminished   Pt does have difficulty swallowing per the pt she reports only a comfort for swallowing very soft food such as oatmeal this am  Question possible silent aspiration, would ask speech to eval ?  Check ambulatory pulse ox prior to dc    blood cultures negative after 5 days   urine strep and Legionella antigen, negative  azithro discontinued  resp protocol although pt doesn't really have much resp symptoms    Note Pulmonary input  Repeat x-ray    Generalized weakness  Assessment & Plan  Along with ambulatory dysfunction  Most likely secondary to pneumonia, however  reports that pts appetite has been decreasing and she has lost weight  Pt states she cant really eat most food but does ok with softer foods   Diet was changed to accommodate this   Added chocolate ensure tid 7/28    PT OT evaluation, recommending rehab     Dementia  Assessment & Plan  Continue Namenda and Aricept  Please update  (sometimes difficulty to reach as well as son)       Essential hypertension  Assessment & Plan  Well controlled  Continue home meds      Acquired hypothyroidism  Assessment & Plan  Continue levothyroxine  VTE Pharmacologic Prophylaxis:   Pharmacologic: Enoxaparin (Lovenox)  Mechanical VTE Prophylaxis in Place: No    Patient Centered Rounds: I have performed bedside rounds with nursing staff today  Time Spent for Care: 15 minutes  More than 50% of total time spent on counseling and coordination of care as described above  Current Length of Stay: 8 day(s)    Current Patient Status: Inpatient   Certification Statement: The patient will continue to require additional inpatient hospital stay due to Monitor chest tube output  Note Pulmonary input  Tentative DC of chest tube later today  Discharge Plan:  Discharge in 1 to 2 days if patient tolerating pain control  Follow up on WBC  Discussed with son via phone    Code Status: Level 1 - Full Code      Subjective:   No acute distress    Objective:     Vitals:   Temp (24hrs), Av 7 °F (37 1 °C), Min:97 3 °F (36 3 °C), Max:99 6 °F (37 6 °C)    Temp:  [97 3 °F (36 3 °C)-99 6 °F (37 6 °C)] 98 2 °F (36 8 °C)  HR:  [67-83] 73  Resp:  [16] 16  BP: (118-139)/(50-61) 139/60  SpO2:  [93 %-98 %] 93 %  Body mass index is 18 93 kg/m²  Input and Output Summary (last 24 hours): Intake/Output Summary (Last 24 hours) at 2019 1441  Last data filed at 2019 0934  Gross per 24 hour   Intake 120 ml   Output 465 ml   Net -345 ml       Physical Exam:     Physical Exam    Additional Data:     Labs:    Results from last 7 days   Lab Units 19  1218   WBC Thousand/uL 16 22*   HEMOGLOBIN g/dL 11 0*   HEMATOCRIT % 33 6*   PLATELETS Thousands/uL 330   NEUTROS PCT % 85*   LYMPHS PCT % 7*   MONOS PCT % 5   EOS PCT % 1     Results from last 7 days   Lab Units 19  0457   POTASSIUM mmol/L 4 3   CHLORIDE mmol/L 100   CO2 mmol/L 27   BUN mg/dL 16   CREATININE mg/dL 0 70   CALCIUM mg/dL 9 2           * I Have Reviewed All Lab Data Listed Above  * Additional Pertinent Lab Tests Reviewed:  All Labs Within Last 24 Hours Reviewed        Recent Cultures (last 7 days):     Results from last 7 days   Lab Units 08/04/19  1108   GRAM STAIN RESULT  No Polys or Bacteria seen  1+ RBC's   BODY FLUID CULTURE, STERILE  No growth       Last 24 Hours Medication List:     Current Facility-Administered Medications:  acetaminophen 650 mg Oral Q6H PRN LEE Arrieta    amLODIPine 5 mg Oral Daily Cesar Johnston MD    aspirin 81 mg Oral Daily Cesar Johnston MD    atorvastatin 40 mg Oral QPM Cesar Johnston MD    benzonatate 100 mg Oral TID PRN Cesar Johnston MD    bisacodyl 10 mg Rectal Daily PRN LEE Arrieta    cefTRIAXone 1,000 mg Intravenous Q24H LEE Arrieta Last Rate: 1,000 mg (08/03/19 2126)   donepezil 10 mg Oral HS Cesar Johnston MD    enoxaparin 40 mg Subcutaneous Daily Cesar Johnston MD    fluticasone 2 spray Nasal Daily Cesar Johnston MD    lactulose 30 g Oral Once LEE Arrieta    levothyroxine 100 mcg Oral Early Morning Cesar Johnston MD    losartan 50 mg Oral Daily Cesar Johnston MD    memantine 10 mg Oral BID Cesar Johnston MD    metoprolol succinate 50 mg Oral Daily Cesar Johnston MD    montelukast 10 mg Oral HS Cesar Johnston MD    oxyCODONE 2 5 mg Oral Q4H PRN Austin Frisa DO    pantoprazole 40 mg Oral Daily Before Breakfast Cesar Johnston MD    zolpidem 5 mg Oral HS PRN Cesar Johnston MD         Today, Patient Was Seen By: Mary Leone DO    ** Please Note: Dictation voice to text software may have been used in the creation of this document   **

## 2019-08-05 ENCOUNTER — APPOINTMENT (INPATIENT)
Dept: RADIOLOGY | Facility: HOSPITAL | Age: 84
DRG: 193 | End: 2019-08-05
Payer: MEDICARE

## 2019-08-05 ENCOUNTER — TELEPHONE (OUTPATIENT)
Dept: DERMATOLOGY | Facility: CLINIC | Age: 84
End: 2019-08-05

## 2019-08-05 LAB
ALBUMIN SERPL BCP-MCNC: 1.7 G/DL (ref 3.5–5)
ALP SERPL-CCNC: 167 U/L (ref 46–116)
ALT SERPL W P-5'-P-CCNC: 163 U/L (ref 12–78)
AMMONIA PLAS-SCNC: 18 UMOL/L (ref 11–35)
ANION GAP SERPL CALCULATED.3IONS-SCNC: 7 MMOL/L (ref 4–13)
ARTERIAL PATENCY WRIST A: YES
AST SERPL W P-5'-P-CCNC: 127 U/L (ref 5–45)
BACTERIA SPEC BFLD CULT: NO GROWTH
BASE EXCESS BLDA CALC-SCNC: 2.3 MMOL/L
BASOPHILS # BLD AUTO: 0.06 THOUSANDS/ΜL (ref 0–0.1)
BASOPHILS NFR BLD AUTO: 0 % (ref 0–1)
BILIRUB SERPL-MCNC: 0.3 MG/DL (ref 0.2–1)
BUN SERPL-MCNC: 16 MG/DL (ref 5–25)
CALCIUM SERPL-MCNC: 8.9 MG/DL (ref 8.3–10.1)
CHLORIDE SERPL-SCNC: 102 MMOL/L (ref 100–108)
CO2 SERPL-SCNC: 27 MMOL/L (ref 21–32)
CREAT SERPL-MCNC: 0.9 MG/DL (ref 0.6–1.3)
EOSINOPHIL # BLD AUTO: 0.18 THOUSAND/ΜL (ref 0–0.61)
EOSINOPHIL NFR BLD AUTO: 1 % (ref 0–6)
ERYTHROCYTE [DISTWIDTH] IN BLOOD BY AUTOMATED COUNT: 12.6 % (ref 11.6–15.1)
GFR SERPL CREATININE-BSD FRML MDRD: 57 ML/MIN/1.73SQ M
GLUCOSE SERPL-MCNC: 116 MG/DL (ref 65–140)
GLUCOSE SERPL-MCNC: 141 MG/DL (ref 65–140)
GLUCOSE SERPL-MCNC: 146 MG/DL (ref 65–140)
GLUCOSE SERPL-MCNC: 183 MG/DL (ref 65–140)
GLUCOSE SERPL-MCNC: 92 MG/DL (ref 65–140)
GLUCOSE SERPL-MCNC: 99 MG/DL (ref 65–140)
GRAM STN SPEC: NORMAL
GRAM STN SPEC: NORMAL
HCO3 BLDA-SCNC: 24.8 MMOL/L (ref 22–28)
HCT VFR BLD AUTO: 34.3 % (ref 34.8–46.1)
HGB BLD-MCNC: 11.2 G/DL (ref 11.5–15.4)
IMM GRANULOCYTES # BLD AUTO: 0.33 THOUSAND/UL (ref 0–0.2)
IMM GRANULOCYTES NFR BLD AUTO: 2 % (ref 0–2)
LYMPHOCYTES # BLD AUTO: 1.2 THOUSANDS/ΜL (ref 0.6–4.47)
LYMPHOCYTES NFR BLD AUTO: 8 % (ref 14–44)
MAGNESIUM SERPL-MCNC: 2.3 MG/DL (ref 1.6–2.6)
MCH RBC QN AUTO: 30.7 PG (ref 26.8–34.3)
MCHC RBC AUTO-ENTMCNC: 32.7 G/DL (ref 31.4–37.4)
MCV RBC AUTO: 94 FL (ref 82–98)
MONOCYTES # BLD AUTO: 0.99 THOUSAND/ΜL (ref 0.17–1.22)
MONOCYTES NFR BLD AUTO: 6 % (ref 4–12)
NEUTROPHILS # BLD AUTO: 12.9 THOUSANDS/ΜL (ref 1.85–7.62)
NEUTS SEG NFR BLD AUTO: 83 % (ref 43–75)
NON VENT ROOM AIR: 21 %
NRBC BLD AUTO-RTO: 0 /100 WBCS
O2 CT BLDA-SCNC: 15.1 ML/DL (ref 16–23)
OXYHGB MFR BLDA: 93.3 % (ref 94–97)
PCO2 BLDA: 31.5 MM HG (ref 36–44)
PH BLDA: 7.51 [PH] (ref 7.35–7.45)
PLATELET # BLD AUTO: 337 THOUSANDS/UL (ref 149–390)
PMV BLD AUTO: 9.4 FL (ref 8.9–12.7)
PO2 BLDA: 66 MM HG (ref 75–129)
POTASSIUM SERPL-SCNC: 4 MMOL/L (ref 3.5–5.3)
PROT SERPL-MCNC: 6.9 G/DL (ref 6.4–8.2)
RBC # BLD AUTO: 3.65 MILLION/UL (ref 3.81–5.12)
SODIUM SERPL-SCNC: 136 MMOL/L (ref 136–145)
SPECIMEN SOURCE: ABNORMAL
WBC # BLD AUTO: 15.66 THOUSAND/UL (ref 4.31–10.16)

## 2019-08-05 PROCEDURE — 85025 COMPLETE CBC W/AUTO DIFF WBC: CPT | Performed by: PHYSICIAN ASSISTANT

## 2019-08-05 PROCEDURE — 99232 SBSQ HOSP IP/OBS MODERATE 35: CPT | Performed by: INTERNAL MEDICINE

## 2019-08-05 PROCEDURE — 99233 SBSQ HOSP IP/OBS HIGH 50: CPT | Performed by: INTERNAL MEDICINE

## 2019-08-05 PROCEDURE — 80053 COMPREHEN METABOLIC PANEL: CPT | Performed by: PHYSICIAN ASSISTANT

## 2019-08-05 PROCEDURE — 82140 ASSAY OF AMMONIA: CPT | Performed by: PHYSICIAN ASSISTANT

## 2019-08-05 PROCEDURE — 82805 BLOOD GASES W/O2 SATURATION: CPT | Performed by: PHYSICIAN ASSISTANT

## 2019-08-05 PROCEDURE — 71045 X-RAY EXAM CHEST 1 VIEW: CPT

## 2019-08-05 PROCEDURE — 83735 ASSAY OF MAGNESIUM: CPT | Performed by: PHYSICIAN ASSISTANT

## 2019-08-05 PROCEDURE — 36600 WITHDRAWAL OF ARTERIAL BLOOD: CPT

## 2019-08-05 PROCEDURE — 82948 REAGENT STRIP/BLOOD GLUCOSE: CPT

## 2019-08-05 RX ADMIN — MONTELUKAST SODIUM 10 MG: 10 TABLET, FILM COATED ORAL at 21:52

## 2019-08-05 RX ADMIN — CEFTRIAXONE SODIUM 2000 MG: 10 INJECTION, POWDER, FOR SOLUTION INTRAVENOUS at 20:49

## 2019-08-05 RX ADMIN — OXYCODONE HYDROCHLORIDE 2.5 MG: 5 TABLET ORAL at 21:53

## 2019-08-05 RX ADMIN — ATORVASTATIN CALCIUM 40 MG: 40 TABLET, FILM COATED ORAL at 20:44

## 2019-08-05 RX ADMIN — DONEPEZIL HYDROCHLORIDE 10 MG: 10 TABLET ORAL at 21:52

## 2019-08-05 RX ADMIN — MEMANTINE 10 MG: 10 TABLET ORAL at 20:43

## 2019-08-05 RX ADMIN — OXYCODONE HYDROCHLORIDE 2.5 MG: 5 TABLET ORAL at 14:12

## 2019-08-05 NOTE — NURSING NOTE
Paged SLIM to advise of change in mental status  Patient is harder to arouse and can not keep conversation with out falling asleep  Giving pills this evening patient could not stay awake and had a hard time understanding what to do to "take and swallow the pill " Rn also noticed that patients HR is holding lower than Baseline 60-55  BP stable at 128/57 and Blood sugar 116  Patient didn't even realize that we were changing her chris wick, and slept through the entire process  This is a change as of Saturday you would call patients name when you entered and she would wake right up and talk to you and stay awake the entire time  Awaiting orders, RN will continue to monitor patient

## 2019-08-05 NOTE — QUICK NOTE
Patient is sleeping, but wakes up to name  Offers no complaints other than "she can't think straight " Denies CP, palpitations, SOB, N/V, headache, dizziness, change in vision, paresthesia, focal weakness  NAD and VSS on RA  AAOx2  Is able to tell me she is in hospital for "lung problem " Follows commands  Identifies water cup and flowers, not pencil or phone  Sensation intact and decreased but equal strength of upper and lower extremities  Chart reviewed, underlying dementia and generalized weakness  No new medications  Last received oxycodone yesterday AM  At this time patient appears fatigued, but exam is non-focal  Will obtain AM labs now and include ABG and ammonia  Neuro checks Q4hrs, if any change will obtain stat CTH  Continue to monitor and notify of any changes

## 2019-08-05 NOTE — PROGRESS NOTES
Progress Note - Antonietta Ritchie 3/23/1931, 80 y o  female MRN: [id number]    Unit/Bed#: -01 Encounter: [id number]    Primary Care Provider: Sushma Lux MD   Date and time admitted to hospital: 7/26/2019 12:24 PM    · Left voicemail for son William Matute at [phone] regarding goals of care discussion  Left number to call back  · Sutures removed from biopsy site on right leg with nursing       * Community acquired pneumonia  Assessment & Plan  In ER evaluation patient's O2 saturation dropped into low 90s on ambulation, [facility] is not comfortable taking him back at this point because of generalized weakness and mild hypoxia  LL and LM lobe seen on lateral chest x-ray with possible effusion  Patient has leukocytosis, which has peaked now trending down however still high  Procalcitonin on admission negative    With increase to  0 92 and on 7/29 0 53  Pt remains afebrile   Started on ceftriaxone azithromycin,  azithro dc due to negative urine    Patient saturating well on room air, lungs diminished   Pt does have difficulty swallowing per the pt she reports only a comfort for swallowing very soft food such as oatmeal this am  Question possible silent aspiration, would ask speech to eval ?  Check ambulatory pulse ox prior to dc    blood cultures negative after 5 days   urine strep and Legionella antigen, negative  azithro discontinued  resp protocol although pt doesn't really have much resp symptoms    Note Pulmonary input regarding chest tube  Note Infectious Disease input  May need to consider VATS  Goals of care discussion with son scheduled for 2:30 today      Squamous cell carcinoma  Assessment & Plan  S/p recent removal of anterior right  Thigh mass   - now with incision intact with staples no drainage, erythema noted   - pt with increased wbc count more likely due to suspected pna  - should continue follow up with her plastic surgeon and dermatologist    - no hx noted in epic at this time except pcp note           Dementia  Assessment & Plan  Continue Namenda and Aricept  Please update  (sometimes difficulty to reach as well as son)       Essential hypertension  Assessment & Plan  Well controlled  Continue home meds  VTE Pharmacologic Prophylaxis:   Pharmacologic: Enoxaparin (Lovenox)  Mechanical VTE Prophylaxis in Place: No    Patient Centered Rounds: I have performed bedside rounds with nursing staff today  Time Spent for Care: 15 minutes  More than 50% of total time spent on counseling and coordination of care as described above  Current Length of Stay: 9 day(s)    Current Patient Status: Inpatient   Certification Statement: The patient will continue to require additional inpatient hospital stay due to Need to monitor symptoms    Discharge Plan:  Not ready for discharge  Continue monitor infection  Note Infectious Disease input regarding monitoring of pleural effusion  Code Status: Level 1 - Full Code      Subjective:   Patient with decreased appetite  Objective:     Vitals:   Temp (24hrs), Av 7 °F (37 1 °C), Min:97 9 °F (36 6 °C), Max:99 2 °F (37 3 °C)    Temp:  [97 9 °F (36 6 °C)-99 2 °F (37 3 °C)] 98 9 °F (37 2 °C)  HR:  [59-72] 71  Resp:  [16-18] 16  BP: (128-153)/(57-71) 153/71  SpO2:  [95 %-97 %] 95 %  Body mass index is 18 93 kg/m²  Input and Output Summary (last 24 hours): Intake/Output Summary (Last 24 hours) at 2019 1311  Last data filed at 2019 1246  Gross per 24 hour   Intake 640 ml   Output 1138 ml   Net -498 ml       Physical Exam:     Physical Exam   Constitutional: She is oriented to person, place, and time  HENT:   Head: Normocephalic and atraumatic  Eyes: Pupils are equal, round, and reactive to light  EOM are normal    Neck: Normal range of motion  Neck supple  Cardiovascular: Normal rate  Exam reveals no friction rub  No murmur heard  Pulmonary/Chest: Effort normal  No stridor  No respiratory distress     Neurological: She is alert and oriented to person, place, and time  Additional Data:     Labs:    Results from last 7 days   Lab Units 08/05/19  0500   WBC Thousand/uL 15 66*   HEMOGLOBIN g/dL 11 2*   HEMATOCRIT % 34 3*   PLATELETS Thousands/uL 337   NEUTROS PCT % 83*   LYMPHS PCT % 8*   MONOS PCT % 6   EOS PCT % 1     Results from last 7 days   Lab Units 08/05/19  0500   POTASSIUM mmol/L 4 0   CHLORIDE mmol/L 102   CO2 mmol/L 27   BUN mg/dL 16   CREATININE mg/dL 0 90   CALCIUM mg/dL 8 9   ALK PHOS U/L 167*   ALT U/L 163*   AST U/L 127*           * I Have Reviewed All Lab Data Listed Above  * Additional Pertinent Lab Tests Reviewed:  All Labs Within Last 24 Hours Reviewed      Recent Cultures (last 7 days):     Results from last 7 days   Lab Units 08/05/19  0829   GRAM STAIN RESULT  No Polys or Bacteria seen  1+ RBC's   BODY FLUID CULTURE, STERILE  No growth       Last 24 Hours Medication List:     Current Facility-Administered Medications:  amLODIPine 5 mg Oral Daily Tiffany Henson MD   aspirin 81 mg Oral Daily Tiffany Henson MD   atorvastatin 40 mg Oral QPM Tiffany Henson MD   benzonatate 100 mg Oral TID PRN Tiffany Henson MD   bisacodyl 10 mg Rectal Daily PRN LEE Coppola   cefTRIAXone 2,000 mg Intravenous Q24H Katy Magdaleno MD   donepezil 10 mg Oral HS Tiffany Henson MD   enoxaparin 40 mg Subcutaneous Daily Tiffany Henson MD   fluticasone 2 spray Nasal Daily Tiffany Henson MD   lactulose 30 g Oral Once LEE Coppola   levothyroxine 100 mcg Oral Early Morning Tiffany Henson MD   losartan 50 mg Oral Daily Tiffany Henson MD   memantine 10 mg Oral BID Tiffany Henson MD   metoprolol succinate 50 mg Oral Daily Tiffany Henson MD   montelukast 10 mg Oral HS Tiffany Henson MD   oxyCODONE 2 5 mg Oral Q4H PRN Austin Frias DO   pantoprazole 40 mg Oral Daily Before Breakfast Tiffany Henson MD   zolpidem 5 mg Oral HS PRN Tiffany Henson MD Today, Patient Was Seen By: Arya Bonilla DO    ** Please Note: Dictation voice to text software may have been used in the creation of this document   **

## 2019-08-05 NOTE — ASSESSMENT & PLAN NOTE
In ER evaluation patient's O2 saturation dropped into low 90s on ambulation, [facility] is not comfortable taking him back at this point because of generalized weakness and mild hypoxia  LL and LM lobe seen on lateral chest x-ray with possible effusion  Patient has leukocytosis, which has peaked now trending down however still high  Procalcitonin on admission negative    With increase to  0 92 and on 7/29 0 53  Pt remains afebrile   Started on ceftriaxone azithromycin,  azithro dc due to negative urine    Patient saturating well on room air, lungs diminished   Pt does have difficulty swallowing per the pt she reports only a comfort for swallowing very soft food such as oatmeal this am  Question possible silent aspiration, would ask speech to eval ?  Check ambulatory pulse ox prior to dc    blood cultures negative after 5 days   urine strep and Legionella antigen, negative  azithro discontinued  resp protocol although pt doesn't really have much resp symptoms    Note Pulmonary input regarding chest tube  Note Infectious Disease input  May need to consider VATS  Goals of care discussion with son scheduled for 2:30 today

## 2019-08-05 NOTE — PROGRESS NOTES
Progress Note - Infectious Disease   Karoline Newman 80 y o  female MRN: [id number]  Unit/Bed#: -01 Encounter: [id number]      Impression/Plan:  1  Community-acquired pneumonia with pleural effusions   Patient presents at this time with progressive weakness and shortness of breath at home along with leukocytosis on admission   Patient remains hemodynamically stable, procalcitonin normalized and leukocytosis down trending  Her respiratory status remains stable  Continue on ceftriaxone but will increase the dose to 2 g IV Q 24 hours with the complicated effusion  Continue to trend fever curve/vitals  Recheck CBC with diff and CMP  Supportive care    2  Complicated parapneumonic effusion-possible empyema based upon very low pH and very high LDH  The pleural cultures are negative however the patient had been on antibiotics for quite some time   -antibiotics as above  -may need additional drainage measures including possible additional chest tube drainage for VATS  -recheck CT of the chest  -close Pulmonary follow-up     3  Leukocytosis, persistent   Patient noted with persistent leukocytosis  Suspect that this is related to a possible complicated parapneumonic effusion  There are no other obvious sources for elevated white count on her exam   Speech evaluation without signs of aspiration  Repeat CBC tomorrow  Continue antibiotics as above  Additional evaluations as above  Additional care as per primary    4  Liver function test abnormalities-unclear etiology  The patient appears to be mildly symptomatic with notable nausea  Perhaps related to the ceftriaxone  -recheck liver function test  -check right upper quadrant ultrasound if liver function tests remain abnormal  -additional workup as needed     5  Jt Chen has underlying history of dementia for which she is on medications for   He seems to be at her reported baseline    Continue monitor mental status  Continue antibiotics as above  Additional care as per primary    Discussed the above plan with pulmonary    Antibiotics:  Ceftriaxone 7  Total antibiotics 10    Subjective:  Patient has no fever, chills, sweats; having some nausea but no vomiting or diarrhea; no cough, shortness of breath; mild abdominal pain  No new symptoms  She had chest tube placed last Friday and has remained on the IV antibiotics  Objective:  Vitals:  Temp:  [97 9 °F (36 6 °C)-99 2 °F (37 3 °C)] 98 9 °F (37 2 °C)  HR:  [59-72] 71  Resp:  [16-18] 16  BP: (128-153)/(57-71) 153/71  SpO2:  [95 %-97 %] 95 %  Temp (24hrs), Av 7 °F (37 1 °C), Min:97 9 °F (36 6 °C), Max:99 2 °F (37 3 °C)  Current: Temperature: 98 9 °F (37 2 °C)    Physical Exam:   General Appearance:  Alert, interactive, nontoxic, no acute distress  Throat: Oropharynx moist without lesions  Lungs:   Decreased breath sounds left greater than right; no wheezes, rhonchi or rales; respirations unlabored  Left-sided chest tube in place   Heart:  RRR; no murmur, rub or gallop   Abdomen:   Soft, non-tender, non-distended, positive bowel sounds  Extremities: No clubbing, cyanosis or edema   Skin: No new rashes or lesions  No draining wounds noted         Labs, Imaging, & Other studies:   All pertinent labs and imaging studies were personally reviewed  Results from last 7 days   Lab Units 19  0500 19  1218 19  0620   WBC Thousand/uL 15 66* 16 22* 14 62*   HEMOGLOBIN g/dL 11 2* 11 0* 10 6*   PLATELETS Thousands/uL 337 330 281     Results from last 7 days   Lab Units 19  0500 19  0457 19  0524   SODIUM mmol/L 136 134* 136   POTASSIUM mmol/L 4 0 4 3 4 2   CHLORIDE mmol/L 102 100 102   CO2 mmol/L 27 27 25   BUN mg/dL 16 16 11   CREATININE mg/dL 0 90 0 70 0 67   EGFR ml/min/1 73sq m 57 78 79   CALCIUM mg/dL 8 9 9 2 8 9   AST U/L 127*  --   --    ALT U/L 163*  --   --    ALK PHOS U/L 167*  --   --      Results from last 7 days   Lab Units 19  0854   GRAM STAIN RESULT  No Polys or Bacteria seen  1+ RBC's   BODY FLUID CULTURE, STERILE  No growth     Results from last 7 days   Lab Units 08/01/19  1123 07/30/19  0443   PROCALCITONIN ng/ml 0 20 0 47*     Chest x-ray-persistent left-sided effusion    Images personally reviewed by me in PACS

## 2019-08-05 NOTE — PROGRESS NOTES
Progress Note - Pulmonary   Avanell Sanket 80 y o  female MRN: [id number]  Unit/Bed#: -01 Encounter: [id number]    Assessment/Plan:    1  Acute pulmonary insufficiency likely secondary to pneumonia with pleural effusion      *  patient currently on room air- 98%- patient does not wear home oxygen therapy      *  will continue to maintain saturations greater than 90%      *  will continue pulmonary toileting as tolerated: IS Q 1 hr, OOB as tolerated, deep breathing cough    2  Community-acquired pneumonia       *  ID following       *  Day #10 ceftriaxone, received 2 days cefepime, 2 days azithromycin       *  procalcitonin resolved, WBC trending down 14 62, pleural culture, AFB remain negative    3  Loculated left-sided exudative pleural effusion with resolved hydropneumothorax      *  Thoracentesis 8/2/19- 460 mL clear yellow fluid-  chest tube placed s/p procedure      *  repeat chest x-ray- resolved hydropneumothorax improving pleural effusion      *  total output since insertion- 96 mL      *  Currently 10 fr CT at -20 H2O water seal for over 48 hours      *  IR consult placed for CT DC for pneumothorax      *  will perform bed side ultrasound to determine necessity for chest tube placement for loculated effusion      *  Fungal/cytology- pending    Chief Complaint:    "My back is sore"    Subjective:    Jonathan Lugo was comfortably lying in her bed  Patient reports she was having some mild back pain  I was able to a chest her bed and she stated that her back pain had resolved  Significant overnight events included increased confusion throughout the evening  Patient's  did state that when his wife is hospitalized from longer  Her confusion does tend to increase a longer hospital stay  Patient currently denies any fever, chills, hemoptysis, headaches, wheezing, or cough  Objective:    Vitals: Blood pressure 153/71, pulse 71, temperature 98 9 °F (37 2 °C), resp   rate 16, height 5' 5" (1 651 m), weight 51 6 kg (113 lb 12 1 oz), SpO2 95 %  RA,Body mass index is 18 93 kg/m²  Intake/Output Summary (Last 24 hours) at 8/5/2019 0925  Last data filed at 8/5/2019 0858  Gross per 24 hour   Intake 640 ml   Output 1315 ml   Net -675 ml       Invasive Devices     Peripheral Intravenous Line            Peripheral IV 08/04/19 Distal;Right;Ventral (anterior) Forearm 1 day          Drain            Chest Tube 1 Left Pleural 10 Fr  2 days                Physical Exam:   Physical Exam   Constitutional: She appears well-developed and well-nourished  HENT:   Head: Normocephalic and atraumatic  Neck: Normal range of motion  Neck supple  No JVD present  No tracheal deviation present  Cardiovascular: Normal rate, regular rhythm and normal heart sounds  Exam reveals no gallop and no friction rub  No murmur heard  Pulmonary/Chest: No accessory muscle usage or stridor  No tachypnea and no bradypnea  No respiratory distress  She has decreased breath sounds in the right middle field, the right lower field, the left middle field and the left lower field  She has no wheezes  She has no rhonchi  She has no rales  She exhibits no tenderness  10 fr left pleuritic chest tube- placed to -20 H2O seal, no output recorded in 48 hours, no bubbling noted in chamber, no crepitus noted on back chest wall or neck   Abdominal: Soft  Bowel sounds are normal  She exhibits no distension  There is no tenderness  Musculoskeletal: Normal range of motion  She exhibits no edema or deformity  Neurological: She is alert  Oriented to self,  reports patient becomes increasingly confused a longer her hospital stay   Skin: Skin is warm and dry  Psychiatric: She has a normal mood and affect  Her behavior is normal        Labs: I have personally reviewed pertinent lab results  , ABG:   Lab Results   Component Value Date    PHART 7 514 (H) 08/05/2019    SNV6VNV 31 5 (L) 08/05/2019    PO2ART 66 0 (L) 08/05/2019    KJB7VCK 24 8 08/05/2019 BEART 2 3 08/05/2019    SOURCE Radial, Right 08/05/2019   , BNP: No results found for: BNP, CBC:   Lab Results   Component Value Date    WBC 15 66 (H) 08/05/2019    HGB 11 2 (L) 08/05/2019    HCT 34 3 (L) 08/05/2019    MCV 94 08/05/2019     08/05/2019    MCH 30 7 08/05/2019    MCHC 32 7 08/05/2019    RDW 12 6 08/05/2019    MPV 9 4 08/05/2019    NRBC 0 08/05/2019   , CMP:   Lab Results   Component Value Date    SODIUM 136 08/05/2019    K 4 0 08/05/2019     08/05/2019    CO2 27 08/05/2019    BUN 16 08/05/2019    CREATININE 0 90 08/05/2019    CALCIUM 8 9 08/05/2019     (H) 08/05/2019     (H) 08/05/2019    ALKPHOS 167 (H) 08/05/2019    EGFR 57 08/05/2019       Imaging and other studies: I have personally reviewed pertinent films in PACS     Chest x-ray- resolution of left hydropneumothorax

## 2019-08-05 NOTE — TELEPHONE ENCOUNTER
Telephone Call to patient   LVM to please return our call to scheduled a appointment with Dr Jim Cooper  8/03-8/05

## 2019-08-06 ENCOUNTER — APPOINTMENT (INPATIENT)
Dept: RADIOLOGY | Facility: HOSPITAL | Age: 84
DRG: 193 | End: 2019-08-06
Payer: MEDICARE

## 2019-08-06 LAB
ALBUMIN SERPL BCP-MCNC: 1.7 G/DL (ref 3.5–5)
ALP SERPL-CCNC: 169 U/L (ref 46–116)
ALT SERPL W P-5'-P-CCNC: 148 U/L (ref 12–78)
ANION GAP SERPL CALCULATED.3IONS-SCNC: 7 MMOL/L (ref 4–13)
AST SERPL W P-5'-P-CCNC: 103 U/L (ref 5–45)
BASOPHILS # BLD AUTO: 0.05 THOUSANDS/ΜL (ref 0–0.1)
BASOPHILS NFR BLD AUTO: 0 % (ref 0–1)
BILIRUB SERPL-MCNC: 0.28 MG/DL (ref 0.2–1)
BUN SERPL-MCNC: 20 MG/DL (ref 5–25)
CALCIUM SERPL-MCNC: 8.9 MG/DL (ref 8.3–10.1)
CHLORIDE SERPL-SCNC: 99 MMOL/L (ref 100–108)
CO2 SERPL-SCNC: 25 MMOL/L (ref 21–32)
CREAT SERPL-MCNC: 0.7 MG/DL (ref 0.6–1.3)
EOSINOPHIL # BLD AUTO: 0.1 THOUSAND/ΜL (ref 0–0.61)
EOSINOPHIL NFR BLD AUTO: 1 % (ref 0–6)
ERYTHROCYTE [DISTWIDTH] IN BLOOD BY AUTOMATED COUNT: 12.8 % (ref 11.6–15.1)
GFR SERPL CREATININE-BSD FRML MDRD: 78 ML/MIN/1.73SQ M
GLUCOSE SERPL-MCNC: 103 MG/DL (ref 65–140)
GLUCOSE SERPL-MCNC: 116 MG/DL (ref 65–140)
GLUCOSE SERPL-MCNC: 120 MG/DL (ref 65–140)
GLUCOSE SERPL-MCNC: 160 MG/DL (ref 65–140)
GLUCOSE SERPL-MCNC: 169 MG/DL (ref 65–140)
HCT VFR BLD AUTO: 32.7 % (ref 34.8–46.1)
HGB BLD-MCNC: 10.6 G/DL (ref 11.5–15.4)
IMM GRANULOCYTES # BLD AUTO: 0.34 THOUSAND/UL (ref 0–0.2)
IMM GRANULOCYTES NFR BLD AUTO: 2 % (ref 0–2)
LYMPHOCYTES # BLD AUTO: 1.22 THOUSANDS/ΜL (ref 0.6–4.47)
LYMPHOCYTES NFR BLD AUTO: 6 % (ref 14–44)
MCH RBC QN AUTO: 30.5 PG (ref 26.8–34.3)
MCHC RBC AUTO-ENTMCNC: 32.4 G/DL (ref 31.4–37.4)
MCV RBC AUTO: 94 FL (ref 82–98)
MONOCYTES # BLD AUTO: 1.15 THOUSAND/ΜL (ref 0.17–1.22)
MONOCYTES NFR BLD AUTO: 6 % (ref 4–12)
NEUTROPHILS # BLD AUTO: 17.68 THOUSANDS/ΜL (ref 1.85–7.62)
NEUTS SEG NFR BLD AUTO: 85 % (ref 43–75)
NRBC BLD AUTO-RTO: 0 /100 WBCS
PLATELET # BLD AUTO: 339 THOUSANDS/UL (ref 149–390)
PMV BLD AUTO: 9.3 FL (ref 8.9–12.7)
POTASSIUM SERPL-SCNC: 3.9 MMOL/L (ref 3.5–5.3)
PROT SERPL-MCNC: 6.9 G/DL (ref 6.4–8.2)
RBC # BLD AUTO: 3.48 MILLION/UL (ref 3.81–5.12)
SODIUM SERPL-SCNC: 131 MMOL/L (ref 136–145)
WBC # BLD AUTO: 20.54 THOUSAND/UL (ref 4.31–10.16)

## 2019-08-06 PROCEDURE — 82948 REAGENT STRIP/BLOOD GLUCOSE: CPT

## 2019-08-06 PROCEDURE — 71250 CT THORAX DX C-: CPT

## 2019-08-06 PROCEDURE — 99233 SBSQ HOSP IP/OBS HIGH 50: CPT | Performed by: INTERNAL MEDICINE

## 2019-08-06 PROCEDURE — 85025 COMPLETE CBC W/AUTO DIFF WBC: CPT | Performed by: INTERNAL MEDICINE

## 2019-08-06 PROCEDURE — 97535 SELF CARE MNGMENT TRAINING: CPT

## 2019-08-06 PROCEDURE — 76604 US EXAM CHEST: CPT | Performed by: RADIOLOGY

## 2019-08-06 PROCEDURE — NC001 PR NO CHARGE: Performed by: RADIOLOGY

## 2019-08-06 PROCEDURE — 99232 SBSQ HOSP IP/OBS MODERATE 35: CPT | Performed by: INTERNAL MEDICINE

## 2019-08-06 PROCEDURE — 97530 THERAPEUTIC ACTIVITIES: CPT

## 2019-08-06 PROCEDURE — 97110 THERAPEUTIC EXERCISES: CPT

## 2019-08-06 PROCEDURE — 76942 ECHO GUIDE FOR BIOPSY: CPT

## 2019-08-06 PROCEDURE — 80053 COMPREHEN METABOLIC PANEL: CPT | Performed by: INTERNAL MEDICINE

## 2019-08-06 PROCEDURE — 76705 ECHO EXAM OF ABDOMEN: CPT

## 2019-08-06 RX ADMIN — AMLODIPINE BESYLATE 5 MG: 5 TABLET ORAL at 08:47

## 2019-08-06 RX ADMIN — MEMANTINE 10 MG: 10 TABLET ORAL at 16:19

## 2019-08-06 RX ADMIN — METOPROLOL SUCCINATE 50 MG: 50 TABLET, EXTENDED RELEASE ORAL at 08:47

## 2019-08-06 RX ADMIN — DONEPEZIL HYDROCHLORIDE 10 MG: 10 TABLET ORAL at 22:19

## 2019-08-06 RX ADMIN — ATORVASTATIN CALCIUM 40 MG: 40 TABLET, FILM COATED ORAL at 16:19

## 2019-08-06 RX ADMIN — MONTELUKAST SODIUM 10 MG: 10 TABLET, FILM COATED ORAL at 22:19

## 2019-08-06 RX ADMIN — PANTOPRAZOLE SODIUM 40 MG: 40 TABLET, DELAYED RELEASE ORAL at 06:54

## 2019-08-06 RX ADMIN — OXYCODONE HYDROCHLORIDE 2.5 MG: 5 TABLET ORAL at 19:45

## 2019-08-06 RX ADMIN — ASPIRIN 81 MG 81 MG: 81 TABLET ORAL at 08:47

## 2019-08-06 RX ADMIN — OXYCODONE HYDROCHLORIDE 2.5 MG: 5 TABLET ORAL at 06:55

## 2019-08-06 RX ADMIN — FLUTICASONE PROPIONATE 2 SPRAY: 50 SPRAY, METERED NASAL at 08:47

## 2019-08-06 RX ADMIN — MEMANTINE 10 MG: 10 TABLET ORAL at 08:47

## 2019-08-06 RX ADMIN — LEVOTHYROXINE SODIUM 100 MCG: 100 TABLET ORAL at 06:54

## 2019-08-06 RX ADMIN — LOSARTAN POTASSIUM 50 MG: 50 TABLET, FILM COATED ORAL at 08:47

## 2019-08-06 RX ADMIN — CEFTRIAXONE SODIUM 2000 MG: 10 INJECTION, POWDER, FOR SOLUTION INTRAVENOUS at 20:51

## 2019-08-06 RX ADMIN — ENOXAPARIN SODIUM 40 MG: 40 INJECTION SUBCUTANEOUS at 08:47

## 2019-08-06 NOTE — ASSESSMENT & PLAN NOTE
Discussed with Pulmonary  IR to be consulted for possible chest tube placement  Continue IV ceftriaxone  Infectious Disease input noted

## 2019-08-06 NOTE — PLAN OF CARE
Problem: PHYSICAL THERAPY ADULT  Goal: Performs mobility at highest level of function for planned discharge setting  See evaluation for individualized goals  Description  Treatment/Interventions: Functional transfer training, LE strengthening/ROM, Therapeutic exercise, Endurance training, Patient/family training, Equipment eval/education, Bed mobility, Gait training, Spoke to nursing, Family, Cognitive reorientation  Equipment Recommended: Anu Goldberg       See flowsheet documentation for full assessment, interventions and recommendations  Outcome: Progressing  Note:   Prognosis: Fair  Problem List: Decreased strength, Decreased endurance, Impaired balance, Decreased mobility, Pain  Assessment: Pt presents to therapy today with decreased endurance, increased lethargy, and decreased mobility  These impairments limit the patient by increasing the level of assist needed to complete mobility  Pt would benefit from continued skilled therapy while in the hospital to improve functional mobility and return to PLOF  Recommend rehab  At end of session patient was left supine with call bell within reach  Pt presented today with increased lethargy requiring increased cueing throughout the session to participate and to keep eyes open  Recommendation: (rehab)     PT - OK to Discharge: Yes    See flowsheet documentation for full assessment

## 2019-08-06 NOTE — RESTORATIVE TECHNICIAN NOTE
Restorative Specialist Mobility Note       Activity: Ambulate in dee, Ambulate in room, Bathroom privileges, Chair, Dangle, Stand at bedside(Educated/encouraged pt to ambulate with assistance 3-4 x's/day  Chair alarm on   Pt callbell, phone/tray within reach )     Assistive Device: Front wheel walker          [employer] BS, Restorative Technician, [employer]

## 2019-08-06 NOTE — PLAN OF CARE
Problem: Potential for Falls  Goal: Patient will remain free of falls  Description  INTERVENTIONS:  - Assess patient frequently for physical needs  -  Identify cognitive and physical deficits and behaviors that affect risk of falls    -  [place] fall precautions as indicated by assessment   - Educate patient/family on patient safety including physical limitations  - Instruct patient to call for assistance with activity based on assessment  - Modify environment to reduce risk of injury  - Consider OT/PT consult to assist with strengthening/mobility  Outcome: Progressing     Problem: Prexisting or High Potential for Compromised Skin Integrity  Goal: Skin integrity is maintained or improved  Description  INTERVENTIONS:  - Identify patients at risk for skin breakdown  - Assess and monitor skin integrity  - Assess and monitor nutrition and hydration status  - Monitor labs (i e  albumin)  - Assess for incontinence   - Turn and reposition patient  - Assist with mobility/ambulation  - Relieve pressure over bony prominences  - Avoid friction and shearing  - Provide appropriate hygiene as needed including keeping skin clean and dry  - Evaluate need for skin moisturizer/barrier cream  - Collaborate with interdisciplinary team (i e  Nutrition, Rehabilitation, etc )   - Patient/family teaching  Outcome: Progressing     Problem: PAIN - ADULT  Goal: Verbalizes/displays adequate comfort level or baseline comfort level  Description  Interventions:  - Encourage patient to monitor pain and request assistance  - Assess pain using appropriate pain scale  - Administer analgesics based on type and severity of pain and evaluate response  - Implement non-pharmacological measures as appropriate and evaluate response  - Notify physician/advanced practitioner if interventions unsuccessful or patient reports new pain   Outcome: Progressing     Problem: SAFETY ADULT  Goal: Maintain or return to baseline ADL function  Description  INTERVENTIONS:  -  Assess patient's ability to carry out ADLs; assess patient's baseline for ADL function and identify physical deficits which impact ability to perform ADLs (bathing, care of mouth/teeth, toileting, grooming, dressing, etc )  - Assess/evaluate cause of self-care deficits   - Assess range of motion  - Assess patient's mobility; develop plan if impaired  - Assess patient's need for assistive devices and provide as appropriate  - Encourage maximum independence but intervene and supervise when necessary  ¯ Involve family in performance of ADLs  ¯ Assess for home care needs following discharge   ¯ Request OT consult to assist with ADL evaluation and planning for discharge  ¯ Provide patient education as appropriate  Outcome: Progressing  Goal: Maintain or return mobility status to optimal level  Description  INTERVENTIONS:  - Assess patient's baseline mobility status (ambulation, transfers, stairs, etc )    - Identify cognitive and physical deficits and behaviors that affect mobility  - Identify mobility aids required to assist with transfers and/or ambulation (gait belt, sit-to-stand, lift, walker, cane, etc )  - [place] fall precautions as indicated by assessment  - Record patient progress and toleration of activity level on Mobility SBAR; progress patient to next Phase/Stage  - Instruct patient to call for assistance with activity based on assessment  - Request Rehabilitation consult to assist with strengthening/weightbearing, etc   Outcome: Progressing     Problem: DISCHARGE PLANNING  Goal: Discharge to home or other facility with appropriate resources  Description  INTERVENTIONS:  - Identify barriers to discharge w/patient and caregiver  - Arrange for needed discharge resources and transportation as appropriate  - Identify discharge learning needs (meds, wound care, etc )  - Refer to Case Management Department for coordinating discharge planning if the patient needs post-hospital services based on physician/advanced practitioner order or complex needs related to functional status, cognitive ability, or social support system   Outcome: Progressing     Problem: Knowledge Deficit  Goal: Patient/family/caregiver demonstrates understanding of disease process, treatment plan, medications, and discharge instructions  Description  Complete learning assessment and assess knowledge base    Interventions:  - Provide teaching at level of understanding  - Provide teaching via preferred learning methods  Outcome: Progressing     Problem: INFECTION - ADULT  Goal: Absence or prevention of progression during hospitalization  Description  INTERVENTIONS:  - Assess and monitor for signs and symptoms of infection  - Monitor lab/diagnostic results  - Monitor all insertion sites, i e  indwelling lines, tubes, and drains  - [place] appropriate cooling/warming therapies per order  - Administer medications as ordered  - Instruct and encourage patient and family to use good hand hygiene technique  - Identify and instruct in appropriate isolation precautions for identified infection/condition   Outcome: Progressing     Problem: MUSCULOSKELETAL - ADULT  Goal: Maintain or return mobility to safest level of function  Description  INTERVENTIONS:  - Assess patient's ability to carry out ADLs; assess patient's baseline for ADL function and identify physical deficits which impact ability to perform ADLs (bathing, care of mouth/teeth, toileting, grooming, dressing, etc )  - Assess/evaluate cause of self-care deficits   - Assess range of motion  - Assess patient's mobility; develop plan if impaired  - Assess patient's need for assistive devices and provide as appropriate  - Encourage maximum independence but intervene and supervise when necessary  - Involve family in performance of ADLs  - Assess for home care needs following discharge   - Request OT consult to assist with ADL evaluation and planning for discharge  - Provide patient education as appropriate  Outcome: Progressing     Problem: Nutrition/Hydration-ADULT  Goal: Nutrient/Hydration intake appropriate for improving, restoring or maintaining nutritional needs  Description  Monitor and assess patient's nutrition/hydration status for malnutrition (ex- brittle hair, bruises, dry skin, pale skin and conjunctiva, muscle wasting, smooth red tongue, and disorientation)  Collaborate with interdisciplinary team and initiate plan and interventions as ordered  Monitor patient's weight and dietary intake as ordered or per policy  Utilize nutrition screening tool and intervene per policy  Determine patient's food preferences and provide high-protein, high-caloric foods as appropriate       INTERVENTIONS:  - Monitor oral intake, urinary output, labs, and treatment plans  - Assess nutrition and hydration status and recommend course of action  - Evaluate amount of meals eaten  - Assist patient with eating if necessary   - Allow adequate time for meals  - Recommend/ encourage appropriate diets, oral nutritional supplements, and vitamin/mineral supplements  - Order, calculate, and assess calorie counts as needed  - Recommend, monitor, and adjust tube feedings and TPN/PPN based on assessed needs  - Assess need for intravenous fluids  - Provide specific nutrition/hydration education as appropriate  - Include patient/family/caregiver in decisions related to nutrition  Outcome: Progressing

## 2019-08-06 NOTE — PROGRESS NOTES
PULMONOLOGY PROGRESS NOTE     Name: Dara Denney   Age & Sex: 80 y o  female   MRN: [id number]  Unit/Bed#: -01   Encounter: [id number]    PATIENT INFORMATION     Name: Dara Denney   Age & Sex: 80 y o  female   MRN: [id number]  Hospital Stay Days: 10    ASSESSMENT/PLAN     Principal Problem:    Community acquired pneumonia  Active Problems:    Acquired hypothyroidism    Essential hypertension    Dementia    Squamous cell carcinoma    Generalized weakness    Metabolic encephalopathy    Slow transit constipation    Leukocytosis      Acute respiratory insufficiency  -likely secondary to pneumonia with complicated parapneumonic effusion  -currently saturating over 92% on room air    Patient does not wear oxygen at home  -will continue to maintain oxygen saturations > 90%  -will continue incentive spirometry, out of bed as tolerated, Tessalon Perles for cough    Community-acquired pneumonia  -patient continues to be febrile overnight with T-max 100 9° F and WBCs 15 66 --> 20 54  -procalcitonin within normal limits as of 08/01/2019  -blood cultures x2, urine strep, urine Legionella, AFB stain body fluid culture/Gram stain, fungus culture negative growth to date  -continue high-dose IV ceftriaxone 2 mg daily (Day 11); previously received 2 days of cefepime and 2 days of azithromycin (total 12 days of antibiotics since admission)  -infectious Disease consulted and following  -trend WBC and fever curve    Complicated left-sided parapneumonic effusion  -PH 6 0 with LDH 1631; concern for empyema  -body fluid microbiology as noted above  -will perform a bedside ultrasound to evaluate for necessity of additional chest tube placement to achieve source control  -IR consult currently in place for possible chest tube placement    Left-sided hydropneumothorax - resolved  -thoracentesis 08/02/2019; chest tube placement status post procedure  -repeat chest x-ray with resolved hydropneumothorax  -left-sided chest tube removed yesterday afternoon    SUBJECTIVE     Patient seen and examined  No acute events overnight  Patient's left chest tube was removed yesterday afternoon  Patient resting comfortably in no acute distress  Patient reports mild shortness of breath and states that her respiratory status is fair and reports generalized fatigue  Patient disappointed about possibly requiring other chest tube  Denies fever, chills, headaches, lightheadedness, dizziness, visual disturbances, sore throat, chest pain, palpitations, abdominal pain, nausea, vomiting, or trouble urinating/ defecating  OBJECTIVE     Vitals:    19 1541 19 2144 19 2144 19 0730   BP: 140/60 140/61 140/61 138/59   Pulse: 83  84 78   Resp: 20 18 20 18   Temp: (!) 100 9 °F (38 3 °C) (!) 100 6 °F (38 1 °C) 99 7 °F (37 6 °C) 98 9 °F (37 2 °C)   TempSrc:       SpO2: 95%  94% 95%   Weight:       Height:          Temperature:   Temp (24hrs), Av °F (37 8 °C), Min:98 9 °F (37 2 °C), Max:100 9 °F (38 3 °C)    Temperature: 98 9 °F (37 2 °C)  Intake & Output:  I/O       / 07 -  0700 / 07 -  0700 / 07 -  0700    P  O  520 360     Total Intake(mL/kg) 520 (10 1) 360 (7)     Urine (mL/kg/hr) 1302 (1 1) 345 (0 3)     Chest Tube 13      Total Output 1315 345     Net -795 +15                Weights:   IBW: 57 kg    Body mass index is 18 93 kg/m²  Weight (last 2 days)     None        Physical Exam   Constitutional: She appears well-developed  No distress  She is not intubated  Thin frail elderly woman   HENT:   Head: Normocephalic and atraumatic  Eyes: Pupils are equal, round, and reactive to light  EOM are normal    Neck: No JVD present  No thyromegaly present  Cardiovascular: Normal rate, regular rhythm and normal heart sounds  Exam reveals no gallop  No murmur heard  Pulmonary/Chest: No accessory muscle usage or stridor  No apnea, no tachypnea and no bradypnea  She is not intubated  No respiratory distress   She has decreased breath sounds in the right middle field, the right lower field, the left middle field and the left lower field  She has no wheezes  She has no rhonchi  Chest wall is dull to percussion  She exhibits no tenderness, no laceration, no edema, no deformity and no swelling  Abdominal: Soft  Bowel sounds are normal  She exhibits no distension and no ascites  There is no tenderness  There is no rebound and no guarding  Musculoskeletal:        Right lower leg: She exhibits no tenderness and no edema  Left lower leg: She exhibits no tenderness and no edema  Lymphadenopathy:     She has no cervical adenopathy  Neurological: She is alert  She is disoriented  Oriented x1 (self)   Skin: Skin is warm and dry  No rash noted  She is not diaphoretic  No erythema  No pallor  Psychiatric: Her mood appears anxious  LABORATORY DATA     Labs: I have personally reviewed pertinent reports  Results from last 7 days   Lab Units 08/06/19  0752 08/05/19  0500 08/04/19  1218   WBC Thousand/uL 20 54* 15 66* 16 22*   HEMOGLOBIN g/dL 10 6* 11 2* 11 0*   HEMATOCRIT % 32 7* 34 3* 33 6*   PLATELETS Thousands/uL 339 337 330   NEUTROS PCT % 85* 83* 85*   MONOS PCT % 6 6 5      Results from last 7 days   Lab Units 08/05/19  0500 08/02/19  0457 08/01/19  0524   POTASSIUM mmol/L 4 0 4 3 4 2   CHLORIDE mmol/L 102 100 102   CO2 mmol/L 27 27 25   BUN mg/dL 16 16 11   CREATININE mg/dL 0 90 0 70 0 67   CALCIUM mg/dL 8 9 9 2 8 9   ALK PHOS U/L 167*  --   --    ALT U/L 163*  --   --    AST U/L 127*  --   --      Results from last 7 days   Lab Units 08/05/19  0500   MAGNESIUM mg/dL 2 3                        IMAGING & DIAGNOSTIC TESTING     Radiology Results: I have personally reviewed pertinent reports  Xr Chest 2 Views    Result Date: 7/26/2019  Impression: Left mid to lower lung consolidation and moderate pleural effusion   Workstation performed: [id number]     Other Diagnostic Testing: I have personally reviewed pertinent reports  ACTIVE MEDICATIONS     Current Facility-Administered Medications   Medication Dose Route Frequency    amLODIPine (NORVASC) tablet 5 mg  5 mg Oral Daily    aspirin chewable tablet 81 mg  81 mg Oral Daily    atorvastatin (LIPITOR) tablet 40 mg  40 mg Oral QPM    benzonatate (TESSALON PERLES) capsule 100 mg  100 mg Oral TID PRN    bisacodyl (DULCOLAX) rectal suppository 10 mg  10 mg Rectal Daily PRN    cefTRIAXone (ROCEPHIN) 2,000 mg in dextrose 5 % 50 mL IVPB  2,000 mg Intravenous Q24H    donepezil (ARICEPT) tablet 10 mg  10 mg Oral HS    enoxaparin (LOVENOX) subcutaneous injection 40 mg  40 mg Subcutaneous Daily    fluticasone (FLONASE) 50 mcg/act nasal spray 2 spray  2 spray Nasal Daily    lactulose 20 g/30 mL oral solution 30 g  30 g Oral Once    levothyroxine tablet 100 mcg  100 mcg Oral Early Morning    losartan (COZAAR) tablet 50 mg  50 mg Oral Daily    memantine (NAMENDA) tablet 10 mg  10 mg Oral BID    metoprolol succinate (TOPROL-XL) 24 hr tablet 50 mg  50 mg Oral Daily    montelukast (SINGULAIR) tablet 10 mg  10 mg Oral HS    oxyCODONE (ROXICODONE) IR tablet 2 5 mg  2 5 mg Oral Q4H PRN    pantoprazole (PROTONIX) EC tablet 40 mg  40 mg Oral Daily Before Breakfast    zolpidem (AMBIEN) tablet 5 mg  5 mg Oral HS PRN       VTE Pharmacologic Prophylaxis: Enoxaparin (Lovenox)  VTE Mechanical Prophylaxis: sequential compression device    Portions of the record may have been created with voice recognition software  Occasional wrong word or "sound a like" substitutions may have occurred due to the inherent limitations of voice recognition software    Read the chart carefully and recognize, using context, where substitutions have occurred   ==  Katherine Mackey, [facility]  Internal Medicine Residency PGY-3

## 2019-08-06 NOTE — PLAN OF CARE
Problem: OCCUPATIONAL THERAPY ADULT  Goal: Performs self-care activities at highest level of function for planned discharge setting  See evaluation for individualized goals  Description  Treatment Interventions: ADL retraining, Functional transfer training, UE strengthening/ROM, Endurance training, Patient/family training, Cognitive reorientation, Equipment evaluation/education, Compensatory technique education, Activityengagement, Energy conservation          See flowsheet documentation for full assessment, interventions and recommendations  Outcome: Progressing  Note:   Limitation: Decreased ADL status, Decreased UE strength, Decreased Safe judgement during ADL, Decreased cognition, Decreased endurance, Decreased self-care trans  Prognosis: Good, Fair  Assessment: Pt participated in occupational therapy with focus on activity tolerance, functional transfers/standing tolerance and balance for pt engagement in LB self-care and UB self-care  Pt cleared by RN/Sbaa for pt participation in occupational  therapy  Pt received sitting out of bed to bedside chair and agreeable to therapy following pt Identifiers confirmed  Pt did not report a goal today  Pt family member/pt  was initially present  Pt requires assist for functional transfers and standing balance 2* pt decreased overall strength and balance  Pt required set up assist for oral hygiene at bedside chair 2* pt poor activity tolerance  Pt will require in-pt rehab to continue to address pt noted deficits with decreased strength, balance and activity tolerance which currently impair pt ADL and functional mob        OT Discharge Recommendation: Short Term Rehab

## 2019-08-06 NOTE — PROGRESS NOTES
Progress Note - Antonietta Ritchie 3/23/1931, 80 y o  female MRN: [id number]    Unit/Bed#: -01 Encounter: [id number]    Primary Care Provider: Sushma Lux MD   Date and time admitted to hospital: 7/26/2019 12:24 PM        * Community acquired pneumonia  Assessment & Plan  Discussed with Pulmonary  IR to be consulted for possible chest tube placement  Continue IV ceftriaxone  Infectious Disease input noted      Generalized weakness  Assessment & Plan  Along with ambulatory dysfunction  Most likely secondary to pneumonia, however  reports that pts appetite has been decreasing and she has lost weight  Pt states she cant really eat most food but does ok with softer foods   Diet was changed to accommodate this   Added chocolate ensure tid 7/28    PT OT evaluation, recommending rehab     Dementia  Assessment & Plan  Continue Namenda and Aricept  Reorientation sleep hygiene      Essential hypertension  Assessment & Plan  Well controlled  Continue home meds  Acquired hypothyroidism  Assessment & Plan  Continue levothyroxine  VTE Pharmacologic Prophylaxis:   Pharmacologic: Enoxaparin (Lovenox)  Mechanical VTE Prophylaxis in Place: Yes    Patient Centered Rounds: I have performed bedside rounds with nursing staff today  Discussions with Specialists or Other Care Team Provider:  Pulmonary    Education and Discussions with Family / Patient:  Discussed with the patient, called and left a voice message for Son day awaited as well as son William Countess over phone    Time Spent for Care: 30 minutes  More than 50% of total time spent on counseling and coordination of care as described above      Current Length of Stay: 10 day(s)    Current Patient Status: Inpatient   Certification Statement: The patient will continue to require additional inpatient hospital stay due to As mentioned    Discharge Plan:  Awaiting clinical and symptomatic improvement    Code Status: Level 1 - Full Code      Subjective: Comfortably sitting up in bed  Reports feeling tired and fatigued  Poor appetite    Objective:     Vitals:   Temp (24hrs), Av 3 °F (37 4 °C), Min:97 9 °F (36 6 °C), Max:100 6 °F (38 1 °C)    Temp:  [97 9 °F (36 6 °C)-100 6 °F (38 1 °C)] 97 9 °F (36 6 °C)  HR:  [72-84] 72  Resp:  [18-20] 20  BP: ()/(57-61) 136/58  SpO2:  [94 %-96 %] 96 %  Body mass index is 18 93 kg/m²  Input and Output Summary (last 24 hours): Intake/Output Summary (Last 24 hours) at 2019 1610  Last data filed at 2019 1300  Gross per 24 hour   Intake 597 ml   Output 221 ml   Net 376 ml       Physical Exam:     Physical Exam    Comfortably sitting up in bed  Appears lethargic  Neck supple  Lungs diminished breath sounds bilaterally  Heart sounds S1-S2 noted  Abdomen soft nontender  Awake obeys simple commands  Pulses noted  No pedal edema  No rash    Additional Data:     Labs:    Results from last 7 days   Lab Units 19  0752   WBC Thousand/uL 20 54*   HEMOGLOBIN g/dL 10 6*   HEMATOCRIT % 32 7*   PLATELETS Thousands/uL 339   NEUTROS PCT % 85*   LYMPHS PCT % 6*   MONOS PCT % 6   EOS PCT % 1     Results from last 7 days   Lab Units 19  0752   SODIUM mmol/L 131*   POTASSIUM mmol/L 3 9   CHLORIDE mmol/L 99*   CO2 mmol/L 25   BUN mg/dL 20   CREATININE mg/dL 0 70   ANION GAP mmol/L 7   CALCIUM mg/dL 8 9   ALBUMIN g/dL 1 7*   TOTAL BILIRUBIN mg/dL 0 28   ALK PHOS U/L 169*   ALT U/L 148*   AST U/L 103*   GLUCOSE RANDOM mg/dL 103         Results from last 7 days   Lab Units 19  1058 19  0704 19  2116 19  1631 19  1051 19  0649 19  0135 19  2122 19  1612 19  1109 19  0635 19  2114   POC GLUCOSE mg/dl 160* 116 146* 183* 141* 99 116 151* 127 121 93 103         Results from last 7 days   Lab Units 19  1123   PROCALCITONIN ng/ml 0 20           * I Have Reviewed All Lab Data Listed Above  * Additional Pertinent Lab Tests Reviewed:  All Labs Within Last 24 Hours Reviewed    Imaging:    Imaging Reports Reviewed Today Include:  Imaging studies noted  Imaging Personally Reviewed by Myself Includes:  Chest CT reviewed right sided consolidation pleural effusion noted    Recent Cultures (last 7 days):     Results from last 7 days   Lab Units 08/05/19  0829   GRAM STAIN RESULT  No Polys or Bacteria seen  1+ RBC's   BODY FLUID CULTURE, STERILE  No growth       Last 24 Hours Medication List:     Current Facility-Administered Medications:  alteplase (TPA) 10 mg in SWFI 30 mL chest tube instillation 10 mg Chest Tube BID Daphnie Alfaro MD    And        dornase adán (PULMOZYME) 5 mg in 30 mL SWFI chest tube instilation 5 mg Chest Tube BID Daphnie Alfaro MD    amLODIPine 5 mg Oral Daily Susanna Vicente MD    aspirin 81 mg Oral Daily Susanna Vicente MD    atorvastatin 40 mg Oral QPM Susanna Vicente MD    benzonatate 100 mg Oral TID PRN Susanna Vicente MD    bisacodyl 10 mg Rectal Daily PRN Loree Krabbe, CRNP    cefTRIAXone 2,000 mg Intravenous Q24H Prabha Elizabeth MD Last Rate: 2,000 mg (08/05/19 2049)   donepezil 10 mg Oral HS Susanna Vicente MD    enoxaparin 40 mg Subcutaneous Daily Susanna Vicente MD    fluticasone 2 spray Nasal Daily Susanna Vicente MD    lactulose 30 g Oral Once Loree Krabbe, CRNP    levothyroxine 100 mcg Oral Early Morning Susanna Vicente MD    losartan 50 mg Oral Daily Susanna Vicente MD    memantine 10 mg Oral BID Susanna Vicente MD    metoprolol succinate 50 mg Oral Daily Susanna Vicente MD    montelukast 10 mg Oral HS Susanna Vicente MD    oxyCODONE 2 5 mg Oral Q4H PRN Austin Frias DO    pantoprazole 40 mg Oral Daily Before Breakfast Susanna Vicente MD    zolpidem 5 mg Oral HS PRN Susanna Vicente MD         Today, Patient Was Seen By: Mandy Gilman MD    ** Please Note: Dictation voice to text software may have been used in the creation of this document   **

## 2019-08-06 NOTE — PROGRESS NOTES
Progress Note - Infectious Disease   Nia Adan 80 y o  female MRN: [id number]  Unit/Bed#: -01 Encounter: [id number]      Impression/Plan:  1  Community-acquired pneumonia with pleural effusions   Patient presents at this time with progressive weakness and shortness of breath at home along with leukocytosis on admission   Patient remains hemodynamically stable, procalcitonin normalized and leukocytosis down trending  Her respiratory status remains stable  Continue on ceftriaxone  for now at current dose  Continue to trend fever curve/vitals  Recheck CBC with diff and CMP  Supportive care     2  Complicated parapneumonic effusion-possible empyema based upon very low pH and very high LDH  The pleural cultures are negative however the patient had been on antibiotics for quite some time  Chest tube was removed yesterday as it was not in a appropriate location  -antibiotics as above  -await ultrasound and possible repeat chest tube placement  -may need additional drainage measures including possible additional chest tube drainage for VATS  -may need repeat CT the chest  -close Pulmonary follow-up     3  Fever, persistent leukocytosis   The patient now has new fever spike  White cell count has risen since yesterday  Unclear etiology  Possibly all related to the patient's complicated effusion  There are no other obvious sources for elevated white count on her exam   Speech evaluation without signs of aspiration  Repeat CBC tomorrow  Recheck procalcitonin level  If fever recurs check blood cultures x2 sets  Continue antibiotics as above  Additional workup as needed     4  Liver function test abnormalities-unclear etiology  The patient appears to be mildly symptomatic with notable nausea  Perhaps related to the ceftriaxone  Liver function tests remain elevated but the patient's symptoms are better    -recheck liver function test  -check right upper quadrant ultrasound  -check hepatitis panel  -May need to change antibiotics  -additional workup as needed     5  Kendra Yao has underlying history of dementia for which she is on medications for   He seems to be at her reported baseline  Continue monitor mental status  Continue antibiotics as above  Additional care as per primary    Antibiotics:  Ceftriaxone 8  Total antibiotics 11    Subjective:  Patient developed fever yesterday; no nausea, vomiting, diarrhea; no increase cough, or shortness of breath; no pain  No new symptoms  She had a bowel movement today is feeling better  Objective:  Vitals:  Temp:  [98 9 °F (37 2 °C)-100 9 °F (38 3 °C)] 98 9 °F (37 2 °C)  HR:  [78-84] 78  Resp:  [18-20] 18  BP: (138-140)/(59-61) 138/59  SpO2:  [94 %-95 %] 95 %  Temp (24hrs), Av °F (37 8 °C), Min:98 9 °F (37 2 °C), Max:100 9 °F (38 3 °C)  Current: Temperature: 98 9 °F (37 2 °C)    Physical Exam:   General Appearance:  Alert, interactive, nontoxic, no acute distress  Throat: Oropharynx moist without lesions  Lungs:   Decreased breath sounds left greater than right; no wheezes, rhonchi or rales; respirations unlabored   Heart:  RRR; no murmur, rub or gallop   Abdomen:   Soft, non-tender, non-distended, positive bowel sounds  Extremities: No clubbing, cyanosis or edema   Skin: No new rashes or lesions  No draining wounds noted         Labs, Imaging, & Other studies:   All pertinent labs and imaging studies were personally reviewed  Results from last 7 days   Lab Units 19  0752 19  0500 19  1218   WBC Thousand/uL 20 54* 15 66* 16 22*   HEMOGLOBIN g/dL 10 6* 11 2* 11 0*   PLATELETS Thousands/uL 339 337 330     Results from last 7 days   Lab Units 19  0752 19  0500  19  0457   SODIUM mmol/L 131* 136  --  134*   POTASSIUM mmol/L 3 9 4 0  --  4 3   CHLORIDE mmol/L 99* 102  --  100   CO2 mmol/L 25 27  --  27   BUN mg/dL 20 16  --  16   CREATININE mg/dL 0 70 0 90  --  0 70   EGFR ml/min/1 73sq m 78 57  --  78   CALCIUM mg/dL 8 9 8 9  --  9 2   AST U/L 103* 127*  --   --    ALT U/L 148* 163*   < >  --    ALK PHOS U/L 169* 167*   < >  --     < > = values in this interval not displayed  Results from last 7 days   Lab Units 08/05/19  0829   GRAM STAIN RESULT  No Polys or Bacteria seen  1+ RBC's   BODY FLUID CULTURE, STERILE  No growth     Results from last 7 days   Lab Units 08/01/19  1123   PROCALCITONIN ng/ml 0 20     Chest x-ray decreased left pleural collection    Chest tube removed    Images personally reviewed by me in PACS

## 2019-08-06 NOTE — PROGRESS NOTES
IR limited note    Patient here for replacement of prior chest tube    - she is confused, unable to state time, place, why she is here    - unable to reach family member (, son) despite multiple attempts  - limited ultrasound with numerous loculations, possibly abscesses, unclear visualization  - given additional risk and patient oxygenating on room air, not OK to proceed without consent discussion    Recommend  - CT of chest,  I have ordered, for planning  - can eat baseline diet now  - NPO after midnight  - need to have party that gives consent available to discuss with IR  - hold anti coags  - continue abx

## 2019-08-06 NOTE — PHYSICAL THERAPY NOTE
Physical Therapy Treatment Note     Patient Name: Holly Bryson    OPRKC'D Date: 8/6/2019     Problem List  Patient Active Problem List   Diagnosis    Acquired hypothyroidism    Essential hypertension    CAD (coronary artery disease)    GERD (gastroesophageal reflux disease)    Chest pain    Weakness    Dementia    History of TIA (transient ischemic attack)    Twitching    Abnormal EKG    Gait disturbance    Cataracts, bilateral    Glaucoma    History of appendectomy    History of MI (myocardial infarction)    Macular degeneration of both eyes    Actinic keratosis    Post-nasal drip    Squamous cell carcinoma    Urinary leakage    Pneumonia, unspecified organism    Community acquired pneumonia    Generalized weakness    Metabolic encephalopathy    Slow transit constipation    Leukocytosis        Past Medical History  Past Medical History:   Diagnosis Date    Disease of thyroid gland     GERD (gastroesophageal reflux disease)     Hyperlipidemia     Hypertension     Insomnia     MI (myocardial infarction) ([address] )         Past Surgical History  Past Surgical History:   Procedure Laterality Date    APPENDECTOMY      COLONOSCOPY      03OCT2012  LAST ASSESSED    IR CHEST TUBE  8/2/2019 08/06/19 1050   Pain Assessment   Pain Assessment Malloy-Baker FACES   Malloy-Baker FACES Pain Rating 6   Pain Type Acute pain   Pain Location Back   Restrictions/Precautions   Weight Bearing Precautions Per Order No   General   Chart Reviewed Yes   Response to Previous Treatment Patient with no complaints from previous session     Family/Caregiver Present No   Cognition   Overall Cognitive Status Impaired   Arousal/Participation Lethargic;Arousable   Attention Within functional limits   Following Commands Follows one step commands with increased time or repetition   Comments mutlipel cues throughout to keep eyes open   Bed Mobility   Rolling R 4 Minimal assistance   Additional items Assist x 1   Supine to Sit 3  Moderate assistance   Additional items Assist x 1   Sit to Supine 3  Moderate assistance   Additional items Assist x 1   Transfers   Sit to Stand Unable to assess   Balance   Static Sitting Poor +   Dynamic Sitting Poor +   Endurance Deficit   Endurance Deficit Yes   Activity Tolerance   Activity Tolerance Patient limited by pain; Patient limited by fatigue   Nurse Made Aware nurse approved therapy session   Exercises   Hip Flexion Supine;Bilateral  (12 reps x2 sets SLR)   Knee AROM Long Arc Quad Sitting;Bilateral  (12 reps x 2 sets )   Ankle Pumps Supine;Bilateral  (30 reps x 3 sets )   Balance training  sitting EOB min A for 5-10 minutes unable to maintaining upright position  requested to lay down multiple times    Assessment   Prognosis Fair   Problem List Decreased strength;Decreased endurance; Impaired balance;Decreased mobility;Pain   Assessment Pt presents to therapy today with decreased endurance, increased lethargy, and decreased mobility  These impairments limit the patient by increasing the level of assist needed to complete mobility  Pt would benefit from continued skilled therapy while in the hospital to improve functional mobility and return to PLOF  Recommend rehab  At end of session patient was left supine with call bell within reach  Pt presented today with increased lethargy requiring increased cueing throughout the session to participate and to keep eyes open  Goals   STG Expiration Date 08/07/19   Short Term Goal #1 continue to progress towards goals    Treatment Day 3   Plan   Treatment/Interventions LE strengthening/ROM; Functional transfer training;Bed mobility   Progress Slow progress, decreased activity tolerance   PT Frequency   (3-5x wk)   Recommendation   Recommendation   (rehab)   Equipment Recommended Ashish Camera   PT - OK to Discharge Yes   Additional Comments when medically cleared    Diana Ivory, Pt, DPT

## 2019-08-06 NOTE — OCCUPATIONAL THERAPY NOTE
Occupational Therapy Treatment Note     08/06/19 9635   Restrictions/Precautions   Weight Bearing Precautions Per Order No   Other Precautions Cognitive; Bed Alarm; Fall Risk   Lifestyle   Autonomy pt reports she is independent with basic adls and mobility with rw- spouse and facility staff assist prn - meals/homemaking provided    Reciprocal Relationships supportive family    Service to Others retired   Intrinsic Gratification  mostly sedentary    Pain Assessment   Pain Assessment No/denies pain   Pain Score No Pain   ADL   Where Assessed Chair   Grooming Assistance 5  Supervision/Setup   Grooming Deficit Setup; Increased time to complete   LB Dressing Assistance 2  Maximal Assistance   LB Dressing Deficit Thread RLE into underwear; Thread LLE into underwear;Pull up over hips   Transfers   Sit to Stand 3  Moderate assistance   Additional items Assist x 1   Stand to Sit 3  Moderate assistance   Additional items Assist x 1   Cognition   Overall Cognitive Status Impaired   Arousal/Participation Lethargic;Arousable   Attention Within functional limits   Orientation Level Oriented to person;Oriented to place; Disoriented to time;Disoriented to situation   Memory Decreased short term memory;Decreased recall of recent events;Decreased recall of precautions   Following Commands Follows one step commands with increased time or repetition   Assessment   Assessment Pt participated in occupational therapy with focus on activity tolerance, functional transfers/standing tolerance and balance for pt engagement in LB self-care and UB self-care  Pt cleared by VANCE/Saba for pt participation in occupational  therapy  Pt received sitting out of bed to bedside chair and agreeable to therapy following pt Identifiers confirmed  Pt did not report a goal today  Pt family member/pt  was initially present  Pt requires assist for functional transfers and standing balance 2* pt decreased overall strength and balance   Pt required set up assist for oral hygiene at bedside chair 2* pt poor activity tolerance  Pt will require in-pt rehab to continue to address pt noted deficits with decreased strength, balance and activity tolerance which currently impair pt ADL and functional mob      Plan   Treatment Interventions ADL retraining;Functional transfer training;Patient/family training   Goal Expiration Date 08/13/19   Treatment Day 1   OT Frequency 2-3x/wk   Recommendation   OT Discharge Recommendation Short Term Rehab   Barthel Index   Feeding 5   Bathing 0   Grooming Score 0   Dressing Score 5   Bladder Score 5   Bowels Score 10   Toilet Use Score 5   Transfers (Bed/Chair) Score 5   Mobility (Level Surface) Score 0   Stairs Score 0   Barthel Index Score 35       Sonya Guilford  GRAFF/L

## 2019-08-07 ENCOUNTER — APPOINTMENT (INPATIENT)
Dept: RADIOLOGY | Facility: HOSPITAL | Age: 84
DRG: 193 | End: 2019-08-07
Payer: MEDICARE

## 2019-08-07 PROBLEM — J90 LOCULATED PLEURAL EFFUSION: Status: ACTIVE | Noted: 2019-08-07

## 2019-08-07 LAB
ALBUMIN SERPL BCP-MCNC: 1.8 G/DL (ref 3.5–5)
ALP SERPL-CCNC: 187 U/L (ref 46–116)
ALT SERPL W P-5'-P-CCNC: 199 U/L (ref 12–78)
ANION GAP SERPL CALCULATED.3IONS-SCNC: 9 MMOL/L (ref 4–13)
AST SERPL W P-5'-P-CCNC: 159 U/L (ref 5–45)
BASOPHILS # BLD AUTO: 0.05 THOUSANDS/ΜL (ref 0–0.1)
BASOPHILS NFR BLD AUTO: 0 % (ref 0–1)
BILIRUB SERPL-MCNC: 0.34 MG/DL (ref 0.2–1)
BUN SERPL-MCNC: 18 MG/DL (ref 5–25)
CALCIUM SERPL-MCNC: 9 MG/DL (ref 8.3–10.1)
CHLORIDE SERPL-SCNC: 96 MMOL/L (ref 100–108)
CO2 SERPL-SCNC: 25 MMOL/L (ref 21–32)
CREAT SERPL-MCNC: 0.71 MG/DL (ref 0.6–1.3)
EOSINOPHIL # BLD AUTO: 0.18 THOUSAND/ΜL (ref 0–0.61)
EOSINOPHIL NFR BLD AUTO: 1 % (ref 0–6)
ERYTHROCYTE [DISTWIDTH] IN BLOOD BY AUTOMATED COUNT: 12.8 % (ref 11.6–15.1)
GFR SERPL CREATININE-BSD FRML MDRD: 76 ML/MIN/1.73SQ M
GLUCOSE SERPL-MCNC: 110 MG/DL (ref 65–140)
GLUCOSE SERPL-MCNC: 138 MG/DL (ref 65–140)
GLUCOSE SERPL-MCNC: 81 MG/DL (ref 65–140)
GLUCOSE SERPL-MCNC: 92 MG/DL (ref 65–140)
GLUCOSE SERPL-MCNC: 95 MG/DL (ref 65–140)
HAV IGM SER QL: NORMAL
HBV CORE IGM SER QL: NORMAL
HBV SURFACE AG SER QL: NORMAL
HCT VFR BLD AUTO: 34.5 % (ref 34.8–46.1)
HCV AB SER QL: NORMAL
HGB BLD-MCNC: 10.9 G/DL (ref 11.5–15.4)
IMM GRANULOCYTES # BLD AUTO: 0.3 THOUSAND/UL (ref 0–0.2)
IMM GRANULOCYTES NFR BLD AUTO: 2 % (ref 0–2)
LYMPHOCYTES # BLD AUTO: 1.09 THOUSANDS/ΜL (ref 0.6–4.47)
LYMPHOCYTES NFR BLD AUTO: 6 % (ref 14–44)
MCH RBC QN AUTO: 30.1 PG (ref 26.8–34.3)
MCHC RBC AUTO-ENTMCNC: 31.6 G/DL (ref 31.4–37.4)
MCV RBC AUTO: 95 FL (ref 82–98)
MONOCYTES # BLD AUTO: 0.97 THOUSAND/ΜL (ref 0.17–1.22)
MONOCYTES NFR BLD AUTO: 6 % (ref 4–12)
NEUTROPHILS # BLD AUTO: 15.02 THOUSANDS/ΜL (ref 1.85–7.62)
NEUTS SEG NFR BLD AUTO: 85 % (ref 43–75)
NRBC BLD AUTO-RTO: 0 /100 WBCS
PLATELET # BLD AUTO: 364 THOUSANDS/UL (ref 149–390)
PMV BLD AUTO: 9.8 FL (ref 8.9–12.7)
POTASSIUM SERPL-SCNC: 4 MMOL/L (ref 3.5–5.3)
PROCALCITONIN SERPL-MCNC: 0.1 NG/ML
PROT SERPL-MCNC: 7.3 G/DL (ref 6.4–8.2)
RBC # BLD AUTO: 3.62 MILLION/UL (ref 3.81–5.12)
SODIUM SERPL-SCNC: 130 MMOL/L (ref 136–145)
WBC # BLD AUTO: 17.61 THOUSAND/UL (ref 4.31–10.16)

## 2019-08-07 PROCEDURE — 80053 COMPREHEN METABOLIC PANEL: CPT | Performed by: INTERNAL MEDICINE

## 2019-08-07 PROCEDURE — 82948 REAGENT STRIP/BLOOD GLUCOSE: CPT

## 2019-08-07 PROCEDURE — 32557 INSERT CATH PLEURA W/ IMAGE: CPT

## 2019-08-07 PROCEDURE — 80074 ACUTE HEPATITIS PANEL: CPT | Performed by: INTERNAL MEDICINE

## 2019-08-07 PROCEDURE — C1769 GUIDE WIRE: HCPCS

## 2019-08-07 PROCEDURE — 84145 PROCALCITONIN (PCT): CPT | Performed by: INTERNAL MEDICINE

## 2019-08-07 PROCEDURE — 0W9B30Z DRAINAGE OF LEFT PLEURAL CAVITY WITH DRAINAGE DEVICE, PERCUTANEOUS APPROACH: ICD-10-PCS | Performed by: RADIOLOGY

## 2019-08-07 PROCEDURE — C1729 CATH, DRAINAGE: HCPCS

## 2019-08-07 PROCEDURE — 87070 CULTURE OTHR SPECIMN AEROBIC: CPT | Performed by: RADIOLOGY

## 2019-08-07 PROCEDURE — 99232 SBSQ HOSP IP/OBS MODERATE 35: CPT | Performed by: INTERNAL MEDICINE

## 2019-08-07 PROCEDURE — 85025 COMPLETE CBC W/AUTO DIFF WBC: CPT | Performed by: INTERNAL MEDICINE

## 2019-08-07 PROCEDURE — 99223 1ST HOSP IP/OBS HIGH 75: CPT | Performed by: THORACIC SURGERY (CARDIOTHORACIC VASCULAR SURGERY)

## 2019-08-07 PROCEDURE — 99233 SBSQ HOSP IP/OBS HIGH 50: CPT | Performed by: INTERNAL MEDICINE

## 2019-08-07 PROCEDURE — 99152 MOD SED SAME PHYS/QHP 5/>YRS: CPT

## 2019-08-07 PROCEDURE — 99153 MOD SED SAME PHYS/QHP EA: CPT

## 2019-08-07 PROCEDURE — 87205 SMEAR GRAM STAIN: CPT | Performed by: RADIOLOGY

## 2019-08-07 PROCEDURE — 3E0L3GC INTRODUCTION OF OTHER THERAPEUTIC SUBSTANCE INTO PLEURAL CAVITY, PERCUTANEOUS APPROACH: ICD-10-PCS | Performed by: EMERGENCY MEDICINE

## 2019-08-07 RX ORDER — FENTANYL CITRATE 50 UG/ML
INJECTION, SOLUTION INTRAMUSCULAR; INTRAVENOUS CODE/TRAUMA/SEDATION MEDICATION
Status: COMPLETED | OUTPATIENT
Start: 2019-08-07 | End: 2019-08-07

## 2019-08-07 RX ORDER — OXYCODONE HYDROCHLORIDE 5 MG/1
5 TABLET ORAL EVERY 4 HOURS PRN
Status: DISCONTINUED | OUTPATIENT
Start: 2019-08-07 | End: 2019-08-16 | Stop reason: HOSPADM

## 2019-08-07 RX ORDER — MIDAZOLAM HYDROCHLORIDE 1 MG/ML
INJECTION INTRAMUSCULAR; INTRAVENOUS CODE/TRAUMA/SEDATION MEDICATION
Status: COMPLETED | OUTPATIENT
Start: 2019-08-07 | End: 2019-08-07

## 2019-08-07 RX ORDER — LIDOCAINE 50 MG/G
2 PATCH TOPICAL DAILY
Status: DISCONTINUED | OUTPATIENT
Start: 2019-08-07 | End: 2019-08-16 | Stop reason: HOSPADM

## 2019-08-07 RX ORDER — HYDROMORPHONE HCL/PF 1 MG/ML
0.2 SYRINGE (ML) INJECTION
Status: DISCONTINUED | OUTPATIENT
Start: 2019-08-07 | End: 2019-08-08

## 2019-08-07 RX ORDER — HYDROMORPHONE HCL/PF 1 MG/ML
0.2 SYRINGE (ML) INJECTION ONCE
Status: DISCONTINUED | OUTPATIENT
Start: 2019-08-07 | End: 2019-08-16 | Stop reason: HOSPADM

## 2019-08-07 RX ADMIN — ALTEPLASE 10 MG: 2.2 INJECTION, POWDER, LYOPHILIZED, FOR SOLUTION INTRAVENOUS at 21:07

## 2019-08-07 RX ADMIN — MEMANTINE 10 MG: 10 TABLET ORAL at 09:12

## 2019-08-07 RX ADMIN — FENTANYL CITRATE 12.5 MCG: 50 INJECTION INTRAMUSCULAR; INTRAVENOUS at 14:47

## 2019-08-07 RX ADMIN — LOSARTAN POTASSIUM 50 MG: 50 TABLET, FILM COATED ORAL at 09:12

## 2019-08-07 RX ADMIN — PANTOPRAZOLE SODIUM 40 MG: 40 TABLET, DELAYED RELEASE ORAL at 08:30

## 2019-08-07 RX ADMIN — OXYCODONE HYDROCHLORIDE 2.5 MG: 5 TABLET ORAL at 10:59

## 2019-08-07 RX ADMIN — LIDOCAINE 2 PATCH: 50 PATCH CUTANEOUS at 12:16

## 2019-08-07 RX ADMIN — HYDROMORPHONE HYDROCHLORIDE 0.2 MG: 1 INJECTION, SOLUTION INTRAMUSCULAR; INTRAVENOUS; SUBCUTANEOUS at 22:15

## 2019-08-07 RX ADMIN — HYDROMORPHONE HYDROCHLORIDE 0.2 MG: 1 INJECTION, SOLUTION INTRAMUSCULAR; INTRAVENOUS; SUBCUTANEOUS at 12:17

## 2019-08-07 RX ADMIN — DORNASE ALFA 5 MG: 1 SOLUTION RESPIRATORY (INHALATION) at 21:06

## 2019-08-07 RX ADMIN — AMLODIPINE BESYLATE 5 MG: 5 TABLET ORAL at 09:15

## 2019-08-07 RX ADMIN — METOPROLOL SUCCINATE 50 MG: 50 TABLET, EXTENDED RELEASE ORAL at 09:12

## 2019-08-07 RX ADMIN — MIDAZOLAM 0.25 MG: 1 INJECTION INTRAMUSCULAR; INTRAVENOUS at 14:47

## 2019-08-07 RX ADMIN — HYDROMORPHONE HYDROCHLORIDE 0.2 MG: 1 INJECTION, SOLUTION INTRAMUSCULAR; INTRAVENOUS; SUBCUTANEOUS at 18:34

## 2019-08-07 RX ADMIN — MONTELUKAST SODIUM 10 MG: 10 TABLET, FILM COATED ORAL at 22:11

## 2019-08-07 RX ADMIN — MIDAZOLAM 0.25 MG: 1 INJECTION INTRAMUSCULAR; INTRAVENOUS at 14:56

## 2019-08-07 RX ADMIN — DONEPEZIL HYDROCHLORIDE 10 MG: 10 TABLET ORAL at 22:11

## 2019-08-07 RX ADMIN — MEMANTINE 10 MG: 10 TABLET ORAL at 18:07

## 2019-08-07 RX ADMIN — ERTAPENEM SODIUM 1000 MG: 1 INJECTION, POWDER, LYOPHILIZED, FOR SOLUTION INTRAMUSCULAR; INTRAVENOUS at 10:30

## 2019-08-07 RX ADMIN — ATORVASTATIN CALCIUM 40 MG: 40 TABLET, FILM COATED ORAL at 18:07

## 2019-08-07 RX ADMIN — FLUTICASONE PROPIONATE 2 SPRAY: 50 SPRAY, METERED NASAL at 09:31

## 2019-08-07 NOTE — BRIEF OP NOTE (RAD/CATH)
Procedure name not found  Procedure Note    PATIENT NAME: Cordell Zuniga  : 3/23/1931  MRN: [id number]     Pre-op Diagnosis:   1  Left lower lobe pneumonia ([address] )    2  Cough    3  Pleural effusion    4  Empyema (HCC)      Post-op Diagnosis:   1  Left lower lobe pneumonia ([address] )    2  Cough    3  Pleural effusion    4   Empyema [facility])        Surgeon:   Samantha Pepe MD  Assistants:     No qualified resident was available, Resident is only observing    Estimated Blood Loss: Minimal  Findings: Left loculated pleural effusion    Specimens: serousanguinous fluid aspirated and sent for culture and fluid analysis    Complications:  None    Anesthesia: Conscious sedation    Samantha Pepe MD     Date: 2019  Time: 3:41 PM

## 2019-08-07 NOTE — CONSULTS
Consultation - Thoracic Surgery   Dara Denney 80 y o  female MRN: [id number]  Unit/Bed#: -01 Encounter: [id number]      Assessment/Plan     Assessment:  79yo F w/ L-sided empyema  IR chest tube being placed later today with planned instillation of tPA/dornase  Plan:  --no need for surgical intervention at this time  --IR chest tube w/ tPA/dornase instillation per pulmonology  --will follow up imaging after tPA/dornase treatment completed  --care per primary   --please call with any questions    History of Present Illness   Reason for Consult / Principal Problem: Left empyema    HPI: Dara Denney is a 80y o  year old female with dementia who presents with LLL PNA and loculated left pleural effusion c/f empyema  She was noted to have a L hydropneumothorax and had a chest tube placed by IR on 8/2, which yielded 460cc of clear-yellow fluid  CXRs showed improvement in left hydropneumothorax with resolution of pneumothorax component  The chest tube was removed on 8/5, however, she subsequently had febrile episodes overnight  Bedside ultrasound was performed, which showed complicated pleural space, prompting discussion with IR about chest tube replacement with tPA/dornase therapy  Chest tube replacement was planned for 8/6, however consent was unable to be obtained  As of 8/7, chest tube consent has been obtained and the plan remains for IR to replace chest tube along with tPA/dornase instillation  She was recently switched from ceftriaxone therapy to ertapenem per ID recommendations             Inpatient consult to Thoracic Surgery     Performed by  Arielle Contreras MD     Authorized by Anju Qureshi MD              Review of Systems   Unable to perform ROS: Dementia       Historical Information   Past Medical History:   Diagnosis Date    Disease of thyroid gland     GERD (gastroesophageal reflux disease)     Hyperlipidemia     Hypertension     Insomnia     MI [facility]) [address] [address] [facility] Past Surgical History:   Procedure Laterality Date    APPENDECTOMY      COLONOSCOPY      03OCT2012  LAST ASSESSED    IR CHEST TUBE  8/2/2019     Social History     Substance and Sexual Activity   Alcohol Use Yes     Social History     Substance and Sexual Activity   Drug Use No     Social History     Tobacco Use   Smoking Status Never Smoker   Smokeless Tobacco Never Used     Family History:   Family History   Problem Relation Age of Onset    No Known Problems Father     Heart disease Family        Meds/Allergies   current meds:   Current Facility-Administered Medications   Medication Dose Route Frequency    alteplase (TPA) 10 mg in SWFI 30 mL chest tube instillation  10 mg Chest Tube BID    And    dornase adán (PULMOZYME) 5 mg in 30 mL SWFI chest tube instilation  5 mg Chest Tube BID    amLODIPine (NORVASC) tablet 5 mg  5 mg Oral Daily    aspirin chewable tablet 81 mg  81 mg Oral Daily    atorvastatin (LIPITOR) tablet 40 mg  40 mg Oral QPM    benzonatate (TESSALON PERLES) capsule 100 mg  100 mg Oral TID PRN    bisacodyl (DULCOLAX) rectal suppository 10 mg  10 mg Rectal Daily PRN    donepezil (ARICEPT) tablet 10 mg  10 mg Oral HS    enoxaparin (LOVENOX) subcutaneous injection 40 mg  40 mg Subcutaneous Daily    ertapenem (INVanz) 1,000 mg in sodium chloride 0 9 % 50 mL IVPB  1,000 mg Intravenous Q24H    fluticasone (FLONASE) 50 mcg/act nasal spray 2 spray  2 spray Nasal Daily    HYDROmorphone (DILAUDID) injection 0 2 mg  0 2 mg Intravenous Q3H PRN    lactulose 20 g/30 mL oral solution 30 g  30 g Oral Once    levothyroxine tablet 100 mcg  100 mcg Oral Early Morning    lidocaine (LIDODERM) 5 % patch 2 patch  2 patch Topical Daily    losartan (COZAAR) tablet 50 mg  50 mg Oral Daily    memantine (NAMENDA) tablet 10 mg  10 mg Oral BID    metoprolol succinate (TOPROL-XL) 24 hr tablet 50 mg  50 mg Oral Daily    montelukast (SINGULAIR) tablet 10 mg  10 mg Oral HS    oxyCODONE (ROXICODONE) IR tablet 5 mg  5 mg Oral Q4H PRN    pantoprazole (PROTONIX) EC tablet 40 mg  40 mg Oral Daily Before Breakfast    zolpidem (AMBIEN) tablet 5 mg  5 mg Oral HS PRN     Allergies   Allergen Reactions    Alendronate      Other reaction(s): tooth decay    Diphenhydramine Hyperactivity    Penicillins Other (See Comments)     Reaction not listed; however, has tolerated Ceftriaxone and Cefepime which have different side chains  Objective   Vitals: Blood pressure 147/66, pulse 83, temperature 98 5 °F (36 9 °C), resp  rate 16, height 5' 5" (1 651 m), weight 51 6 kg (113 lb 12 1 oz), SpO2 95 %  Invasive Devices     Peripheral Intravenous Line            Peripheral IV 08/04/19 Distal;Right;Ventral (anterior) Forearm 3 days          Drain            External Urinary Catheter less than 1 day                Physical Exam   Constitutional: She appears well-developed and well-nourished  No distress  HENT:   Head: Normocephalic and atraumatic  Eyes: Pupils are equal, round, and reactive to light  EOM are normal    Neck: Normal range of motion  Neck supple  No JVD present  Cardiovascular: Normal rate, regular rhythm, normal heart sounds and intact distal pulses  Exam reveals no gallop and no friction rub  No murmur heard  Pulmonary/Chest: Effort normal  No stridor  No respiratory distress  She has no wheezes  She has no rales  Slightly decreased breath sounds on left side   Abdominal: Soft  Bowel sounds are normal  She exhibits no distension and no mass  There is no tenderness  There is no rebound and no guarding  No hernia  Musculoskeletal: Normal range of motion  She exhibits no edema or deformity  Skin: Skin is warm and dry  No rash noted  She is not diaphoretic  No erythema  No pallor         Lab Results:   CBC:   Lab Results   Component Value Date    WBC 17 61 (H) 08/07/2019    HGB 10 9 (L) 08/07/2019    HCT 34 5 (L) 08/07/2019    MCV 95 08/07/2019     08/07/2019    MCH 30 1 08/07/2019    [facility] 31 6 08/07/2019    RDW 12 8 08/07/2019    MPV 9 8 08/07/2019    NRBC 0 08/07/2019   , CMP:   Lab Results   Component Value Date    SODIUM 130 (L) 08/07/2019    CL 96 (L) 08/07/2019    CO2 25 08/07/2019    BUN 18 08/07/2019    CREATININE 0 71 08/07/2019    CALCIUM 9 0 08/07/2019     (H) 08/07/2019     (H) 08/07/2019    ALKPHOS 187 (H) 08/07/2019    EGFR 76 08/07/2019   , Magnesium: No components found for: MAG  Imaging Studies: I have personally reviewed pertinent reports  EKG, Pathology, and Other Studies: I have personally reviewed pertinent reports      VTE Prophylaxis: Enoxaparin (Lovenox)    Code Status: Level 1 - Full Code

## 2019-08-07 NOTE — QUICK NOTE
Additional phone numbers for patient family members:    [place] Loupe: [address] Sr: [phone]

## 2019-08-07 NOTE — PROGRESS NOTES
Rec/'d pt from IR  Pt alert and taking sips of protein drink  Chest tube connected to suction as per EMR  Serous dsg in tubing  No air leak noted at this time  No crepitus noted at this time  Will monitor

## 2019-08-07 NOTE — PROGRESS NOTES
Pt off unit with transporter to IR for Chest tube placement  Family at bedside and waiting for her return  Will monitor

## 2019-08-07 NOTE — PROGRESS NOTES
Progress Note - Valente Mcarthur 3/23/1931, 80 y o  female MRN: [id number]    Unit/Bed#: -01 Encounter: [id number]    Primary Care Provider: Christian Marshall MD   Date and time admitted to hospital: 7/26/2019 12:24 PM        * Community acquired pneumonia  Assessment & Plan  Continue IV antibiotics  Infectious Disease input noted  Supportive care      Loculated pleural effusion  Assessment & Plan  Pneumonia with loculated pleural effusion with pleuritic pain  Discussed with Pulmonary, IR  Will request thoracic surgery inputs  For chest tube placement today  Continue IV antibiotics  Analgesics, lidocaine patch  Supportive care    Generalized weakness  Assessment & Plan  Deconditioning, ambulatory dysfunction  Safe ambulation fall precautions  Physical therapy    Dementia  Assessment & Plan  Continue Namenda and Aricept  Reorientation sleep hygiene      Essential hypertension  Assessment & Plan  Monitor blood pressures  Avoid hypotension    Acquired hypothyroidism  Assessment & Plan  Continue levothyroxine      VTE Pharmacologic Prophylaxis:   Pharmacologic: Enoxaparin (Lovenox)  Mechanical VTE Prophylaxis in Place: Yes    Patient Centered Rounds: I have performed bedside rounds with nursing staff today  Discussions with Specialists or Other Care Team Provider:  IR Dr Tenzin Arvizu, pulmonary Dr Melisa oNvak    Education and Discussions with Family / Patient:  Discussed with the patient, daughter and son as well as patient's spouse at bedside discussed in detail    Time Spent for Care: 30 minutes  More than 50% of total time spent on counseling and coordination of care as described above      Current Length of Stay: 11 day(s)    Current Patient Status: Inpatient   Certification Statement: The patient will continue to require additional inpatient hospital stay due to As mentioned    Discharge Plan:  Awaiting clinical and symptomatic improvement    Code Status: Level 1 - Full Code      Subjective:     Patient complains of left-sided chest pain  Severe intractable worsened by deep breaths cough as well as movement  Reports fatigue generalized weakness  Poor appetite    Objective:     Vitals:   Temp (24hrs), Av 5 °F (36 9 °C), Min:97 9 °F (36 6 °C), Max:99 °F (37 2 °C)    Temp:  [97 9 °F (36 6 °C)-99 °F (37 2 °C)] 98 5 °F (36 9 °C)  HR:  [72-83] 83  Resp:  [16-21] 16  BP: ()/(57-66) 147/66  SpO2:  [95 %-96 %] 95 %  Body mass index is 18 93 kg/m²  Input and Output Summary (last 24 hours):        Intake/Output Summary (Last 24 hours) at 2019 1320  Last data filed at 2019 1223  Gross per 24 hour   Intake 310 ml   Output 650 ml   Net -340 ml       Physical Exam:     Physical Exam    Patient in distress due to left-sided chest pain  Neck supple  Mucous membranes are moist  Lungs diminished breath sounds the left side  Heart sounds S1 and S2 noted  Abdomen soft nontender  Awake obeys simple commands  Pulses noted  No pedal edema  No rash    Additional Data:     Labs:    Results from last 7 days   Lab Units 19  0514   WBC Thousand/uL 17 61*   HEMOGLOBIN g/dL 10 9*   HEMATOCRIT % 34 5*   PLATELETS Thousands/uL 364   NEUTROS PCT % 85*   LYMPHS PCT % 6*   MONOS PCT % 6   EOS PCT % 1     Results from last 7 days   Lab Units 19  0514   SODIUM mmol/L 130*   POTASSIUM mmol/L 4 0   CHLORIDE mmol/L 96*   CO2 mmol/L 25   BUN mg/dL 18   CREATININE mg/dL 0 71   ANION GAP mmol/L 9   CALCIUM mg/dL 9 0   ALBUMIN g/dL 1 8*   TOTAL BILIRUBIN mg/dL 0 34   ALK PHOS U/L 187*   ALT U/L 199*   AST U/L 159*   GLUCOSE RANDOM mg/dL 81         Results from last 7 days   Lab Units 19  1050 19  0647 19  2137 19  1705 19  1058 19  0704 19  2116 19  1631 19  1051 19  0649 19  0135 19  2122   POC GLUCOSE mg/dl 110 95 120 169* 160* 116 146* 183* 141* 99 116 151*         Results from last 7 days   Lab Units 19  0514 19  1123   PROCALCITONIN ng/ml 0 10 0 20 * I Have Reviewed All Lab Data Listed Above  * Additional Pertinent Lab Tests Reviewed:  All Labs Within Last 24 Hours Reviewed    Imaging:    Imaging Reports Reviewed Today Include:  Imaging studies noted  Imaging Personally Reviewed by Myself Includes:  CT chest left-sided consolidation effusion noted    Recent Cultures (last 7 days):     Results from last 7 days   Lab Units 08/05/19  0829   GRAM STAIN RESULT  No Polys or Bacteria seen  1+ RBC's   BODY FLUID CULTURE, STERILE  No growth       Last 24 Hours Medication List:     Current Facility-Administered Medications:  alteplase (TPA) 10 mg in SWFI 30 mL chest tube instillation 10 mg Chest Tube BID Danial Claude, MD    And        dornase adán (PULMOZYME) 5 mg in 30 mL SWFI chest tube instilation 5 mg Chest Tube BID Danial Claude, MD    amLODIPine 5 mg Oral Daily Brian Figueroa MD    aspirin 81 mg Oral Daily Brian Figueroa MD    atorvastatin 40 mg Oral QPM Brian Figueroa MD    benzonatate 100 mg Oral TID PRN Brian Figueroa MD    bisacodyl 10 mg Rectal Daily PRN Clemente Bumps, CRNP    donepezil 10 mg Oral HS Brian Figueroa MD    enoxaparin 40 mg Subcutaneous Daily Brian Figueroa MD    ertapenem 1,000 mg Intravenous Q24H Awilda Berger MD Last Rate: Stopped (08/07/19 1223)   fluticasone 2 spray Nasal Daily Brian Figueroa MD    HYDROmorphone 0 2 mg Intravenous Q3H PRN Yahaira Mattson MD    HYDROmorphone 0 2 mg Intravenous Once Yahaira Mattson MD    lactulose 30 g Oral Once Clemente Bumps, CRNP    levothyroxine 100 mcg Oral Early Morning Brian Figueroa MD    lidocaine 2 patch Topical Daily Yahaira Mattson MD    losartan 50 mg Oral Daily Brian Figueroa MD    memantine 10 mg Oral BID Brian Figueroa MD    metoprolol succinate 50 mg Oral Daily Brian Figueroa MD    montelukast 10 mg Oral HS Brian Figueroa MD    oxyCODONE 5 mg Oral Q4H PRN Yahaira Mattson MD    pantoprazole 40 mg Oral Daily Before Breakfast Laurent Carr MD    zolpidem 5 mg Oral HS PRN Laurent Carr MD         Today, Patient Was Seen By: Sydni Walters MD    ** Please Note: Dictation voice to text software may have been used in the creation of this document   **

## 2019-08-07 NOTE — SOCIAL WORK
CM LVM for patient's son Karin Underwood advising patient is not yet medically cleared and will need PT when discharged as discussed with patient's son and spouse previously  Advised to call nurses station and provided number for medical updates

## 2019-08-07 NOTE — PROGRESS NOTES
PULMONOLOGY PROGRESS NOTE     Name: Lefty Arias   Age & Sex: 80 y o  female   MRN: [id number]  Unit/Bed#: -01   Encounter: [id number]      PATIENT INFORMATION     Name: Lefty Arias   Age & Sex: 80 y o  female   MRN: [id number]  Hospital Stay Days: 11    ASSESSMENT/PLAN     Principal Problem:    Community acquired pneumonia  Active Problems:    Acquired hypothyroidism    Essential hypertension    Dementia    Squamous cell carcinoma    Generalized weakness    Metabolic encephalopathy    Slow transit constipation    Leukocytosis      Acute respiratory insufficiency  -likely secondary to pneumonia with complicated parapneumonic effusion  -currently saturating over 92% on room air  Patient does not wear oxygen at home  -will continue to maintain oxygen saturations > 90%  -will continue incentive spirometry, out of bed as tolerated, Tessalon Perles for cough     Community-acquired pneumonia  -patient afebrile overnight and WBCs 15 66 --> 20 54 --> 17 61  -procalcitonin within normal limits as of 08/01/2019  -blood cultures x2, urine strep, urine Legionella, AFB stain body fluid culture/Gram stain, fungus culture negative growth to date  -continue high-dose IV ceftriaxone 2 mg daily (Day 9); previously received 2 days of cefepime and 2 days of azithromycin (total 12 days of antibiotics since admission)  -infectious Disease consulted and following  -trend WBC and fever curve     Complicated left-sided parapneumonic effusion  -PH 6 0 with LDH 1631; concern for empyema  -body fluid microbiology as noted above  -will perform a bedside ultrasound to evaluate for necessity of additional chest tube placement to achieve source control  -bedside ultrasound revealing complicated pleural space with loculated effusions and septation  -plan for chest tube placement with Interventional Radiology  Unfortunately unable to obtain consent from patient due to altered mental status and confusion    Re-attempt chest tube placement with family/POA's consent   -plan for tPA/DNA therapy post chest tube placement     Left-sided hydropneumothorax - resolved  -thoracentesis 2019; chest tube placement status post procedure  -repeat chest x-ray with resolved hydropneumothorax  -left-sided chest tube removed yesterday afternoon    SUBJECTIVE     Patient seen and examined  No acute events overnight  Patient resting comfortably in no acute distress  Patient reports that she has generalized fatigue and is upset that is taking a long time to recover  She reports that her respiratory status is unchanged  Reports mild on/off chest pain around previous chest tube insertion site  She is still disappointed about having to have another chest tube placed  Denies fever, chills, headaches, lightheadedness, dizziness, visual disturbances, sore throat, palpitations, abdominal pain, nausea, vomiting, or trouble urinating/ defecating  OBJECTIVE     Vitals:    19 1516 19 1605 19 2228 19 0808   BP: (!) 89/57 136/58 132/57 147/66   Pulse: 72 72 78 83   Resp: 20  21 16   Temp: 97 9 °F (36 6 °C)  99 °F (37 2 °C) 98 5 °F (36 9 °C)   TempSrc:       SpO2: 96% 96% 95% 95%   Weight:       Height:          Temperature:   Temp (24hrs), Av 5 °F (36 9 °C), Min:97 9 °F (36 6 °C), Max:99 °F (37 2 °C)    Temperature: 98 5 °F (36 9 °C)  Intake & Output:  I/O       701 -  -  - 700    P  O  360 652     IV Piggyback  50     Total Intake(mL/kg) 360 (7) 702 (13 6)     Urine (mL/kg/hr) 345 (0 3) 550 (0 4)     Chest Tube       Total Output 345 550     Net +15 +152                Weights:   IBW: 57 kg    Body mass index is 18 93 kg/m²  Weight (last 2 days)     None        Physical Exam   Constitutional: She appears well-developed  Non-toxic appearance  She does not appear ill  No distress  She is not intubated  Thin frail elderly woman   HENT:   Head: Normocephalic and atraumatic  Eyes: Pupils are equal, round, and reactive to light  EOM are normal    Neck: No JVD present  No tracheal deviation present  No thyromegaly present  Cardiovascular: Normal rate, regular rhythm, normal heart sounds and intact distal pulses  Exam reveals no gallop, no friction rub and no decreased pulses  No murmur heard  Pulmonary/Chest: No accessory muscle usage or stridor  No apnea, no tachypnea and no bradypnea  She is not intubated  No respiratory distress  She has decreased breath sounds in the right middle field, the right lower field, the left middle field and the left lower field  She has no wheezes  She has no rhonchi  She has no rales  Chest wall is dull to percussion  She exhibits tenderness  She exhibits no bony tenderness, no laceration, no crepitus, no edema, no deformity, no swelling and no retraction  Coarse breath sounds noted bilateral lung fields   Abdominal: Soft  Bowel sounds are normal  She exhibits no distension and no ascites  There is no tenderness  There is no rebound and no guarding  Lymphadenopathy:     She has no cervical adenopathy  Neurological: She is alert  She is disoriented  No cranial nerve deficit  Oriented x1 (self)   Skin: Skin is warm and dry  Capillary refill takes less than 2 seconds  No rash noted  She is not diaphoretic  No erythema  No pallor  LABORATORY DATA     Labs: I have personally reviewed pertinent reports    Results from last 7 days   Lab Units 08/07/19 0514 08/06/19  0752 08/05/19  0500   WBC Thousand/uL 17 61* 20 54* 15 66*   HEMOGLOBIN g/dL 10 9* 10 6* 11 2*   HEMATOCRIT % 34 5* 32 7* 34 3*   PLATELETS Thousands/uL 364 339 337   NEUTROS PCT % 85* 85* 83*   MONOS PCT % 6 6 6      Results from last 7 days   Lab Units 08/07/19 0514 08/06/19  0752 08/05/19  0500   POTASSIUM mmol/L 4 0 3 9 4 0   CHLORIDE mmol/L 96* 99* 102   CO2 mmol/L 25 25 27   BUN mg/dL 18 20 16   CREATININE mg/dL 0 71 0 70 0 90   CALCIUM mg/dL 9 0 8 9 8 9   ALK PHOS U/L 187* 169* 167*   ALT U/L 199* 148* 163*   AST U/L 159* 103* 127*     Results from last 7 days   Lab Units 08/05/19  0500   MAGNESIUM mg/dL 2 3                        IMAGING & DIAGNOSTIC TESTING     Radiology Results: I have personally reviewed pertinent reports  Xr Chest 2 Views    Result Date: 7/26/2019  Impression: Left mid to lower lung consolidation and moderate pleural effusion  Workstation performed: [id number]     Other Diagnostic Testing: I have personally reviewed pertinent reports      ACTIVE MEDICATIONS     Current Facility-Administered Medications   Medication Dose Route Frequency    amLODIPine (NORVASC) tablet 5 mg  5 mg Oral Daily    aspirin chewable tablet 81 mg  81 mg Oral Daily    atorvastatin (LIPITOR) tablet 40 mg  40 mg Oral QPM    benzonatate (TESSALON PERLES) capsule 100 mg  100 mg Oral TID PRN    bisacodyl (DULCOLAX) rectal suppository 10 mg  10 mg Rectal Daily PRN    cefTRIAXone (ROCEPHIN) 2,000 mg in dextrose 5 % 50 mL IVPB  2,000 mg Intravenous Q24H    donepezil (ARICEPT) tablet 10 mg  10 mg Oral HS    enoxaparin (LOVENOX) subcutaneous injection 40 mg  40 mg Subcutaneous Daily    fluticasone (FLONASE) 50 mcg/act nasal spray 2 spray  2 spray Nasal Daily    lactulose 20 g/30 mL oral solution 30 g  30 g Oral Once    levothyroxine tablet 100 mcg  100 mcg Oral Early Morning    losartan (COZAAR) tablet 50 mg  50 mg Oral Daily    memantine (NAMENDA) tablet 10 mg  10 mg Oral BID    metoprolol succinate (TOPROL-XL) 24 hr tablet 50 mg  50 mg Oral Daily    montelukast (SINGULAIR) tablet 10 mg  10 mg Oral HS    oxyCODONE (ROXICODONE) IR tablet 2 5 mg  2 5 mg Oral Q4H PRN    pantoprazole (PROTONIX) EC tablet 40 mg  40 mg Oral Daily Before Breakfast    zolpidem (AMBIEN) tablet 5 mg  5 mg Oral HS PRN        VTE Pharmacologic Prophylaxis:  Currently on hold for anticipated procedure  VTE Mechanical Prophylaxis: sequential compression device    Portions of the record may have been created with voice recognition software  Occasional wrong word or "sound a like" substitutions may have occurred due to the inherent limitations of voice recognition software    Read the chart carefully and recognize, using context, where substitutions have occurred   ==  Geovany Estimable, [facility]  Internal Medicine Residency PGY-3

## 2019-08-07 NOTE — PROGRESS NOTES
Progress Note - Infectious Disease   Arlin Bucio 80 y o  female MRN: [id number]  Unit/Bed#: -01 Encounter: [id number]      Impression/Plan:  1  Community-acquired pneumonia with pleural effusions  Possibly with lung abscess   Patient presents at this time with progressive weakness and shortness of breath at home along with leukocytosis on admission   Patient remains hemodynamically stable, procalcitonin normalized and leukocytosis down trending   Her respiratory status remains stable  With worsening liver function test abnormalities, favor changing the antibiotics as this could be due to the ceftriaxone  Very limited antibiotic options as the patient is also allergic to penicillins  Discontinue ceftriaxone  Ertapenem 1 g IV Q 24 hours  Recheck CBC with diff and CMP  Supportive care     2  Complicated parapneumonic effusion-possible empyema based upon very low pH and very high LDH  The pleural cultures are negative however the patient had been on antibiotics for quite some time  Chest tube was removed as it was not in a appropriate location  Unable to do repeat chest tube yesterday as unable to get consent  -antibiotics as above  -await new chest tube placement  -may need additional drainage measures including possible additional chest tube drainage for VATS  -close Pulmonary follow-up     3  Fever, persistent leukocytosis   The patient now has new fever spike  The temperature is now come down and the white cell count has decreased modestly  Unclear etiology  Possibly all related to the patient's complicated effusion  There are no other obvious sources for elevated white count on her exam   Speech evaluation without signs of aspiration  Procalcitonin level is normal  Repeat CBC tomorrow  If fever recurs check blood cultures x2 sets  Continue antibiotics as above  Additional workup as needed     4   Liver function test abnormalities-unclear etiology   The patient appears to be mildly symptomatic with notable nausea   Perhaps related to the ceftriaxone  Worsening liver function test   Right upper quadrant ultrasound without source  -recheck liver function test  -change antibiotics as above  -check hepatitis panel  -additional workup as needed     5  Rica Muñoz has underlying history of dementia for which she is on medications for   He seems to be at her reported baseline  Continue monitor mental status  Continue antibiotics as above  Additional care as per primary    Antibiotics:  Ceftriaxone 9  Total antibiotics 12    Subjective:  Patient has no fever, chills, sweats; no nausea, vomiting, diarrhea; no cough, shortness of breath; no pain  No new symptoms  She continues to have some left-sided pleuritic pain    Objective:  Vitals:  Temp:  [97 9 °F (36 6 °C)-99 °F (37 2 °C)] 98 5 °F (36 9 °C)  HR:  [72-83] 83  Resp:  [16-21] 16  BP: ()/(57-66) 147/66  SpO2:  [95 %-96 %] 95 %  Temp (24hrs), Av 5 °F (36 9 °C), Min:97 9 °F (36 6 °C), Max:99 °F (37 2 °C)  Current: Temperature: 98 5 °F (36 9 °C)    Physical Exam:   General Appearance:  Alert, interactive, nontoxic, no acute distress  Throat: Oropharynx moist without lesions  Lungs:   Decreased breath sounds left greater than right; no wheezes, rhonchi or rales; respirations unlabored   Heart:  RRR; no murmur, rub or gallop   Abdomen:   Soft, non-tender, non-distended, positive bowel sounds  Extremities: No clubbing, cyanosis or edema   Skin: No new rashes or lesions  No draining wounds noted         Labs, Imaging, & Other studies:   All pertinent labs and imaging studies were personally reviewed  Results from last 7 days   Lab Units 19  07519  0500   WBC Thousand/uL 17 61* 20 54* 15 66*   HEMOGLOBIN g/dL 10 9* 10 6* 11 2*   PLATELETS Thousands/uL 364 339 337     Results from last 7 days   Lab Units 1914 19  0752 19  0500   SODIUM mmol/L 130* 131* 136   POTASSIUM mmol/L 4 0 3 9 4 0   CHLORIDE mmol/L 96* 99* 102   CO2 mmol/L 25 25 27   BUN mg/dL 18 20 16   CREATININE mg/dL 0 71 0 70 0 90   EGFR ml/min/1 73sq m 76 78 57   CALCIUM mg/dL 9 0 8 9 8 9   AST U/L 159* 103* 127*   ALT U/L 199* 148* 163*   ALK PHOS U/L 187* 169* 167*     Results from last 7 days   Lab Units 08/05/19  0829   GRAM STAIN RESULT  No Polys or Bacteria seen  1+ RBC's   BODY FLUID CULTURE, STERILE  No growth     Results from last 7 days   Lab Units 08/07/19  0514 08/01/19  1123   PROCALCITONIN ng/ml 0 10 0 20       Right upper quadrant ultrasound-no abnormality    CT chest-dense consolidation left base with some pleural fluid also with a possible abscess    Images personally reviewed by me in PACS    Await formal radiology report

## 2019-08-07 NOTE — RESTORATIVE TECHNICIAN NOTE
Restorative Specialist Mobility Note       Activity: Ambulate in room, Bathroom privileges, Dangle, Stand at bedside(Educated/encouraged pt to ambulate with assistance 3-4 x's/day  Bed alarm on   Pt callbell, phone/tray within reach )     Assistive Device: Front wheel walker       Oliverio LAKHANI, Restorative Technician, [employer]

## 2019-08-07 NOTE — H&P
Interventional Radiology Preprocedure Note    History/Indication for procedure:   Aria Schafer is a 80 y o  female with left pleural effusion    Relevant past medical history:    Past Medical History:   Diagnosis Date    Disease of thyroid gland     GERD (gastroesophageal reflux disease)     Hyperlipidemia     Hypertension     Insomnia     MI (myocardial infarction) ([facility] [address] )      Patient Active Problem List   Diagnosis    Acquired hypothyroidism    Essential hypertension    CAD (coronary artery disease)    GERD (gastroesophageal reflux disease)    Chest pain    Weakness    Dementia    History of TIA (transient ischemic attack)    Twitching    Abnormal EKG    Gait disturbance    Cataracts, bilateral    Glaucoma    History of appendectomy    History of MI (myocardial infarction)    Macular degeneration of both eyes    Actinic keratosis    Post-nasal drip    Squamous cell carcinoma    Urinary leakage    Pneumonia, unspecified organism    Community acquired pneumonia    Generalized weakness    Metabolic encephalopathy    Slow transit constipation    Leukocytosis    Loculated pleural effusion       Medications:    Inpatient Medications:     Scheduled Medications:    Current Facility-Administered Medications:  alteplase (TPA) 10 mg in SWFI 30 mL chest tube instillation 10 mg Chest Tube BID Law Rucker MD    And        dornase adán (PULMOZYME) 5 mg in 30 mL SWFI chest tube instilation 5 mg Chest Tube BID aLw Rucker MD    amLODIPine 5 mg Oral Daily Cesar Johnston MD    aspirin 81 mg Oral Daily Cesar Johnston MD    atorvastatin 40 mg Oral QPM Cesar Johnston MD    benzonatate 100 mg Oral TID PRN Cesar Johnston MD    bisacodyl 10 mg Rectal Daily PRN LEE Arrieta    donepezil 10 mg Oral HS Cesar Johnston MD    enoxaparin 40 mg Subcutaneous Daily Cesar Johnston MD    ertapenem 1,000 mg Intravenous Q24H Nancy Greene MD Last Rate: Stopped (08/07/19 1223) fentanyl citrate (PF)  Intravenous Code/Trauma/Sedation Brady Norwood MD    fluticasone 2 spray Nasal Daily Sherri Khanna MD    HYDROmorphone 0 2 mg Intravenous Q3H PRN Damon Pettit MD    HYDROmorphone 0 2 mg Intravenous Once Damon Pettit MD    lactulose 30 g Oral Once Johann Councilman, CRNP    levothyroxine 100 mcg Oral Early Morning Cheral Courser, MD    lidocaine 2 patch Topical Daily Damon Pettit MD    losartan 50 mg Oral Daily Cheral Courser, MD    memantine 10 mg Oral BID Cheral CourseMD carol    metoprolol succinate 50 mg Oral Daily Cheral Courser, MD    midazolam   Code/Trauma/Sedation Brady Norwood MD    montelukast 10 mg Oral HS Cheral Courser, MD    oxyCODONE 5 mg Oral Q4H PRN Damon Pettit MD    pantoprazole 40 mg Oral Daily Before Breakfast Tiffanyal Courser, MD    zolpidem 5 mg Oral HS [facility]N [facility]        Infusions:       PRN:  benzonatate    bisacodyl    fentanyl citrate (PF)    HYDROmorphone    midazolam    oxyCODONE    zolpidem    Outpatient Medications:  No current facility-administered medications on file prior to encounter        Current Outpatient Medications on File Prior to Encounter   Medication Sig Dispense Refill    amLODIPine (NORVASC) 5 mg tablet Take 1 tablet by mouth daily for 30 days 30 tablet 0    aspirin 81 mg chewable tablet Chew 1 tablet (81 mg total) daily 30 tablet 3    atorvastatin (LIPITOR) 40 mg tablet TAKE 1 TABLET DAILY IN THE EVENING 30 tablet 2    benzonatate (TESSALON PERLES) 100 mg capsule Take 1 capsule (100 mg total) by mouth 3 (three) times a day as needed for cough 21 capsule 0    cholecalciferol (VITAMIN D3) 1,000 units tablet Take 1 tablet (1,000 Units total) by mouth daily 30 tablet 3    cyanocobalamin (VITAMIN B-12) 1,000 mcg tablet Take 1,000 mcg by mouth daily      donepezil (ARICEPT) 10 mg tablet TAKE 1 TABLET DAILY TAB/TAN/RND 30 tablet 2    fluticasone (FLONASE) 50 mcg/act nasal spray 2 sprays into each nostril daily 16 g 3    levothyroxine 100 mcg tablet TAKE 1 TABLET DAILY 30 tablet 3    losartan (COZAAR) 50 mg tablet Take 1 tablet (50 mg total) by mouth daily 30 tablet 3    memantine (NAMENDA) 10 mg tablet TAKE 1 TABLET TWICE DAILY 60 tablet 2    metoprolol succinate (TOPROL-XL) 50 mg 24 hr tablet TAKE 1 TABLET DAILY 30 tablet 2    montelukast (SINGULAIR) 10 mg tablet TAKE ONE TABLET AT BEDTIME 30 tablet 0    omeprazole (PriLOSEC) 20 mg delayed release capsule TAKE 1 CAPSULE DAILY 30 capsule 2    zolpidem (AMBIEN) 5 mg tablet Take 1 tablet (5 mg total) by mouth daily at bedtime as needed for sleep 30 tablet 3       Allergies   Allergen Reactions    Alendronate      Other reaction(s): tooth decay    Diphenhydramine Hyperactivity    Penicillins Other (See Comments)     Reaction not listed; however, has tolerated Ceftriaxone and Cefepime which have different side chains  Anticoagulants: none    ASA classification: ASA 3 - Patient with moderate systemic disease with functional limitations    Airway Assessment: III (soft and hard palate and base of uvula visible)    Relevant family history: None    Relevant review of systems: None    Prior sedation/anesthesia: yes    Can the patient lie flat? Yes     Does patient have sleep apnea?  No    If yes, does patient use CPAP? no    NPO Status: yes    Labs:   CBC with diff: Lab Results   Component Value Date    WBC 17 61 (H) 08/07/2019    HGB 10 9 (L) 08/07/2019    HCT 34 5 (L) 08/07/2019    MCV 95 08/07/2019     08/07/2019    MCH 30 1 08/07/2019    MCHC 31 6 08/07/2019    RDW 12 8 08/07/2019    MPV 9 8 08/07/2019    NRBC 0 08/07/2019     BMP/CMP:  Lab Results   Component Value Date     12/08/2014    K 4 0 08/07/2019    K 3 7 12/08/2014    CL 96 (L) 08/07/2019     12/08/2014    CO2 25 08/07/2019    CO2 29 12/08/2014    ANIONGAP 8 12/08/2014    BUN 18 08/07/2019    BUN 13 12/08/2014    CREATININE 0 71 08/07/2019    CREATININE 0 96 12/08/2014    GLUCOSE 94 12/08/2014    CALCIUM 9 0 08/07/2019    CALCIUM 9 0 12/08/2014     (H) 08/07/2019    AST 23 12/08/2014     (H) 08/07/2019    ALT 29 12/08/2014    ALKPHOS 187 (H) 08/07/2019    ALKPHOS 123 12/08/2014    PROT 7 3 12/08/2014    BILITOT 0 55 12/08/2014    EGFR 76 08/07/2019   ,     Coags:   Lab Results   Component Value Date    PTT 27 10/03/2017    PTT 27 12/08/2014    INR 1 07 10/03/2017    INR 1 04 12/08/2014   ,          Relevant imaging studies:   CT chest reviewed    Directed physical examination:  NAD  CTAB  RRR    Assessment/Plan:   Proceed with left chest tube placement for pleural effusion    Sedation/Anesthesia plan: Moderate sedation will be used as needed for procedure      Consent with alternatives to the procedure, risks and benefits have been explained and discussed with the patient/patient's family: yes

## 2019-08-07 NOTE — ASSESSMENT & PLAN NOTE
Pneumonia with loculated pleural effusion with pleuritic pain  Discussed with Pulmonary, IR  Will request thoracic surgery inputs  For chest tube placement today  Continue IV antibiotics  Analgesics, lidocaine patch  Supportive care

## 2019-08-08 LAB
ALBUMIN SERPL BCP-MCNC: 1.9 G/DL (ref 3.5–5)
ALP SERPL-CCNC: 170 U/L (ref 46–116)
ALT SERPL W P-5'-P-CCNC: 185 U/L (ref 12–78)
ANION GAP SERPL CALCULATED.3IONS-SCNC: 9 MMOL/L (ref 4–13)
AST SERPL W P-5'-P-CCNC: 137 U/L (ref 5–45)
BILIRUB SERPL-MCNC: 0.36 MG/DL (ref 0.2–1)
BUN SERPL-MCNC: 26 MG/DL (ref 5–25)
CALCIUM SERPL-MCNC: 8.9 MG/DL (ref 8.3–10.1)
CHLORIDE SERPL-SCNC: 99 MMOL/L (ref 100–108)
CO2 SERPL-SCNC: 26 MMOL/L (ref 21–32)
CREAT SERPL-MCNC: 0.8 MG/DL (ref 0.6–1.3)
ERYTHROCYTE [DISTWIDTH] IN BLOOD BY AUTOMATED COUNT: 12.7 % (ref 11.6–15.1)
GFR SERPL CREATININE-BSD FRML MDRD: 66 ML/MIN/1.73SQ M
GLUCOSE SERPL-MCNC: 131 MG/DL (ref 65–140)
GLUCOSE SERPL-MCNC: 147 MG/DL (ref 65–140)
GLUCOSE SERPL-MCNC: 153 MG/DL (ref 65–140)
GLUCOSE SERPL-MCNC: 163 MG/DL (ref 65–140)
GLUCOSE SERPL-MCNC: 194 MG/DL (ref 65–140)
HCT VFR BLD AUTO: 31.9 % (ref 34.8–46.1)
HGB BLD-MCNC: 10.4 G/DL (ref 11.5–15.4)
MCH RBC QN AUTO: 30.9 PG (ref 26.8–34.3)
MCHC RBC AUTO-ENTMCNC: 32.6 G/DL (ref 31.4–37.4)
MCV RBC AUTO: 95 FL (ref 82–98)
PLATELET # BLD AUTO: 400 THOUSANDS/UL (ref 149–390)
PMV BLD AUTO: 9.3 FL (ref 8.9–12.7)
POTASSIUM SERPL-SCNC: 5.1 MMOL/L (ref 3.5–5.3)
PROT SERPL-MCNC: 7.2 G/DL (ref 6.4–8.2)
RBC # BLD AUTO: 3.37 MILLION/UL (ref 3.81–5.12)
SODIUM SERPL-SCNC: 134 MMOL/L (ref 136–145)
WBC # BLD AUTO: 19.19 THOUSAND/UL (ref 4.31–10.16)

## 2019-08-08 PROCEDURE — 99233 SBSQ HOSP IP/OBS HIGH 50: CPT | Performed by: INTERNAL MEDICINE

## 2019-08-08 PROCEDURE — 80053 COMPREHEN METABOLIC PANEL: CPT | Performed by: INTERNAL MEDICINE

## 2019-08-08 PROCEDURE — 85027 COMPLETE CBC AUTOMATED: CPT | Performed by: INTERNAL MEDICINE

## 2019-08-08 PROCEDURE — 3E0L3GC INTRODUCTION OF OTHER THERAPEUTIC SUBSTANCE INTO PLEURAL CAVITY, PERCUTANEOUS APPROACH: ICD-10-PCS | Performed by: EMERGENCY MEDICINE

## 2019-08-08 PROCEDURE — 82948 REAGENT STRIP/BLOOD GLUCOSE: CPT

## 2019-08-08 PROCEDURE — 99232 SBSQ HOSP IP/OBS MODERATE 35: CPT | Performed by: INTERNAL MEDICINE

## 2019-08-08 PROCEDURE — 97530 THERAPEUTIC ACTIVITIES: CPT

## 2019-08-08 RX ORDER — ACETAMINOPHEN 325 MG/1
975 TABLET ORAL EVERY 8 HOURS SCHEDULED
Status: DISCONTINUED | OUTPATIENT
Start: 2019-08-08 | End: 2019-08-16 | Stop reason: HOSPADM

## 2019-08-08 RX ORDER — HYDROMORPHONE HCL/PF 1 MG/ML
0.2 SYRINGE (ML) INJECTION EVERY 4 HOURS PRN
Status: DISCONTINUED | OUTPATIENT
Start: 2019-08-08 | End: 2019-08-16 | Stop reason: HOSPADM

## 2019-08-08 RX ADMIN — METOPROLOL SUCCINATE 50 MG: 50 TABLET, EXTENDED RELEASE ORAL at 08:08

## 2019-08-08 RX ADMIN — MEMANTINE 10 MG: 10 TABLET ORAL at 08:09

## 2019-08-08 RX ADMIN — LOSARTAN POTASSIUM 50 MG: 50 TABLET, FILM COATED ORAL at 08:08

## 2019-08-08 RX ADMIN — FLUTICASONE PROPIONATE 2 SPRAY: 50 SPRAY, METERED NASAL at 08:10

## 2019-08-08 RX ADMIN — OXYCODONE HYDROCHLORIDE 5 MG: 5 TABLET ORAL at 13:18

## 2019-08-08 RX ADMIN — PANTOPRAZOLE SODIUM 40 MG: 40 TABLET, DELAYED RELEASE ORAL at 06:06

## 2019-08-08 RX ADMIN — HYDROMORPHONE HYDROCHLORIDE 0.2 MG: 1 INJECTION, SOLUTION INTRAMUSCULAR; INTRAVENOUS; SUBCUTANEOUS at 06:06

## 2019-08-08 RX ADMIN — AMLODIPINE BESYLATE 5 MG: 5 TABLET ORAL at 08:08

## 2019-08-08 RX ADMIN — ERTAPENEM SODIUM 1000 MG: 1 INJECTION, POWDER, LYOPHILIZED, FOR SOLUTION INTRAMUSCULAR; INTRAVENOUS at 08:13

## 2019-08-08 RX ADMIN — DORNASE ALFA 5 MG: 1 SOLUTION RESPIRATORY (INHALATION) at 10:48

## 2019-08-08 RX ADMIN — MEMANTINE 10 MG: 10 TABLET ORAL at 17:57

## 2019-08-08 RX ADMIN — HYDROMORPHONE HYDROCHLORIDE 0.2 MG: 1 INJECTION, SOLUTION INTRAMUSCULAR; INTRAVENOUS; SUBCUTANEOUS at 10:06

## 2019-08-08 RX ADMIN — DONEPEZIL HYDROCHLORIDE 10 MG: 10 TABLET ORAL at 21:41

## 2019-08-08 RX ADMIN — LIDOCAINE 2 PATCH: 50 PATCH CUTANEOUS at 08:10

## 2019-08-08 RX ADMIN — ALTEPLASE 10 MG: 2.2 INJECTION, POWDER, LYOPHILIZED, FOR SOLUTION INTRAVENOUS at 10:48

## 2019-08-08 RX ADMIN — ENOXAPARIN SODIUM 40 MG: 40 INJECTION SUBCUTANEOUS at 08:09

## 2019-08-08 RX ADMIN — DORNASE ALFA 5 MG: 1 SOLUTION RESPIRATORY (INHALATION) at 21:41

## 2019-08-08 RX ADMIN — ATORVASTATIN CALCIUM 40 MG: 40 TABLET, FILM COATED ORAL at 17:57

## 2019-08-08 RX ADMIN — ACETAMINOPHEN 975 MG: 325 TABLET ORAL at 13:18

## 2019-08-08 RX ADMIN — ASPIRIN 81 MG 81 MG: 81 TABLET ORAL at 08:09

## 2019-08-08 RX ADMIN — LEVOTHYROXINE SODIUM 100 MCG: 100 TABLET ORAL at 06:06

## 2019-08-08 RX ADMIN — MONTELUKAST SODIUM 10 MG: 10 TABLET, FILM COATED ORAL at 21:41

## 2019-08-08 RX ADMIN — ALTEPLASE 10 MG: 2.2 INJECTION, POWDER, LYOPHILIZED, FOR SOLUTION INTRAVENOUS at 21:40

## 2019-08-08 RX ADMIN — ACETAMINOPHEN 975 MG: 325 TABLET ORAL at 22:42

## 2019-08-08 NOTE — PLAN OF CARE
Problem: Potential for Falls  Goal: Patient will remain free of falls  Description  INTERVENTIONS:  - Assess patient frequently for physical needs  -  Identify cognitive and physical deficits and behaviors that affect risk of falls    -  [place] fall precautions as indicated by assessment   - Educate patient/family on patient safety including physical limitations  - Instruct patient to call for assistance with activity based on assessment  - Modify environment to reduce risk of injury  - Consider OT/PT consult to assist with strengthening/mobility  Outcome: Progressing     Problem: Prexisting or High Potential for Compromised Skin Integrity  Goal: Skin integrity is maintained or improved  Description  INTERVENTIONS:  - Identify patients at risk for skin breakdown  - Assess and monitor skin integrity  - Assess and monitor nutrition and hydration status  - Monitor labs (i e  albumin)  - Assess for incontinence   - Turn and reposition patient  - Assist with mobility/ambulation  - Relieve pressure over bony prominences  - Avoid friction and shearing  - Provide appropriate hygiene as needed including keeping skin clean and dry  - Evaluate need for skin moisturizer/barrier cream  - Collaborate with interdisciplinary team (i e  Nutrition, Rehabilitation, etc )   - Patient/family teaching  Outcome: Progressing     Problem: PAIN - ADULT  Goal: Verbalizes/displays adequate comfort level or baseline comfort level  Description  Interventions:  - Encourage patient to monitor pain and request assistance  - Assess pain using appropriate pain scale  - Administer analgesics based on type and severity of pain and evaluate response  - Implement non-pharmacological measures as appropriate and evaluate response  - Notify physician/advanced practitioner if interventions unsuccessful or patient reports new pain   Outcome: Progressing     Problem: SAFETY ADULT  Goal: Maintain or return to baseline ADL function  Description  INTERVENTIONS:  -  Assess patient's ability to carry out ADLs; assess patient's baseline for ADL function and identify physical deficits which impact ability to perform ADLs (bathing, care of mouth/teeth, toileting, grooming, dressing, etc )  - Assess/evaluate cause of self-care deficits   - Assess range of motion  - Assess patient's mobility; develop plan if impaired  - Assess patient's need for assistive devices and provide as appropriate  - Encourage maximum independence but intervene and supervise when necessary  ¯ Involve family in performance of ADLs  ¯ Assess for home care needs following discharge   ¯ Request OT consult to assist with ADL evaluation and planning for discharge  ¯ Provide patient education as appropriate  Outcome: Progressing  Goal: Maintain or return mobility status to optimal level  Description  INTERVENTIONS:  - Assess patient's baseline mobility status (ambulation, transfers, stairs, etc )    - Identify cognitive and physical deficits and behaviors that affect mobility  - Identify mobility aids required to assist with transfers and/or ambulation (gait belt, sit-to-stand, lift, walker, cane, etc )  - [place] fall precautions as indicated by assessment  - Record patient progress and toleration of activity level on Mobility SBAR; progress patient to next Phase/Stage  - Instruct patient to call for assistance with activity based on assessment  - Request Rehabilitation consult to assist with strengthening/weightbearing, etc   Outcome: Progressing     Problem: DISCHARGE PLANNING  Goal: Discharge to home or other facility with appropriate resources  Description  INTERVENTIONS:  - Identify barriers to discharge w/patient and caregiver  - Arrange for needed discharge resources and transportation as appropriate  - Identify discharge learning needs (meds, wound care, etc )  - Refer to Case Management Department for coordinating discharge planning if the patient needs post-hospital services based on physician/advanced practitioner order or complex needs related to functional status, cognitive ability, or social support system   Outcome: Progressing     Problem: Knowledge Deficit  Goal: Patient/family/caregiver demonstrates understanding of disease process, treatment plan, medications, and discharge instructions  Description  Complete learning assessment and assess knowledge base    Interventions:  - Provide teaching at level of understanding  - Provide teaching via preferred learning methods  Outcome: Progressing     Problem: INFECTION - ADULT  Goal: Absence or prevention of progression during hospitalization  Description  INTERVENTIONS:  - Assess and monitor for signs and symptoms of infection  - Monitor lab/diagnostic results  - Monitor all insertion sites, i e  indwelling lines, tubes, and drains  - [place] appropriate cooling/warming therapies per order  - Administer medications as ordered  - Instruct and encourage patient and family to use good hand hygiene technique  - Identify and instruct in appropriate isolation precautions for identified infection/condition   Outcome: Progressing     Problem: MUSCULOSKELETAL - ADULT  Goal: Maintain or return mobility to safest level of function  Description  INTERVENTIONS:  - Assess patient's ability to carry out ADLs; assess patient's baseline for ADL function and identify physical deficits which impact ability to perform ADLs (bathing, care of mouth/teeth, toileting, grooming, dressing, etc )  - Assess/evaluate cause of self-care deficits   - Assess range of motion  - Assess patient's mobility; develop plan if impaired  - Assess patient's need for assistive devices and provide as appropriate  - Encourage maximum independence but intervene and supervise when necessary  - Involve family in performance of ADLs  - Assess for home care needs following discharge   - Request OT consult to assist with ADL evaluation and planning for discharge  - Provide patient education as appropriate  Outcome: Progressing     Problem: Nutrition/Hydration-ADULT  Goal: Nutrient/Hydration intake appropriate for improving, restoring or maintaining nutritional needs  Description  Monitor and assess patient's nutrition/hydration status for malnutrition (ex- brittle hair, bruises, dry skin, pale skin and conjunctiva, muscle wasting, smooth red tongue, and disorientation)  Collaborate with interdisciplinary team and initiate plan and interventions as ordered  Monitor patient's weight and dietary intake as ordered or per policy  Utilize nutrition screening tool and intervene per policy  Determine patient's food preferences and provide high-protein, high-caloric foods as appropriate       INTERVENTIONS:  - Monitor oral intake, urinary output, labs, and treatment plans  - Assess nutrition and hydration status and recommend course of action  - Evaluate amount of meals eaten  - Assist patient with eating if necessary   - Allow adequate time for meals  - Recommend/ encourage appropriate diets, oral nutritional supplements, and vitamin/mineral supplements  - Order, calculate, and assess calorie counts as needed  - Recommend, monitor, and adjust tube feedings and TPN/PPN based on assessed needs  - Assess need for intravenous fluids  - Provide specific nutrition/hydration education as appropriate  - Include patient/family/caregiver in decisions related to nutrition  Outcome: Progressing

## 2019-08-08 NOTE — QUICK NOTE
TPA/Dornase instilled into L pleural chest tube at 10:30am  Chest tube now clamped for 1 hour  RN instructed to unclamp in 1 hour at 11:30am  Will then place back to low wall suction

## 2019-08-08 NOTE — PROGRESS NOTES
Progress Note - Infectious Disease   Hailee Mary 80 y o  female MRN: [id number]  Unit/Bed#: -01 Encounter: [id number]      Impression/Plan:  1  Community-acquired pneumonia with pleural effusions  Possibly with lung abscess   Patient presents at this time with progressive weakness and shortness of breath at home along with leukocytosis on admission   Patient remains hemodynamically stable, procalcitonin normalized and leukocytosis down trending   Her respiratory status remains stable  Antibiotics changed due to worsening liver function tests while on ceftriaxone  Very limited antibiotic options as the patient is also allergic to penicillins  Continue ertapenem  Recheck CBC with diff and CMP  Supportive care     2  Complicated parapneumonic effusion-possible empyema based upon very low pH and very high LDH  The pleural cultures are negative however the patient had been on antibiotics for quite some time   Chest tube was removed as it was not in a appropriate location  Now status post new chest tube placement   -antibiotics as above  -followup new pleural fluid cultures  -tPA instillation  -may need additional drainage measures including possible additional chest tube drainage for VATS  -close Pulmonary follow-up     3  Fever, persistent leukocytosis  No recurrence of the fever spike  The white cell count remains elevated    Unclear etiology   Possibly all related to the patient's complicated effusion  There are no other obvious sources for elevated white count on her exam  Speech evaluation without signs of aspiration  Procalcitonin level is normal  Recheck CBC with diff  If fever recurs check blood cultures x2 sets  Continue antibiotics as above  Additional workup as needed     4  Liver function test abnormalities-unclear etiology   The patient appears to be mildly symptomatic with notable nausea   Perhaps related to the ceftriaxone  Right upper quadrant ultrasound without source    Hepatitis panel is negative  Liver function test have modestly improved since stopping the ceftriaxone  -recheck liver function test  -change antibiotics as above  -additional workup as needed     5  Dementia   Patient seems to be at baseline cognition  Continue monitor mental status  Continue antibiotics as above  Additional care as per primary    Antibiotics:  Ertapenem 2  Antibiotics 13    Subjective:  Patient has no fever, chills, sweats; no nausea, vomiting, diarrhea; no cough, shortness of breath; no increased pain  No new symptoms  She had a left-sided chest tube replaced yesterday    Objective:  Vitals:  Temp:  [98 °F (36 7 °C)] 98 °F (36 7 °C)  HR:  [71-88] 86  BP: (114-146)/(55-67) 126/57  SpO2:  [93 %-100 %] 93 %  Temp (24hrs), Av °F (36 7 °C), Min:98 °F (36 7 °C), Max:98 °F (36 7 °C)  Current: Temperature: 98 °F (36 7 °C)    Physical Exam:   General Appearance:  Alert, interactive, nontoxic, no acute distress  Throat: Oropharynx moist without lesions  Lungs:   Decreased breath sounds left greater than right; no wheezes, rhonchi or rales; respirations unlabored   Heart:  RRR; no murmur, rub or gallop   Abdomen:   Soft, non-tender, non-distended, positive bowel sounds  Extremities: No clubbing, cyanosis or edema   Skin: No new rashes or lesions  No draining wounds noted         Labs, Imaging, & Other studies:   All pertinent labs and imaging studies were personally reviewed  Results from last 7 days   Lab Units 19  0637 19  0514 19  0752   WBC Thousand/uL 19 19* 17 61* 20 54*   HEMOGLOBIN g/dL 10 4* 10 9* 10 6*   PLATELETS Thousands/uL 400* 364 339     Results from last 7 days   Lab Units 19  0456 19  0514 19  0752   SODIUM mmol/L 134* 130* 131*   POTASSIUM mmol/L 5 1 4 0 3 9   CHLORIDE mmol/L 99* 96* 99*   CO2 mmol/L 26 25 25   BUN mg/dL 26* 18 20   CREATININE mg/dL 0 80 0 71 0 70   EGFR ml/min/1 73sq m 66 76 78   CALCIUM mg/dL 8 9 9 0 8 9   AST U/L 137* 159* 103*   ALT U/L 185* 199* 148*   ALK PHOS U/L 170* 187* 169*     Results from last 7 days   Lab Units 08/07/19  1519 08/05/19  0829   GRAM STAIN RESULT  1+ Polys  No bacteria seen No Polys or Bacteria seen  1+ RBC's   BODY FLUID CULTURE, STERILE   --  No growth     Results from last 7 days   Lab Units 08/07/19  0514 08/01/19  1123   PROCALCITONIN ng/ml 0 10 0 20

## 2019-08-08 NOTE — PROGRESS NOTES
Progress Note - Holly Bryson 3/23/1931, 80 y o  female MRN: [id number]    Unit/Bed#: -01 Encounter: [id number]    Primary Care Provider: Gifty Hodges MD   Date and time admitted to hospital: 7/26/2019 12:24 PM        * Community acquired pneumonia  Assessment & Plan  Continue IV antibiotics  Infectious Disease input noted  Supportive care      Loculated pleural effusion  Assessment & Plan  Pneumonia with loculated pleural effusion with pleuritic pain  Pulmonary following  Chest tube in place receiving tPA/Dornase for 3 days  Thoracic surgery input noted  Continue IV antibiotics  Analgesics, lidocaine patch  Supportive care    Generalized weakness  Assessment & Plan  Deconditioning, ambulatory dysfunction  Safe ambulation fall precautions  Physical therapy    Dementia  Assessment & Plan  Continue Namenda and Aricept  Reorientation sleep hygiene      Acquired hypothyroidism  Assessment & Plan  Continue levothyroxine        VTE Pharmacologic Prophylaxis:   Pharmacologic: Enoxaparin (Lovenox)  Mechanical VTE Prophylaxis in Place: Yes    Patient Centered Rounds: I have performed bedside rounds with nursing staff today  Discussions with Specialists or Other Care Team Provider:     Education and Discussions with Family / Patient:  Discussed with the patient, spouse at bedside updated  Discussed with daughter in detail, imaging studies reviewed treatment plan from thoracic surgery and Pulmonary team's reviewed     Time Spent for Care: 30 minutes  More than 50% of total time spent on counseling and coordination of care as described above      Current Length of Stay: 12 day(s)    Current Patient Status: Inpatient   Certification Statement: The patient will continue to require additional inpatient hospital stay due to As mentioned    Discharge Plan:  Awaiting clinical and symptomatic improvement    Code Status: Level 1 - Full Code      Subjective:     Sitting up in chair  Chest tube in place  Reports feeling tired and fatigued  Poor appetite  Still with chest pain but reports slight improvement  Encourage incentive spirometry    Objective:     Vitals:   Temp (24hrs), Av 7 °F (36 5 °C), Min:97 4 °F (36 3 °C), Max:98 °F (36 7 °C)    Temp:  [97 4 °F (36 3 °C)-98 °F (36 7 °C)] 97 4 °F (36 3 °C)  HR:  [73-88] 73  BP: (109-146)/(50-67) 109/50  SpO2:  [93 %-99 %] 96 %  Body mass index is 18 93 kg/m²  Input and Output Summary (last 24 hours):        Intake/Output Summary (Last 24 hours) at 2019 1514  Last data filed at 2019 0800  Gross per 24 hour   Intake 602 ml   Output 537 ml   Net 65 ml       Physical Exam:     Physical Exam    Comfortably sitting up in chair  Mucous members are moist  Neck supple  Decreased breath sounds left side  Chest tube in place  Heart sounds S1-S2 noted  Abdomen soft  Drowsy, arousable to verbal stimuli  Moves extremities  Pulses noted  No pedal edema  Rash      Additional Data:     Labs:    Results from last 7 days   Lab Units 19  0637 19  0514   WBC Thousand/uL 19 19* 17 61*   HEMOGLOBIN g/dL 10 4* 10 9*   HEMATOCRIT % 31 9* 34 5*   PLATELETS Thousands/uL 400* 364   NEUTROS PCT %  --  85*   LYMPHS PCT %  --  6*   MONOS PCT %  --  6   EOS PCT %  --  1     Results from last 7 days   Lab Units 19  0456   SODIUM mmol/L 134*   POTASSIUM mmol/L 5 1   CHLORIDE mmol/L 99*   CO2 mmol/L 26   BUN mg/dL 26*   CREATININE mg/dL 0 80   ANION GAP mmol/L 9   CALCIUM mg/dL 8 9   ALBUMIN g/dL 1 9*   TOTAL BILIRUBIN mg/dL 0 36   ALK PHOS U/L 170*   ALT U/L 185*   AST U/L 137*   GLUCOSE RANDOM mg/dL 131         Results from last 7 days   Lab Units 19  1043 19  0640 19  2123 19  1618 19  1050 19  0647 19  2137 19  1705 19  1058 19  0704 19  2116 19  1631   POC GLUCOSE mg/dl 194* 147* 138 92 110 95 120 169* 160* 116 146* 183*         Results from last 7 days   Lab Units 19  0514   PROCALCITONIN ng/ml 0 10           * I Have Reviewed All Lab Data Listed Above  * Additional Pertinent Lab Tests Reviewed:  All Labs Within Last 24 Hours Reviewed    Imaging:    Imaging Reports Reviewed Today Include:  Imaging studies reviewed  Imaging Personally Reviewed by Myself Includes:     Recent Cultures (last 7 days):     Results from last 7 days   Lab Units 08/07/19  1519 08/05/19  0829   GRAM STAIN RESULT  1+ Polys  No bacteria seen No Polys or Bacteria seen  1+ RBC's   BODY FLUID CULTURE, STERILE  No growth No growth       Last 24 Hours Medication List:     Current Facility-Administered Medications:  acetaminophen 975 mg Oral Q8H [facility] Jose R Harrison MD    alteplase (TPA) 10 mg in SWFI 30 mL chest tube instillation 10 mg Chest Tube BID Joaquim Pineda MD    And        dornase adán (PULMOZYME) 5 mg in 30 mL SWFI chest tube instilation 5 mg Chest Tube BID Joaquim Pineda MD    amLODIPine 5 mg Oral Daily Sultana Adan MD    aspirin 81 mg Oral Daily Sultana Adan MD    atorvastatin 40 mg Oral QPM Sultaan Adan MD    benzonatate 100 mg Oral TID PRN Sultana Adan MD    bisacodyl 10 mg Rectal Daily PRN LEE Javier    donepezil 10 mg Oral HS Sultana Adan MD    enoxaparin 40 mg Subcutaneous Daily Sultana Adan MD    ertapenem 1,000 mg Intravenous Q24H Celeste Gamez MD Last Rate: 1,000 mg (08/08/19 0813)   fluticasone 2 spray Nasal Daily Sultana Adan MD    HYDROmorphone 0 2 mg Intravenous Once Jose R Harrison MD    HYDROmorphone 0 2 mg Intravenous Q4H PRN Jose R Harrison MD    lactulose 30 g Oral Once LEE Javier    levothyroxine 100 mcg Oral Early Morning Sultana Adan MD    lidocaine 2 patch Topical Daily Jose R Harrison MD    losartan 50 mg Oral Daily Sultana Adan MD    memantine 10 mg Oral BID Sultana Adan MD    metoprolol succinate 50 mg Oral Daily Sultana Adan MD    montelukast 10 mg Oral HS Sultana Adan MD oxyCODONE 5 mg Oral Q4H PRN Mandy Gilman MD    pantoprazole 40 mg Oral Daily Before Breakfast Susanna Vicente MD    zolpidem 5 mg Oral HS PRN Susanna Vicente MD         Today, Patient Was Seen By: Mandy Gilman MD    ** Please Note: Dictation voice to text software may have been used in the creation of this document   **

## 2019-08-08 NOTE — PHYSICAL THERAPY NOTE
PT TREATMENT       08/08/19 0921   Pain Assessment   Pain Assessment FLACC   Pain Rating: FLACC (Rest) - Face 1   Pain Rating: FLACC (Rest) - Legs 0   Pain Rating: FLACC (Rest) - Activity 0   Pain Rating: FLACC (Rest) - Cry 1   Pain Rating: FLACC (Rest) - Consolability 0   Score: FLACC (Rest) 2   Pain Rating: FLACC (Activity) - Face 1   Pain Rating: FLACC (Activity) - Legs 0   Pain Rating: FLACC (Activity) - Activity 0   Pain Rating: FLACC (Activity) - Cry 1   Pain Rating: FLACC (Activity) - Consolability 1   Score: FLACC (Activity) 3   Restrictions/Precautions   Other Precautions Visual impairment; Fall Risk;Multiple lines; Bed Alarm; Chair Alarm;Cognitive  (CHEST TUBE; IV POLE; CHAIR ALARM ACTIVE POST PT TREAT)   General   Chart Reviewed Yes   Response to Previous Treatment Patient with no complaints from previous session  Family/Caregiver Present No   Cognition   Overall Cognitive Status Impaired   Arousal/Participation Lethargic   Attention Difficulty attending to directions   Orientation Level Oriented to person;Oriented to place   Memory Decreased recall of precautions;Decreased recall of recent events   Following Commands Follows one step commands with increased time or repetition   Subjective   Subjective "MY ARM HURTS" - REFERENCING L SHOULDER    Bed Mobility   Supine to Sit 2  Maximal assistance   Additional items Assist x 1; Increased time required;LE management   Sit to Supine Unable to assess   Transfers   Sit to Stand 2  Maximal assistance   Additional items Assist x 2; Increased time required;Verbal cues   Stand to Sit 2  Maximal assistance   Additional items Assist x 2; Increased time required;Verbal cues   Stand pivot 2  Maximal assistance   Additional items Assist x 2; Increased time required;Verbal cues   Balance   Static Sitting Poor   Static Standing Poor -   Endurance Deficit   Endurance Deficit Yes   Endurance Deficit Description WEAKNESS; FATIGUE    Activity Tolerance   Activity Tolerance Patient limited by fatigue   Nurse Made Aware MONICA TO SEE PER RN SHYANN    Assessment   Prognosis Fair   Problem List Decreased strength;Decreased endurance; Impaired balance;Decreased mobility; Decreased cognition;Pain; Impaired vision   Assessment PT INITIATED TREATMENT SESSION IN ORDER TO ASSIST PATIENT IN ACHIEVING GOALS FOR IMPROVED ACTIVITY TOLERANCE AND TRANSFERS  PATIENT CONTINUE TO PRESENT WITH DECREASED ACTIVITY TOLERANCE LIMITING PARTICIPATION IN OVERALL FUNCTIONAL MOBILITY  SHE PRESENTS WITH REPORTED L SHOULDER PAIN AND BACK PAIN AND WITH L SIDED CHEST TUBE  PATIENT GROSSLY LETHARGIC PRESENTING WITH EYES CLOSED AND REQUIRING SIGNIFICANT PHYSICAL ASSISTANCE FOR PARTICIPATION IN MOBILITY  PATIENT REQUIRED MAX-AX1 FOR SUPINE-->SIT TRANSFER AND MIN-AX1 TO MAINTAIN STATIC SITTING EOB X 7 MINUTES  PATIENT REQUIRED MAX-AX2 FOR PERFORMANCE OF SIT-->STAND TRANSFER WITH B/L HHA  PATIENT UNABLE TO INITIATE STEPS IN STANDING SECONDARY TO GROSS WEAKNESS AND FATIGUE  PATIENT POSITIONED WITH PILLOWS IN CHAIR TO ASSIST WITH MANAGING REPORTED PAIN  PT D/C RECOMMENDATION FOR INPT REHAB REMAINS APPROPRIATE AS THIS PATIENT CONTINUES TO FUNCTION BELOW BASELINE  PATIENT WILL BENEFIT FROM CONTINUED SKILLED PT THIS ADMISSION TO ACHIEVE MAXIMAL FUNCTION AND SAFETY  Goals   Patient Goals TO HAVE LESS PAIN    STG Expiration Date 19   Short Term Goal #1 GOALS  ON : GOALS ESTABLISHED FOR PATIENT UPON INITIAL EVALUATION REMAIN APPROPRIATE AND ARE AS FOLLOWS: "pt will:  Increase bilateral LE strength 1/2 grade to facilitate independent mobility, Perform all bed mobility tasks w/ supervision to decrease fall risk factors, Perform all transfers w/ supervision to improve independence, Ambulate 150 ft  with roller walker w/ supervision w/o LOB, Increase all balance 1/2 grade to decrease risk for falls and Improve Barthel Index score to 60 or greater to facilitate independence"   Treatment Day 4   Plan   Treatment/Interventions Functional transfer training;LE strengthening/ROM; Therapeutic exercise; Endurance training;Patient/family training;Equipment eval/education; Bed mobility;Gait training;OT;Spoke to nursing   Progress Slow progress, decreased activity tolerance   PT Frequency Other (Comment)  (3-5X/WK)   Recommendation   Recommendation Other (Comment)  (INPT REHAB )   Equipment Recommended Nilda Spanner; Wheelchair  (RW)   PT - OK to Discharge Yes  (TO [address] )     Isabela Sparks, PT

## 2019-08-08 NOTE — ASSESSMENT & PLAN NOTE
Pneumonia with loculated pleural effusion with pleuritic pain  Pulmonary following  Chest tube in place receiving tPA/Dornase for 3 days  Thoracic surgery input noted  Continue IV antibiotics  Analgesics, lidocaine patch  Supportive care

## 2019-08-08 NOTE — PLAN OF CARE
Problem: PHYSICAL THERAPY ADULT  Goal: Performs mobility at highest level of function for planned discharge setting  See evaluation for individualized goals  Description  Treatment/Interventions: Functional transfer training, LE strengthening/ROM, Therapeutic exercise, Endurance training, Patient/family training, Equipment eval/education, Bed mobility, Gait training, Spoke to nursing, Family, Cognitive reorientation  Equipment Recommended: Altagracia Espinoza       See flowsheet documentation for full assessment, interventions and recommendations  Outcome: Not Progressing  Note:   Prognosis: Fair  Problem List: Decreased strength, Decreased endurance, Impaired balance, Decreased mobility, Decreased cognition, Pain, Impaired vision  Assessment: PT INITIATED TREATMENT SESSION IN ORDER TO ASSIST PATIENT IN ACHIEVING GOALS FOR IMPROVED ACTIVITY TOLERANCE AND TRANSFERS  PATIENT CONTINUE TO PRESENT WITH DECREASED ACTIVITY TOLERANCE LIMITING PARTICIPATION IN OVERALL FUNCTIONAL MOBILITY  SHE PRESENTS WITH REPORTED L SHOULDER PAIN AND BACK PAIN AND WITH L SIDED CHEST TUBE  PATIENT GROSSLY LETHARGIC PRESENTING WITH EYES CLOSED AND REQUIRING SIGNIFICANT PHYSICAL ASSISTANCE FOR PARTICIPATION IN MOBILITY  PATIENT REQUIRED MAX-AX1 FOR SUPINE-->SIT TRANSFER AND MIN-AX1 TO MAINTAIN STATIC SITTING EOB X 7 MINUTES  PATIENT REQUIRED MAX-AX2 FOR PERFORMANCE OF SIT-->STAND TRANSFER WITH B/L HHA  PATIENT UNABLE TO INITIATE STEPS IN STANDING SECONDARY TO GROSS WEAKNESS AND FATIGUE  PATIENT POSITIONED WITH PILLOWS IN CHAIR TO ASSIST WITH MANAGING REPORTED PAIN  PT D/C RECOMMENDATION FOR INPT REHAB REMAINS APPROPRIATE AS THIS PATIENT CONTINUES TO FUNCTION BELOW BASELINE  PATIENT WILL BENEFIT FROM CONTINUED SKILLED PT THIS ADMISSION TO ACHIEVE MAXIMAL FUNCTION AND SAFETY  Recommendation: (S) Other (Comment)(INPT REHAB )     PT - OK to Discharge: (S) Yes(TO INPT REHAB WHEN MED MONICA )    See flowsheet documentation for full assessment

## 2019-08-08 NOTE — PROGRESS NOTES
PULMONOLOGY PROGRESS NOTE     Name: Nathaly Coles   Age & Sex: 80 y o  female   MRN: [id number]  Unit/Bed#: -01   Encounter: [id number]      PATIENT INFORMATION     Name: Nathaly Coels   Age & Sex: 80 y o  female   MRN: [id number]  Hospital Stay Days: 12    ASSESSMENT/PLAN     Principal Problem:    Community acquired pneumonia  Active Problems:    Acquired hypothyroidism    Essential hypertension    Dementia    Squamous cell carcinoma    Generalized weakness    Metabolic encephalopathy    Slow transit constipation    Leukocytosis    Loculated pleural effusion    Acute respiratory insufficiency  -likely secondary to pneumonia with complicated parapneumonic effusion  -currently saturating over 92% on room air    Patient does not wear oxygen at home  -will continue to maintain oxygen saturations > 90%  -will continue incentive spirometry, out of bed as tolerated, Tessalon Perles for cough     Community-acquired pneumonia  -patient afebrile overnight and WBCs 20 54 --> 17 61 --> 19 19  -procalcitonin within normal limits as of 08/01/2019  -blood cultures x2, urine strep, urine Legionella, AFB stain body fluid culture/Gram stain, fungus culture negative growth to date  -IV ceftriaxone discontinued due to increasing LFTs per Infectious Disease (total 9 days); previously received 2 days of cefepime and 2 days of azithromycin   -continue ertapenem (Day 2) (total 13 days of antibiotics since admission)  -infectious Disease consulted and following  -trend WBC and fever curve     Complicated left-sided parapneumonic effusion  -PH 6 0 with LDH 1631; concern for empyema  -body fluid microbiology as noted above  -will perform a bedside ultrasound to evaluate for necessity of additional chest tube placement to achieve source control  -status post IR left-sided chest tube placement on 08/07/2019 (2nd chest tube)  -fluid analysis from chest tube placement with negative growth  -chest tube currently set to suction with approximately 300cc output in past 24 hrs  -continue tPA/DNase b i d  For 3-6 days and repeat chest CT once therapy completed  -thoracic surgery consulted by primary care; possible surgical intervention if tPA/DNase fails  -pain management per primary team (0 2 mg IV Dilaudid p r n  and 5 mg IR oxycodone p r n )     Left-sided hydropneumothorax - resolved  -thoracentesis 2019; chest tube placement status post procedure  -repeat chest x-ray with resolved hydropneumothorax  -left-sided chest tube removed 2019    SUBJECTIVE     Patient seen and examined  No acute events overnight  Patient resting comfortably in no acute distress  Patient continues to report generalized fatigue and reports pain of left shoulder and pain around insertion site  Reports mild shortness of breath and chest wall pain/discomfort with breathing  Denies fever, chills, headaches, lightheadedness, dizziness, visual disturbances, sore throat, chest pain, palpitations, abdominal pain, nausea, vomiting, or trouble urinating/ defecating  OBJECTIVE     Vitals:    19 1537 19 1616 19 2222 19 0305   BP:  142/66 146/67 126/57   Pulse:  76 88 86   Resp:       Temp:  98 °F (36 7 °C)     TempSrc:       SpO2: 95% 97% 93% 94%   Weight:       Height:          Temperature:   Temp (24hrs), Av 3 °F (36 8 °C), Min:98 °F (36 7 °C), Max:98 5 °F (36 9 °C)    Temperature: 98 °F (36 7 °C)  Intake & Output:  I/O       701 -  0700 701 -  07    P  O  652 357    I V  (mL/kg)  40 (0 8)    IV Piggyback 50 50    Total Intake(mL/kg) 702 (13 6) 447 (8 7)    Urine (mL/kg/hr) 550 (0 4) 337 (0 3)    Emesis/NG output  0    Stool  0    Chest Tube  300    Total Output 550 637    Net +152 -190          Unmeasured Stool Occurrence  0 x        Weights:   IBW: 57 kg    Body mass index is 18 93 kg/m²  Weight (last 2 days)     None        Physical Exam   Constitutional: She appears well-developed  No distress     Thin frail elderly woman   HENT:   Head: Normocephalic and atraumatic  Right Ear: External ear normal    Left Ear: External ear normal    Nose: Nose normal    Eyes: Pupils are equal, round, and reactive to light  Conjunctivae and EOM are normal  Right eye exhibits no discharge  Left eye exhibits no discharge  No scleral icterus  Neck: No JVD present  No tracheal deviation present  Cardiovascular: Normal rate, regular rhythm, normal heart sounds and intact distal pulses  Exam reveals no gallop and no friction rub  No murmur heard  Pulmonary/Chest: Effort normal  No stridor  No respiratory distress  She has no wheezes  She has no rales  She exhibits tenderness  Left-sided chest tube in place; set to suction  Insertion site without obvious signs of infection  Diminished breath sounds bilateral lower lung fields (R>L)  Left lower lung field with dullness to percussion  Tenderness to palpation around chest tube site  Abdominal: Soft  Bowel sounds are normal  She exhibits no distension  There is no tenderness  There is no rebound and no guarding  Musculoskeletal: She exhibits tenderness  She exhibits no edema or deformity  Neurological: No cranial nerve deficit  Lethargic but arousable and follows commands  Oriented x1 (self)   Skin: Skin is warm and dry  Capillary refill takes less than 2 seconds  No rash noted  She is not diaphoretic  No erythema  No pallor  Vitals reviewed  LABORATORY DATA     Labs: I have personally reviewed pertinent reports    Results from last 7 days   Lab Units 08/07/19 0514 08/06/19  0752 08/05/19  0500   WBC Thousand/uL 17 61* 20 54* 15 66*   HEMOGLOBIN g/dL 10 9* 10 6* 11 2*   HEMATOCRIT % 34 5* 32 7* 34 3*   PLATELETS Thousands/uL 364 339 337   NEUTROS PCT % 85* 85* 83*   MONOS PCT % 6 6 6      Results from last 7 days   Lab Units 08/07/19 0514 08/06/19  0752 08/05/19  0500   POTASSIUM mmol/L 4 0 3 9 4 0   CHLORIDE mmol/L 96* 99* 102   CO2 mmol/L 25 25 27   BUN mg/dL 18 20 16   CREATININE mg/dL 0 71 0 70 0 90   CALCIUM mg/dL 9 0 8 9 8 9   ALK PHOS U/L 187* 169* 167*   ALT U/L 199* 148* 163*   AST U/L 159* 103* 127*     Results from last 7 days   Lab Units 08/05/19  0500   MAGNESIUM mg/dL 2 3                        IMAGING & DIAGNOSTIC TESTING     Radiology Results: I have personally reviewed pertinent reports  Xr Chest 2 Views    Result Date: 7/26/2019  Impression: Left mid to lower lung consolidation and moderate pleural effusion  Workstation performed: [id number]     Other Diagnostic Testing: I have personally reviewed pertinent reports      ACTIVE MEDICATIONS     Current Facility-Administered Medications   Medication Dose Route Frequency    alteplase (TPA) 10 mg in SWFI 30 mL chest tube instillation  10 mg Chest Tube BID    And    dornase adán (PULMOZYME) 5 mg in 30 mL SWFI chest tube instilation  5 mg Chest Tube BID    amLODIPine (NORVASC) tablet 5 mg  5 mg Oral Daily    aspirin chewable tablet 81 mg  81 mg Oral Daily    atorvastatin (LIPITOR) tablet 40 mg  40 mg Oral QPM    benzonatate (TESSALON PERLES) capsule 100 mg  100 mg Oral TID PRN    bisacodyl (DULCOLAX) rectal suppository 10 mg  10 mg Rectal Daily PRN    donepezil (ARICEPT) tablet 10 mg  10 mg Oral HS    enoxaparin (LOVENOX) subcutaneous injection 40 mg  40 mg Subcutaneous Daily    ertapenem (INVanz) 1,000 mg in sodium chloride 0 9 % 50 mL IVPB  1,000 mg Intravenous Q24H    fluticasone (FLONASE) 50 mcg/act nasal spray 2 spray  2 spray Nasal Daily    HYDROmorphone (DILAUDID) injection 0 2 mg  0 2 mg Intravenous Q3H PRN    HYDROmorphone (DILAUDID) injection 0 2 mg  0 2 mg Intravenous Once    lactulose 20 g/30 mL oral solution 30 g  30 g Oral Once    levothyroxine tablet 100 mcg  100 mcg Oral Early Morning    lidocaine (LIDODERM) 5 % patch 2 patch  2 patch Topical Daily    losartan (COZAAR) tablet 50 mg  50 mg Oral Daily    memantine (NAMENDA) tablet 10 mg  10 mg Oral BID    metoprolol succinate (TOPROL-XL) 24 hr tablet 50 mg  50 mg Oral Daily    montelukast (SINGULAIR) tablet 10 mg  10 mg Oral HS    oxyCODONE (ROXICODONE) IR tablet 5 mg  5 mg Oral Q4H PRN    pantoprazole (PROTONIX) EC tablet 40 mg  40 mg Oral Daily Before Breakfast    zolpidem (AMBIEN) tablet 5 mg  5 mg Oral HS PRN       VTE Pharmacologic Prophylaxis: Enoxaparin (Lovenox)  VTE Mechanical Prophylaxis: sequential compression device    Portions of the record may have been created with voice recognition software  Occasional wrong word or "sound a like" substitutions may have occurred due to the inherent limitations of voice recognition software    Read the chart carefully and recognize, using context, where substitutions have occurred   ==  Yadiel Quintana, [address] [facility]  Internal Medicine Residency PGY-3

## 2019-08-08 NOTE — PLAN OF CARE
Problem: Potential for Falls  Goal: Patient will remain free of falls  Description  INTERVENTIONS:  - Assess patient frequently for physical needs  -  Identify cognitive and physical deficits and behaviors that affect risk of falls    -  [place] fall precautions as indicated by assessment   - Educate patient/family on patient safety including physical limitations  - Instruct patient to call for assistance with activity based on assessment  - Modify environment to reduce risk of injury  - Consider OT/PT consult to assist with strengthening/mobility  Outcome: Progressing     Problem: Prexisting or High Potential for Compromised Skin Integrity  Goal: Skin integrity is maintained or improved  Description  INTERVENTIONS:  - Identify patients at risk for skin breakdown  - Assess and monitor skin integrity  - Assess and monitor nutrition and hydration status  - Monitor labs (i e  albumin)  - Assess for incontinence   - Turn and reposition patient  - Assist with mobility/ambulation  - Relieve pressure over bony prominences  - Avoid friction and shearing  - Provide appropriate hygiene as needed including keeping skin clean and dry  - Evaluate need for skin moisturizer/barrier cream  - Collaborate with interdisciplinary team (i e  Nutrition, Rehabilitation, etc )   - Patient/family teaching  Outcome: Progressing     Problem: PAIN - ADULT  Goal: Verbalizes/displays adequate comfort level or baseline comfort level  Description  Interventions:  - Encourage patient to monitor pain and request assistance  - Assess pain using appropriate pain scale  - Administer analgesics based on type and severity of pain and evaluate response  - Implement non-pharmacological measures as appropriate and evaluate response  - Notify physician/advanced practitioner if interventions unsuccessful or patient reports new pain   Outcome: Progressing     Problem: SAFETY ADULT  Goal: Maintain or return to baseline ADL function  Description  INTERVENTIONS:  -  Assess patient's ability to carry out ADLs; assess patient's baseline for ADL function and identify physical deficits which impact ability to perform ADLs (bathing, care of mouth/teeth, toileting, grooming, dressing, etc )  - Assess/evaluate cause of self-care deficits   - Assess range of motion  - Assess patient's mobility; develop plan if impaired  - Assess patient's need for assistive devices and provide as appropriate  - Encourage maximum independence but intervene and supervise when necessary  ¯ Involve family in performance of ADLs  ¯ Assess for home care needs following discharge   ¯ Request OT consult to assist with ADL evaluation and planning for discharge  ¯ Provide patient education as appropriate  Outcome: Progressing  Goal: Maintain or return mobility status to optimal level  Description  INTERVENTIONS:  - Assess patient's baseline mobility status (ambulation, transfers, stairs, etc )    - Identify cognitive and physical deficits and behaviors that affect mobility  - Identify mobility aids required to assist with transfers and/or ambulation (gait belt, sit-to-stand, lift, walker, cane, etc )  - [place] fall precautions as indicated by assessment  - Record patient progress and toleration of activity level on Mobility SBAR; progress patient to next Phase/Stage  - Instruct patient to call for assistance with activity based on assessment  - Request Rehabilitation consult to assist with strengthening/weightbearing, etc   Outcome: Progressing     Problem: DISCHARGE PLANNING  Goal: Discharge to home or other facility with appropriate resources  Description  INTERVENTIONS:  - Identify barriers to discharge w/patient and caregiver  - Arrange for needed discharge resources and transportation as appropriate  - Identify discharge learning needs (meds, wound care, etc )  - Refer to Case Management Department for coordinating discharge planning if the patient needs post-hospital services based on physician/advanced practitioner order or complex needs related to functional status, cognitive ability, or social support system   Outcome: Progressing     Problem: Knowledge Deficit  Goal: Patient/family/caregiver demonstrates understanding of disease process, treatment plan, medications, and discharge instructions  Description  Complete learning assessment and assess knowledge base    Interventions:  - Provide teaching at level of understanding  - Provide teaching via preferred learning methods  Outcome: Progressing     Problem: INFECTION - ADULT  Goal: Absence or prevention of progression during hospitalization  Description  INTERVENTIONS:  - Assess and monitor for signs and symptoms of infection  - Monitor lab/diagnostic results  - Monitor all insertion sites, i e  indwelling lines, tubes, and drains  - [place] appropriate cooling/warming therapies per order  - Administer medications as ordered  - Instruct and encourage patient and family to use good hand hygiene technique  - Identify and instruct in appropriate isolation precautions for identified infection/condition   Outcome: Progressing     Problem: MUSCULOSKELETAL - ADULT  Goal: Maintain or return mobility to safest level of function  Description  INTERVENTIONS:  - Assess patient's ability to carry out ADLs; assess patient's baseline for ADL function and identify physical deficits which impact ability to perform ADLs (bathing, care of mouth/teeth, toileting, grooming, dressing, etc )  - Assess/evaluate cause of self-care deficits   - Assess range of motion  - Assess patient's mobility; develop plan if impaired  - Assess patient's need for assistive devices and provide as appropriate  - Encourage maximum independence but intervene and supervise when necessary  - Involve family in performance of ADLs  - Assess for home care needs following discharge   - Request OT consult to assist with ADL evaluation and planning for discharge  - Provide patient education as appropriate  Outcome: Progressing     Problem: Nutrition/Hydration-ADULT  Goal: Nutrient/Hydration intake appropriate for improving, restoring or maintaining nutritional needs  Description  Monitor and assess patient's nutrition/hydration status for malnutrition (ex- brittle hair, bruises, dry skin, pale skin and conjunctiva, muscle wasting, smooth red tongue, and disorientation)  Collaborate with interdisciplinary team and initiate plan and interventions as ordered  Monitor patient's weight and dietary intake as ordered or per policy  Utilize nutrition screening tool and intervene per policy  Determine patient's food preferences and provide high-protein, high-caloric foods as appropriate       INTERVENTIONS:  - Monitor oral intake, urinary output, labs, and treatment plans  - Assess nutrition and hydration status and recommend course of action  - Evaluate amount of meals eaten  - Assist patient with eating if necessary   - Allow adequate time for meals  - Recommend/ encourage appropriate diets, oral nutritional supplements, and vitamin/mineral supplements  - Order, calculate, and assess calorie counts as needed  - Recommend, monitor, and adjust tube feedings and TPN/PPN based on assessed needs  - Assess need for intravenous fluids  - Provide specific nutrition/hydration education as appropriate  - Include patient/family/caregiver in decisions related to nutrition  Outcome: Progressing

## 2019-08-08 NOTE — PLAN OF CARE
Problem: Potential for Falls  Goal: Patient will remain free of falls  Description  INTERVENTIONS:  - Assess patient frequently for physical needs  -  Identify cognitive and physical deficits and behaviors that affect risk of falls    -  [place] fall precautions as indicated by assessment   - Educate patient/family on patient safety including physical limitations  - Instruct patient to call for assistance with activity based on assessment  - Modify environment to reduce risk of injury  - Consider OT/PT consult to assist with strengthening/mobility  Outcome: Progressing     Problem: Prexisting or High Potential for Compromised Skin Integrity  Goal: Skin integrity is maintained or improved  Description  INTERVENTIONS:  - Identify patients at risk for skin breakdown  - Assess and monitor skin integrity  - Assess and monitor nutrition and hydration status  - Monitor labs (i e  albumin)  - Assess for incontinence   - Turn and reposition patient  - Assist with mobility/ambulation  - Relieve pressure over bony prominences  - Avoid friction and shearing  - Provide appropriate hygiene as needed including keeping skin clean and dry  - Evaluate need for skin moisturizer/barrier cream  - Collaborate with interdisciplinary team (i e  Nutrition, Rehabilitation, etc )   - Patient/family teaching  Outcome: Progressing     Problem: PAIN - ADULT  Goal: Verbalizes/displays adequate comfort level or baseline comfort level  Description  Interventions:  - Encourage patient to monitor pain and request assistance  - Assess pain using appropriate pain scale  - Administer analgesics based on type and severity of pain and evaluate response  - Implement non-pharmacological measures as appropriate and evaluate response  - Notify physician/advanced practitioner if interventions unsuccessful or patient reports new pain   Outcome: Progressing     Problem: SAFETY ADULT  Goal: Maintain or return to baseline ADL function  Description  INTERVENTIONS:  -  Assess patient's ability to carry out ADLs; assess patient's baseline for ADL function and identify physical deficits which impact ability to perform ADLs (bathing, care of mouth/teeth, toileting, grooming, dressing, etc )  - Assess/evaluate cause of self-care deficits   - Assess range of motion  - Assess patient's mobility; develop plan if impaired  - Assess patient's need for assistive devices and provide as appropriate  - Encourage maximum independence but intervene and supervise when necessary  ¯ Involve family in performance of ADLs  ¯ Assess for home care needs following discharge   ¯ Request OT consult to assist with ADL evaluation and planning for discharge  ¯ Provide patient education as appropriate  Outcome: Progressing  Goal: Maintain or return mobility status to optimal level  Description  INTERVENTIONS:  - Assess patient's baseline mobility status (ambulation, transfers, stairs, etc )    - Identify cognitive and physical deficits and behaviors that affect mobility  - Identify mobility aids required to assist with transfers and/or ambulation (gait belt, sit-to-stand, lift, walker, cane, etc )  - [place] fall precautions as indicated by assessment  - Record patient progress and toleration of activity level on Mobility SBAR; progress patient to next Phase/Stage  - Instruct patient to call for assistance with activity based on assessment  - Request Rehabilitation consult to assist with strengthening/weightbearing, etc   Outcome: Progressing     Problem: DISCHARGE PLANNING  Goal: Discharge to home or other facility with appropriate resources  Description  INTERVENTIONS:  - Identify barriers to discharge w/patient and caregiver  - Arrange for needed discharge resources and transportation as appropriate  - Identify discharge learning needs (meds, wound care, etc )  - Refer to Case Management Department for coordinating discharge planning if the patient needs post-hospital services based on physician/advanced practitioner order or complex needs related to functional status, cognitive ability, or social support system   Outcome: Progressing     Problem: Knowledge Deficit  Goal: Patient/family/caregiver demonstrates understanding of disease process, treatment plan, medications, and discharge instructions  Description  Complete learning assessment and assess knowledge base    Interventions:  - Provide teaching at level of understanding  - Provide teaching via preferred learning methods  Outcome: Progressing     Problem: INFECTION - ADULT  Goal: Absence or prevention of progression during hospitalization  Description  INTERVENTIONS:  - Assess and monitor for signs and symptoms of infection  - Monitor lab/diagnostic results  - Monitor all insertion sites, i e  indwelling lines, tubes, and drains  - [place] appropriate cooling/warming therapies per order  - Administer medications as ordered  - Instruct and encourage patient and family to use good hand hygiene technique  - Identify and instruct in appropriate isolation precautions for identified infection/condition   Outcome: Progressing     Problem: MUSCULOSKELETAL - ADULT  Goal: Maintain or return mobility to safest level of function  Description  INTERVENTIONS:  - Assess patient's ability to carry out ADLs; assess patient's baseline for ADL function and identify physical deficits which impact ability to perform ADLs (bathing, care of mouth/teeth, toileting, grooming, dressing, etc )  - Assess/evaluate cause of self-care deficits   - Assess range of motion  - Assess patient's mobility; develop plan if impaired  - Assess patient's need for assistive devices and provide as appropriate  - Encourage maximum independence but intervene and supervise when necessary  - Involve family in performance of ADLs  - Assess for home care needs following discharge   - Request OT consult to assist with ADL evaluation and planning for discharge  - Provide patient education as appropriate  Outcome: Progressing     Problem: Nutrition/Hydration-ADULT  Goal: Nutrient/Hydration intake appropriate for improving, restoring or maintaining nutritional needs  Description  Monitor and assess patient's nutrition/hydration status for malnutrition (ex- brittle hair, bruises, dry skin, pale skin and conjunctiva, muscle wasting, smooth red tongue, and disorientation)  Collaborate with interdisciplinary team and initiate plan and interventions as ordered  Monitor patient's weight and dietary intake as ordered or per policy  Utilize nutrition screening tool and intervene per policy  Determine patient's food preferences and provide high-protein, high-caloric foods as appropriate       INTERVENTIONS:  - Monitor oral intake, urinary output, labs, and treatment plans  - Assess nutrition and hydration status and recommend course of action  - Evaluate amount of meals eaten  - Assist patient with eating if necessary   - Allow adequate time for meals  - Recommend/ encourage appropriate diets, oral nutritional supplements, and vitamin/mineral supplements  - Order, calculate, and assess calorie counts as needed  - Recommend, monitor, and adjust tube feedings and TPN/PPN based on assessed needs  - Assess need for intravenous fluids  - Provide specific nutrition/hydration education as appropriate  - Include patient/family/caregiver in decisions related to nutrition  Outcome: Progressing

## 2019-08-08 NOTE — QUICK NOTE
TPA/Dornase instilled into L pleural chest tube at 9pm  Chest tube clamped for one hour, unclamped at 10pm  Patient tolerated well, patient's nurse made aware  Chest tube management per pulmonology

## 2019-08-09 ENCOUNTER — APPOINTMENT (INPATIENT)
Dept: RADIOLOGY | Facility: HOSPITAL | Age: 84
DRG: 193 | End: 2019-08-09
Payer: MEDICARE

## 2019-08-09 LAB
ALBUMIN SERPL BCP-MCNC: 1.6 G/DL (ref 3.5–5)
ALP SERPL-CCNC: 148 U/L (ref 46–116)
ALT SERPL W P-5'-P-CCNC: 146 U/L (ref 12–78)
ANION GAP SERPL CALCULATED.3IONS-SCNC: 6 MMOL/L (ref 4–13)
AST SERPL W P-5'-P-CCNC: 94 U/L (ref 5–45)
BILIRUB SERPL-MCNC: 0.28 MG/DL (ref 0.2–1)
BUN SERPL-MCNC: 30 MG/DL (ref 5–25)
CALCIUM SERPL-MCNC: 8.5 MG/DL (ref 8.3–10.1)
CHLORIDE SERPL-SCNC: 100 MMOL/L (ref 100–108)
CO2 SERPL-SCNC: 26 MMOL/L (ref 21–32)
CREAT SERPL-MCNC: 0.72 MG/DL (ref 0.6–1.3)
GFR SERPL CREATININE-BSD FRML MDRD: 75 ML/MIN/1.73SQ M
GLUCOSE SERPL-MCNC: 100 MG/DL (ref 65–140)
GLUCOSE SERPL-MCNC: 111 MG/DL (ref 65–140)
GLUCOSE SERPL-MCNC: 131 MG/DL (ref 65–140)
GLUCOSE SERPL-MCNC: 142 MG/DL (ref 65–140)
GLUCOSE SERPL-MCNC: 166 MG/DL (ref 65–140)
POTASSIUM SERPL-SCNC: 4.3 MMOL/L (ref 3.5–5.3)
PROT SERPL-MCNC: 6.5 G/DL (ref 6.4–8.2)
SODIUM SERPL-SCNC: 132 MMOL/L (ref 136–145)

## 2019-08-09 PROCEDURE — 99232 SBSQ HOSP IP/OBS MODERATE 35: CPT | Performed by: INTERNAL MEDICINE

## 2019-08-09 PROCEDURE — 97530 THERAPEUTIC ACTIVITIES: CPT

## 2019-08-09 PROCEDURE — 71045 X-RAY EXAM CHEST 1 VIEW: CPT

## 2019-08-09 PROCEDURE — 97110 THERAPEUTIC EXERCISES: CPT

## 2019-08-09 PROCEDURE — 3E0L3GC INTRODUCTION OF OTHER THERAPEUTIC SUBSTANCE INTO PLEURAL CAVITY, PERCUTANEOUS APPROACH: ICD-10-PCS | Performed by: EMERGENCY MEDICINE

## 2019-08-09 PROCEDURE — 82948 REAGENT STRIP/BLOOD GLUCOSE: CPT

## 2019-08-09 PROCEDURE — 3E0L3GC INTRODUCTION OF OTHER THERAPEUTIC SUBSTANCE INTO PLEURAL CAVITY, PERCUTANEOUS APPROACH: ICD-10-PCS | Performed by: INTERNAL MEDICINE

## 2019-08-09 PROCEDURE — 99233 SBSQ HOSP IP/OBS HIGH 50: CPT | Performed by: INTERNAL MEDICINE

## 2019-08-09 PROCEDURE — 80053 COMPREHEN METABOLIC PANEL: CPT | Performed by: INTERNAL MEDICINE

## 2019-08-09 RX ORDER — OXYCODONE HYDROCHLORIDE 5 MG/1
2.5 TABLET ORAL 3 TIMES DAILY
Status: DISCONTINUED | OUTPATIENT
Start: 2019-08-09 | End: 2019-08-10

## 2019-08-09 RX ADMIN — DORNASE ALFA 5 MG: 1 SOLUTION RESPIRATORY (INHALATION) at 09:13

## 2019-08-09 RX ADMIN — ENOXAPARIN SODIUM 40 MG: 40 INJECTION SUBCUTANEOUS at 08:22

## 2019-08-09 RX ADMIN — PANTOPRAZOLE SODIUM 40 MG: 40 TABLET, DELAYED RELEASE ORAL at 06:13

## 2019-08-09 RX ADMIN — MEMANTINE 10 MG: 10 TABLET ORAL at 08:22

## 2019-08-09 RX ADMIN — OXYCODONE HYDROCHLORIDE 5 MG: 5 TABLET ORAL at 06:31

## 2019-08-09 RX ADMIN — ALTEPLASE 10 MG: 2.2 INJECTION, POWDER, LYOPHILIZED, FOR SOLUTION INTRAVENOUS at 09:13

## 2019-08-09 RX ADMIN — ACETAMINOPHEN 975 MG: 325 TABLET ORAL at 21:47

## 2019-08-09 RX ADMIN — LIDOCAINE 2 PATCH: 50 PATCH CUTANEOUS at 08:24

## 2019-08-09 RX ADMIN — OXYCODONE HYDROCHLORIDE 2.5 MG: 5 TABLET ORAL at 21:46

## 2019-08-09 RX ADMIN — LEVOTHYROXINE SODIUM 100 MCG: 100 TABLET ORAL at 06:13

## 2019-08-09 RX ADMIN — MONTELUKAST SODIUM 10 MG: 10 TABLET, FILM COATED ORAL at 21:49

## 2019-08-09 RX ADMIN — MEMANTINE 10 MG: 10 TABLET ORAL at 16:49

## 2019-08-09 RX ADMIN — ASPIRIN 81 MG 81 MG: 81 TABLET ORAL at 08:22

## 2019-08-09 RX ADMIN — ACETAMINOPHEN 975 MG: 325 TABLET ORAL at 06:13

## 2019-08-09 RX ADMIN — OXYCODONE HYDROCHLORIDE 5 MG: 5 TABLET ORAL at 16:48

## 2019-08-09 RX ADMIN — ERTAPENEM SODIUM 1000 MG: 1 INJECTION, POWDER, LYOPHILIZED, FOR SOLUTION INTRAMUSCULAR; INTRAVENOUS at 08:25

## 2019-08-09 RX ADMIN — DORNASE ALFA 5 MG: 1 SOLUTION RESPIRATORY (INHALATION) at 21:06

## 2019-08-09 RX ADMIN — ATORVASTATIN CALCIUM 40 MG: 40 TABLET, FILM COATED ORAL at 16:48

## 2019-08-09 RX ADMIN — ACETAMINOPHEN 975 MG: 325 TABLET ORAL at 13:00

## 2019-08-09 RX ADMIN — DONEPEZIL HYDROCHLORIDE 10 MG: 10 TABLET ORAL at 21:49

## 2019-08-09 RX ADMIN — OXYCODONE HYDROCHLORIDE 2.5 MG: 5 TABLET ORAL at 15:12

## 2019-08-09 RX ADMIN — ALTEPLASE 10 MG: 2.2 INJECTION, POWDER, LYOPHILIZED, FOR SOLUTION INTRAVENOUS at 21:06

## 2019-08-09 NOTE — PROGRESS NOTES
Progress Note - Infectious Disease   Jessica Gallardo 80 y o  female MRN: [id number]  Unit/Bed#: -01 Encounter: [id number]      Impression/Plan:  1  Community-acquired pneumonia with pleural effusions   Possibly with lung abscess   Patient presents at this time with progressive weakness and shortness of breath at home along with leukocytosis on admission   Patient remains hemodynamically stable, procalcitonin normalized and leukocytosis down trending   Her respiratory status remains stable  Antibiotics changed due to worsening liver function tests while on ceftriaxone   Very limited antibiotic options as the patient is also allergic to penicillins  Continue ertapenem  Recheck CBC with diff and CMP  Supportive care     2  Complicated parapneumonic effusion-possible empyema based upon very low pH and very high LDH  The pleural cultures are negative however the patient had been on antibiotics for quite some time   Chest tube was removed as it was not in a appropriate location  Now status post new chest tube placement  Repeat chest x-ray appears to be improved  -antibiotics as above  -followup new pleural fluid cultures  -tPA instillation  -recheck chest x-ray tomorrow  -may need additional drainage measures including possible additional chest tube drainage for VATS  -close Pulmonary follow-up     3  Fever, persistent leukocytosis  No recurrence of the fever spike  The white cell count remains elevated    Unclear etiology   Possibly all related to the patient's complicated effusion  There are no other obvious sources for elevated white count on her exam  Speech evaluation without signs of aspiration   Procalcitonin level is normal    Recheck CBC with diff  If fever recurs check blood cultures x2 sets  Continue antibiotics as above  Additional workup as needed     4   Liver function test abnormalities-unclear etiology   The patient appears to be mildly symptomatic with notable nausea   Perhaps related to the ceftriaxone   Right upper quadrant ultrasound without source  Hepatitis panel is negative  Liver function test have modestly improved since stopping the ceftriaxone  -recheck liver function test  -change antibiotics as above  -additional workup as needed     5  Dementia   Patient seems to be at baseline cognition  Continue monitor mental status  Continue antibiotics as above  Additional care as per primary    Antibiotics:  Ertapenem 3  Antibiotics 14    Subjective:  Patient has no fever, chills, sweats; no nausea, vomiting, diarrhea; no increased cough, or shortness of breath; no increased pain  No new symptoms  She is feeling much better today  Objective:  Vitals:  Temp:  [97 4 °F (36 3 °C)-98 2 °F (36 8 °C)] 97 4 °F (36 3 °C)  HR:  [73-81] 78  Resp:  [16] 16  BP: (107-110)/(48-50) 107/48  SpO2:  [96 %-97 %] 97 %  Temp (24hrs), Av 7 °F (36 5 °C), Min:97 4 °F (36 3 °C), Max:98 2 °F (36 8 °C)  Current: Temperature: (!) 97 4 °F (36 3 °C)    Physical Exam:   General Appearance:  Alert, interactive, nontoxic, no acute distress  Throat: Oropharynx moist without lesions  Lungs:   Decreased breath sounds left greater than right; no wheezes, rhonchi or rales; respirations unlabored  Left-sided chest tube in place   Heart:  RRR; no murmur, rub or gallop   Abdomen:   Soft, non-tender, non-distended, positive bowel sounds  Extremities: No clubbing, cyanosis or edema   Skin: No new rashes or lesions  No draining wounds noted         Labs, Imaging, & Other studies:   All pertinent labs and imaging studies were personally reviewed  Results from last 7 days   Lab Units 19  0637 19  0514 19  0752   WBC Thousand/uL 19 19* 17 61* 20 54*   HEMOGLOBIN g/dL 10 4* 10 9* 10 6*   PLATELETS Thousands/uL 400* 364 339     Results from last 7 days   Lab Units 19  0515 19  0456 19  0514   SODIUM mmol/L 132* 134* 130*   POTASSIUM mmol/L 4 3 5 1 4 0   CHLORIDE mmol/L 100 99* 96*   CO2 mmol/L 26 26 25   BUN mg/dL 30* 26* 18   CREATININE mg/dL 0 72 0 80 0 71   EGFR ml/min/1 73sq m 75 66 76   CALCIUM mg/dL 8 5 8 9 9 0   AST U/L 94* 137* 159*   ALT U/L 146* 185* 199*   ALK PHOS U/L 148* 170* 187*     Results from last 7 days   Lab Units 08/07/19  1519 08/05/19  0829   GRAM STAIN RESULT  1+ Polys  No bacteria seen No Polys or Bacteria seen  1+ RBC's   BODY FLUID CULTURE, STERILE  No growth No growth     Results from last 7 days   Lab Units 08/07/19  0514   PROCALCITONIN ng/ml 0 10     Chest x-ray-decreased left-sided pleural effusion      Images personally reviewed by me in PACS    Await formal radiology report

## 2019-08-09 NOTE — PLAN OF CARE
Problem: Potential for Falls  Goal: Patient will remain free of falls  Description  INTERVENTIONS:  - Assess patient frequently for physical needs  -  Identify cognitive and physical deficits and behaviors that affect risk of falls    -  [place] fall precautions as indicated by assessment   - Educate patient/family on patient safety including physical limitations  - Instruct patient to call for assistance with activity based on assessment  - Modify environment to reduce risk of injury  - Consider OT/PT consult to assist with strengthening/mobility  Outcome: Progressing     Problem: Prexisting or High Potential for Compromised Skin Integrity  Goal: Skin integrity is maintained or improved  Description  INTERVENTIONS:  - Identify patients at risk for skin breakdown  - Assess and monitor skin integrity  - Assess and monitor nutrition and hydration status  - Monitor labs (i e  albumin)  - Assess for incontinence   - Turn and reposition patient  - Assist with mobility/ambulation  - Relieve pressure over bony prominences  - Avoid friction and shearing  - Provide appropriate hygiene as needed including keeping skin clean and dry  - Evaluate need for skin moisturizer/barrier cream  - Collaborate with interdisciplinary team (i e  Nutrition, Rehabilitation, etc )   - Patient/family teaching  Outcome: Progressing     Problem: PAIN - ADULT  Goal: Verbalizes/displays adequate comfort level or baseline comfort level  Description  Interventions:  - Encourage patient to monitor pain and request assistance  - Assess pain using appropriate pain scale  - Administer analgesics based on type and severity of pain and evaluate response  - Implement non-pharmacological measures as appropriate and evaluate response  - Notify physician/advanced practitioner if interventions unsuccessful or patient reports new pain   Outcome: Progressing     Problem: SAFETY ADULT  Goal: Maintain or return to baseline ADL function  Description  INTERVENTIONS:  -  Assess patient's ability to carry out ADLs; assess patient's baseline for ADL function and identify physical deficits which impact ability to perform ADLs (bathing, care of mouth/teeth, toileting, grooming, dressing, etc )  - Assess/evaluate cause of self-care deficits   - Assess range of motion  - Assess patient's mobility; develop plan if impaired  - Assess patient's need for assistive devices and provide as appropriate  - Encourage maximum independence but intervene and supervise when necessary  ¯ Involve family in performance of ADLs  ¯ Assess for home care needs following discharge   ¯ Request OT consult to assist with ADL evaluation and planning for discharge  ¯ Provide patient education as appropriate  Outcome: Progressing  Goal: Maintain or return mobility status to optimal level  Description  INTERVENTIONS:  - Assess patient's baseline mobility status (ambulation, transfers, stairs, etc )    - Identify cognitive and physical deficits and behaviors that affect mobility  - Identify mobility aids required to assist with transfers and/or ambulation (gait belt, sit-to-stand, lift, walker, cane, etc )  - [place] fall precautions as indicated by assessment  - Record patient progress and toleration of activity level on Mobility SBAR; progress patient to next Phase/Stage  - Instruct patient to call for assistance with activity based on assessment  - Request Rehabilitation consult to assist with strengthening/weightbearing, etc   Outcome: Progressing     Problem: DISCHARGE PLANNING  Goal: Discharge to home or other facility with appropriate resources  Description  INTERVENTIONS:  - Identify barriers to discharge w/patient and caregiver  - Arrange for needed discharge resources and transportation as appropriate  - Identify discharge learning needs (meds, wound care, etc )  - Refer to Case Management Department for coordinating discharge planning if the patient needs post-hospital services based on physician/advanced practitioner order or complex needs related to functional status, cognitive ability, or social support system   Outcome: Progressing     Problem: Knowledge Deficit  Goal: Patient/family/caregiver demonstrates understanding of disease process, treatment plan, medications, and discharge instructions  Description  Complete learning assessment and assess knowledge base    Interventions:  - Provide teaching at level of understanding  - Provide teaching via preferred learning methods  Outcome: Progressing     Problem: INFECTION - ADULT  Goal: Absence or prevention of progression during hospitalization  Description  INTERVENTIONS:  - Assess and monitor for signs and symptoms of infection  - Monitor lab/diagnostic results  - Monitor all insertion sites, i e  indwelling lines, tubes, and drains  - [place] appropriate cooling/warming therapies per order  - Administer medications as ordered  - Instruct and encourage patient and family to use good hand hygiene technique  - Identify and instruct in appropriate isolation precautions for identified infection/condition   Outcome: Progressing     Problem: MUSCULOSKELETAL - ADULT  Goal: Maintain or return mobility to safest level of function  Description  INTERVENTIONS:  - Assess patient's ability to carry out ADLs; assess patient's baseline for ADL function and identify physical deficits which impact ability to perform ADLs (bathing, care of mouth/teeth, toileting, grooming, dressing, etc )  - Assess/evaluate cause of self-care deficits   - Assess range of motion  - Assess patient's mobility; develop plan if impaired  - Assess patient's need for assistive devices and provide as appropriate  - Encourage maximum independence but intervene and supervise when necessary  - Involve family in performance of ADLs  - Assess for home care needs following discharge   - Request OT consult to assist with ADL evaluation and planning for discharge  - Provide patient education as appropriate  Outcome: Progressing     Problem: Nutrition/Hydration-ADULT  Goal: Nutrient/Hydration intake appropriate for improving, restoring or maintaining nutritional needs  Description  Monitor and assess patient's nutrition/hydration status for malnutrition (ex- brittle hair, bruises, dry skin, pale skin and conjunctiva, muscle wasting, smooth red tongue, and disorientation)  Collaborate with interdisciplinary team and initiate plan and interventions as ordered  Monitor patient's weight and dietary intake as ordered or per policy  Utilize nutrition screening tool and intervene per policy  Determine patient's food preferences and provide high-protein, high-caloric foods as appropriate  INTERVENTIONS:  - Monitor oral intake, urinary output, labs, and treatment plans    Encourage ensures with all meals   Set up meals and assist    - Assess nutrition and hydration status and recommend course of action  - Evaluate amount of meals eaten  - Assist patient with eating if necessary   - Allow adequate time for meals  - Recommend/ encourage appropriate diets, oral nutritional supplements, and vitamin/mineral supplements  - Order, calculate, and assess calorie counts as needed  - Recommend, monitor, and adjust tube feedings and TPN/PPN based on assessed needs  - Assess need for intravenous fluids  - Provide specific nutrition/hydration education as appropriate  - Include patient/family/caregiver in decisions related to nutrition  Outcome: Progressing

## 2019-08-09 NOTE — QUICK NOTE
TPA/Dornase instilled into L pleural chest tube at 9:35pm  Chest tube clamped, unclamped at approximately 11:15pm  Chest tube returned to low wall suction  Patient tolerated well, nurse made aware  Chest tube management per cardiology

## 2019-08-09 NOTE — PHYSICAL THERAPY NOTE
Physical Therapy Cancellation Note  Attempted to work with patient, family members present, stating patient would like to sleep secondary to just getting into bed  Requesting therapist to come back later today  Will continue to follow up with patient as appropriate       Berto Martin PTA

## 2019-08-09 NOTE — PHYSICAL THERAPY NOTE
Physical Therapy Progress Note     08/09/19 2816   Pain Assessment   Pain Assessment 0-10   Pain Score 8   Pain Type Acute pain   Pain Location Shoulder;Back   Pain Orientation Left   Hospital Pain Intervention(s) Ambulation/increased activity   Response to Interventions tolerated   Restrictions/Precautions   Weight Bearing Precautions Per Order No   Other Precautions Cognitive; Bed Alarm;Pain; Fall Risk;Multiple lines   General   Chart Reviewed Yes   Response to Previous Treatment Patient with no complaints from previous session  Family/Caregiver Present Yes   Cognition   Overall Cognitive Status Impaired   Arousal/Participation Lethargic;Persistent stimuli required   Attention Difficulty attending to directions   Following Commands Follows multistep commands with increased time or repetition   Subjective   Subjective Patient in bed, agreeable to physical therapy  Bed Mobility   Supine to Sit 2  Maximal assistance   Additional items Assist x 1;HOB elevated; Bedrails; Increased time required;Verbal cues;LE management   Sit to Supine 2  Maximal assistance   Additional items Assist x 1;HOB elevated; Bedrails; Increased time required;Verbal cues;LE management   Additional Comments standby assist of 2nd for all transfers, during session   Balance   Static Sitting Poor   Dynamic Sitting Poor   Endurance Deficit   Endurance Deficit Yes   Endurance Deficit Description fatigue, weakness, pain    Activity Tolerance   Activity Tolerance Patient limited by fatigue;Patient limited by pain   Nurse Made Aware appropriate to see   Exercises   Quad Sets Supine;5 reps;AROM; Bilateral   Heelslides Supine;10 reps;AAROM; Bilateral   Hip Abduction Supine;5 reps;PROM; Bilateral   Hip Adduction Supine;5 reps;PROM; Bilateral   Knee AROM Long Arc Quad Sitting;10 reps;AAROM; Bilateral   Ankle Pumps Supine;Sitting;10 reps;AROM; Bilateral   Heel Cord Stretch Supine;5 reps;PROM; Bilateral   Assessment   Prognosis Fair   Problem List Decreased strength;Decreased range of motion; Impaired balance;Decreased endurance;Decreased mobility; Decreased coordination;Decreased cognition; Impaired judgement;Decreased safety awareness;Pain   Assessment Patient demonstrating difficulty keeping eyes open during session, with patient only able to keep eyes open approx 25% of the time, with consistent cues required for task at hand  Patient was maximal assist x1 for supine to sit and supine to sit transfers, standby assist of 2nd  Upon first sitting trial, patient was maximal assist to maintain upright posture with patient consistantly leaning towards right to lay down  Patient only able to sit EOB for approx 5 minutes before requesting to lay back down  Improvements noted with second sitting trial, less assistance required, with slight improvements with posture, however patient requesting to lay back down after 3 minutes  Patient was able to complete some AROM/AAROM exercise however, continuous cues required to keep eyes open and for task at hand  Patient would continue to benefit from physical therapy to improve functional mobility until medically cleared  Goals   Patient Goals none stated   STG Expiration Date 19   Short Term Goal #1 GOALS  ON : GOALS ESTABLISHED FOR PATIENT UPON INITIAL EVALUATION REMAIN APPROPRIATE AND ARE AS FOLLOWS: "pt will: Increase bilateral LE strength 1/2 grade to facilitate independent mobility, Perform all bed mobility tasks w/ supervision to decrease fall risk factors, Perform all transfers w/ supervision to improve independence, Ambulate 150 ft  with roller walker w/ supervision w/o LOB, Increase all balance 1/2 grade to decrease risk for falls and Improve Barthel Index score to 60 or greater to facilitate independence"   Treatment Day 5   Plan   Treatment/Interventions Functional transfer training;LE strengthening/ROM; Therapeutic exercise; Endurance training;Patient/family training;Spoke to nursing   Progress Slow progress, decreased activity tolerance   PT Frequency Other (Comment)  (3-5x/wk)   Recommendation   Recommendation Other (Comment)  (inpt rehab)   Equipment Recommended Lisaterri Moore; Wheelchair   PT - OK to Discharge Yes  (to rehab once medically cleared)     Lori Tidwell, PTA

## 2019-08-09 NOTE — PROGRESS NOTES
Progress Note - Fahad Gonzalez 3/23/1931, 80 y o  female MRN: [id number]    Unit/Bed#: -01 Encounter: [id number]    Primary Care Provider: Alexandro Baldwin MD   Date and time admitted to hospital: 7/26/2019 12:24 PM        * Community acquired pneumonia  Assessment & Plan  Continue IV antibiotics  Infectious Disease input noted  Supportive care      Loculated pleural effusion  Assessment & Plan  Pneumonia with loculated pleural effusion with pleuritic pain  Pulmonary following  Chest tube in place receiving tPA/Dornase for 3 days  Thoracic surgery input noted  Continue IV antibiotics  Analgesics, lidocaine patch  Supportive care    Generalized weakness  Assessment & Plan  Deconditioning, ambulatory dysfunction  Safe ambulation fall precautions  Physical therapy    Dementia  Assessment & Plan  Continue Namenda and Aricept  Reorientation sleep hygiene      Essential hypertension  Assessment & Plan  Monitor blood pressures  Avoid hypotension    Acquired hypothyroidism  Assessment & Plan  Continue levothyroxine        VTE Pharmacologic Prophylaxis:   Pharmacologic: Enoxaparin (Lovenox)  Mechanical VTE Prophylaxis in Place: Yes    Patient Centered Rounds: I have performed bedside rounds with nursing staff today  Discussions with Specialists or Other Care Team Provider:      Education and Discussions with Family / Patient: Discussed with the patient, updated daughter Po Jefferson    Time Spent for Care: 30 minutes  More than 50% of total time spent on counseling and coordination of care as described above      Current Length of Stay: 13 day(s)    Current Patient Status: Inpatient   Certification Statement: The patient will continue to require additional inpatient hospital stay due to as mentioned    Discharge Plan: awaiting clinical and symptomatic improvement    Code Status: Level 1 - Full Code      Subjective:     Sitting up in bed  Reports pain is a little better today  Awake and conversational today  Continues to have poor appetite  Encouraged incentive spirometry as able    Objective:     Vitals:   Temp (24hrs), Av 8 °F (36 6 °C), Min:97 4 °F (36 3 °C), Max:98 2 °F (36 8 °C)    Temp:  [97 4 °F (36 3 °C)-98 2 °F (36 8 °C)] 97 4 °F (36 3 °C)  HR:  [78-81] 78  Resp:  [16] 16  BP: (107-110)/(48-50) 107/48  SpO2:  [96 %-97 %] 97 %  Body mass index is 18 93 kg/m²  Input and Output Summary (last 24 hours):        Intake/Output Summary (Last 24 hours) at 2019 1502  Last data filed at 2019 1107  Gross per 24 hour   Intake 890 ml   Output 1295 ml   Net -405 ml       Physical Exam:     Physical Exam    Comfortably sitting up in bed  Neck supple  Lungs chest tube noted on the left side  Diminished breath sounds left side  Heart sounds S1 and S2 noted  Abdomen soft  Awake obeys simple commands  Conversational today  Pulses noted  No pedal edema  No rash    Additional Data:     Labs:    Results from last 7 days   Lab Units 19  0637 19  0514   WBC Thousand/uL 19 19* 17 61*   HEMOGLOBIN g/dL 10 4* 10 9*   HEMATOCRIT % 31 9* 34 5*   PLATELETS Thousands/uL 400* 364   NEUTROS PCT %  --  85*   LYMPHS PCT %  --  6*   MONOS PCT %  --  6   EOS PCT %  --  1     Results from last 7 days   Lab Units 19  0515   SODIUM mmol/L 132*   POTASSIUM mmol/L 4 3   CHLORIDE mmol/L 100   CO2 mmol/L 26   BUN mg/dL 30*   CREATININE mg/dL 0 72   ANION GAP mmol/L 6   CALCIUM mg/dL 8 5   ALBUMIN g/dL 1 6*   TOTAL BILIRUBIN mg/dL 0 28   ALK PHOS U/L 148*   ALT U/L 146*   AST U/L 94*   GLUCOSE RANDOM mg/dL 100         Results from last 7 days   Lab Units 19  1129 19  0625 19  2042 19  1600 19  1043 19  0640 19  2123 19  1618 19  1050 19  0647 19  2137 19  1705   POC GLUCOSE mg/dl 142* 111 163* 153* 194* 147* 138 92 110 95 120 169*         Results from last 7 days   Lab Units 19  0514   PROCALCITONIN ng/ml 0 10           * I Have Reviewed All Lab Data Listed Above  * Additional Pertinent Lab Tests Reviewed:  All Labs Within Last 24 Hours Reviewed    Imaging:    Imaging Reports Reviewed Today Include:    Imaging Personally Reviewed by Myself Includes:  Chest x-ray personally viewed reports improved aeration on the left side    Recent Cultures (last 7 days):     Results from last 7 days   Lab Units 08/07/19  1519 08/05/19  0829   GRAM STAIN RESULT  1+ Polys  No bacteria seen No Polys or Bacteria seen  1+ RBC's   BODY FLUID CULTURE, STERILE  No growth No growth       Last 24 Hours Medication List:     Current Facility-Administered Medications:  acetaminophen 975 mg Oral Q8H [address] Debbie Jones MD    alteplase (TPA) 10 mg in SWFI 30 mL chest tube instillation 10 mg Chest Tube BID Calista Morris MD    And        dornase adán (PULMOZYME) 5 mg in 30 mL SWFI chest tube instilation 5 mg Chest Tube BID Calista Morris MD    amLODIPine 5 mg Oral Daily Daniel Simpson MD    aspirin 81 mg Oral Daily Daniel Simpson MD    atorvastatin 40 mg Oral QPM Daniel Simpson MD    benzonatate 100 mg Oral TID PRN Daniel Simpson MD    bisacodyl 10 mg Rectal Daily PRN LEE Caceres    donepezil 10 mg Oral HS Daniel Simpson MD    enoxaparin 40 mg Subcutaneous Daily Daniel Simpson MD    ertapenem 1,000 mg Intravenous Q24H Debbie Allen MD Last Rate: Stopped (08/09/19 0939)   fluticasone 2 spray Nasal Daily Daniel Simpson MD    HYDROmorphone 0 2 mg Intravenous Once Debbie Jones MD    HYDROmorphone 0 2 mg Intravenous Q4H PRN Debbie Jones MD    lactulose 30 g Oral Once LEE Caceres    levothyroxine 100 mcg Oral Early Morning Daniel Simpson MD    lidocaine 2 patch Topical Daily Debbie Jones MD    losartan 50 mg Oral Daily Daniel Simpson MD    memantine 10 mg Oral BID Daniel Simpson MD    metoprolol succinate 50 mg Oral Daily Daniel Simpson MD    montelukast 10 mg Oral HS Juan Carlos Rahman MD    oxyCODONE 5 mg Oral Q4H PRN Wily Ward MD    pantoprazole 40 mg Oral Daily Before Breakfast Deanne Lomax MD    zolpidem 5 mg Oral HS PRN Deanne Lomax MD         Today, Patient Was Seen By: Wily Ward MD    ** Please Note: Dictation voice to text software may have been used in the creation of this document   **

## 2019-08-09 NOTE — PROGRESS NOTES
Spoke with Elida Santos with Critical Care - would prefer meds via chest tube to be given around 2000/2100  Will relay to night shift RN

## 2019-08-09 NOTE — QUICK NOTE
Instilled tPA/dornase 930 a m  Morrell Spruce Clamp chest for 1 hour and approximately 1045 on clamp chest tube  Replaced chest tube to continuous wall suction  Patient tolerated instillation with no acute symptoms or discomfort

## 2019-08-09 NOTE — PROGRESS NOTES
Progress Note - Pulmonary   Callum Amado 80 y o  female MRN: [id number]  Unit/Bed#: -01 Encounter: [id number]    Assessment/Plan:    1  Acute pulmonary insufficiency (improving) likely secondary to pneumonia with pleural effusion      *  currently on room air-97%, patient does not wear home O2      *  will initiate pulmonary toileting has patient's mental status allows- encourage deep breathing cough, OOB to chair as tolerated, IS Q 1 hr as tolerated      *  no indication for home O2 evaluation    2  Community-acquired pneumonia      *  ID following       *  WBC trending up-19 19, afebrile, procalcitonin normal      *  Negative:  BAL, fungal, AFB, strep pneumoniae, Legionella, BC x 5 days      *  total of 15 days of antibiotics: 11 days ceftriaxone, 3 days Invanz, 2 days azithromycin, 2 days cefepime      *  ID switch antibiotics to Invanz in the setting of increased LFTs    3  Loculated left-sided exudative pleural effusion with resolved hydropneumothorax      *  Thoracentesis 8/2/19- 460 mL clear yellow fluid-  CT # 1 D/C'd after pneumothorax resolved      *  CT #2 for loculated effusion- continue 10 2 Fr left pleuritic chest tube continuous -20 H2O medium suction      *  received 4th dose of tPA/dornase this a m , will receive 5th dose this evening by critical care      *  ordered repeat chest x-ray this a m  Chief Complaint:    "I feel good"    Subjective:    Cassandra Ramos was comfortably sitting in her bed eating her breakfast   She appears more alert than previous days  Patient was able to identify objects and state who she was however still remains disoriented x3  No significant overnight events reported  Patient currently denies any fever, chills, hemoptysis, headache, night sweats, or chest pain  Objective:    Vitals: Blood pressure (!) 107/48, pulse 78, temperature (!) 97 4 °F (36 3 °C), resp  rate 16, height 5' 5" (1 651 m), weight 51 6 kg (113 lb 12 1 oz), SpO2 97 %  RA,Body mass index is 18 93 kg/m²  Intake/Output Summary (Last 24 hours) at 8/9/2019 0944  Last data filed at 8/9/2019 2199  Gross per 24 hour   Intake 530 ml   Output 945 ml   Net -415 ml       Invasive Devices     Peripheral Intravenous Line            Peripheral IV 08/08/19 Left Forearm 1 day          Drain            Chest Tube 1 Left 10 2 Fr  1 day                Physical Exam:   Physical Exam   Constitutional: She is oriented to person, place, and time  She appears cachectic  No distress  HENT:   Head: Normocephalic and atraumatic  Neck: Normal range of motion  Neck supple  No tracheal deviation present  No thyromegaly present  Cardiovascular: Normal rate and regular rhythm  Exam reveals no gallop and no friction rub  Murmur heard  Pulmonary/Chest: No accessory muscle usage or stridor  No tachypnea and no bradypnea  No respiratory distress  She has decreased breath sounds in the right middle field, the right lower field, the left middle field and the left lower field  She has no wheezes  She has no rhonchi  She has no rales  She exhibits no tenderness  10 2 Fr left pleuritic chest tube continuous -20 H2O medium suction  No bubbling noted in chamber  No crepitus noted on chest wall back or neck   Abdominal: Soft  Bowel sounds are normal  She exhibits no distension  There is no tenderness  Musculoskeletal: Normal range of motion  She exhibits no edema or deformity  Neurological: She is alert and oriented to person, place, and time  Skin: Skin is warm and dry  She is not diaphoretic  Psychiatric: She has a normal mood and affect  Her behavior is normal        Labs:    I have personally reviewed pertinent lab results CMP:   Lab Results   Component Value Date    SODIUM 132 (L) 08/09/2019    K 4 3 08/09/2019     08/09/2019    CO2 26 08/09/2019    BUN 30 (H) 08/09/2019    CREATININE 0 72 08/09/2019    CALCIUM 8 5 08/09/2019    AST 94 (H) 08/09/2019     (H) 08/09/2019    ALKPHOS 148 (H) 08/09/2019    EGFR 75 08/09/2019       Imaging and other studies: I have personally reviewed pertinent films in PACS     Chest x-ray 05/2019-diminished left pleural fluid collection, no visible pneumothorax, streaky infiltrates right upper lobe

## 2019-08-10 LAB
ALBUMIN SERPL BCP-MCNC: 1.7 G/DL (ref 3.5–5)
ALP SERPL-CCNC: 145 U/L (ref 46–116)
ALT SERPL W P-5'-P-CCNC: 126 U/L (ref 12–78)
ANION GAP SERPL CALCULATED.3IONS-SCNC: 6 MMOL/L (ref 4–13)
AST SERPL W P-5'-P-CCNC: 76 U/L (ref 5–45)
BACTERIA SPEC BFLD CULT: NO GROWTH
BASOPHILS # BLD AUTO: 0.04 THOUSANDS/ΜL (ref 0–0.1)
BASOPHILS NFR BLD AUTO: 0 % (ref 0–1)
BILIRUB SERPL-MCNC: 0.26 MG/DL (ref 0.2–1)
BUN SERPL-MCNC: 33 MG/DL (ref 5–25)
CALCIUM SERPL-MCNC: 8.6 MG/DL (ref 8.3–10.1)
CHLORIDE SERPL-SCNC: 97 MMOL/L (ref 100–108)
CO2 SERPL-SCNC: 29 MMOL/L (ref 21–32)
CREAT SERPL-MCNC: 0.81 MG/DL (ref 0.6–1.3)
EOSINOPHIL # BLD AUTO: 0.15 THOUSAND/ΜL (ref 0–0.61)
EOSINOPHIL NFR BLD AUTO: 1 % (ref 0–6)
ERYTHROCYTE [DISTWIDTH] IN BLOOD BY AUTOMATED COUNT: 12.7 % (ref 11.6–15.1)
GFR SERPL CREATININE-BSD FRML MDRD: 65 ML/MIN/1.73SQ M
GLUCOSE SERPL-MCNC: 101 MG/DL (ref 65–140)
GLUCOSE SERPL-MCNC: 114 MG/DL (ref 65–140)
GLUCOSE SERPL-MCNC: 140 MG/DL (ref 65–140)
GLUCOSE SERPL-MCNC: 153 MG/DL (ref 65–140)
GLUCOSE SERPL-MCNC: 91 MG/DL (ref 65–140)
GRAM STN SPEC: NORMAL
GRAM STN SPEC: NORMAL
HCT VFR BLD AUTO: 32.8 % (ref 34.8–46.1)
HGB BLD-MCNC: 10.5 G/DL (ref 11.5–15.4)
IMM GRANULOCYTES # BLD AUTO: 0.17 THOUSAND/UL (ref 0–0.2)
IMM GRANULOCYTES NFR BLD AUTO: 1 % (ref 0–2)
LYMPHOCYTES # BLD AUTO: 1.36 THOUSANDS/ΜL (ref 0.6–4.47)
LYMPHOCYTES NFR BLD AUTO: 8 % (ref 14–44)
MCH RBC QN AUTO: 30.8 PG (ref 26.8–34.3)
MCHC RBC AUTO-ENTMCNC: 32 G/DL (ref 31.4–37.4)
MCV RBC AUTO: 96 FL (ref 82–98)
MONOCYTES # BLD AUTO: 1.03 THOUSAND/ΜL (ref 0.17–1.22)
MONOCYTES NFR BLD AUTO: 6 % (ref 4–12)
NEUTROPHILS # BLD AUTO: 13.76 THOUSANDS/ΜL (ref 1.85–7.62)
NEUTS SEG NFR BLD AUTO: 84 % (ref 43–75)
NRBC BLD AUTO-RTO: 0 /100 WBCS
PLATELET # BLD AUTO: 431 THOUSANDS/UL (ref 149–390)
PMV BLD AUTO: 9.5 FL (ref 8.9–12.7)
POTASSIUM SERPL-SCNC: 4.8 MMOL/L (ref 3.5–5.3)
PROT SERPL-MCNC: 6.6 G/DL (ref 6.4–8.2)
RBC # BLD AUTO: 3.41 MILLION/UL (ref 3.81–5.12)
SODIUM SERPL-SCNC: 132 MMOL/L (ref 136–145)
WBC # BLD AUTO: 16.51 THOUSAND/UL (ref 4.31–10.16)

## 2019-08-10 PROCEDURE — 85025 COMPLETE CBC W/AUTO DIFF WBC: CPT | Performed by: INTERNAL MEDICINE

## 2019-08-10 PROCEDURE — 80053 COMPREHEN METABOLIC PANEL: CPT | Performed by: INTERNAL MEDICINE

## 2019-08-10 PROCEDURE — 99232 SBSQ HOSP IP/OBS MODERATE 35: CPT | Performed by: INTERNAL MEDICINE

## 2019-08-10 PROCEDURE — 82948 REAGENT STRIP/BLOOD GLUCOSE: CPT

## 2019-08-10 RX ORDER — OXYCODONE HYDROCHLORIDE 5 MG/1
2.5 TABLET ORAL EVERY 12 HOURS SCHEDULED
Status: DISCONTINUED | OUTPATIENT
Start: 2019-08-10 | End: 2019-08-16 | Stop reason: HOSPADM

## 2019-08-10 RX ORDER — POLYETHYLENE GLYCOL 3350 17 G/17G
17 POWDER, FOR SOLUTION ORAL DAILY
Status: DISCONTINUED | OUTPATIENT
Start: 2019-08-10 | End: 2019-08-16 | Stop reason: HOSPADM

## 2019-08-10 RX ORDER — AMOXICILLIN 250 MG
1 CAPSULE ORAL 2 TIMES DAILY
Status: DISCONTINUED | OUTPATIENT
Start: 2019-08-10 | End: 2019-08-16 | Stop reason: HOSPADM

## 2019-08-10 RX ADMIN — DORNASE ALFA 5 MG: 1 SOLUTION RESPIRATORY (INHALATION) at 11:59

## 2019-08-10 RX ADMIN — OXYCODONE HYDROCHLORIDE 2.5 MG: 5 TABLET ORAL at 21:09

## 2019-08-10 RX ADMIN — SENNOSIDES AND DOCUSATE SODIUM 1 TABLET: 8.6; 5 TABLET ORAL at 09:54

## 2019-08-10 RX ADMIN — ASPIRIN 81 MG 81 MG: 81 TABLET ORAL at 08:26

## 2019-08-10 RX ADMIN — ERTAPENEM SODIUM 1000 MG: 1 INJECTION, POWDER, LYOPHILIZED, FOR SOLUTION INTRAMUSCULAR; INTRAVENOUS at 08:45

## 2019-08-10 RX ADMIN — LEVOTHYROXINE SODIUM 100 MCG: 100 TABLET ORAL at 05:04

## 2019-08-10 RX ADMIN — MEMANTINE 10 MG: 10 TABLET ORAL at 08:26

## 2019-08-10 RX ADMIN — OXYCODONE HYDROCHLORIDE 2.5 MG: 5 TABLET ORAL at 08:25

## 2019-08-10 RX ADMIN — SENNOSIDES AND DOCUSATE SODIUM 1 TABLET: 8.6; 5 TABLET ORAL at 17:45

## 2019-08-10 RX ADMIN — LOSARTAN POTASSIUM 50 MG: 50 TABLET, FILM COATED ORAL at 08:26

## 2019-08-10 RX ADMIN — DONEPEZIL HYDROCHLORIDE 10 MG: 10 TABLET ORAL at 21:09

## 2019-08-10 RX ADMIN — ATORVASTATIN CALCIUM 40 MG: 40 TABLET, FILM COATED ORAL at 17:45

## 2019-08-10 RX ADMIN — METOPROLOL SUCCINATE 50 MG: 50 TABLET, EXTENDED RELEASE ORAL at 08:24

## 2019-08-10 RX ADMIN — FLUTICASONE PROPIONATE 2 SPRAY: 50 SPRAY, METERED NASAL at 08:28

## 2019-08-10 RX ADMIN — ACETAMINOPHEN 975 MG: 325 TABLET ORAL at 21:08

## 2019-08-10 RX ADMIN — MEMANTINE 10 MG: 10 TABLET ORAL at 17:45

## 2019-08-10 RX ADMIN — OXYCODONE HYDROCHLORIDE 5 MG: 5 TABLET ORAL at 10:15

## 2019-08-10 RX ADMIN — OXYCODONE HYDROCHLORIDE 5 MG: 5 TABLET ORAL at 04:14

## 2019-08-10 RX ADMIN — AMLODIPINE BESYLATE 5 MG: 5 TABLET ORAL at 08:23

## 2019-08-10 RX ADMIN — LIDOCAINE 2 PATCH: 50 PATCH CUTANEOUS at 08:27

## 2019-08-10 RX ADMIN — ACETAMINOPHEN 975 MG: 325 TABLET ORAL at 13:28

## 2019-08-10 RX ADMIN — ALTEPLASE 10 MG: 2.2 INJECTION, POWDER, LYOPHILIZED, FOR SOLUTION INTRAVENOUS at 12:01

## 2019-08-10 RX ADMIN — MONTELUKAST SODIUM 10 MG: 10 TABLET, FILM COATED ORAL at 21:10

## 2019-08-10 RX ADMIN — POLYETHYLENE GLYCOL 3350 17 G: 17 POWDER, FOR SOLUTION ORAL at 09:54

## 2019-08-10 RX ADMIN — ACETAMINOPHEN 975 MG: 325 TABLET ORAL at 05:04

## 2019-08-10 RX ADMIN — ENOXAPARIN SODIUM 40 MG: 40 INJECTION SUBCUTANEOUS at 08:23

## 2019-08-10 RX ADMIN — PANTOPRAZOLE SODIUM 40 MG: 40 TABLET, DELAYED RELEASE ORAL at 05:05

## 2019-08-10 NOTE — ASSESSMENT & PLAN NOTE
Pneumonia with loculated pleural effusion with pleuritic pain  Pulmonary following, discussed with Pulmonary  Chest tube in place receiving tPA/Dornase for 3 days  Thoracic surgery input noted  Continue IV antibiotics  Analgesics, lidocaine patch  Supportive care

## 2019-08-10 NOTE — PROGRESS NOTES
Progress Note - Pulmonary   Corean Qualia 80 y o  female MRN: [id number]  Unit/Bed#: -01 Encounter: [id number]      Assessment/Plan:  1  Acute pulmonary insufficiency likely secondary to 2  And 3-improving  1  Continue titrate oxygen maintain SpO2 greater than equal to 89%, currently oxygenating well on room air, no baseline requirements  2  Pulmonary toilet as patient tolerates-cough deep breathe, out of bed, incentive spirometry  2  Community-acquired pneumonia  1  Infectious Disease following and managing antibiotics  2  Currently on ertapenem: day 16 of ABX  3  Leukocytosis improving today: WBC down to 16 51  4  Pleural fluid cultures negative, legionella/strep negative  3  Loculated left-sided exudative complicated pleural effusion with history resolved pneumothorax  1  Continue Chest tube at suction with close monitoring on drainage  2  Completed final dose of tpa/dornase now 1205-RN to clamp chest tube for one hour-patient tolerated tpa/dornase instillation well-total 30ml tpa/30ml Dnase instilled  3  Continue ABX as indicated above  4  Repeat PA/lateral CXR Monday morning  5  Thoracic consult completed        Subjective:     Marsha Patel was seen laying in bed upon entering the room  Appears lethargic, denies pain, no acute distress  Denies: fevers, chills, cough, sputum production or hemoptysis    Objective:         Vitals: Blood pressure 117/87, pulse 72, temperature 97 8 °F (36 6 °C), resp  rate 18, height 5' 5" (1 651 m), weight 51 6 kg (113 lb 12 1 oz), SpO2 96 %  ,RA , Body mass index is 18 93 kg/m²        Intake/Output Summary (Last 24 hours) at 8/10/2019 1144  Last data filed at 8/10/2019 0901  Gross per 24 hour   Intake 220 ml   Output 530 ml   Net -310 ml         Physical Exam  Gen: lethargic, oriented to person only, no acute distress  HEENT: Mucous membranes moist, no oral lesions, no thrush  NECK: no accessory muscle use, JVP not elevated  Cardiac: Regular, single S1, single S2, no murmurs, no rubs, no gallops  Lungs: decreased breath sounds bilaterally-left sided chest tube in place  Abdomen: normoactive bowel sounds, soft nontender, nondistended, no rebound or rigidity, no guarding  Extremities: no cyanosis, no clubbing,  edema    Labs: I have personally reviewed pertinent lab results  , CBC:   Lab Results   Component Value Date    WBC 16 51 (H) 08/10/2019    HGB 10 5 (L) 08/10/2019    HCT 32 8 (L) 08/10/2019    MCV 96 08/10/2019     (H) 08/10/2019    MCH 30 8 08/10/2019    MCHC 32 0 08/10/2019    RDW 12 7 08/10/2019    MPV 9 5 08/10/2019    NRBC 0 08/10/2019   , CMP:   Lab Results   Component Value Date    SODIUM 132 (L) 08/10/2019    K 4 8 08/10/2019    CL 97 (L) 08/10/2019    CO2 29 08/10/2019    BUN 33 (H) 08/10/2019    CREATININE 0 81 08/10/2019    CALCIUM 8 6 08/10/2019    AST 76 (H) 08/10/2019     (H) 08/10/2019    ALKPHOS 145 (H) 08/10/2019    EGFR 65 08/10/2019     Imaging and other studies: no new imaging to review today      LEE Isaac

## 2019-08-10 NOTE — QUICK NOTE
alteplase/dornase instilled in LEFT CT and clamped  Will be unclamped in 1 hour and returned to suction  Patient tolerated installation without complaints or difficulty  Nurse made aware       Zoe Lynn PA-C

## 2019-08-10 NOTE — PROGRESS NOTES
Progress Note - Lefty Arias 3/23/1931, 80 y o  female MRN: [id number]    Unit/Bed#: -01 Encounter: [id number]    Primary Care Provider: Darion Ruiz MD   Date and time admitted to hospital: 7/26/2019 12:24 PM        * Community acquired pneumonia  Assessment & Plan  Continue IV antibiotics  Infectious Disease input noted  Supportive care      Loculated pleural effusion  Assessment & Plan  Pneumonia with loculated pleural effusion with pleuritic pain  Pulmonary following, discussed with Pulmonary  Chest tube in place receiving tPA/Dornase for 3 days  Thoracic surgery input noted  Continue IV antibiotics  Analgesics, lidocaine patch  Supportive care    Generalized weakness  Assessment & Plan  Deconditioning, ambulatory dysfunction  Safe ambulation fall precautions  Physical therapy    Dementia  Assessment & Plan  Continue Namenda and Aricept  Reorientation sleep hygiene      Essential hypertension  Assessment & Plan  Monitor blood pressures  Avoid hypotension    Acquired hypothyroidism  Assessment & Plan  Continue levothyroxine      VTE Pharmacologic Prophylaxis:   Pharmacologic: Enoxaparin (Lovenox)  Mechanical VTE Prophylaxis in Place: Yes    Patient Centered Rounds: I have performed bedside rounds with nursing staff today  Discussions with Specialists or Other Care Team Provider:  Pulmonary    Education and Discussions with Family / Patient:  Discussed the patient, family at bedside discussed in detail    Time Spent for Care: 30 minutes  More than 50% of total time spent on counseling and coordination of care as described above      Current Length of Stay: 14 day(s)    Current Patient Status: Inpatient   Certification Statement: The patient will continue to require additional inpatient hospital stay due to As mentioned    Discharge Plan:  Awaiting clinical and symptomatic improvement    Code Status: Level 1 - Full Code      Subjective:     Patient comfortably in bed  Reports pain left side  Appetite fair to poor  Encourage p o  Intake  Encouraged incentive spirometry  Encouraged out of bed into a chair  Discussed with family at bedside in detail    Objective:     Vitals:   Temp (24hrs), Av 6 °F (36 4 °C), Min:97 5 °F (36 4 °C), Max:97 8 °F (36 6 °C)    Temp:  [97 5 °F (36 4 °C)-97 8 °F (36 6 °C)] 97 8 °F (36 6 °C)  HR:  [72-82] 72  Resp:  [16-19] 18  BP: (117-134)/(59-87) 117/87  SpO2:  [96 %-97 %] 96 %  Body mass index is 18 93 kg/m²  Input and Output Summary (last 24 hours):        Intake/Output Summary (Last 24 hours) at 8/10/2019 1318  Last data filed at 8/10/2019 0901  Gross per 24 hour   Intake 220 ml   Output 530 ml   Net -310 ml       Physical Exam:     Physical Exam    Comfortably in bed  Neck supple  Lungs diminished breath sounds left side chest tubes noted  Heart sounds S1 and S2 noted  Abdomen soft nontender  Awake alert obeys simple commands  Pulses noted  No rash  No pedal edema    Additional Data:     Labs:    Results from last 7 days   Lab Units 08/10/19  0428   WBC Thousand/uL 16 51*   HEMOGLOBIN g/dL 10 5*   HEMATOCRIT % 32 8*   PLATELETS Thousands/uL 431*   NEUTROS PCT % 84*   LYMPHS PCT % 8*   MONOS PCT % 6   EOS PCT % 1     Results from last 7 days   Lab Units 08/10/19  0428   SODIUM mmol/L 132*   POTASSIUM mmol/L 4 8   CHLORIDE mmol/L 97*   CO2 mmol/L 29   BUN mg/dL 33*   CREATININE mg/dL 0 81   ANION GAP mmol/L 6   CALCIUM mg/dL 8 6   ALBUMIN g/dL 1 7*   TOTAL BILIRUBIN mg/dL 0 26   ALK PHOS U/L 145*   ALT U/L 126*   AST U/L 76*   GLUCOSE RANDOM mg/dL 91         Results from last 7 days   Lab Units 08/10/19  1056 08/10/19  0605 19  2131 19  1628 19  1129 19  0625 19  2042 19  1600 19  1043 19  0640 083 19  1618   POC GLUCOSE mg/dl 114 101 131 166* 142* 111 163* 153* 194* 147* 138 92         Results from last 7 days   Lab Units 19  0514   PROCALCITONIN ng/ml 0 10           * I Have Reviewed All Lab Data Listed Above  * Additional Pertinent Lab Tests Reviewed:  All Labs Within Last 24 Hours Reviewed    Imaging:    Imaging Reports Reviewed Today Include:   Imaging Personally Reviewed by Myself Includes:  Chest x-ray personally viewed improved aeration    Recent Cultures (last 7 days):     Results from last 7 days   Lab Units 08/07/19  1519 08/05/19  0829   GRAM STAIN RESULT  1+ Polys  No bacteria seen No Polys or Bacteria seen  1+ RBC's   BODY FLUID CULTURE, STERILE  No growth No growth       Last 24 Hours Medication List:     Current Facility-Administered Medications:  acetaminophen 975 mg Oral Q8H [address], MD    amLODIPine 5 mg Oral Daily Kathie MD Yusuf    aspirin 81 mg Oral Daily Kathie Yusuf, MD    atorvastatin 40 mg Oral QPM Kathie Goldberg MD    benzonatate 100 mg Oral TID PRN Kathie Goldberg MD    bisacodyl 10 mg Rectal Daily PRN LEE Daley    donepezil 10 mg Oral HS Kathie MD Yusuf    enoxaparin 40 mg Subcutaneous Daily Kathie MD Yusuf    ertapenem 1,000 mg Intravenous Q24H Jonas Newsome MD Last Rate: Stopped (08/10/19 0930)   fluticasone 2 spray Nasal Daily Kathie Goldberg MD    HYDROmorphone 0 2 mg Intravenous Once Leann Santos MD    HYDROmorphone 0 2 mg Intravenous Q4H PRN Leann Santos MD    lactulose 30 g Oral Once LEE Daley    levothyroxine 100 mcg Oral Early Morning Kathie MD Yusuf    lidocaine 2 patch Topical Daily Leann Santos MD    losartan 50 mg Oral Daily Kathie MD Yusuf    memantine 10 mg Oral BID Kathie Goldberg MD    metoprolol succinate 50 mg Oral Daily Kathie MD Yusuf    montelukast 10 mg Oral HS Kathie MD Yusuf    oxyCODONE 2 5 mg Oral TID Leann Santos MD    oxyCODONE 5 mg Oral Q4H PRN Leann Santos MD    pantoprazole 40 mg Oral Daily Before Breakfast Kathie Goldberg MD    polyethylene glycol 17 g Oral Daily Sachinroshniumar Otilio Larry MD    senna-docusate sodium 1 tablet Oral BID Jose R Harrison MD    zolpidem 5 mg Oral HS PRN Sultana Adan MD         Today, Patient Was Seen By: Jose R Harrison MD    ** Please Note: Dictation voice to text software may have been used in the creation of this document   **

## 2019-08-11 LAB
GLUCOSE SERPL-MCNC: 149 MG/DL (ref 65–140)
GLUCOSE SERPL-MCNC: 160 MG/DL (ref 65–140)
GLUCOSE SERPL-MCNC: 210 MG/DL (ref 65–140)
GLUCOSE SERPL-MCNC: 94 MG/DL (ref 65–140)

## 2019-08-11 PROCEDURE — 82948 REAGENT STRIP/BLOOD GLUCOSE: CPT

## 2019-08-11 PROCEDURE — 99232 SBSQ HOSP IP/OBS MODERATE 35: CPT | Performed by: INTERNAL MEDICINE

## 2019-08-11 RX ADMIN — MONTELUKAST SODIUM 10 MG: 10 TABLET, FILM COATED ORAL at 21:08

## 2019-08-11 RX ADMIN — PANTOPRAZOLE SODIUM 40 MG: 40 TABLET, DELAYED RELEASE ORAL at 05:52

## 2019-08-11 RX ADMIN — METOPROLOL SUCCINATE 50 MG: 50 TABLET, EXTENDED RELEASE ORAL at 08:47

## 2019-08-11 RX ADMIN — LOSARTAN POTASSIUM 50 MG: 50 TABLET, FILM COATED ORAL at 08:48

## 2019-08-11 RX ADMIN — ACETAMINOPHEN 975 MG: 325 TABLET ORAL at 05:52

## 2019-08-11 RX ADMIN — ERTAPENEM SODIUM 1000 MG: 1 INJECTION, POWDER, LYOPHILIZED, FOR SOLUTION INTRAMUSCULAR; INTRAVENOUS at 08:50

## 2019-08-11 RX ADMIN — MEMANTINE 10 MG: 10 TABLET ORAL at 08:47

## 2019-08-11 RX ADMIN — ACETAMINOPHEN 975 MG: 325 TABLET ORAL at 13:52

## 2019-08-11 RX ADMIN — OXYCODONE HYDROCHLORIDE 2.5 MG: 5 TABLET ORAL at 08:48

## 2019-08-11 RX ADMIN — DONEPEZIL HYDROCHLORIDE 10 MG: 10 TABLET ORAL at 21:08

## 2019-08-11 RX ADMIN — MEMANTINE 10 MG: 10 TABLET ORAL at 17:37

## 2019-08-11 RX ADMIN — ACETAMINOPHEN 975 MG: 325 TABLET ORAL at 21:07

## 2019-08-11 RX ADMIN — SENNOSIDES AND DOCUSATE SODIUM 1 TABLET: 8.6; 5 TABLET ORAL at 08:49

## 2019-08-11 RX ADMIN — POLYETHYLENE GLYCOL 3350 17 G: 17 POWDER, FOR SOLUTION ORAL at 08:46

## 2019-08-11 RX ADMIN — ENOXAPARIN SODIUM 40 MG: 40 INJECTION SUBCUTANEOUS at 08:46

## 2019-08-11 RX ADMIN — FLUTICASONE PROPIONATE 2 SPRAY: 50 SPRAY, METERED NASAL at 08:50

## 2019-08-11 RX ADMIN — OXYCODONE HYDROCHLORIDE 2.5 MG: 5 TABLET ORAL at 21:08

## 2019-08-11 RX ADMIN — LIDOCAINE 2 PATCH: 50 PATCH CUTANEOUS at 08:50

## 2019-08-11 RX ADMIN — ASPIRIN 81 MG 81 MG: 81 TABLET ORAL at 08:49

## 2019-08-11 RX ADMIN — AMLODIPINE BESYLATE 5 MG: 5 TABLET ORAL at 08:48

## 2019-08-11 RX ADMIN — LEVOTHYROXINE SODIUM 100 MCG: 100 TABLET ORAL at 05:52

## 2019-08-11 RX ADMIN — ATORVASTATIN CALCIUM 40 MG: 40 TABLET, FILM COATED ORAL at 17:37

## 2019-08-11 NOTE — PLAN OF CARE
Problem: Potential for Falls  Goal: Patient will remain free of falls  Description  INTERVENTIONS:  - Assess patient frequently for physical needs  -  Identify cognitive and physical deficits and behaviors that affect risk of falls    -  [place] fall precautions as indicated by assessment   - Educate patient/family on patient safety including physical limitations  - Instruct patient to call for assistance with activity based on assessment  - Modify environment to reduce risk of injury  - Consider OT/PT consult to assist with strengthening/mobility  Outcome: Progressing     Problem: Prexisting or High Potential for Compromised Skin Integrity  Goal: Skin integrity is maintained or improved  Description  INTERVENTIONS:  - Identify patients at risk for skin breakdown  - Assess and monitor skin integrity  - Assess and monitor nutrition and hydration status  - Monitor labs (i e  albumin)  - Assess for incontinence   - Turn and reposition patient  - Assist with mobility/ambulation  - Relieve pressure over bony prominences  - Avoid friction and shearing  - Provide appropriate hygiene as needed including keeping skin clean and dry  - Evaluate need for skin moisturizer/barrier cream  - Collaborate with interdisciplinary team (i e  Nutrition, Rehabilitation, etc )   - Patient/family teaching  Outcome: Progressing     Problem: PAIN - ADULT  Goal: Verbalizes/displays adequate comfort level or baseline comfort level  Description  Interventions:  - Encourage patient to monitor pain and request assistance  - Assess pain using appropriate pain scale  - Administer analgesics based on type and severity of pain and evaluate response  - Implement non-pharmacological measures as appropriate and evaluate response  - Notify physician/advanced practitioner if interventions unsuccessful or patient reports new pain   Outcome: Progressing     Problem: SAFETY ADULT  Goal: Maintain or return to baseline ADL function  Description  INTERVENTIONS:  -  Assess patient's ability to carry out ADLs; assess patient's baseline for ADL function and identify physical deficits which impact ability to perform ADLs (bathing, care of mouth/teeth, toileting, grooming, dressing, etc )  - Assess/evaluate cause of self-care deficits   - Assess range of motion  - Assess patient's mobility; develop plan if impaired  - Assess patient's need for assistive devices and provide as appropriate  - Encourage maximum independence but intervene and supervise when necessary  ¯ Involve family in performance of ADLs  ¯ Assess for home care needs following discharge   ¯ Request OT consult to assist with ADL evaluation and planning for discharge  ¯ Provide patient education as appropriate  Outcome: Progressing  Goal: Maintain or return mobility status to optimal level  Description  INTERVENTIONS:  - Assess patient's baseline mobility status (ambulation, transfers, stairs, etc )    - Identify cognitive and physical deficits and behaviors that affect mobility  - Identify mobility aids required to assist with transfers and/or ambulation (gait belt, sit-to-stand, lift, walker, cane, etc )  - [place] fall precautions as indicated by assessment  - Record patient progress and toleration of activity level on Mobility SBAR; progress patient to next Phase/Stage  - Instruct patient to call for assistance with activity based on assessment  - Request Rehabilitation consult to assist with strengthening/weightbearing, etc   Outcome: Progressing     Problem: DISCHARGE PLANNING  Goal: Discharge to home or other facility with appropriate resources  Description  INTERVENTIONS:  - Identify barriers to discharge w/patient and caregiver  - Arrange for needed discharge resources and transportation as appropriate  - Identify discharge learning needs (meds, wound care, etc )  - Refer to Case Management Department for coordinating discharge planning if the patient needs post-hospital services based on physician/advanced practitioner order or complex needs related to functional status, cognitive ability, or social support system   Outcome: Progressing     Problem: Knowledge Deficit  Goal: Patient/family/caregiver demonstrates understanding of disease process, treatment plan, medications, and discharge instructions  Description  Complete learning assessment and assess knowledge base    Interventions:  - Provide teaching at level of understanding  - Provide teaching via preferred learning methods  Outcome: Progressing     Problem: INFECTION - ADULT  Goal: Absence or prevention of progression during hospitalization  Description  INTERVENTIONS:  - Assess and monitor for signs and symptoms of infection  - Monitor lab/diagnostic results  - Monitor all insertion sites, i e  indwelling lines, tubes, and drains  - [place] appropriate cooling/warming therapies per order  - Administer medications as ordered  - Instruct and encourage patient and family to use good hand hygiene technique  - Identify and instruct in appropriate isolation precautions for identified infection/condition   Outcome: Progressing     Problem: MUSCULOSKELETAL - ADULT  Goal: Maintain or return mobility to safest level of function  Description  INTERVENTIONS:  - Assess patient's ability to carry out ADLs; assess patient's baseline for ADL function and identify physical deficits which impact ability to perform ADLs (bathing, care of mouth/teeth, toileting, grooming, dressing, etc )  - Assess/evaluate cause of self-care deficits   - Assess range of motion  - Assess patient's mobility; develop plan if impaired  - Assess patient's need for assistive devices and provide as appropriate  - Encourage maximum independence but intervene and supervise when necessary  - Involve family in performance of ADLs  - Assess for home care needs following discharge   - Request OT consult to assist with ADL evaluation and planning for discharge  - Provide patient education as appropriate  Outcome: Progressing     Problem: Nutrition/Hydration-ADULT  Goal: Nutrient/Hydration intake appropriate for improving, restoring or maintaining nutritional needs  Description  Monitor and assess patient's nutrition/hydration status for malnutrition (ex- brittle hair, bruises, dry skin, pale skin and conjunctiva, muscle wasting, smooth red tongue, and disorientation)  Collaborate with interdisciplinary team and initiate plan and interventions as ordered  Monitor patient's weight and dietary intake as ordered or per policy  Utilize nutrition screening tool and intervene per policy  Determine patient's food preferences and provide high-protein, high-caloric foods as appropriate       INTERVENTIONS:  - Monitor oral intake, urinary output, labs, and treatment plans  - Assess nutrition and hydration status and recommend course of action  - Evaluate amount of meals eaten  - Assist patient with eating if necessary   - Allow adequate time for meals  - Recommend/ encourage appropriate diets, oral nutritional supplements, and vitamin/mineral supplements  - Order, calculate, and assess calorie counts as needed  - Recommend, monitor, and adjust tube feedings and TPN/PPN based on assessed needs  - Assess need for intravenous fluids  - Provide specific nutrition/hydration education as appropriate  - Include patient/family/caregiver in decisions related to nutrition  Outcome: Progressing

## 2019-08-11 NOTE — ASSESSMENT & PLAN NOTE
Pneumonia with loculated pleural effusion with pleuritic pain  Chest tube in place receiving tPA/Dornase for 3 days  Thoracic surgery input noted  Continue IV antibiotics  Analgesics, lidocaine patch  Supportive care

## 2019-08-12 ENCOUNTER — APPOINTMENT (INPATIENT)
Dept: RADIOLOGY | Facility: HOSPITAL | Age: 84
DRG: 193 | End: 2019-08-12
Payer: MEDICARE

## 2019-08-12 PROBLEM — K22.0 ACHALASIA OF ESOPHAGUS: Status: ACTIVE | Noted: 2019-08-12

## 2019-08-12 LAB
GLUCOSE SERPL-MCNC: 124 MG/DL (ref 65–140)
GLUCOSE SERPL-MCNC: 84 MG/DL (ref 65–140)
GLUCOSE SERPL-MCNC: 88 MG/DL (ref 65–140)
GLUCOSE SERPL-MCNC: 88 MG/DL (ref 65–140)

## 2019-08-12 PROCEDURE — 99231 SBSQ HOSP IP/OBS SF/LOW 25: CPT | Performed by: THORACIC SURGERY (CARDIOTHORACIC VASCULAR SURGERY)

## 2019-08-12 PROCEDURE — 82948 REAGENT STRIP/BLOOD GLUCOSE: CPT

## 2019-08-12 PROCEDURE — 99232 SBSQ HOSP IP/OBS MODERATE 35: CPT | Performed by: INTERNAL MEDICINE

## 2019-08-12 PROCEDURE — 71250 CT THORAX DX C-: CPT

## 2019-08-12 RX ADMIN — MEMANTINE 10 MG: 10 TABLET ORAL at 16:33

## 2019-08-12 RX ADMIN — ENOXAPARIN SODIUM 40 MG: 40 INJECTION SUBCUTANEOUS at 08:41

## 2019-08-12 RX ADMIN — POLYETHYLENE GLYCOL 3350 17 G: 17 POWDER, FOR SOLUTION ORAL at 08:42

## 2019-08-12 RX ADMIN — OXYCODONE HYDROCHLORIDE 2.5 MG: 5 TABLET ORAL at 08:44

## 2019-08-12 RX ADMIN — MEMANTINE 10 MG: 10 TABLET ORAL at 08:44

## 2019-08-12 RX ADMIN — OXYCODONE HYDROCHLORIDE 5 MG: 5 TABLET ORAL at 03:07

## 2019-08-12 RX ADMIN — ASPIRIN 81 MG 81 MG: 81 TABLET ORAL at 08:44

## 2019-08-12 RX ADMIN — MONTELUKAST SODIUM 10 MG: 10 TABLET, FILM COATED ORAL at 22:21

## 2019-08-12 RX ADMIN — LEVOTHYROXINE SODIUM 100 MCG: 100 TABLET ORAL at 05:13

## 2019-08-12 RX ADMIN — DONEPEZIL HYDROCHLORIDE 10 MG: 10 TABLET ORAL at 22:22

## 2019-08-12 RX ADMIN — ATORVASTATIN CALCIUM 40 MG: 40 TABLET, FILM COATED ORAL at 16:33

## 2019-08-12 RX ADMIN — PANTOPRAZOLE SODIUM 40 MG: 40 TABLET, DELAYED RELEASE ORAL at 05:13

## 2019-08-12 RX ADMIN — LIDOCAINE 2 PATCH: 50 PATCH CUTANEOUS at 08:42

## 2019-08-12 RX ADMIN — ERTAPENEM SODIUM 1000 MG: 1 INJECTION, POWDER, LYOPHILIZED, FOR SOLUTION INTRAMUSCULAR; INTRAVENOUS at 08:47

## 2019-08-12 RX ADMIN — ACETAMINOPHEN 975 MG: 325 TABLET ORAL at 05:13

## 2019-08-12 RX ADMIN — SENNOSIDES AND DOCUSATE SODIUM 1 TABLET: 8.6; 5 TABLET ORAL at 08:44

## 2019-08-12 RX ADMIN — SENNOSIDES AND DOCUSATE SODIUM 1 TABLET: 8.6; 5 TABLET ORAL at 16:33

## 2019-08-12 RX ADMIN — OXYCODONE HYDROCHLORIDE 2.5 MG: 5 TABLET ORAL at 22:22

## 2019-08-12 RX ADMIN — ACETAMINOPHEN 975 MG: 325 TABLET ORAL at 13:02

## 2019-08-12 RX ADMIN — ACETAMINOPHEN 975 MG: 325 TABLET ORAL at 22:21

## 2019-08-12 NOTE — PROGRESS NOTES
Progress Note - Infectious Disease   Rich Bernard 80 y o  female MRN: [id number]  Unit/Bed#: -01 Encounter: [id number]      Impression/Plan:  1  Community-acquired pneumonia with pleural effusions   Possibly with lung abscess   Patient presents at this time with progressive weakness and shortness of breath at home along with leukocytosis on admission   Patient remains hemodynamically stable, procalcitonin normalized and leukocytosis down trending   Her respiratory status remains stable  Antibiotics changed due to worsening liver function tests while on ceftriaxone   Very limited antibiotic options as the patient is also allergic to penicillins  Continue ertapenem  Recheck CBC with diff and CMP  Supportive care     2  Complicated parapneumonic effusion-possible empyema based upon very low pH and very high LDH  The pleural cultures are negative however the patient had been on antibiotics for quite some time   Chest tube was removed as it was not in a appropriate location   Now status post new chest tube placement   -antibiotics as above  -tPA instillation  -thoracic follow-up of CT to see if patient needs VATS     3  Fever, persistent leukocytosis   No recurrence of the fever spike  The white cell count remains elevated    Unclear etiology   Possibly all related to the patient's complicated effusion  There are no other obvious sources for elevated white count on her exam  Speech evaluation without signs of aspiration   Procalcitonin level is normal    Recheck CBC with diff  If fever recurs check blood cultures x2 sets  Continue antibiotics as above  Additional workup as needed     4  Liver function test abnormalities-unclear etiology   The patient appears to be mildly symptomatic with notable nausea   Perhaps related to the ceftriaxone   Right upper quadrant ultrasound without source   Hepatitis panel is negative   Liver function test have modestly improved since stopping the ceftriaxone    -recheck liver function test  -change antibiotics as above  -additional workup as needed     5  Dementia   Patient seems to be at baseline cognition  Continue monitor mental status  Continue antibiotics as above  Additional care as per primary    Antibiotics:  Ertapenem 6  Antibiotics 17    Subjective:  Patient has no fever, chills, sweats; no nausea, vomiting, diarrhea; no cough, shortness of breath; no increased pain  No new symptoms  Objective:  Vitals:  Temp:  [97 5 °F (36 4 °C)-97 7 °F (36 5 °C)] 97 6 °F (36 4 °C)  HR:  [66-77] 66  Resp:  [16-17] 16  BP: ()/(46-49) 102/49  SpO2:  [97 %-99 %] 99 %  Temp (24hrs), Av 6 °F (36 4 °C), Min:97 5 °F (36 4 °C), Max:97 7 °F (36 5 °C)  Current: Temperature: 97 6 °F (36 4 °C)    Physical Exam:   General Appearance:  Alert, interactive, nontoxic, no acute distress  Throat: Oropharynx moist without lesions  Lungs:   Decreased breath sounds left greater than right; no wheezes, rhonchi or rales; respirations unlabored  Left-sided chest tube in place   Heart:  RRR; no murmur, rub or gallop   Abdomen:   Soft, non-tender, non-distended, positive bowel sounds  Extremities: No clubbing, cyanosis or edema   Skin: No new rashes or lesions  No draining wounds noted         Labs, Imaging, & Other studies:   All pertinent labs and imaging studies were personally reviewed  Results from last 7 days   Lab Units 08/10/19  0428 08/08/19  0637 19  0514   WBC Thousand/uL 16 51* 19 19* 17 61*   HEMOGLOBIN g/dL 10 5* 10 4* 10 9*   PLATELETS Thousands/uL 431* 400* 364     Results from last 7 days   Lab Units 08/10/19  0428 19  0515 19  0456   SODIUM mmol/L 132* 132* 134*   POTASSIUM mmol/L 4 8 4 3 5 1   CHLORIDE mmol/L 97* 100 99*   CO2 mmol/L 29 26 26   BUN mg/dL 33* 30* 26*   CREATININE mg/dL 0 81 0 72 0 80   EGFR ml/min/1 73sq m 65 75 66   CALCIUM mg/dL 8 6 8 5 8 9   AST U/L 76* 94* 137*   ALT U/L 126* 146* 185*   ALK PHOS U/L 145* 148* 170*     Results from last 7 days   Lab Units 08/07/19  1519   GRAM STAIN RESULT  1+ Polys  No bacteria seen   BODY FLUID CULTURE, STERILE  No growth     Results from last 7 days   Lab Units 08/07/19  0514   PROCALCITONIN ng/ml 0 10     CT chest-still with some pneumothorax and left basilar effusion    Images personally reviewed by me in PACS    Await formal radiology review

## 2019-08-12 NOTE — PROGRESS NOTES
Progress Note - Jacky Rothman 3/23/1931, 80 y o  female MRN: [id number]    Unit/Bed#: -01 Encounter: [id number]    Primary Care Provider: Faina Kiser MD   Date and time admitted to hospital: 7/26/2019 12:24 PM        * Community acquired pneumonia  Assessment & Plan  Continue IV antibiotics  Infectious Disease following  Supportive care      Loculated pleural effusion  Assessment & Plan  Pneumonia with loculated pleural effusion with pleuritic pain  Chest tube in place receiving tPA/Dornase for 3 days  CT chest noted reveals improved left lung aeration  Discussed with thoracic surgery  Continue IV antibiotics  Analgesics, lidocaine patch  Supportive care    Achalasia of esophagus  Assessment & Plan  Patient with poor p o  Intake  Will request GI inputs    Generalized weakness  Assessment & Plan  Deconditioning, ambulatory dysfunction  Safe ambulation fall precautions  Physical therapy    Dementia  Assessment & Plan  Continue Namenda and Aricept  Reorientation sleep hygiene      Essential hypertension  Assessment & Plan  Monitor blood pressures  Avoid hypotension    Acquired hypothyroidism  Assessment & Plan  Continue levothyroxine      VTE Pharmacologic Prophylaxis:   Pharmacologic: Enoxaparin (Lovenox)  Mechanical VTE Prophylaxis in Place: Yes    Patient Centered Rounds: I have performed bedside rounds with nursing staff today  Discussions with Specialists or Other Care Team Provider:  Thoracic surgery    Education and Discussions with Family / Patient:  Discussed with the patient, spouse at bedside updated, called and updated moriah Wilson over phone     Time Spent for Care: 30 minutes  More than 50% of total time spent on counseling and coordination of care as described above      Current Length of Stay: 16 day(s)    Current Patient Status: Inpatient   Certification Statement: The patient will continue to require additional inpatient hospital stay due to As mentioned    Discharge Plan: Awaiting clinical and symptomatic improvement    Code Status: Level 1 - Full Code      Subjective:     Comfortably sitting up in chair  Reports feeling better  Appetite were  Encourage p o  Intake as able  Encourage incentive spirometry  Encouraged out of bed and ambulation as able  Spouse at bedside updated    Objective:     Vitals:   Temp (24hrs), Av 6 °F (36 4 °C), Min:97 5 °F (36 4 °C), Max:97 7 °F (36 5 °C)    Temp:  [97 5 °F (36 4 °C)-97 7 °F (36 5 °C)] 97 6 °F (36 4 °C)  HR:  [66-77] 66  Resp:  [16-17] 16  BP: ()/(46-49) 102/49  SpO2:  [97 %-99 %] 99 %  Body mass index is 18 93 kg/m²  Input and Output Summary (last 24 hours):        Intake/Output Summary (Last 24 hours) at 2019 1340  Last data filed at 2019 1302  Gross per 24 hour   Intake 387 ml   Output 331 ml   Net 56 ml       Physical Exam:     Physical Exam    Comfortably sitting up in chair  Neck supple  Improved air entry to the left side  Chest tube in place  Heart sounds S1-S2 noted  Abdomen soft nontender  Awake alert obeys simple commands  Pulses noted  No pedal edema  No rash    Additional Data:     Labs:    Results from last 7 days   Lab Units 08/10/19  0428   WBC Thousand/uL 16 51*   HEMOGLOBIN g/dL 10 5*   HEMATOCRIT % 32 8*   PLATELETS Thousands/uL 431*   NEUTROS PCT % 84*   LYMPHS PCT % 8*   MONOS PCT % 6   EOS PCT % 1     Results from last 7 days   Lab Units 08/10/19  0428   SODIUM mmol/L 132*   POTASSIUM mmol/L 4 8   CHLORIDE mmol/L 97*   CO2 mmol/L 29   BUN mg/dL 33*   CREATININE mg/dL 0 81   ANION GAP mmol/L 6   CALCIUM mg/dL 8 6   ALBUMIN g/dL 1 7*   TOTAL BILIRUBIN mg/dL 0 26   ALK PHOS U/L 145*   ALT U/L 126*   AST U/L 76*   GLUCOSE RANDOM mg/dL 91         Results from last 7 days   Lab Units 19  1121 19  0734 198 19  1600 19  1101 19  0626 08/10/19  2054 08/10/19  1609 08/10/19  1056 08/10/19  0605 19  2131 19  1628   POC GLUCOSE mg/dl 88 84 149* 210* 160* 94 140 153* 114 101 131 166*         Results from last 7 days   Lab Units 08/07/19  0514   PROCALCITONIN ng/ml 0 10           * I Have Reviewed All Lab Data Listed Above  * Additional Pertinent Lab Tests Reviewed:  All Labs Within Last 24 Hours Reviewed    Imaging:    Imaging Reports Reviewed Today Include:  CT chest  Imaging Personally Reviewed by Myself Includes:  CT chest personally reviewed - improved aeration left lung    Recent Cultures (last 7 days):     Results from last 7 days   Lab Units 08/07/19  1519   GRAM STAIN RESULT  1+ Polys  No bacteria seen   BODY FLUID CULTURE, STERILE  No growth       Last 24 Hours Medication List:     Current Facility-Administered Medications:  acetaminophen 975 mg Oral Q8H [address] Monique Russ MD    amLODIPine 5 mg Oral Daily Chucky Kam MD    aspirin 81 mg Oral Daily Chucky aKm MD    atorvastatin 40 mg Oral QPM Chucky Kam MD    benzonatate 100 mg Oral TID PRN Chucky Kam MD    bisacodyl 10 mg Rectal Daily PRN LEE Garcia    donepezil 10 mg Oral HS Chucky Kam MD    enoxaparin 40 mg Subcutaneous Daily Chucky Kam MD    ertapenem 1,000 mg Intravenous Q24H Marry Lynn MD Last Rate: Stopped (08/12/19 1000)   fluticasone 2 spray Nasal Daily Chucky Kam MD    HYDROmorphone 0 2 mg Intravenous Once Monique Russ MD    HYDROmorphone 0 2 mg Intravenous Q4H PRN Monique Russ MD    lactulose 30 g Oral Once LEE Garcia    levothyroxine 100 mcg Oral Early Morning Chucky Kam MD    lidocaine 2 patch Topical Daily Monique Russ MD    losartan 50 mg Oral Daily Chucky Kam MD    memantine 10 mg Oral BID Chucky Kam MD    metoprolol succinate 50 mg Oral Daily Chucky Kam MD    montelukast 10 mg Oral HS Chucky Kam MD    oxyCODONE 2 5 mg Oral Q12H Kelsi Carmichael MD    oxyCODONE 5 mg Oral Q4H PRN Monique Russ MD    pantoprazole 40 mg Oral Daily Before Breakfast Bowen Quiroga MD    polyethylene glycol 17 g Oral Daily Josesito Fisher MD    senna-docusate sodium 1 tablet Oral BID Josesito Fisher MD    zolpidem 5 mg Oral HS PRN Bowen Quiroga MD         Today, Patient Was Seen By: Josesito Fisher MD    ** Please Note: Dictation voice to text software may have been used in the creation of this document   **

## 2019-08-12 NOTE — PROGRESS NOTES
Progress Note - Thoracic Surgery   Jacky Rothman 80 y o  female MRN: [id number]  Unit/Bed#: -01 Encounter: [id number]    Assessment:  51-year-old female with history of dementia who developed a left lower lobe community-acquired pneumonia complicated by a parapneumonic effusion versus this empyema  This was conservatively managed with left-sided pigtail placement in tPA x3 doses  Plan:  - her left pleural space is almost completely evacuated  Pleural fluid cultures showed no bacteria  And most importantly she is asymptomatic from a respiratory standpoint with stable vital signs on room air  Her left lower lobe still has some consolidation and 5-10 percent entrapment  Given her dementia and underlying frailty I think operative decortication for this minimal residual disease would be excessive   - recommend removing her left-sided pigtail in checking chest x-ray afterwards  - continue antibiotics per Infectious Disease  - I discussed the above plan with the patient's  and [address] [place] [address] Internal Medicine team    Subjective/Objective     Chief Complaint:   Chief Complaint   Patient presents with    Shortness of Breath     Patient comes to the E R  with report of wheezing and Rhonchi  Patient resides at [facility], went to the wellness center today  While at the wellness center, patient reportedly had chest pain wheezing and rhonchi  Patient sent to us for rule out pneumonia  Subjective:  No major events overnight  Patient currently resting comfortably   denies any shortness of breath with the patient  Objective:     Vitals: Blood pressure (!) 102/49, pulse 66, temperature 97 6 °F (36 4 °C), resp  rate 16, height 5' 5" (1 651 m), weight 51 6 kg (113 lb 12 1 oz), SpO2 99 %  ,Body mass index is 18 93 kg/m²  I/O       08/10 0701 - 08/11 0700 08/11 0701 - 08/12 0700 08/12 0701 - 08/13 0700    P  O  360 960 337    IV Piggyback   50    Total Intake(mL/kg) 360 (7) 960 (18 6) 387 (7 5)    Urine (mL/kg/hr) 997 (0 8) 164 (0 1) 224 (0 6)    Stool  0     Chest Tube 505 0 10    Total Output 1502 164 234    Net -1142 +796 +153           Unmeasured Urine Occurrence  2 x     Unmeasured Stool Occurrence  4 x           Physical Exam: General appearance:  Patient resting comfortably in chair  Head: Normocephalic, without obvious abnormality, atraumatic  Eyes: conjunctivae/corneas clear  PERRL, EOM's intact  Fundi benign  Neck: no adenopathy, no carotid bruit, no JVD and supple, symmetrical, trachea midline  Lungs:  Normal work of breathing on room air  Mild left-sided basilar crackles  Left pigtail in place to suction  No air leak  Minimal output in last 24 hours  Incisions: clean,dry, intact  No evidence of infection  Heart: regular rate and rhythm, S1, S2 normal, no murmur, click, rub or gallop  Abdomen: soft, non-tender; bowel sounds normal; no masses,  no organomegaly  Extremities: extremities normal, atraumatic, no cyanosis or edema  Skin: Skin color, texture, turgor normal  No rashes or lesions     I personally reviewed her CT imaging in PACs  She has a left-sided pigtail in place  Minimal trapped left lower lobe  Minimal amount of left basilar fluid  Continued left lower lobe pulmonary parenchymal consolidation  No other significant findings  This is significantly improved overall since her tPA       Lab, Imaging and other studies:  I reviewed the patient's lab studies  White blood cell count from 8/1016 5  Microbiology shows no bacteria in her left pleural fluid

## 2019-08-12 NOTE — CONSULTS
Consultation - [facility]) [facility]  Kamar Baumann 80 y o  female MRN: [id number]  Unit/Bed#: -01 Encounter: [id number]        Consults    Reason for Consult / Principal Problem:     Dilated esophagus      ASSESSMENT AND PLAN:      42-year-old with history of dementia, hyperlipidemia, hypertension, coronary artery disease who is admitted 16 days ago with community-acquired pneumonia with loculated pleural effusion status post chest tube placement, currently on IV ertapenem  GI consulted for evaluation of dilated esophagus on CT scan, concern for achalasia  1    Dysphagia:  Patient has dilated esophagus on CT scan with and has been having problem with swallowing solids  She has normal oropharyngeal swallow and does not report symptoms consistent with transfer dysphagia  We will investigate her symptoms and findings on the imaging with EGD  Our concern is that she might have pseudo achalasia from underlying malignancy of the esophagus /stomach  I will keep her NPO after midnight in anticipation of EGD in the morning after discussion with attending     -  NPO after midnight  -  Will discuss with attending about EGD in the morning  Bebo Llamas MD  Gastroenterology Fellow  [address] [place] [address]  Date: August 12, 2019      ______________________________________________________________________    HPI:   42-year-old with history of dementia, hyperlipidemia, hypertension, coronary artery disease who is admitted 16 days ago with community-acquired pneumonia with loculated pleural effusion status post chest tube placement, currently on IV ertapenem  GI consulted for evaluation of dilated esophagus on CT scan, concern for achalasia  Patient says that for the last few weeks she has had problems swallowing solids and sometimes food gets stuck in her chest   She has no problems initiating the swallow and does not have any cough or choking while swallowing    She denies any regurgitation of food after swallowing  No heartburn, nausea, vomiting  She denies any history of swallowing problems and was taking a regular diet until few weeks ago when she was admitted to hospital for current hospitalization  speech pathology saw the patient earlier and determined that she had a normal oropharyngeal swallow but placed the patient on soft diet per patient preference since the patient had been having difficulty with swallowing of solids  On review of records CT scan of the chest done today and last week showed dilated esophagus with concern for achalasia  I talked to patient's son, Gerhardt Gear, who told me that patient was eating regular diet until 2 weeks ago she is having low appetite and losing weight since her current hospitalization  Gerhardt Gear also denied that patient had any prior history of swallowing problems or achalasia  I shared with patient's son that we might need to do EGD to investigate the dilated esophagus but given the patient's limited functional and cognitive status we would not be able to intervene much more  Patient's son understood  And agreed with plan  REVIEW OF SYSTEMS:    CONSTITUTIONAL: Denies any fever, chills, rigors, and weight loss  HEENT: No earache or tinnitus  Denies hearing loss or visual disturbances  CARDIOVASCULAR: No chest pain or palpitations  RESPIRATORY: Denies any cough, hemoptysis, shortness of breath or dyspnea on exertion  GASTROINTESTINAL: As noted in the History of Present Illness  GENITOURINARY: No problems with urination  Denies any hematuria or dysuria  NEUROLOGIC: No dizziness or vertigo, denies headaches  MUSCULOSKELETAL: Denies any muscle or joint pain  SKIN: Denies skin rashes or itching  ENDOCRINE: Denies excessive thirst  Denies intolerance to heat or cold  PSYCHOSOCIAL: Denies depression or anxiety  Denies any recent memory loss         Historical Information   Past Medical History:   Diagnosis Date    Disease of thyroid gland     GERD (gastroesophageal reflux disease)     Hyperlipidemia     Hypertension     Insomnia     MI (myocardial infarction) ([address] )      Past Surgical History:   Procedure Laterality Date    APPENDECTOMY      COLONOSCOPY      [id number]  LAST ASSESSED    CT GUIDED CHEST TUBE  8/7/2019    IR CHEST TUBE  8/2/2019    IR CHEST TUBE  8/6/2019     Social History   Social History     Substance and Sexual Activity   Alcohol Use Yes     Social History     Substance and Sexual Activity   Drug Use No     Social History     Tobacco Use   Smoking Status Never Smoker   Smokeless Tobacco Never Used     Family History   Problem Relation Age of Onset    No Known Problems Father     Heart disease Family        Meds/Allergies     Medications Prior to Admission   Medication    amLODIPine (NORVASC) 5 mg tablet    aspirin 81 mg chewable tablet    atorvastatin (LIPITOR) 40 mg tablet    benzonatate (TESSALON PERLES) 100 mg capsule    cholecalciferol (VITAMIN D3) 1,000 units tablet    cyanocobalamin (VITAMIN B-12) 1,000 mcg tablet    donepezil (ARICEPT) 10 mg tablet    fluticasone (FLONASE) 50 mcg/act nasal spray    levothyroxine 100 mcg tablet    losartan (COZAAR) 50 mg tablet    memantine (NAMENDA) 10 mg tablet    metoprolol succinate (TOPROL-XL) 50 mg 24 hr tablet    montelukast (SINGULAIR) 10 mg tablet    omeprazole (PriLOSEC) 20 mg delayed release capsule    zolpidem (AMBIEN) 5 mg tablet     Current Facility-Administered Medications   Medication Dose Route Frequency    acetaminophen (TYLENOL) tablet 975 mg  975 mg Oral Q8H [facility]    amLODIPine (NORVASC) tablet 5 mg  5 mg Oral Daily    aspirin chewable tablet 81 mg  81 mg Oral Daily    atorvastatin (LIPITOR) tablet 40 mg  40 mg Oral QPM    benzonatate (TESSALON PERLES) capsule 100 mg  100 mg Oral TID PRN    bisacodyl (DULCOLAX) rectal suppository 10 mg  10 mg Rectal Daily PRN    donepezil (ARICEPT) tablet 10 mg  10 mg Oral HS    enoxaparin (LOVENOX) subcutaneous injection 40 mg  40 mg Subcutaneous Daily    ertapenem (INVanz) 1,000 mg in sodium chloride 0 9 % 50 mL IVPB  1,000 mg Intravenous Q24H    fluticasone (FLONASE) 50 mcg/act nasal spray 2 spray  2 spray Nasal Daily    HYDROmorphone (DILAUDID) injection 0 2 mg  0 2 mg Intravenous Once    HYDROmorphone (DILAUDID) injection 0 2 mg  0 2 mg Intravenous Q4H PRN    lactulose 20 g/30 mL oral solution 30 g  30 g Oral Once    levothyroxine tablet 100 mcg  100 mcg Oral Early Morning    lidocaine (LIDODERM) 5 % patch 2 patch  2 patch Topical Daily    losartan (COZAAR) tablet 50 mg  50 mg Oral Daily    memantine (NAMENDA) tablet 10 mg  10 mg Oral BID    metoprolol succinate (TOPROL-XL) 24 hr tablet 50 mg  50 mg Oral Daily    montelukast (SINGULAIR) tablet 10 mg  10 mg Oral HS    oxyCODONE (ROXICODONE) IR tablet 2 5 mg  2 5 mg Oral Q12H NEERU    oxyCODONE (ROXICODONE) IR tablet 5 mg  5 mg Oral Q4H PRN    pantoprazole (PROTONIX) EC tablet 40 mg  40 mg Oral Daily Before Breakfast    polyethylene glycol (MIRALAX) packet 17 g  17 g Oral Daily    senna-docusate sodium (SENOKOT S) 8 6-50 mg per tablet 1 tablet  1 tablet Oral BID    zolpidem (AMBIEN) tablet 5 mg  5 mg Oral HS PRN       Allergies   Allergen Reactions    Alendronate      Other reaction(s): tooth decay    Diphenhydramine Hyperactivity    Penicillins Other (See Comments)     Reaction not listed; however, has tolerated Ceftriaxone and Cefepime which have different side chains  Objective     Blood pressure (!) 104/48, pulse 68, temperature 97 9 °F (36 6 °C), resp  rate 18, height 5' 5" (1 651 m), weight 47 8 kg (105 lb 6 1 oz), SpO2 98 %  Body mass index is 17 54 kg/m²        Intake/Output Summary (Last 24 hours) at 8/12/2019 0905  Last data filed at 8/12/2019 1639  Gross per 24 hour   Intake 505 ml   Output 336 ml   Net 169 ml         PHYSICAL EXAM:      General Appearance:   Alert, cooperative, no distress, cachectic   HEENT:   Normocephalic, atraumatic, anicteric      Neck:  Supple, symmetrical, trachea midline   Lungs:   Clear to auscultation bilaterally; no rales, rhonchi or wheezing; respirations unlabored    Heart[de-identified]   Regular rate and rhythm; no murmur, rub, or gallop  Abdomen:   Soft, non-tender, non-distended; normal bowel sounds; no masses, no organomegaly    Genitalia:   Deferred    Rectal:   Deferred    Extremities:  No cyanosis, clubbing or edema    Pulses:  2+ and symmetric all extremities    Skin:  No jaundice, rashes, or lesions    Lymph nodes:  No palpable cervical lymphadenopathy        Lab Results:   No results displayed because visit has over 200 results  Imaging Studies: I have personally reviewed pertinent imaging studies

## 2019-08-12 NOTE — SOCIAL WORK
CM followed up with [facility]  CM reported that pt is not medically stable for d/c at this time  CM will follow up with providers for tentative date  CM spoke with [facility] provider  Pt ordered to have chest tube removed today  Pt was not eating well, increase intake  GI following  Pt current with IV abx  Didi Done is following

## 2019-08-12 NOTE — ASSESSMENT & PLAN NOTE
Pneumonia with loculated pleural effusion with pleuritic pain  Chest tube in place receiving tPA/Dornase for 3 days  CT chest noted reveals improved left lung aeration  Discussed with thoracic surgery  Continue IV antibiotics  Analgesics, lidocaine patch  Supportive care

## 2019-08-12 NOTE — RESTORATIVE TECHNICIAN NOTE
Restorative Specialist Mobility Note       Activity: Ambulate in dee, Ambulate in room, Bathroom privileges, Chair, Dangle, Stand at bedside(Educated/encouraged pt to ambulate with assistance 3-4 x's/day  Chair alarm on   Pt callbell, phone/tray within reach )     Assistive Device: Front wheel walker, Other (Comment)(Chest tube hooked up to wall suction )       Eddie Pino BS, Restorative Technician, [employer]

## 2019-08-12 NOTE — PLAN OF CARE
Problem: Potential for Falls  Goal: Patient will remain free of falls  Description  INTERVENTIONS:  - Assess patient frequently for physical needs  -  Identify cognitive and physical deficits and behaviors that affect risk of falls    -  [place] fall precautions as indicated by assessment   - Educate patient/family on patient safety including physical limitations  - Instruct patient to call for assistance with activity based on assessment  - Modify environment to reduce risk of injury  - Consider OT/PT consult to assist with strengthening/mobility  Outcome: Progressing     Problem: Prexisting or High Potential for Compromised Skin Integrity  Goal: Skin integrity is maintained or improved  Description  INTERVENTIONS:  - Identify patients at risk for skin breakdown  - Assess and monitor skin integrity  - Assess and monitor nutrition and hydration status  - Monitor labs (i e  albumin)  - Assess for incontinence   - Turn and reposition patient  - Assist with mobility/ambulation  - Relieve pressure over bony prominences  - Avoid friction and shearing  - Provide appropriate hygiene as needed including keeping skin clean and dry  - Evaluate need for skin moisturizer/barrier cream  - Collaborate with interdisciplinary team (i e  Nutrition, Rehabilitation, etc )   - Patient/family teaching  Outcome: Progressing     Problem: PAIN - ADULT  Goal: Verbalizes/displays adequate comfort level or baseline comfort level  Description  Interventions:  - Encourage patient to monitor pain and request assistance  - Assess pain using appropriate pain scale  - Administer analgesics based on type and severity of pain and evaluate response  - Implement non-pharmacological measures as appropriate and evaluate response  - Notify physician/advanced practitioner if interventions unsuccessful or patient reports new pain   Outcome: Progressing     Problem: SAFETY ADULT  Goal: Maintain or return to baseline ADL function  Description  INTERVENTIONS:  -  Assess patient's ability to carry out ADLs; assess patient's baseline for ADL function and identify physical deficits which impact ability to perform ADLs (bathing, care of mouth/teeth, toileting, grooming, dressing, etc )  - Assess/evaluate cause of self-care deficits   - Assess range of motion  - Assess patient's mobility; develop plan if impaired  - Assess patient's need for assistive devices and provide as appropriate  - Encourage maximum independence but intervene and supervise when necessary  ¯ Involve family in performance of ADLs  ¯ Assess for home care needs following discharge   ¯ Request OT consult to assist with ADL evaluation and planning for discharge  ¯ Provide patient education as appropriate  Outcome: Progressing  Goal: Maintain or return mobility status to optimal level  Description  INTERVENTIONS:  - Assess patient's baseline mobility status (ambulation, transfers, stairs, etc )    - Identify cognitive and physical deficits and behaviors that affect mobility  - Identify mobility aids required to assist with transfers and/or ambulation (gait belt, sit-to-stand, lift, walker, cane, etc )  - [place] fall precautions as indicated by assessment  - Record patient progress and toleration of activity level on Mobility SBAR; progress patient to next Phase/Stage  - Instruct patient to call for assistance with activity based on assessment  - Request Rehabilitation consult to assist with strengthening/weightbearing, etc   Outcome: Progressing     Problem: DISCHARGE PLANNING  Goal: Discharge to home or other facility with appropriate resources  Description  INTERVENTIONS:  - Identify barriers to discharge w/patient and caregiver  - Arrange for needed discharge resources and transportation as appropriate  - Identify discharge learning needs (meds, wound care, etc )  - Refer to Case Management Department for coordinating discharge planning if the patient needs post-hospital services based on physician/advanced practitioner order or complex needs related to functional status, cognitive ability, or social support system   Outcome: Progressing     Problem: Knowledge Deficit  Goal: Patient/family/caregiver demonstrates understanding of disease process, treatment plan, medications, and discharge instructions  Description  Complete learning assessment and assess knowledge base    Interventions:  - Provide teaching at level of understanding  - Provide teaching via preferred learning methods  Outcome: Progressing     Problem: INFECTION - ADULT  Goal: Absence or prevention of progression during hospitalization  Description  INTERVENTIONS:  - Assess and monitor for signs and symptoms of infection  - Monitor lab/diagnostic results  - Monitor all insertion sites, i e  indwelling lines, tubes, and drains  - [place] appropriate cooling/warming therapies per order  - Administer medications as ordered  - Instruct and encourage patient and family to use good hand hygiene technique  - Identify and instruct in appropriate isolation precautions for identified infection/condition   Outcome: Progressing     Problem: MUSCULOSKELETAL - ADULT  Goal: Maintain or return mobility to safest level of function  Description  INTERVENTIONS:  - Assess patient's ability to carry out ADLs; assess patient's baseline for ADL function and identify physical deficits which impact ability to perform ADLs (bathing, care of mouth/teeth, toileting, grooming, dressing, etc )  - Assess/evaluate cause of self-care deficits   - Assess range of motion  - Assess patient's mobility; develop plan if impaired  - Assess patient's need for assistive devices and provide as appropriate  - Encourage maximum independence but intervene and supervise when necessary  - Involve family in performance of ADLs  - Assess for home care needs following discharge   - Request OT consult to assist with ADL evaluation and planning for discharge  - Provide patient education as appropriate  Outcome: Progressing     Problem: Nutrition/Hydration-ADULT  Goal: Nutrient/Hydration intake appropriate for improving, restoring or maintaining nutritional needs  Description  Monitor and assess patient's nutrition/hydration status for malnutrition (ex- brittle hair, bruises, dry skin, pale skin and conjunctiva, muscle wasting, smooth red tongue, and disorientation)  Collaborate with interdisciplinary team and initiate plan and interventions as ordered  Monitor patient's weight and dietary intake as ordered or per policy  Utilize nutrition screening tool and intervene per policy  Determine patient's food preferences and provide high-protein, high-caloric foods as appropriate       INTERVENTIONS:  - Monitor oral intake, urinary output, labs, and treatment plans  - Assess nutrition and hydration status and recommend course of action  - Evaluate amount of meals eaten  - Assist patient with eating if necessary , ENCOURAGE,   - Allow adequate time for meals  - Recommend/ encourage appropriate diets, oral nutritional supplements, and vitamin/mineral supplements --> strawberry ensure  - Order, calculate, and assess calorie counts as needed  - Recommend, monitor, and adjust tube feedings and TPN/PPN based on assessed needs  - Assess need for intravenous fluids  - Provide specific nutrition/hydration education as appropriate  - Include patient/family/caregiver in decisions related to nutrition  Outcome: Progressing

## 2019-08-13 ENCOUNTER — ANESTHESIA EVENT (INPATIENT)
Dept: GASTROENTEROLOGY | Facility: HOSPITAL | Age: 84
DRG: 193 | End: 2019-08-13
Payer: MEDICARE

## 2019-08-13 ENCOUNTER — ANESTHESIA (INPATIENT)
Dept: GASTROENTEROLOGY | Facility: HOSPITAL | Age: 84
DRG: 193 | End: 2019-08-13
Payer: MEDICARE

## 2019-08-13 ENCOUNTER — APPOINTMENT (INPATIENT)
Dept: GASTROENTEROLOGY | Facility: HOSPITAL | Age: 84
DRG: 193 | End: 2019-08-13
Payer: MEDICARE

## 2019-08-13 LAB
ALBUMIN SERPL BCP-MCNC: 2 G/DL (ref 3.5–5)
ALP SERPL-CCNC: 158 U/L (ref 46–116)
ALT SERPL W P-5'-P-CCNC: 125 U/L (ref 12–78)
ANION GAP SERPL CALCULATED.3IONS-SCNC: 6 MMOL/L (ref 4–13)
AST SERPL W P-5'-P-CCNC: 102 U/L (ref 5–45)
BASOPHILS # BLD AUTO: 0.06 THOUSANDS/ΜL (ref 0–0.1)
BASOPHILS NFR BLD AUTO: 1 % (ref 0–1)
BILIRUB SERPL-MCNC: 0.24 MG/DL (ref 0.2–1)
BUN SERPL-MCNC: 28 MG/DL (ref 5–25)
CALCIUM SERPL-MCNC: 9 MG/DL (ref 8.3–10.1)
CHLORIDE SERPL-SCNC: 100 MMOL/L (ref 100–108)
CO2 SERPL-SCNC: 28 MMOL/L (ref 21–32)
CREAT SERPL-MCNC: 0.86 MG/DL (ref 0.6–1.3)
EOSINOPHIL # BLD AUTO: 0.19 THOUSAND/ΜL (ref 0–0.61)
EOSINOPHIL NFR BLD AUTO: 2 % (ref 0–6)
ERYTHROCYTE [DISTWIDTH] IN BLOOD BY AUTOMATED COUNT: 13 % (ref 11.6–15.1)
GFR SERPL CREATININE-BSD FRML MDRD: 61 ML/MIN/1.73SQ M
GLUCOSE SERPL-MCNC: 142 MG/DL (ref 65–140)
GLUCOSE SERPL-MCNC: 69 MG/DL (ref 65–140)
GLUCOSE SERPL-MCNC: 77 MG/DL (ref 65–140)
GLUCOSE SERPL-MCNC: 88 MG/DL (ref 65–140)
GLUCOSE SERPL-MCNC: 93 MG/DL (ref 65–140)
HCT VFR BLD AUTO: 34.9 % (ref 34.8–46.1)
HGB BLD-MCNC: 10.9 G/DL (ref 11.5–15.4)
IMM GRANULOCYTES # BLD AUTO: 0.14 THOUSAND/UL (ref 0–0.2)
IMM GRANULOCYTES NFR BLD AUTO: 2 % (ref 0–2)
LYMPHOCYTES # BLD AUTO: 1.46 THOUSANDS/ΜL (ref 0.6–4.47)
LYMPHOCYTES NFR BLD AUTO: 17 % (ref 14–44)
MCH RBC QN AUTO: 30.6 PG (ref 26.8–34.3)
MCHC RBC AUTO-ENTMCNC: 31.2 G/DL (ref 31.4–37.4)
MCV RBC AUTO: 98 FL (ref 82–98)
MONOCYTES # BLD AUTO: 0.56 THOUSAND/ΜL (ref 0.17–1.22)
MONOCYTES NFR BLD AUTO: 6 % (ref 4–12)
NEUTROPHILS # BLD AUTO: 6.45 THOUSANDS/ΜL (ref 1.85–7.62)
NEUTS SEG NFR BLD AUTO: 72 % (ref 43–75)
NRBC BLD AUTO-RTO: 0 /100 WBCS
PLATELET # BLD AUTO: 435 THOUSANDS/UL (ref 149–390)
PMV BLD AUTO: 10 FL (ref 8.9–12.7)
POTASSIUM SERPL-SCNC: 4.6 MMOL/L (ref 3.5–5.3)
PROT SERPL-MCNC: 7.2 G/DL (ref 6.4–8.2)
RBC # BLD AUTO: 3.56 MILLION/UL (ref 3.81–5.12)
SODIUM SERPL-SCNC: 134 MMOL/L (ref 136–145)
WBC # BLD AUTO: 8.86 THOUSAND/UL (ref 4.31–10.16)

## 2019-08-13 PROCEDURE — 82948 REAGENT STRIP/BLOOD GLUCOSE: CPT

## 2019-08-13 PROCEDURE — 99232 SBSQ HOSP IP/OBS MODERATE 35: CPT | Performed by: INTERNAL MEDICINE

## 2019-08-13 PROCEDURE — 0DB48ZX EXCISION OF ESOPHAGOGASTRIC JUNCTION, VIA NATURAL OR ARTIFICIAL OPENING ENDOSCOPIC, DIAGNOSTIC: ICD-10-PCS | Performed by: INTERNAL MEDICINE

## 2019-08-13 PROCEDURE — 85025 COMPLETE CBC W/AUTO DIFF WBC: CPT | Performed by: INTERNAL MEDICINE

## 2019-08-13 PROCEDURE — 80053 COMPREHEN METABOLIC PANEL: CPT | Performed by: INTERNAL MEDICINE

## 2019-08-13 PROCEDURE — 43239 EGD BIOPSY SINGLE/MULTIPLE: CPT | Performed by: INTERNAL MEDICINE

## 2019-08-13 PROCEDURE — 99222 1ST HOSP IP/OBS MODERATE 55: CPT | Performed by: INTERNAL MEDICINE

## 2019-08-13 RX ORDER — PROPOFOL 10 MG/ML
INJECTION, EMULSION INTRAVENOUS AS NEEDED
Status: DISCONTINUED | OUTPATIENT
Start: 2019-08-13 | End: 2019-08-13 | Stop reason: SURG

## 2019-08-13 RX ORDER — SODIUM CHLORIDE 9 MG/ML
INJECTION, SOLUTION INTRAVENOUS CONTINUOUS PRN
Status: DISCONTINUED | OUTPATIENT
Start: 2019-08-13 | End: 2019-08-13 | Stop reason: SURG

## 2019-08-13 RX ORDER — LIDOCAINE HYDROCHLORIDE 10 MG/ML
INJECTION, SOLUTION INFILTRATION; PERINEURAL AS NEEDED
Status: DISCONTINUED | OUTPATIENT
Start: 2019-08-13 | End: 2019-08-13 | Stop reason: SURG

## 2019-08-13 RX ADMIN — DONEPEZIL HYDROCHLORIDE 10 MG: 10 TABLET ORAL at 21:35

## 2019-08-13 RX ADMIN — SODIUM CHLORIDE: 0.9 INJECTION, SOLUTION INTRAVENOUS at 13:06

## 2019-08-13 RX ADMIN — ASPIRIN 81 MG 81 MG: 81 TABLET ORAL at 08:39

## 2019-08-13 RX ADMIN — ATORVASTATIN CALCIUM 40 MG: 40 TABLET, FILM COATED ORAL at 17:32

## 2019-08-13 RX ADMIN — MEMANTINE 10 MG: 10 TABLET ORAL at 17:32

## 2019-08-13 RX ADMIN — ACETAMINOPHEN 975 MG: 325 TABLET ORAL at 15:48

## 2019-08-13 RX ADMIN — OXYCODONE HYDROCHLORIDE 2.5 MG: 5 TABLET ORAL at 07:25

## 2019-08-13 RX ADMIN — MONTELUKAST SODIUM 10 MG: 10 TABLET, FILM COATED ORAL at 21:35

## 2019-08-13 RX ADMIN — LIDOCAINE HYDROCHLORIDE 40 MG: 10 INJECTION, SOLUTION INFILTRATION; PERINEURAL at 13:25

## 2019-08-13 RX ADMIN — LEVOTHYROXINE SODIUM 100 MCG: 100 TABLET ORAL at 05:25

## 2019-08-13 RX ADMIN — PROPOFOL 20 MG: 10 INJECTION, EMULSION INTRAVENOUS at 13:27

## 2019-08-13 RX ADMIN — MEMANTINE 10 MG: 10 TABLET ORAL at 08:42

## 2019-08-13 RX ADMIN — OXYCODONE HYDROCHLORIDE 2.5 MG: 5 TABLET ORAL at 21:35

## 2019-08-13 RX ADMIN — FLUTICASONE PROPIONATE 2 SPRAY: 50 SPRAY, METERED NASAL at 10:32

## 2019-08-13 RX ADMIN — SENNOSIDES AND DOCUSATE SODIUM 1 TABLET: 8.6; 5 TABLET ORAL at 08:39

## 2019-08-13 RX ADMIN — LOSARTAN POTASSIUM 50 MG: 50 TABLET, FILM COATED ORAL at 08:39

## 2019-08-13 RX ADMIN — ACETAMINOPHEN 975 MG: 325 TABLET ORAL at 21:34

## 2019-08-13 RX ADMIN — METOPROLOL SUCCINATE 50 MG: 50 TABLET, EXTENDED RELEASE ORAL at 08:39

## 2019-08-13 RX ADMIN — ERTAPENEM SODIUM 1000 MG: 1 INJECTION, POWDER, LYOPHILIZED, FOR SOLUTION INTRAMUSCULAR; INTRAVENOUS at 09:57

## 2019-08-13 RX ADMIN — LIDOCAINE 2 PATCH: 50 PATCH CUTANEOUS at 08:40

## 2019-08-13 RX ADMIN — PANTOPRAZOLE SODIUM 40 MG: 40 TABLET, DELAYED RELEASE ORAL at 05:25

## 2019-08-13 RX ADMIN — PROPOFOL 20 MG: 10 INJECTION, EMULSION INTRAVENOUS at 13:33

## 2019-08-13 RX ADMIN — ACETAMINOPHEN 975 MG: 325 TABLET ORAL at 05:25

## 2019-08-13 RX ADMIN — ENOXAPARIN SODIUM 40 MG: 40 INJECTION SUBCUTANEOUS at 08:39

## 2019-08-13 RX ADMIN — PROPOFOL 20 MG: 10 INJECTION, EMULSION INTRAVENOUS at 13:30

## 2019-08-13 RX ADMIN — AMLODIPINE BESYLATE 5 MG: 5 TABLET ORAL at 08:39

## 2019-08-13 RX ADMIN — PROPOFOL 40 MG: 10 INJECTION, EMULSION INTRAVENOUS at 13:25

## 2019-08-13 NOTE — ANESTHESIA POSTPROCEDURE EVALUATION
Post-Op Assessment Note    CV Status:  Stable  Pain Score: 0    Pain management: adequate     Mental Status:  Alert   Hydration Status:  Euvolemic   PONV Controlled:  Controlled   Airway Patency:  Patent   Post Op Vitals Reviewed: Yes      Staff: CRNA           BP   128/67   Temp      Pulse     Resp   22   SpO2   95

## 2019-08-13 NOTE — PHYSICAL THERAPY NOTE
Physical Therapy Cancellation Note    Pt chart reviewed  Pt currently off floor at GI lab  PT to continue to follow and see pt as able      Monique Yee, PT

## 2019-08-13 NOTE — PROGRESS NOTES
Progress Note - Levis Eisenmenger 3/23/1931, 80 y o  female MRN: [id number]    Unit/Bed#: -01 Encounter: [id number]    Primary Care Provider: Hiren Aguilera MD   Date and time admitted to hospital: 2019 12:24 PM        * Community acquired pneumonia  Assessment & Plan  Continue IV antibiotics  Infectious Disease following  Supportive care      Achalasia of esophagus  Assessment & Plan  Patient with poor p o  Intake  Will request GI inputs    Loculated pleural effusion  Assessment & Plan  Pneumonia with loculated pleural effusion with pleuritic pain  Chest tube in place receiving tPA/Dornase for 3 days  CT chest noted reveals improved left lung aeration  Discussed with thoracic surgery  Continue IV antibiotics  Analgesics, lidocaine patch  Supportive care    Generalized weakness  Assessment & Plan  Deconditioning, ambulatory dysfunction  Safe ambulation fall precautions  Physical therapy    Dementia  Assessment & Plan  Continue Namenda and Aricept  Reorientation sleep hygiene      Essential hypertension  Assessment & Plan  Monitor blood pressures  Avoid hypotension    Acquired hypothyroidism  Assessment & Plan  Continue levothyroxine      VTE Pharmacologic Prophylaxis:   Pharmacologic: Enoxaparin (Lovenox)  Mechanical VTE Prophylaxis in Place: No    Patient Centered Rounds: I have performed bedside rounds with nursing staff today  Time Spent for Care: 15 minutes  More than 50% of total time spent on counseling and coordination of care as described above      Current Length of Stay: 17 day(s)    Current Patient Status: Inpatient   Certification Statement: The patient will continue to require additional inpatient hospital stay due to Need to monitor symptoms      Code Status: Level 1 - Full Code      Subjective:   No acute distress    Objective:     Vitals:   Temp (24hrs), Av 8 °F (36 6 °C), Min:97 7 °F (36 5 °C), Max:97 9 °F (36 6 °C)    Temp:  [97 7 °F (36 5 °C)-97 9 °F (36 6 °C)] 97 8 °F (36 6 °C)  HR:  [68-83] 83  Resp:  [17-18] 17  BP: (104-134)/(48-68) 134/68  SpO2:  [97 %-98 %] 97 %  Body mass index is 17 54 kg/m²  Input and Output Summary (last 24 hours): Intake/Output Summary (Last 24 hours) at 8/13/2019 1056  Last data filed at 8/13/2019 1014  Gross per 24 hour   Intake 355 ml   Output 697 ml   Net -342 ml       Physical Exam:     Physical Exam   Constitutional: She is oriented to person, place, and time  HENT:   Head: Normocephalic and atraumatic  Cardiovascular: Normal rate  Pulmonary/Chest: Effort normal  No stridor  No respiratory distress  Abdominal: Soft  Bowel sounds are normal  She exhibits no distension  There is no tenderness  Musculoskeletal: Normal range of motion  Neurological: She is alert and oriented to person, place, and time  No cranial nerve deficit  Skin: Skin is warm and dry         Additional Data:     Labs:    Results from last 7 days   Lab Units 08/13/19  0527   WBC Thousand/uL 8 86   HEMOGLOBIN g/dL 10 9*   HEMATOCRIT % 34 9   PLATELETS Thousands/uL 435*   NEUTROS PCT % 72   LYMPHS PCT % 17   MONOS PCT % 6   EOS PCT % 2     Results from last 7 days   Lab Units 08/13/19  0526   POTASSIUM mmol/L 4 6   CHLORIDE mmol/L 100   CO2 mmol/L 28   BUN mg/dL 28*   CREATININE mg/dL 0 86   CALCIUM mg/dL 9 0   ALK PHOS U/L 158*   ALT U/L 125*   AST U/L 102*             Recent Cultures (last 7 days):     Results from last 7 days   Lab Units 08/07/19  1519   GRAM STAIN RESULT  1+ Polys  No bacteria seen   BODY FLUID CULTURE, STERILE  No growth       Last 24 Hours Medication List:     Current Facility-Administered Medications:  acetaminophen 975 mg Oral Q8H 900 Raman Palacio MD    amLODIPine 5 mg Oral Daily Hattie Rocha MD    aspirin 81 mg Oral Daily Hattie Rocha MD    atorvastatin 40 mg Oral QPM Hattie Rocha MD    benzonatate 100 mg Oral TID PRN Hattie Rocha MD    bisacodyl 10 mg Rectal Daily PRN Buckner Duane, CRNP donepezil 10 mg Oral HS Oliva Nguyen MD    enoxaparin 40 mg Subcutaneous Daily Oliva Nguyen MD    ertapenem 1,000 mg Intravenous Q24H Marcos Euceda MD Last Rate: 1,000 mg (08/13/19 0984)   fluticasone 2 spray Nasal Daily Oliva Nguyen MD    HYDROmorphone 0 2 mg Intravenous Once Samantha Peoples MD    HYDROmorphone 0 2 mg Intravenous Q4H PRN Samantha Peoples MD    lactulose 30 g Oral Once LEE Hunt    levothyroxine 100 mcg Oral Early Morning Oliva Nguyen MD    lidocaine 2 patch Topical Daily Samantha Peoples MD    losartan 50 mg Oral Daily Oliva Nguyen MD    memantine 10 mg Oral BID Oliva Nguyen MD    metoprolol succinate 50 mg Oral Daily Oliva Nguyen MD    montelukast 10 mg Oral HS Oliva Nguyen MD    oxyCODONE 2 5 mg Oral Q12H Janece Herrera, MD    oxyCODONE 5 mg Oral Q4H PRN Samantha Peoples MD    pantoprazole 40 mg Oral Daily Before Breakfast Oliva Nguyen MD    polyethylene glycol 17 g Oral Daily Samantha Peoples MD    senna-docusate sodium 1 tablet Oral BID Samantha Peoples MD    zolpidem 5 mg Oral HS PRN Oliva Nguyen MD         Today, Patient Was Seen By: Jensen De Leon DO    ** Please Note: Dictation voice to text software may have been used in the creation of this document   **

## 2019-08-13 NOTE — OCCUPATIONAL THERAPY NOTE
Occupational Therapy Cancel Note    Chart reviewed, attempt see pt however currently off floor at GI LAB and not available for OT tx at this time  Continue to follow pt as able, pt will need re-evaluation next session 2* goals per initial eval have        Filipe Johnson

## 2019-08-13 NOTE — ANESTHESIA PREPROCEDURE EVALUATION
Review of Systems/Medical History  Patient summary reviewed  Chart reviewed  No history of anesthetic complications     Cardiovascular  EKG reviewed, Exercise tolerance (METS): <4,  Hyperlipidemia, Hypertension , Past MI , CAD ,    Pulmonary  Pneumonia, Pleural effusion ,        GI/Hepatic    GERD ,             Endo/Other  History of thyroid disease ,      GYN       Hematology   Musculoskeletal       Neurology      Comment: Dementia Psychology       ECHO 2017: SUMMARY     LEFT VENTRICLE:  Size was normal   Systolic function was normal  Ejection fraction was estimated to be 60 %  Wall thickness was normal   Left ventricular diastolic function parameters were normal      RIGHT VENTRICLE:  The size was normal   Systolic function was normal      MITRAL VALVE:  There was trace to mild regurgitation      AORTIC VALVE:  There was trace regurgitation      COMPARISONS:  There has been no significant interval change  Comparison was made with the previous study of 12-Sep-2014        Physical Exam    Airway    Mallampati score: III  TM Distance: >3 FB  Neck ROM: full     Dental       Cardiovascular  Rhythm: regular, Rate: normal, Cardiovascular exam normal    Pulmonary  Pulmonary exam normal Breath sounds clear to auscultation,     Other Findings  Multiple cracked teeth  AAOx2  Anesthesia Plan  ASA Score- 3     Anesthesia Type- IV sedation with anesthesia with ASA Monitors  Additional Monitors:   Airway Plan:         Plan Factors- Instructed to abstain from smoking on day of procedure: N/A  Georgette Ormond Patient smoked on day of surgery: N/A  Georgette Ormond Induction- intravenous  Postoperative Plan-     Informed Consent- Anesthetic plan and risks discussed with patient, son and healthcare power of   I personally reviewed this patient with the CRNA  Discussed and agreed on the Anesthesia Plan with the CRNA Georgette Ormond

## 2019-08-13 NOTE — NURSING NOTE
Pt transferred down from NW7  Pt resting in bed comfortably with no complaints of pain  Chest tube to suction  Bed alarm on  Will continue to monitor pt closely

## 2019-08-14 LAB
GLUCOSE SERPL-MCNC: 120 MG/DL (ref 65–140)
GLUCOSE SERPL-MCNC: 139 MG/DL (ref 65–140)
GLUCOSE SERPL-MCNC: 91 MG/DL (ref 65–140)
GLUCOSE SERPL-MCNC: 96 MG/DL (ref 65–140)

## 2019-08-14 PROCEDURE — 82948 REAGENT STRIP/BLOOD GLUCOSE: CPT

## 2019-08-14 PROCEDURE — 97168 OT RE-EVAL EST PLAN CARE: CPT

## 2019-08-14 PROCEDURE — 99233 SBSQ HOSP IP/OBS HIGH 50: CPT | Performed by: INTERNAL MEDICINE

## 2019-08-14 PROCEDURE — 99232 SBSQ HOSP IP/OBS MODERATE 35: CPT | Performed by: INTERNAL MEDICINE

## 2019-08-14 PROCEDURE — G8988 SELF CARE GOAL STATUS: HCPCS

## 2019-08-14 PROCEDURE — G8987 SELF CARE CURRENT STATUS: HCPCS

## 2019-08-14 PROCEDURE — 97530 THERAPEUTIC ACTIVITIES: CPT

## 2019-08-14 RX ADMIN — ASPIRIN 81 MG 81 MG: 81 TABLET ORAL at 08:29

## 2019-08-14 RX ADMIN — MEMANTINE 10 MG: 10 TABLET ORAL at 18:28

## 2019-08-14 RX ADMIN — OXYCODONE HYDROCHLORIDE 2.5 MG: 5 TABLET ORAL at 08:28

## 2019-08-14 RX ADMIN — DONEPEZIL HYDROCHLORIDE 10 MG: 10 TABLET ORAL at 21:03

## 2019-08-14 RX ADMIN — ERTAPENEM SODIUM 1000 MG: 1 INJECTION, POWDER, LYOPHILIZED, FOR SOLUTION INTRAMUSCULAR; INTRAVENOUS at 12:08

## 2019-08-14 RX ADMIN — MONTELUKAST SODIUM 10 MG: 10 TABLET, FILM COATED ORAL at 21:03

## 2019-08-14 RX ADMIN — LOSARTAN POTASSIUM 50 MG: 50 TABLET, FILM COATED ORAL at 08:29

## 2019-08-14 RX ADMIN — PANTOPRAZOLE SODIUM 40 MG: 40 TABLET, DELAYED RELEASE ORAL at 08:46

## 2019-08-14 RX ADMIN — OXYCODONE HYDROCHLORIDE 2.5 MG: 5 TABLET ORAL at 21:04

## 2019-08-14 RX ADMIN — METOPROLOL SUCCINATE 50 MG: 50 TABLET, EXTENDED RELEASE ORAL at 08:29

## 2019-08-14 RX ADMIN — ATORVASTATIN CALCIUM 40 MG: 40 TABLET, FILM COATED ORAL at 18:28

## 2019-08-14 RX ADMIN — AMLODIPINE BESYLATE 5 MG: 5 TABLET ORAL at 08:29

## 2019-08-14 RX ADMIN — MEMANTINE 10 MG: 10 TABLET ORAL at 08:29

## 2019-08-14 RX ADMIN — LEVOTHYROXINE SODIUM 100 MCG: 100 TABLET ORAL at 05:11

## 2019-08-14 RX ADMIN — ENOXAPARIN SODIUM 40 MG: 40 INJECTION SUBCUTANEOUS at 08:37

## 2019-08-14 RX ADMIN — SENNOSIDES AND DOCUSATE SODIUM 1 TABLET: 8.6; 5 TABLET ORAL at 18:28

## 2019-08-14 RX ADMIN — ACETAMINOPHEN 975 MG: 325 TABLET ORAL at 21:03

## 2019-08-14 RX ADMIN — FLUTICASONE PROPIONATE 2 SPRAY: 50 SPRAY, METERED NASAL at 08:36

## 2019-08-14 RX ADMIN — OXYCODONE HYDROCHLORIDE 5 MG: 5 TABLET ORAL at 12:38

## 2019-08-14 RX ADMIN — ACETAMINOPHEN 975 MG: 325 TABLET ORAL at 05:10

## 2019-08-14 RX ADMIN — LIDOCAINE 2 PATCH: 50 PATCH CUTANEOUS at 08:29

## 2019-08-14 NOTE — PROGRESS NOTES
Progress Note - Infectious Disease   Gia Adler 80 y o  female MRN: [id number]  Unit/Bed#: -01 Encounter: [id number]      Impression/Plan:  1  Community-acquired pneumonia with pleural effusions   Possibly with lung abscess   Patient presents at this time with progressive weakness and shortness of breath at home along with leukocytosis on admission   Patient remains hemodynamically stable, procalcitonin normalized and leukocytosis down trending   Her respiratory status remains stable  Antibiotics changed due to worsening liver function tests while on ceftriaxone   Very limited antibiotic options as the patient is also allergic to penicillins  Continue ertapenem through 8/16/2019 to complete 3 weeks total treatment  may need to change the antibiotics if the acute encephalopathy does not improve as ertapenem is a known toxicity of the ertapenem  Monitor CBC with diff and CMP  Supportive care     2  Complicated parapneumonic effusion-possible empyema based upon very low pH and very high LDH  The pleural cultures are negative however the patient had been on antibiotics for quite some time   Chest tube was removed as it was not in a appropriate location   Now status post new chest tube placement  Repeat CT of the chest is improved with minimal fluid remaining  -antibiotics as above  -discontinue chest tube as per thoracic  -recheck chest x-ray     3  Fever, persistent leukocytosis   No recurrence of the fever spike  The white cell count remains elevated    Unclear etiology   Possibly all related to the patient's complicated effusion  There are no other obvious sources for elevated white count on her exam  Speech evaluation without signs of aspiration   Procalcitonin level is normal    Monitor CBC with diff  If fever recurs check blood cultures x2 sets  Continue antibiotics as above  Additional workup as needed     4   Liver function test abnormalities-unclear etiology   The patient appears to be mildly symptomatic with notable nausea   Perhaps related to the ceftriaxone   Right upper quadrant ultrasound without source   Hepatitis panel is negative   Liver function test have modestly improved since stopping the ceftriaxone but remain quite abnormal   -recheck  liver function test  -GI re-evaluation  -additional workup as needed     5  Acute encephalopathy-in the setting of Dementia   Now seems a bit more somnolent confused  Continue monitor mental status  Continue antibiotics as above  May need to change antibiotics  Additional care as per primary    Discussed the above antibiotic plan with Dr Rick Matthews    Antibiotics:  Ertapenem 8  Antibiotics 19    Subjective:  Patient has no fever, chills, sweats; no nausea, vomiting, diarrhea; no cough, shortness of breath; no pain  No new symptoms  Objective:  Vitals:  Temp:  [97 7 °F (36 5 °C)-98 1 °F (36 7 °C)] 97 8 °F (36 6 °C)  HR:  [63-75] 63  Resp:  [16-18] 18  BP: ()/(52-63) 128/59  SpO2:  [94 %-100 %] 94 %  Temp (24hrs), Av 9 °F (36 6 °C), Min:97 7 °F (36 5 °C), Max:98 1 °F (36 7 °C)  Current: Temperature: 97 8 °F (36 6 °C)    Physical Exam:   General Appearance:  Alert, interactive, nontoxic, no acute distress  Throat: Oropharynx moist without lesions  Lungs:   Decreased breath sounds bilaterally; no wheezes, rhonchi or rales; respirations unlabored  Left-sided chest tube in place   Heart:  RRR; no murmur, rub or gallop   Abdomen:   Soft, non-tender, non-distended, positive bowel sounds  Extremities: No clubbing, cyanosis or edema   Skin: No new rashes or lesions  No draining wounds noted         Labs, Imaging, & Other studies:   All pertinent labs and imaging studies were personally reviewed  Results from last 7 days   Lab Units 19  0527 08/10/19  0428 19  0637   WBC Thousand/uL 8 86 16 51* 19 19*   HEMOGLOBIN g/dL 10 9* 10 5* 10 4*   PLATELETS Thousands/uL 435* 431* 400*     Results from last 7 days   Lab Units 19  0526 08/10/19  0428 08/09/19  0515   SODIUM mmol/L 134* 132* 132*   POTASSIUM mmol/L 4 6 4 8 4 3   CHLORIDE mmol/L 100 97* 100   CO2 mmol/L 28 29 26   BUN mg/dL 28* 33* 30*   CREATININE mg/dL 0 86 0 81 0 72   EGFR ml/min/1 73sq m 61 65 75   CALCIUM mg/dL 9 0 8 6 8 5   AST U/L 102* 76* 94*   ALT U/L 125* 126* 146*   ALK PHOS U/L 158* 145* 148*     Results from last 7 days   Lab Units 08/07/19  1519   GRAM STAIN RESULT  1+ Polys  No bacteria seen   BODY FLUID CULTURE, STERILE  No growth

## 2019-08-14 NOTE — UTILIZATION REVIEW
Continued Stay Review    Date: 8/14/19                         Current Patient Class: INPT Current Level of Care: MED-SURG    HPI:88 y o  female initially admitted observation on [email or web address] upgraded to [email or web address] on [email or web address]  Assessment/Plan: 79 yo female admitted observation upgraded to Inpt d/t  Community-acquired pneumonia with pleural effusions   Possibly with lung abscess   Patient presents at this time with progressive weakness and shortness of breath at home along with leukocytosis on admission   Patient remains hemodynamically stable, procalcitonin normalized and leukocytosis down trending   Her respiratory status remains stable  Antibiotics changed due to worsening liver function tests while on ceftriaxone   Very limited antibiotic options as the patient is also allergic to penicillins  Continue ertapenem through 8/16/2019 to complete 3 weeks total treatment  may need to change the antibiotics if the acute encephalopathy does not improve as ertapenem is a known toxicity of the ertapenem  Monitor CBC with diff and CMPSupportive care  Complicated parapneumonic effusion-possible empyema based upon very low pH and very high LDH   The pleural cultures are negative however the patient had been on antibiotics for quite some time   Chest tube was removed as it was not in a appropriate location   Now status post new chest tube placement   Repeat CT of the chest is improved with minimal fluid remaining  -antibiotics as above  -discontinue chest tube as per thoracic  -recheck chest x-ray  Fever, persistent leukocytosis   No recurrence of the fever spike  The white cell count remains elevated    Unclear etiology   Possibly all related to the patient's complicated effusion  There are no other obvious sources for elevated white count on her exam  Speech evaluation without signs of aspiration   Procalcitonin level is normal    Monitor CBC with diff  If fever recurs check blood cultures x2 sets  Continue antibiotics as above  Additional workup as needed    Liver function test abnormalities-unclear etiology   The patient appears to be mildly symptomatic with notable nausea   Perhaps related to the ceftriaxone   Right upper quadrant ultrasound without source   Hepatitis panel is negative   Liver function test have modestly improved since stopping the ceftriaxone but remain quite abnormal   -recheck Elliot Prey function test  -GI re-evaluation  -additional workup as needed   Acute encephalopathy-in the setting of Dementia   Now seems a bit more somnolent confused    Continue monitor mental status  Continue antibiotics as above  May need to change antibiotics  Additional care as per primary       Pertinent Labs/Diagnostic Results:   Results from last 7 days   Lab Units 08/13/19  0527 08/10/19  0428 08/08/19  0637   WBC Thousand/uL 8 86 16 51* 19 19*   HEMOGLOBIN g/dL 10 9* 10 5* 10 4*   HEMATOCRIT % 34 9 32 8* 31 9*   PLATELETS Thousands/uL 435* 431* 400*   NEUTROS ABS Thousands/µL 6 45 13 76*  --          Results from last 7 days   Lab Units 08/13/19  0526 08/10/19  0428 08/09/19  0515 08/08/19  0456   SODIUM mmol/L 134* 132* 132* 134*   POTASSIUM mmol/L 4 6 4 8 4 3 5 1   CHLORIDE mmol/L 100 97* 100 99*   CO2 mmol/L 28 29 26 26   ANION GAP mmol/L 6 6 6 9   BUN mg/dL 28* 33* 30* 26*   CREATININE mg/dL 0 86 0 81 0 72 0 80   EGFR ml/min/1 73sq m 61 65 75 66   CALCIUM mg/dL 9 0 8 6 8 5 8 9     Results from last 7 days   Lab Units 08/13/19  0526 08/10/19  0428 08/09/19  0515 08/08/19  0456   AST U/L 102* 76* 94* 137*   ALT U/L 125* 126* 146* 185*   ALK PHOS U/L 158* 145* 148* 170*   TOTAL PROTEIN g/dL 7 2 6 6 6 5 7 2   ALBUMIN g/dL 2 0* 1 7* 1 6* 1 9*   TOTAL BILIRUBIN mg/dL 0 24 0 26 0 28 0 36     Results from last 7 days   Lab Units 08/14/19  1047 08/14/19  0636 08/13/19  2055 08/13/19  1607 08/13/19  1050 08/13/19  0901 08/12/19  2113 08/12/19  1617 08/12/19  1121 08/12/19  0734   POC GLUCOSE mg/dl 139 91 142* 77 88 93 88 124 88 84 Results from last 7 days   Lab Units 08/13/19  0526 08/10/19  0428 08/09/19  0515 08/08/19  0456   GLUCOSE RANDOM mg/dL 69 91 100 131       Results from last 7 days   Lab Units 08/07/19  1519   GRAM STAIN RESULT  1+ Polys  No bacteria seen   BODY FLUID CULTURE, STERILE  No growth     8/12 ct chest:1   Status post left basilar chest tube placement, diminished fluid component of the hydropneumothorax with the air component relatively stable  Fluid tracking into the major fissure has also improved as has basilar aeration  2   Improving ground glass opacities throughout the right upper and middle lobes, likely infectious or inflammatory  Similar changes in the right lower lobe relatively stable  3   Fluid and debris filled prominent esophagus concerning for achalasia  4   Stable cardiomegaly and trace pericardial effusion      8/9 cxr:Interval placement of left basilar drainage catheter  Trace left basilar gas may be in the chest wall or pleural space      Small to moderate left pleural effusion slightly decreased      Left lower lobe alveolar consolidation unchanged      8/7 ct guided chest tube: Successful CT guided left chest tube placement      Plan:     Connect the chest tube to suction     Strict I/O     Instillation of tPA/Donase as needed to facilitate drainage of the loculated effusion      8/6 U/s RUQ:  No evidence of cholelithiasis, cholecystitis or biliary obstruction  2   No visualized hepatic lesion  8/6 IR chest tube: Increased loculation of left pleural fluid  Tube placement deferred in order to obtain further imaging      Plan:      Noncontrast CT of the chest for planning, return to IR for tube placement tomorrow    8/6 ct chest:1  Complex left pleural collection, with pleural thickening, and hydropneumothorax  Small loculated component in the posterior medial hemithorax  2    Patchy opacities in the right upper and lower lobes are not significantly changed       8/5 cxr:Diminished left pleural fluid collection  Left chest tube removed  No visible pneumothorax on either side  Streaky infiltrates right upper lobe and medial right lung base  Atelectasis left lower lung field  8/4 cxr:   Right upper lung zone and left mid to lower lung zone airspace opacities are unchanged      Unchanged moderate left pleural effusion with chest tube in place  No pneumothorax    8/2 cxr:Significant interval improvement in the left hydropneumothorax      8/2 IR chest tube: Successful CT-guided left chest tube placement  8/2 cxr:1  Left hydropneumothorax  2   Right upper lung airspace disease  8/1 CT chest:1   Predominantly right-sided groundglass and patchy densities concerning for multilobar infiltrates  Additionally, there are left greater than right pleural effusions with prominent loculation on the left and likely associated compressive atelectasis  Recommend follow-up to resolution  2   Small pericardial effusion noted      7/16 cxr:   Left mid to lower lung consolidation and moderate pleural effusion      7/26 ekg:Age and gender specific ECG analysis   Normal sinus rhythm  Normal ECG  When compared with ECG of 03-OCT-2017 11:18,  Premature supraventricular complexes are no longer Present    Vital Signs:   08/14/19 0721  97 8 °F (36 6 °C)  63  18  128/59    94 %  None (Room air)  Lying   08/13/19 2309  97 9 °F (36 6 °C)  75  18  98/52    96 %    Lying   08/13/19 2000              None (Room air)     08/13/19 1500  98 1 °F (36 7 °C)  69  18  119/55    98 %       08/13/19 1435  97 9 °F (36 6 °C)  70  18  115/63    96 %  None (Room air)  Lying   08/13/19 1412    70  18  115/59    97 %  None (Room air)     08/13/19 1358    75  18  115/56    99 %  None (Room air)     08/13/19 1343    68  16  100/52    100 %  None (Room air)     08/13/19 1151  97 7 °F (36 5 °C)  70  18  123/56    97 %  None (Room air)     08/13/19 0716  97 8 °F (36 6 °C)  83  17  134/68    97 %  None (Room air)  Lying   08/12/19 21:57:45  97 7 °F (36 5 °C)  77  18  114/52  73  98 %       08/12/19 15:07:59  97 9 °F (36 6 °C)  68  18  104/48Abnormal   67  98 %       08/12/19 0800              None (Room air)     08/12/19 07:56:15  97 6 °F (36 4 °C)  66  16  102/49Abnormal   67  99 %             Medications:   Scheduled Meds:   Current Facility-Administered Medications:  acetaminophen 975 mg Oral Q8H [facility]     amLODIPine 5 mg Oral Daily     aspirin 81 mg Oral Daily     atorvastatin 40 mg Oral QPM     benzonatate 100 mg Oral TID PRN     bisacodyl 10 mg Rectal Daily PRN     donepezil 10 mg Oral HS     enoxaparin 40 mg Subcutaneous Daily     ertapenem 1,000 mg Intravenous Q24H     fluticasone 2 spray Nasal Daily     HYDROmorphone 0 2 mg Intravenous Once     HYDROmorphone 0 2 mg Intravenous Q4H PRN     lactulose 30 g Oral Once     levothyroxine 100 mcg Oral Early Morning     lidocaine 2 patch Topical Daily     losartan 50 mg Oral Daily     memantine 10 mg Oral BID     metoprolol succinate 50 mg Oral Daily     montelukast 10 mg Oral HS     oxyCODONE 2 5 mg Oral Q12H [facility]     oxyCODONE 5 mg Oral Q4H PRN     pantoprazole 40 mg Oral Daily Before Breakfast     polyethylene glycol 17 g Oral Daily     senna-docusate sodium 1 tablet Oral BID     zolpidem 5 mg Oral HS PRN         Discharge Plan: D    Network Utilization Review Department  Phone: [phone]; Fax [phone]  [email or web address]  ATTENTION: Please call with any questions or concerns to [phone]  and carefully listen to the prompts so that you are directed to the right person  Send all requests for admission clinical reviews, approved or denied determinations and any other requests to fax [phone]   All voicemails are confidential

## 2019-08-14 NOTE — QUICK NOTE
Chart reviewed  Pt admitted with community acquired pneumonia  Original chest tube placed 8/2/19 for left pneumothorax and removed 8/5/19  Second chest tube placed 8/7/19 for left pleural effusion  CT chest performed on 8/12/19 revealed diminished fluid  Chest tube removed 8/14/19  Pt tolerated well  Due to minimal fluid and debride still present, patient will need continued monitoring of respiratory status  Discussed with patient and  there is always a chance chest tube may need to be replaced      Vale Taylor PA-C

## 2019-08-14 NOTE — ASSESSMENT & PLAN NOTE
Pneumonia with loculated pleural effusion with pleuritic pain status post chest tube  Placement    Will discuss with thoracic plans regarding chest tube removal   To consider IR consult for removal   Continue IV antibiotics  Analgesics, lidocaine patch  Supportive care

## 2019-08-14 NOTE — PLAN OF CARE
Problem: PHYSICAL THERAPY ADULT  Goal: Performs mobility at highest level of function for planned discharge setting  See evaluation for individualized goals  Description  Treatment/Interventions: Functional transfer training, LE strengthening/ROM, Therapeutic exercise, Endurance training, Patient/family training, Equipment eval/education, Bed mobility, Gait training, Spoke to nursing, Family, Cognitive reorientation  Equipment Recommended: Manuel Quintero       See flowsheet documentation for full assessment, interventions and recommendations  Note:   Prognosis: Fair  Problem List: Decreased strength, Decreased mobility, Decreased endurance, Impaired balance, Decreased coordination, Decreased cognition, Impaired judgement, Decreased safety awareness, Pain  Assessment: Pt seen for treatment for setup, bed mob, time spent EOB, transfers, standing trials, repositioning  Pt cooperative but does not feel well  continues to need A of 2 for all mobility tasks  Note B knee buckling w/ same  remains appropriate for rehab at d/c        Recommendation: (recommend rehab at d/c)     PT - OK to Discharge: (S) Yes(when stable to rehab)    See flowsheet documentation for full assessment

## 2019-08-14 NOTE — PHYSICAL THERAPY NOTE
Physical Therapy Treatment Note       08/14/19 0915   Pain Assessment   Pain Assessment 0-10   Pain Score 8   Pain Type Acute pain;Chronic pain   Pain Location Back;Generalized   Patient's Stated Pain Goal No pain   Hospital Pain Intervention(s) Repositioned   Response to Interventions worse w/ mvmt, better at rest   Restrictions/Precautions   Weight Bearing Precautions Per Order No   Other Precautions Pain; Fall Risk;Cognitive; Chair Alarm; Bed Alarm;Multiple lines;Telemetry;O2   General   Chart Reviewed Yes   Family/Caregiver Present No   Cognition   Overall Cognitive Status Impaired   Arousal/Participation Lethargic;Responsive   Attention Difficulty attending to directions   Orientation Level Oriented to person   Memory Unable to assess   Following Commands Follows one step commands with increased time or repetition   Subjective   Subjective states she is tired, weak, nausated and in pain  willing to mobilize at least to use commode   Bed Mobility   Supine to Sit 3  Moderate assistance   Additional items Assist x 1   Transfers   Sit to Stand 3  Moderate assistance   Additional items Assist x 2   Stand to Sit 3  Moderate assistance   Additional items Assist x 1   Stand pivot 3  Moderate assistance   Additional items Assist x 2   Additional Comments sat EOB x few min  Pt then took few small pivot steps from bed to commode  Sat x 8 min to use  did 2 standing trials x 30-60 sec each to clean pt, then did few pivot steps from commode to chair    repositioned in chair   Balance   Static Sitting Fair   Dynamic Sitting Poor +   Static Standing Poor   Dynamic Standing Poor -   Ambulatory Poor -   Endurance Deficit   Endurance Deficit Yes   Endurance Deficit Description weakness, fatigue, pain   Activity Tolerance   Activity Tolerance Patient limited by fatigue;Patient limited by pain;Treatment limited secondary to medical complications (Comment)   Nurse Made Aware yes   Assessment   Prognosis Fair   Problem List Decreased strength;Decreased mobility; Decreased endurance; Impaired balance;Decreased coordination;Decreased cognition; Impaired judgement;Decreased safety awareness;Pain   Assessment Pt seen for treatment for setup, bed mob, time spent EOB, transfers, standing trials, repositioning  Pt cooperative but does not feel well  continues to need A of 2 for all mobility tasks  Note B knee buckling w/ same  remains appropriate for rehab at d/c   Goals   Patient Goals to feel better   STG Expiration Date 08/22/19   Treatment Day 6   Plan   Treatment/Interventions Functional transfer training;LE strengthening/ROM; Therapeutic exercise; Endurance training;Patient/family training;Equipment eval/education; Bed mobility;Gait training   Progress Slow progress, medical status limitations   PT Frequency   (3-5x/wk)   Recommendation   Recommendation   (recommend rehab at d/c)   Equipment Recommended Wheelchair;Walker   PT - OK to Discharge Yes  (when stable to rehab)   David Velasquez PT, DPT CSRS

## 2019-08-14 NOTE — PLAN OF CARE
Problem: OCCUPATIONAL THERAPY ADULT  Goal: Performs self-care activities at highest level of function for planned discharge setting  See evaluation for individualized goals  Description  Treatment Interventions: ADL retraining, Functional transfer training, UE strengthening/ROM, Endurance training, Patient/family training, Cognitive reorientation, Equipment evaluation/education, Compensatory technique education, Activityengagement, Energy conservation          See flowsheet documentation for full assessment, interventions and recommendations  8/14/2019 1349 by Raisa Herndon OT  Outcome: Progressing  Note:   Limitation: Decreased ADL status, Decreased UE strength, Decreased Safe judgement during ADL, Decreased cognition, Decreased UE ROM, Decreased endurance, Visual deficit, Decreased fine motor control, Decreased self-care trans, Decreased high-level ADLs, Mood limitation  Prognosis: Fair  Assessment: Pt has been demonstrating good participation and engagement with current OT program  Was limited this session by nausea and fatigue, reporting blurry vision and intermittent double vision supine in bed  Patient reports no pain  Upon last functional update on evaluation on 7/30/19, patient was requiring mod to max assist for overall ADLs and moderate assist for functional transfers/mobility  Patient was living with her spouse in an apt at [facility], and was completing adls and mobility independently with occassional assist from spouse and facility staff (mainly assisted with IADLS)  Pt has been demonstrating slow progress in their ability to participate in self-care, functional activities, balance/functional txf and mobility with increased independence  Pt currently requires min A for UB ADLs, max A for LB adls and toileting, and mod assist for functional transfers/mobility    Deficits to continue to address include: difficulty performing ADLS, decreased initiation and engagement  and health management  activity tolerance, endurance, standing balance/tolerance, sitting balance/tolerance, UE strength, memory, insight, safety, attention  and sequencing; limiting their ability to participate in self-care activities and return to their previously valued roles and occupations  Pt continues to benefit from skilled OT services to meet goals stated, increase pt safety and independence with self-care/home/community activities, UE management and strengthening, therapeutic exercises/functional activities, decrease burden of care, pt/family education and training, equipment assessment, resumption of meaningful roles/occupations, and to facilitate safe and appropriate discharge plan  OT Discharge Recommendation: Short Term Rehab  OT - OK to Discharge: Yes(to rehab when medically stable)    8/14/2019 1348 by Chichi Chery OT  Outcome: Progressing  Note:   Limitation: Decreased ADL status, Decreased UE strength, Decreased Safe judgement during ADL, Decreased cognition, Decreased UE ROM, Decreased endurance, Visual deficit, Decreased fine motor control, Decreased self-care trans, Decreased high-level ADLs, Mood limitation  Prognosis: Fair  Assessment: Pt has been demonstrating good participation and engagement with current OT program  Was limited this session by nausea and fatigue, reporting blurry vision and intermittent double vision supine in bed  Patient reports no pain  Upon last functional update on evaluation on 7/30/19, patient was requiring mod to max assist for overall ADLs and moderate assist for functional transfers/mobility  Patient was living with her spouse in an apt at [facility], and was completing adls and mobility independently with occassional assist from spouse and facility staff (mainly assisted with IADLS)   Pt has been demonstrating slow progress in their ability to participate in self-care, functional activities, balance/functional txf and mobility with increased independence  Pt currently requires min A for UB ADLs, max A for LB adls and toileting, and mod assist for functional transfers/mobility  Deficits to continue to address include: difficulty performing ADLS, decreased initiation and engagement  and health management  activity tolerance, endurance, standing balance/tolerance, sitting balance/tolerance, UE strength, memory, insight, safety, attention  and sequencing; limiting their ability to participate in self-care activities and return to their previously valued roles and occupations  Pt continues to benefit from skilled OT services to meet goals stated, increase pt safety and independence with self-care/home/community activities, UE management and strengthening, therapeutic exercises/functional activities, decrease burden of care, pt/family education and training, equipment assessment, resumption of meaningful roles/occupations, and to facilitate safe and appropriate discharge plan  OT Discharge Recommendation: Short Term Rehab  OT - OK to Discharge: Yes(to rehab when medically stable)    8/14/2019 1348 by Manpreet Samano OT  Note:   Limitation: Decreased ADL status, Decreased UE strength, Decreased Safe judgement during ADL, Decreased cognition, Decreased UE ROM, Decreased endurance, Visual deficit, Decreased fine motor control, Decreased self-care trans, Decreased high-level ADLs, Mood limitation  Prognosis: Fair  Assessment: Pt has been demonstrating good participation and engagement with current OT program  Was limited this session by nausea and fatigue, reporting blurry vision and intermittent double vision supine in bed  Patient reports no pain  Upon last functional update on evaluation on 7/30/19, patient was requiring mod to max assist for overall ADLs and moderate assist for functional transfers/mobility    Patient was living with her spouse in an apt at [facility], and was completing adls and mobility independently with occassional assist from spouse and facility staff (mainly assisted with IADLS)  Pt has been demonstrating slow progress in their ability to participate in self-care, functional activities, balance/functional txf and mobility with increased independence  Pt currently requires min A for UB ADLs, max A for LB adls and toileting, and mod assist for functional transfers/mobility  Deficits to continue to address include: difficulty performing ADLS, decreased initiation and engagement  and health management  activity tolerance, endurance, standing balance/tolerance, sitting balance/tolerance, UE strength, memory, insight, safety, attention  and sequencing; limiting their ability to participate in self-care activities and return to their previously valued roles and occupations  Pt continues to benefit from skilled OT services to meet goals stated, increase pt safety and independence with self-care/home/community activities, UE management and strengthening, therapeutic exercises/functional activities, decrease burden of care, pt/family education and training, equipment assessment, resumption of meaningful roles/occupations, and to facilitate safe and appropriate discharge plan       OT Discharge Recommendation: Short Term Rehab  OT - OK to Discharge: Yes(to rehab when medically stable)

## 2019-08-14 NOTE — ASSESSMENT & PLAN NOTE
S/p recent removal of anterior right  Thigh mass   - now with incision intact with staples no drainage, erythema noted

## 2019-08-14 NOTE — OCCUPATIONAL THERAPY NOTE
[address] Re-evaluation/Progress Note     Patient Name: Sarthak Iniguez  [id number] Date: 8/14/2019  Problem List  Patient Active Problem List   Diagnosis    Acquired hypothyroidism    Essential hypertension    CAD (coronary artery disease)    GERD (gastroesophageal reflux disease)    Chest pain    Weakness    Dementia    History of TIA (transient ischemic attack)    Twitching    Abnormal EKG    Gait disturbance    Cataracts, bilateral    Glaucoma    History of appendectomy    History of MI (myocardial infarction)    Macular degeneration of both eyes    Actinic keratosis    Post-nasal drip    Squamous cell carcinoma    Urinary leakage    Pneumonia, unspecified organism    Community acquired pneumonia    Generalized weakness    Metabolic encephalopathy    Slow transit constipation    Leukocytosis    Loculated pleural effusion    Achalasia of esophagus     Past Medical History  Past Medical History:   Diagnosis Date    Disease of thyroid gland     GERD (gastroesophageal reflux disease)     Hyperlipidemia     Hypertension     Insomnia     MI [facility]myocardial infarction) ([facility] [address] )      Past Surgical History  Past Surgical History:   Procedure Laterality Date    APPENDECTOMY      COLONOSCOPY      [id number]  LAST ASSESSED    CT GUIDED CHEST TUBE  8/7/2019    IR CHEST TUBE  8/2/2019    IR CHEST TUBE  8/6/2019 08/14/19 0916   Note Type   Note type Re-eval   Restrictions/Precautions   Weight Bearing Precautions Per Order No   Braces or Orthoses   (none)   Other Precautions Cognitive; Chair Alarm; Bed Alarm;Multiple lines;Telemetry;O2;Fall Risk;Pain;Hard of hearing;1 Chest Tube   Pain Assessment   Pain Assessment 0-10   Pain Score 8   Pain Type Acute pain;Chronic pain   Pain Location Back;Generalized   Pain Orientation Bilateral;Lower;Mid   Pain Descriptors Aching;Cramping   Pain Frequency Intermittent   Pain Onset Ongoing   Clinical Progression Not changed   Effect of Pain on Daily Activities limits activity   Patient's Stated Pain Goal No pain   Hospital Pain Intervention(s) Repositioned; Emotional support; Rest   Response to Interventions no change   Psychosocial   Psychosocial (WDL) WDL   Subjective   Subjective can i go to bed   ADL   Eating Assistance 5  Supervision/Setup   Grooming Assistance 4  Minimal Assistance   UB Bathing Assistance 4  Minimal Assistance   LB Bathing Assistance 2  Maximal Assistance   UB Dressing Assistance 4  Minimal Assistance   LB Dressing Assistance 2  Maximal [address]  2  Maximal Assistance   Bed Mobility   Supine to Sit 3  Moderate assistance   Additional items Assist x 1;HOB elevated;Trapeze bar; Increased time required;Verbal cues;LE management   Transfers   Sit to Stand 3  Moderate assistance   Additional items Assist x 2;Armrests; Increased time required;Verbal cues   Stand to Sit 3  Moderate assistance   Additional items Assist x 2; Increased time required;Verbal cues   Stand pivot 3  Moderate assistance   Additional items Assist x 2; Increased time required;Verbal cues  (x2 trials)   Toilet transfer 3  Moderate assistance   Additional items Assist x 2; Increased time required;Verbal cues; Commode;Armrests   Additional Comments Patient reported nausea and blurry vision this session  Patient seated EOB and reports no worsening of symptoms  Pt requesting need to use restroom and completed stand pivot transfer to commode to R side  Then completed additional stand pivot to R to bedside chair s/p toileting  Patient completed two standing trials for 30-60 seconds each to clean patient for hygiene management s/p toileting  Cues for safety and hand placement throughout all transfers  Comfortably resting in chair at end of session     Balance   Static Sitting Fair   Dynamic Sitting Poor +   Static Standing Poor   Dynamic Standing Poor -   Ambulatory Poor -   Activity Tolerance   Activity Tolerance Patient limited by fatigue  (+ nausea) Medical Staff Made Aware PT Bianka Perry   Nurse Made Aware OK to see for re-evaluation per RN west   RUREYNA Assessment   RUE Assessment WFL  (mmt 3+/5)   LUE Assessment   LUE Assessment WFL  (mmt 3+/5)   Hand Function   Gross Motor Coordination Functional   Fine Motor Coordination Functional   Sensation   Light Touch No apparent deficits   Sharp/Dull No apparent deficits   Stereognosis No apparent deficits   Vision-Basic Assessment   Patient Visual Report   (reporting blurry and intermittent double vision this session)   Cognition   Overall Cognitive Status Impaired   Arousal/Participation Lethargic;Responsive   Attention Difficulty attending to directions   Orientation Level Oriented to person   Memory Unable to assess   Following Commands Follows one step commands with increased time or repetition   Comments cooperative throughout session   Assessment   Limitation Decreased ADL status; Decreased UE strength;Decreased Safe judgement during ADL;Decreased cognition;Decreased UE ROM; Decreased endurance;Visual deficit; Decreased fine motor control;Decreased self-care trans;Decreased high-level ADLs;Mood limitation   Prognosis Fair   Assessment Pt has been demonstrating good participation and engagement with current OT program  Was limited this session by nausea and fatigue, reporting blurry vision and intermittent double vision supine in bed  Patient reports no pain  Upon last functional update on evaluation on 7/30/19, patient was requiring mod to max assist for overall ADLs and moderate assist for functional transfers/mobility  Patient was living with her spouse in an apt at [facility], and was completing adls and mobility independently with occassional assist from spouse and facility staff (mainly assisted with IADLS)  Pt has been demonstrating slow progress in their ability to participate in self-care, functional activities, balance/functional txf and mobility with increased independence    Pt currently requires min A for UB ADLs, max A for LB adls and toileting, and mod assist for functional transfers/mobility  Deficits to continue to address include: difficulty performing ADLS, decreased initiation and engagement  and health management  activity tolerance, endurance, standing balance/tolerance, sitting balance/tolerance, UE strength, memory, insight, safety, attention  and sequencing; limiting their ability to participate in self-care activities and return to their previously valued roles and occupations  Pt continues to benefit from skilled OT services to meet goals stated, increase pt safety and independence with self-care/home/community activities, UE management and strengthening, therapeutic exercises/functional activities, decrease burden of care, pt/family education and training, equipment assessment, resumption of meaningful roles/occupations, and to facilitate safe and appropriate discharge plan     Goals   Patient Goals to have the sickness go away   Long Term Goal see below for updated goals   Plan   Goal Expiration Date 08/28/19   OT Frequency 2-3x/wk   Recommendation   OT Discharge Recommendation Short Term Rehab   Equipment Recommended   (none at this time)   OT - OK to Discharge Yes  (to rehab when medically stable)   Barthel Index   Feeding 5   Bathing 0   Grooming Score 0   Dressing Score 5   Bladder Score 0  (incontinent at beginning of session - kd soiled)   Bowels Score 10   Toilet Use Score 5   Transfers (Bed/Chair) Score 5   Mobility (Level Surface) Score 0   Stairs Score 0   Barthel Index Score 30       Pt will achieve the following long term goals by discharge  *SBA feeding/grooming after setup -- continue  *Min a adls after setup with use of AE PRN -- continue - UB adls min to supervision varying; lb adls mod A  *Min a toileting and clothing management -- continue - currently max A w/ increased time and assist with posterior hygiene and thoroughness  *Min a functional mobility and transfers to/from all surfaces with fair to fair+ dynamic balance and safety for participation in dynamic adls and iadl tasks -- continue - currently mod A x2 for functional transfers/mobility   *Demonstrate fair+ carryover with safe use of RW during functional tasks -- continue - currently fair  *Assess DME needs -- continue - patient to benefit from [facility]  *Increase activity tolerance to 30-35 minutes for participation in adls and enjoyable activities -- continue  *Assist with safe d/c recommendations -- continue    Pt is slowly progressing towards meeting projected OT goals, however would benefit greatly for extended LOS to further refine strength, activity tolerance, balance and increasing independence w/ ADLs, home + community activities to decrease burden of care  Recommend skilled OT and interdisciplinary rehab program until 8/28/19 to meet projected OT goals  Plan and progress to date has been communicated to [facility] for continuity of care delivery  Recommend continued frequency of 2-3x/week for remainder of rehab stay      Deloris Montoya MS, OTR/L

## 2019-08-14 NOTE — PROGRESS NOTES
Progress Note - Smita Green 3/23/1931, 80 y o  female MRN: [id number]    Unit/Bed#: -01 Encounter: [id number]    Primary Care Provider: Loni Gonzalez MD   Date and time admitted to hospital: 7/26/2019 12:24 PM        * Community acquired pneumonia  Assessment & Plan  Continue IV antibiotics  Infectious Disease following  Observe cognitive status while on ertapenem    Supportive care      Achalasia of esophagus  Assessment & Plan  Status post EGD yesterday  Discussed findings with GI  Findings consistent with Schatzki's ring status post dilatation with partial improvement of symptoms  Loculated pleural effusion  Assessment & Plan  Pneumonia with loculated pleural effusion with pleuritic pain status post chest tube  Placement  Will discuss with thoracic plans regarding chest tube removal   To consider IR consult for removal   Continue IV antibiotics  Analgesics, lidocaine patch  Supportive care    Leukocytosis  Assessment & Plan  White count slightly improved  No reported diarrhea symptoms  Question bone marrow process versus infection  Repeat procalcitonin and trend        Slow transit constipation  Assessment & Plan  Pt with no bm as of yet   Given one time dose of lactulose and started on miralax     Metabolic encephalopathy  Assessment & Plan  In setting of acute infection/pneumonia as evidenced by improving mentation  Although there is some dementia and pt can be labile with mental status     Pt with improving wbc count     Generalized weakness  Assessment & Plan  Deconditioning, ambulatory dysfunction  Safe ambulation fall precautions  Physical therapy    Squamous cell carcinoma  Assessment & Plan  S/p recent removal of anterior right  Thigh mass   - now with incision intact with staples no drainage, erythema noted           Essential hypertension  Assessment & Plan  Monitor blood pressures  Avoid hypotension    VTE Pharmacologic Prophylaxis:   Pharmacologic: Enoxaparin (Lovenox)  Mechanical VTE Prophylaxis in Place: No    Patient Centered Rounds: I have performed bedside rounds with nursing staff today  Time Spent for Care: 15 minutes  More than 50% of total time spent on counseling and coordination of care as described above  Current Length of Stay: 18 day(s)    Current Patient Status: Inpatient       Code Status: Level 1 - Full Code      Subjective:   No acute distress    Objective:     Vitals:   Temp (24hrs), Av 9 °F (36 6 °C), Min:97 8 °F (36 6 °C), Max:98 1 °F (36 7 °C)    Temp:  [97 8 °F (36 6 °C)-98 1 °F (36 7 °C)] 97 8 °F (36 6 °C)  HR:  [63-75] 63  Resp:  [18] 18  BP: ()/(52-59) 128/59  SpO2:  [94 %-98 %] 94 %  Body mass index is 17 54 kg/m²  Input and Output Summary (last 24 hours): Intake/Output Summary (Last 24 hours) at 2019 1446  Last data filed at 2019 0501  Gross per 24 hour   Intake 360 ml   Output 262 ml   Net 98 ml       Physical Exam:     Physical Exam   Constitutional: She is oriented to person, place, and time  She appears well-developed and well-nourished  HENT:   Head: Normocephalic and atraumatic  Eyes: Pupils are equal, round, and reactive to light  EOM are normal    Cardiovascular: Normal rate  Pulmonary/Chest: Effort normal and breath sounds normal  No stridor  No respiratory distress  Abdominal: Soft  Bowel sounds are normal  She exhibits no distension  There is no tenderness  Musculoskeletal: Normal range of motion  Neurological: She is alert and oriented to person, place, and time  Skin: Skin is warm and dry         Additional Data:     Labs:    Results from last 7 days   Lab Units 19  0527   WBC Thousand/uL 8 86   HEMOGLOBIN g/dL 10 9*   HEMATOCRIT % 34 9   PLATELETS Thousands/uL 435*   NEUTROS PCT % 72   LYMPHS PCT % 17   MONOS PCT % 6   EOS PCT % 2     Results from last 7 days   Lab Units 19  0526   POTASSIUM mmol/L 4 6   CHLORIDE mmol/L 100   CO2 mmol/L 28   BUN mg/dL 28* CREATININE mg/dL 0 86   CALCIUM mg/dL 9 0   ALK PHOS U/L 158*   ALT U/L 125*   AST U/L 102*           * I Have Reviewed All Lab Data Listed Above  * Additional Pertinent Lab Tests Reviewed:  All Labs Within Last 24 Hours Reviewed        Recent Cultures (last 7 days):     Results from last 7 days   Lab Units 08/07/19  1519   GRAM STAIN RESULT  1+ Polys  No bacteria seen   BODY FLUID CULTURE, STERILE  No growth       Last 24 Hours Medication List:     Current Facility-Administered Medications:  acetaminophen 975 mg Oral Q8H [address] Patrick Amador MD    amLODIPine 5 mg Oral Daily Aurelia Vora MD    aspirin 81 mg Oral Daily Aurelia Vora MD    atorvastatin 40 mg Oral QPM Patrick Amador MD    benzonatate 100 mg Oral TID PRN Aurelia Vora MD    bisacodyl 10 mg Rectal Daily PRN Aurelia Vora MD    donepezil 10 mg Oral HS Patrick Amador MD    enoxaparin 40 mg Subcutaneous Daily Patrick Amador MD    ertapenem 1,000 mg Intravenous Q24H Aurelia Vora MD Last Rate: 1,000 mg (08/14/19 1208)   fluticasone 2 spray Nasal Daily Patrick Amador MD    HYDROmorphone 0 2 mg Intravenous Once Aurelia Vora MD    HYDROmorphone 0 2 mg Intravenous Q4H PRN Aurelia Vora MD    lactulose 30 g Oral Once Aurelia Vora MD    levothyroxine 100 mcg Oral Early Morning Aurelia Vora MD    lidocaine 2 patch Topical Daily Aurelia Vora MD    losartan 50 mg Oral Daily Patrick Amador MD    memantine 10 mg Oral BID Aurelia Vora MD    metoprolol succinate 50 mg Oral Daily Patrick Amador MD    montelukast 10 mg Oral HS Patrick Amador MD    oxyCODONE 2 5 mg Oral Q12H [address] Patrick Amador MD    oxyCODONE 5 mg Oral Q4H PRN Aurelia Vora MD    pantoprazole 40 mg Oral Daily Before Breakfast Aurelia Vora MD    polyethylene glycol 17 g Oral Daily Patrick Amador MD    senna-docusate sodium 1 tablet Oral BID Aurelia Vora MD    zolpidem 5 mg Oral HS PRN Aurelia Vora MD Today, Patient Was Seen By: Mayte White DO    ** Please Note: Dictation voice to text software may have been used in the creation of this document   **

## 2019-08-14 NOTE — ASSESSMENT & PLAN NOTE
Status post EGD yesterday  Discussed findings with GI  Findings consistent with Schatzki's ring status post dilatation with partial improvement of symptoms

## 2019-08-14 NOTE — ASSESSMENT & PLAN NOTE
Continue IV antibiotics  Infectious Disease following    Observe cognitive status while on ertapenem    Supportive care

## 2019-08-15 LAB
GLUCOSE SERPL-MCNC: 103 MG/DL (ref 65–140)
GLUCOSE SERPL-MCNC: 116 MG/DL (ref 65–140)
GLUCOSE SERPL-MCNC: 135 MG/DL (ref 65–140)
GLUCOSE SERPL-MCNC: 86 MG/DL (ref 65–140)

## 2019-08-15 PROCEDURE — 99232 SBSQ HOSP IP/OBS MODERATE 35: CPT | Performed by: INTERNAL MEDICINE

## 2019-08-15 PROCEDURE — 82948 REAGENT STRIP/BLOOD GLUCOSE: CPT

## 2019-08-15 RX ORDER — ONDANSETRON 2 MG/ML
4 INJECTION INTRAMUSCULAR; INTRAVENOUS EVERY 6 HOURS PRN
Status: DISCONTINUED | OUTPATIENT
Start: 2019-08-15 | End: 2019-08-16 | Stop reason: HOSPADM

## 2019-08-15 RX ADMIN — ATORVASTATIN CALCIUM 40 MG: 40 TABLET, FILM COATED ORAL at 17:05

## 2019-08-15 RX ADMIN — MEMANTINE 10 MG: 10 TABLET ORAL at 10:39

## 2019-08-15 RX ADMIN — AMLODIPINE BESYLATE 5 MG: 5 TABLET ORAL at 10:39

## 2019-08-15 RX ADMIN — ASPIRIN 81 MG 81 MG: 81 TABLET ORAL at 10:40

## 2019-08-15 RX ADMIN — ACETAMINOPHEN 975 MG: 325 TABLET ORAL at 22:05

## 2019-08-15 RX ADMIN — LOSARTAN POTASSIUM 50 MG: 50 TABLET, FILM COATED ORAL at 10:39

## 2019-08-15 RX ADMIN — ENOXAPARIN SODIUM 40 MG: 40 INJECTION SUBCUTANEOUS at 10:37

## 2019-08-15 RX ADMIN — OXYCODONE HYDROCHLORIDE 2.5 MG: 5 TABLET ORAL at 22:06

## 2019-08-15 RX ADMIN — MONTELUKAST SODIUM 10 MG: 10 TABLET, FILM COATED ORAL at 22:05

## 2019-08-15 RX ADMIN — PANTOPRAZOLE SODIUM 40 MG: 40 TABLET, DELAYED RELEASE ORAL at 05:10

## 2019-08-15 RX ADMIN — OXYCODONE HYDROCHLORIDE 2.5 MG: 5 TABLET ORAL at 10:42

## 2019-08-15 RX ADMIN — LIDOCAINE 2 PATCH: 50 PATCH CUTANEOUS at 10:40

## 2019-08-15 RX ADMIN — LEVOTHYROXINE SODIUM 100 MCG: 100 TABLET ORAL at 05:10

## 2019-08-15 RX ADMIN — ONDANSETRON 4 MG: 2 INJECTION INTRAMUSCULAR; INTRAVENOUS at 10:32

## 2019-08-15 RX ADMIN — SENNOSIDES AND DOCUSATE SODIUM 1 TABLET: 8.6; 5 TABLET ORAL at 10:39

## 2019-08-15 RX ADMIN — ZOLPIDEM TARTRATE 5 MG: 5 TABLET, FILM COATED ORAL at 22:06

## 2019-08-15 RX ADMIN — ACETAMINOPHEN 975 MG: 325 TABLET ORAL at 05:10

## 2019-08-15 RX ADMIN — FLUTICASONE PROPIONATE 2 SPRAY: 50 SPRAY, METERED NASAL at 10:44

## 2019-08-15 RX ADMIN — SENNOSIDES AND DOCUSATE SODIUM 1 TABLET: 8.6; 5 TABLET ORAL at 17:05

## 2019-08-15 RX ADMIN — ERTAPENEM SODIUM 1000 MG: 1 INJECTION, POWDER, LYOPHILIZED, FOR SOLUTION INTRAMUSCULAR; INTRAVENOUS at 11:10

## 2019-08-15 RX ADMIN — ACETAMINOPHEN 975 MG: 325 TABLET ORAL at 14:17

## 2019-08-15 RX ADMIN — DONEPEZIL HYDROCHLORIDE 10 MG: 10 TABLET ORAL at 22:07

## 2019-08-15 RX ADMIN — METOPROLOL SUCCINATE 50 MG: 50 TABLET, EXTENDED RELEASE ORAL at 10:39

## 2019-08-15 RX ADMIN — MEMANTINE 10 MG: 10 TABLET ORAL at 17:05

## 2019-08-15 NOTE — PROGRESS NOTES
Progress Note - Lefty Arias 3/23/1931, 80 y o  female MRN: [id number]    Unit/Bed#: -01 Encounter: [id number]    Primary Care Provider: Darion Ruiz MD   Date and time admitted to hospital: 7/26/2019 12:24 PM        * Community acquired pneumonia  Assessment & Plan  Continue IV antibiotics  Infectious Disease following  Observe cognitive status while on ertapenem    Supportive care      Achalasia of esophagus  Assessment & Plan  Status post EGD yesterday  Discussed findings with GI  Findings consistent with Schatzki's ring status post dilatation with partial improvement of symptoms  Loculated pleural effusion  Assessment & Plan  Pneumonia with loculated pleural effusion with pleuritic pain status post chest tube  Placement  Will discuss with thoracic plans regarding chest tube removal   To consider IR consult for removal   Continue IV antibiotics  Analgesics, lidocaine patch  Supportive care    Leukocytosis  Assessment & Plan  Stable        Metabolic encephalopathy  Assessment & Plan  In setting of acute infection/pneumonia as evidenced by improving mentation  Although there is some dementia and pt can be labile with mental status  Pt with improving wbc count     Generalized weakness  Assessment & Plan  Deconditioning, ambulatory dysfunction  Safe ambulation fall precautions  Physical therapy    Squamous cell carcinoma  Assessment & Plan  S/p recent removal of anterior right  Thigh mass   - now with incision intact with staples no drainage, erythema noted           Dementia  Assessment & Plan  Continue Namenda and Aricept    Reorientation sleep hygiene      Essential hypertension  Assessment & Plan  Monitor blood pressures  Avoid hypotension    Acquired hypothyroidism  Assessment & Plan  Continue levothyroxine      VTE Pharmacologic Prophylaxis:   Pharmacologic: Enoxaparin (Lovenox)  Mechanical VTE Prophylaxis in Place: No    Patient Centered Rounds: I have performed bedside rounds with nursing staff today  Time Spent for Care: 15 minutes  More than 50% of total time spent on counseling and coordination of care as described above  Current Length of Stay: 19 day(s)    Current Patient Status: Inpatient   Certification Statement: The patient will continue to require additional inpatient hospital stay due to Need to monitor symptoms    Discharge Plan:  Possible discharge in next 1 to 2 days  Will need to observe response antibiotic    Code Status: Level 1 - Full Code      Subjective:   Patient comfortable    Objective:     Vitals:   Temp (24hrs), Av 9 °F (36 6 °C), Min:97 7 °F (36 5 °C), Max:98 1 °F (36 7 °C)    Temp:  [97 7 °F (36 5 °C)-98 1 °F (36 7 °C)] 98 1 °F (36 7 °C)  HR:  [64-78] 64  Resp:  [16-18] 18  BP: (108-121)/(54-65) 121/65  SpO2:  [96 %-98 %] 96 %  Body mass index is 17 54 kg/m²  Input and Output Summary (last 24 hours): Intake/Output Summary (Last 24 hours) at 8/15/2019 1207  Last data filed at 8/15/2019 0949  Gross per 24 hour   Intake 720 ml   Output 600 ml   Net 120 ml       Physical Exam:     Physical Exam   Constitutional: She is oriented to person, place, and time  HENT:   Head: Normocephalic and atraumatic  Eyes: Pupils are equal, round, and reactive to light  EOM are normal    Neck: Normal range of motion  Neck supple  Cardiovascular: Normal rate  Exam reveals no friction rub  No murmur heard  Pulmonary/Chest: Effort normal    Abdominal: Soft  Bowel sounds are normal    Musculoskeletal: Normal range of motion  She exhibits no edema  Neurological: She is alert and oriented to person, place, and time  Skin: Skin is warm and dry         Additional Data:     Labs:    Results from last 7 days   Lab Units 19  0527   WBC Thousand/uL 8 86   HEMOGLOBIN g/dL 10 9*   HEMATOCRIT % 34 9   PLATELETS Thousands/uL 435*   NEUTROS PCT % 72   LYMPHS PCT % 17   MONOS PCT % 6   EOS PCT % 2     Results from last 7 days   Lab Units 19  0526 POTASSIUM mmol/L 4 6   CHLORIDE mmol/L 100   CO2 mmol/L 28   BUN mg/dL 28*   CREATININE mg/dL 0 86   CALCIUM mg/dL 9 0   ALK PHOS U/L 158*   ALT U/L 125*   AST U/L 102*           * I Have Reviewed All Lab Data Listed Above  * Additional Pertinent Lab Tests Reviewed:  All Labs Within Last 24 Hours Reviewed        Recent Cultures (last 7 days):           Last 24 Hours Medication List:     Current Facility-Administered Medications:  acetaminophen 975 mg Oral Q8H [address] Patrick Amador MD    amLODIPine 5 mg Oral Daily Beulah Jasso MD    aspirin 81 mg Oral Daily Beulah Jasso MD    atorvastatin 40 mg Oral QPM Patrick Amador MD    benzonatate 100 mg Oral TID PRN Beulah Jasso MD    bisacodyl 10 mg Rectal Daily PRN Beulah Jasso MD    donepezil 10 mg Oral HS Patrick Amador MD    enoxaparin 40 mg Subcutaneous Daily Patrick Amador MD    ertapenem 1,000 mg Intravenous Q24H Beulah Jasso MD Last Rate: 1,000 mg (08/15/19 1110)   fluticasone 2 spray Nasal Daily Patrick Amador MD    HYDROmorphone 0 2 mg Intravenous Once Beulah Jasso MD    HYDROmorphone 0 2 mg Intravenous Q4H PRN Beulah Jasso MD    lactulose 30 g Oral Once Beulah Jasso MD    levothyroxine 100 mcg Oral Early Morning Beulah Jasso MD    lidocaine 2 patch Topical Daily Patrick Amador MD    losartan 50 mg Oral Daily Patrick Amador MD    memantine 10 mg Oral BID Beulah Jasso MD    metoprolol succinate 50 mg Oral Daily Patrick Amador MD    montelukast 10 mg Oral HS Patrick Amador MD    ondansetron 4 mg Intravenous Q6H PRN Austin Frias DO    oxyCODONE 2 5 mg Oral Q12H [address] Patrick Amador MD    oxyCODONE 5 mg Oral Q4H PRN Beulah Jasso MD    pantoprazole 40 mg Oral Daily Before Breakfast Beulah Jasso MD    polyethylene glycol 17 g Oral Daily Patrick Amador MD    senna-docusate sodium 1 tablet Oral BID Beulah Jasso MD    zolpidem 5 mg Oral HS PRN Beulah Jasso MD         Today, Patient Was Seen By: Patricia Velazquez DO    ** Please Note: Dictation voice to text software may have been used in the creation of this document   **

## 2019-08-15 NOTE — PROGRESS NOTES
Progress Note - Infectious Disease   Jayden Hoffmann 80 y o  female MRN: [id number]  Unit/Bed#: -01 Encounter: [id number]      Impression/Plan:  1  Community-acquired pneumonia with pleural effusions   Possibly with lung abscess   Patient presents at this time with progressive weakness and shortness of breath at home along with leukocytosis on admission   Patient remains hemodynamically stable, procalcitonin normalized and leukocytosis down trending   Her respiratory status remains stable  Antibiotics changed due to worsening liver function tests while on ceftriaxone   Very limited antibiotic options as the patient is also allergic to penicillins  Continue ertapenem through tomorrow to complete 3 weeks total treatment  Recheck CBC with diff and CMP  Supportive care     2  Complicated parapneumonic effusion-possible empyema based upon very low pH and very high LDH  The pleural cultures are negative however the patient had been on antibiotics for quite some time   Chest tube was removed as it was not in a appropriate location   Now status post new chest tube placement   Repeat CT of the chest is improved with minimal fluid remaining  Second chest tube is now been removed  Patient's respiratory status is stable  -antibiotics as above  -monitor respiratory status     3  Fever, persistent leukocytosis   No recurrence of the fever spike  The white cell count remains elevated    Unclear etiology   Possibly all related to the patient's complicated effusion  There are no other obvious sources for elevated white count on her exam  Speech evaluation without signs of aspiration   Procalcitonin level is normal    Monitor CBC with diff  If fever recurs check blood cultures x2 sets  Continue antibiotics as above  Additional workup as needed     4   Liver function test abnormalities-unclear etiology   The patient appears to be mildly symptomatic with notable nausea   Perhaps related to the ceftriaxone   Right upper quadrant ultrasound without source   Hepatitis panel is negative   Liver function test have modestly improved since stopping the ceftriaxone but remain quite abnormal   -recheck  liver function test  -GI re-evaluation  -additional workup as needed     5  Acute encephalopathy-in the setting of Dementia  She is more alert interactive today  Continue monitor mental status  Continue antibiotics as above  Additional care as per primary     Discussed the above antibiotic plan with Dr Gianna Can    Discussed in detail with the patient and her     Antibiotics:  Ertapenem 9  Antibiotics 20    Subjective:  Patient has no fever, chills, sweats; no nausea, vomiting, diarrhea; no cough, shortness of breath; no pain  No new symptoms  The patient's chest tube was removed  She is more interactive today  Objective:  Vitals:  Temp:  [97 7 °F (36 5 °C)-98 1 °F (36 7 °C)] 98 1 °F (36 7 °C)  HR:  [64-78] 64  Resp:  [16-18] 18  BP: (108-121)/(54-65) 121/65  SpO2:  [96 %-98 %] 96 %  Temp (24hrs), Av 9 °F (36 6 °C), Min:97 7 °F (36 5 °C), Max:98 1 °F (36 7 °C)  Current: Temperature: 98 1 °F (36 7 °C)    Physical Exam:   General Appearance:  Alert, interactive, nontoxic, no acute distress  Throat: Oropharynx moist without lesions  Lungs:   Decreased breath sounds bilaterally; no wheezes, rhonchi or rales; respirations unlabored   Heart:  RRR; no murmur, rub or gallop   Abdomen:   Soft, non-tender, non-distended, positive bowel sounds  Extremities: No clubbing, cyanosis or edema   Skin: No new rashes or lesions  No draining wounds noted         Labs, Imaging, & Other studies:   All pertinent labs and imaging studies were personally reviewed  Results from last 7 days   Lab Units 08/13/19  0527 08/10/19  0428   WBC Thousand/uL 8 86 16 51*   HEMOGLOBIN g/dL 10 9* 10 5*   PLATELETS Thousands/uL 435* 431*     Results from last 7 days   Lab Units 19  0526 08/10/19  0428 19  0515   SODIUM mmol/L 134* 132* 132*   POTASSIUM mmol/L 4 6 4 8 4 3   CHLORIDE mmol/L 100 97* 100   CO2 mmol/L 28 29 26   BUN mg/dL 28* 33* 30*   CREATININE mg/dL 0 86 0 81 0 72   EGFR ml/min/1 73sq m 61 65 75   CALCIUM mg/dL 9 0 8 6 8 5   AST U/L 102* 76* 94*   ALT U/L 125* 126* 146*   ALK PHOS U/L 158* 145* 148*

## 2019-08-16 ENCOUNTER — DOCUMENTATION (OUTPATIENT)
Dept: MEDSURG UNIT | Facility: HOSPITAL | Age: 84
End: 2019-08-16

## 2019-08-16 VITALS
RESPIRATION RATE: 18 BRPM | SYSTOLIC BLOOD PRESSURE: 98 MMHG | BODY MASS INDEX: 17.56 KG/M2 | WEIGHT: 105.38 LBS | OXYGEN SATURATION: 99 % | HEART RATE: 69 BPM | HEIGHT: 65 IN | DIASTOLIC BLOOD PRESSURE: 58 MMHG | TEMPERATURE: 98.2 F

## 2019-08-16 LAB
ALBUMIN SERPL BCP-MCNC: 2.5 G/DL (ref 3.5–5)
ALP SERPL-CCNC: 152 U/L (ref 46–116)
ALT SERPL W P-5'-P-CCNC: 101 U/L (ref 12–78)
ANION GAP SERPL CALCULATED.3IONS-SCNC: 8 MMOL/L (ref 4–13)
AST SERPL W P-5'-P-CCNC: 63 U/L (ref 5–45)
BASOPHILS # BLD AUTO: 0.04 THOUSANDS/ΜL (ref 0–0.1)
BASOPHILS NFR BLD AUTO: 1 % (ref 0–1)
BILIRUB SERPL-MCNC: 0.29 MG/DL (ref 0.2–1)
BUN SERPL-MCNC: 26 MG/DL (ref 5–25)
CALCIUM SERPL-MCNC: 9.3 MG/DL (ref 8.3–10.1)
CHLORIDE SERPL-SCNC: 105 MMOL/L (ref 100–108)
CO2 SERPL-SCNC: 27 MMOL/L (ref 21–32)
CREAT SERPL-MCNC: 0.79 MG/DL (ref 0.6–1.3)
EOSINOPHIL # BLD AUTO: 0.21 THOUSAND/ΜL (ref 0–0.61)
EOSINOPHIL NFR BLD AUTO: 3 % (ref 0–6)
ERYTHROCYTE [DISTWIDTH] IN BLOOD BY AUTOMATED COUNT: 13.1 % (ref 11.6–15.1)
GFR SERPL CREATININE-BSD FRML MDRD: 67 ML/MIN/1.73SQ M
GLUCOSE SERPL-MCNC: 122 MG/DL (ref 65–140)
GLUCOSE SERPL-MCNC: 76 MG/DL (ref 65–140)
GLUCOSE SERPL-MCNC: 79 MG/DL (ref 65–140)
HCT VFR BLD AUTO: 33.7 % (ref 34.8–46.1)
HGB BLD-MCNC: 10.5 G/DL (ref 11.5–15.4)
IMM GRANULOCYTES # BLD AUTO: 0.09 THOUSAND/UL (ref 0–0.2)
IMM GRANULOCYTES NFR BLD AUTO: 1 % (ref 0–2)
LYMPHOCYTES # BLD AUTO: 1.4 THOUSANDS/ΜL (ref 0.6–4.47)
LYMPHOCYTES NFR BLD AUTO: 21 % (ref 14–44)
MCH RBC QN AUTO: 30.6 PG (ref 26.8–34.3)
MCHC RBC AUTO-ENTMCNC: 31.2 G/DL (ref 31.4–37.4)
MCV RBC AUTO: 98 FL (ref 82–98)
MONOCYTES # BLD AUTO: 0.6 THOUSAND/ΜL (ref 0.17–1.22)
MONOCYTES NFR BLD AUTO: 9 % (ref 4–12)
NEUTROPHILS # BLD AUTO: 4.25 THOUSANDS/ΜL (ref 1.85–7.62)
NEUTS SEG NFR BLD AUTO: 65 % (ref 43–75)
NRBC BLD AUTO-RTO: 0 /100 WBCS
PLATELET # BLD AUTO: 328 THOUSANDS/UL (ref 149–390)
PMV BLD AUTO: 9.6 FL (ref 8.9–12.7)
POTASSIUM SERPL-SCNC: 4.6 MMOL/L (ref 3.5–5.3)
PROT SERPL-MCNC: 7.1 G/DL (ref 6.4–8.2)
RBC # BLD AUTO: 3.43 MILLION/UL (ref 3.81–5.12)
SODIUM SERPL-SCNC: 140 MMOL/L (ref 136–145)
WBC # BLD AUTO: 6.59 THOUSAND/UL (ref 4.31–10.16)

## 2019-08-16 PROCEDURE — 99232 SBSQ HOSP IP/OBS MODERATE 35: CPT | Performed by: INTERNAL MEDICINE

## 2019-08-16 PROCEDURE — 82948 REAGENT STRIP/BLOOD GLUCOSE: CPT

## 2019-08-16 PROCEDURE — 80053 COMPREHEN METABOLIC PANEL: CPT | Performed by: INTERNAL MEDICINE

## 2019-08-16 PROCEDURE — 97110 THERAPEUTIC EXERCISES: CPT

## 2019-08-16 PROCEDURE — 99238 HOSP IP/OBS DSCHRG MGMT 30/<: CPT | Performed by: INTERNAL MEDICINE

## 2019-08-16 PROCEDURE — 85025 COMPLETE CBC W/AUTO DIFF WBC: CPT | Performed by: INTERNAL MEDICINE

## 2019-08-16 RX ORDER — LIDOCAINE 50 MG/G
2 PATCH TOPICAL DAILY
Qty: 30 PATCH | Refills: 0 | Status: SHIPPED | OUTPATIENT
Start: 2019-08-17 | End: 2019-09-03 | Stop reason: ALTCHOICE

## 2019-08-16 RX ORDER — AMOXICILLIN 250 MG
1 CAPSULE ORAL 2 TIMES DAILY
Qty: 30 TABLET | Refills: 0 | Status: SHIPPED | OUTPATIENT
Start: 2019-08-16 | End: 2019-09-23 | Stop reason: SDUPTHER

## 2019-08-16 RX ORDER — BISACODYL 10 MG
10 SUPPOSITORY, RECTAL RECTAL DAILY PRN
Qty: 12 SUPPOSITORY | Refills: 0 | Status: SHIPPED | OUTPATIENT
Start: 2019-08-16 | End: 2021-08-31 | Stop reason: ALTCHOICE

## 2019-08-16 RX ADMIN — ERTAPENEM SODIUM 1000 MG: 1 INJECTION, POWDER, LYOPHILIZED, FOR SOLUTION INTRAMUSCULAR; INTRAVENOUS at 10:05

## 2019-08-16 RX ADMIN — MEMANTINE 10 MG: 10 TABLET ORAL at 09:41

## 2019-08-16 RX ADMIN — PANTOPRAZOLE SODIUM 40 MG: 40 TABLET, DELAYED RELEASE ORAL at 06:23

## 2019-08-16 RX ADMIN — AMLODIPINE BESYLATE 5 MG: 5 TABLET ORAL at 09:40

## 2019-08-16 RX ADMIN — SENNOSIDES AND DOCUSATE SODIUM 1 TABLET: 8.6; 5 TABLET ORAL at 09:40

## 2019-08-16 RX ADMIN — METOPROLOL SUCCINATE 50 MG: 50 TABLET, EXTENDED RELEASE ORAL at 09:40

## 2019-08-16 RX ADMIN — FLUTICASONE PROPIONATE 2 SPRAY: 50 SPRAY, METERED NASAL at 09:43

## 2019-08-16 RX ADMIN — LEVOTHYROXINE SODIUM 100 MCG: 100 TABLET ORAL at 06:23

## 2019-08-16 RX ADMIN — ASPIRIN 81 MG 81 MG: 81 TABLET ORAL at 09:41

## 2019-08-16 RX ADMIN — LIDOCAINE 2 PATCH: 50 PATCH CUTANEOUS at 09:38

## 2019-08-16 RX ADMIN — POLYETHYLENE GLYCOL 3350 17 G: 17 POWDER, FOR SOLUTION ORAL at 10:18

## 2019-08-16 RX ADMIN — ACETAMINOPHEN 975 MG: 325 TABLET ORAL at 14:01

## 2019-08-16 RX ADMIN — OXYCODONE HYDROCHLORIDE 2.5 MG: 5 TABLET ORAL at 09:41

## 2019-08-16 RX ADMIN — ENOXAPARIN SODIUM 40 MG: 40 INJECTION SUBCUTANEOUS at 09:38

## 2019-08-16 RX ADMIN — ACETAMINOPHEN 975 MG: 325 TABLET ORAL at 06:22

## 2019-08-16 RX ADMIN — LOSARTAN POTASSIUM 50 MG: 50 TABLET, FILM COATED ORAL at 09:40

## 2019-08-16 NOTE — NURSING NOTE
Pt escorted via wc to [facility]   at side, taking belongings with him  Pt Confused  Offers no complaints  Call placed to [facility], nurse unable to accept report, states she will call me back  Chart and dc papers faxed to facility  [phone] Call returned by Vester Runner from [facility], report given  All questions answered

## 2019-08-16 NOTE — PROGRESS NOTES
Progress Note - Infectious Disease   Fahad Gonzalez 80 y o  female MRN: [id number]  Unit/Bed#: -01 Encounter: [id number]      Impression/Plan:  1  Community-acquired pneumonia with pleural effusions   Possibly with lung abscess   Patient presents at this time with progressive weakness and shortness of breath at home along with leukocytosis on admission   Patient remains hemodynamically stable, procalcitonin normalized and leukocytosis down trending   Her respiratory status remains stable  Antibiotics changed due to worsening liver function tests while on ceftriaxone   Very limited antibiotic options as the patient is also allergic to penicillins  Discontinue ertapenem as the patient is completed 3 weeks of intravenous antibiotics  Recheck CBC with diff and CMP  Supportive care     2  Complicated parapneumonic effusion-possible empyema based upon very low pH and very high LDH  The pleural cultures are negative however the patient had been on antibiotics for quite some time   Chest tube was removed as it was not in a appropriate location   Now status post new chest tube placement   Repeat CT of the chest is improved with minimal fluid remaining  Second chest tube is now been removed  Patient's respiratory status is stable  She has completed 3 weeks of intravenous antibiotics and has undergone chest tube drainage  -discontinue antibiotics as above  -monitor respiratory status     3  Fever, persistent leukocytosis   No recurrence of the fever spike  The white cell count remains elevated    Unclear etiology   Possibly all related to the patient's complicated effusion  There are no other obvious sources for elevated white count on her exam  Speech evaluation without signs of aspiration   Procalcitonin level is normal    Monitor CBC with diff as needed  Discontinue antibiotics as above  Additional workup as needed     4   Liver function test abnormalities-unclear etiology   The patient appears to be mildly symptomatic with notable nausea   Perhaps related to the ceftriaxone   Right upper quadrant ultrasound without source   Hepatitis panel is negative   Liver function test have modestly improved since stopping the ceftriaxone but remain quite abnormal   -recheck  liver function test  -GI re-evaluation  -additional workup as needed     5  Acute encephalopathy-in the setting of Dementia  She is more alert interactive today  Continue monitor mental status  No additional antibiotics as above  Additional care as per primary     Discussed the above antibiotic plan with Dr Lola Engle     Discussed in detail with the patient and her     Will see the patient again 2019 if not discharged  Please call if questions  Antibiotics:  Ertapenem 10  Antibiotics 21    Subjective:  Patient has no fever, chills, sweats; no nausea, vomiting, diarrhea; no cough, shortness of breath; no pain  No new symptoms  She is anxious to get out of the hospital    Objective:  Vitals:  Temp:  [97 7 °F (36 5 °C)-98 5 °F (36 9 °C)] 97 7 °F (36 5 °C)  HR:  [65-73] 65  Resp:  [16-18] 16  BP: ()/(53-64) 119/56  SpO2:  [96 %-98 %] 96 %  Temp (24hrs), Av °F (36 7 °C), Min:97 7 °F (36 5 °C), Max:98 5 °F (36 9 °C)  Current: Temperature: 97 7 °F (36 5 °C)    Physical Exam:   General Appearance:  Alert, interactive, nontoxic, no acute distress  Throat: Oropharynx moist without lesions  Lungs:   Decreased breath sounds bilaterally; no wheezes, rhonchi or rales; respirations unlabored   Heart:  RRR; no murmur, rub or gallop   Abdomen:   Soft, non-tender, non-distended, positive bowel sounds  Extremities: No clubbing, cyanosis or edema   Skin: No new rashes or lesions  No draining wounds noted         Labs, Imaging, & Other studies:   All pertinent labs and imaging studies were personally reviewed  Results from last 7 days   Lab Units 19  0443 19  0527 08/10/19  0428   WBC Thousand/uL 6 59 8 86 16 51*   HEMOGLOBIN g/dL 10 5* 10 9* 10 5*   PLATELETS Thousands/uL 328 435* 431*     Results from last 7 days   Lab Units 08/16/19  0443 08/13/19  0526 08/10/19  0428   SODIUM mmol/L 140 134* 132*   POTASSIUM mmol/L 4 6 4 6 4 8   CHLORIDE mmol/L 105 100 97*   CO2 mmol/L 27 28 29   BUN mg/dL 26* 28* 33*   CREATININE mg/dL 0 79 0 86 0 81   EGFR ml/min/1 73sq m 67 61 65   CALCIUM mg/dL 9 3 9 0 8 6   AST U/L 63* 102* 76*   ALT U/L 101* 125* 126*   ALK PHOS U/L 152* 158* 145*

## 2019-08-16 NOTE — PHYSICAL THERAPY NOTE
Physical Therapy Treatment Note     Patient Name: Irineo Padron    OFNJM'A Date: 8/16/2019     Problem List  Patient Active Problem List   Diagnosis    Acquired hypothyroidism    Essential hypertension    CAD (coronary artery disease)    GERD (gastroesophageal reflux disease)    Chest pain    Weakness    Dementia    History of TIA (transient ischemic attack)    Twitching    Abnormal EKG    Gait disturbance    Cataracts, bilateral    Glaucoma    History of appendectomy    History of MI (myocardial infarction)    Macular degeneration of both eyes    Actinic keratosis    Post-nasal drip    Squamous cell carcinoma    Urinary leakage    Pneumonia, unspecified organism    Community acquired pneumonia    Generalized weakness    Metabolic encephalopathy    Slow transit constipation    Leukocytosis    Loculated pleural effusion    Achalasia of esophagus        Past Medical History  Past Medical History:   Diagnosis Date    Disease of thyroid gland     GERD (gastroesophageal reflux disease)     Hyperlipidemia     Hypertension     Insomnia     MI (myocardial infarction) ([facility] [address] )         Past Surgical History  Past Surgical History:   Procedure Laterality Date    APPENDECTOMY      COLONOSCOPY      [id number]  LAST ASSESSED    CT GUIDED CHEST TUBE  8/7/2019    IR CHEST TUBE  8/2/2019    IR CHEST TUBE  8/6/2019 08/16/19 1333   Pain Assessment   Pain Assessment Malloy-Baker FACES   Malloy-Baker FACES Pain Rating 0   Restrictions/Precautions   Weight Bearing Precautions Per Order No   Other Precautions Cognitive; Chair Alarm; Fall Risk   General   Chart Reviewed Yes   Response to Previous Treatment Patient with no complaints from previous session     Family/Caregiver Present Yes   Cognition   Overall Cognitive Status Impaired   Arousal/Participation Alert   Attention Difficulty attending to directions   Orientation Level Oriented to person Following Commands Follows one step commands with increased time or repetition   Comments requires demonstation and cueing to complete certain activities    Bed Mobility   Additional Comments did not assess due to pt being in chair    Transfers   Sit to Stand 3  Moderate assistance  (VC for proepr sequencing and positioning )   Additional items Assist x 1   Stand to Sit 3  Moderate assistance   Additional items Assist x 1   Balance   Static Sitting Fair   Dynamic Sitting Fair -   Static Standing Poor   Dynamic Standing Poor -   Endurance Deficit   Endurance Deficit Yes   Activity Tolerance   Activity Tolerance Patient limited by fatigue  (and fear )   Nurse Made Aware Nurse approved therapy session   Medical Staff Made Aware Nurse made aware of open wound on leg    Exercises   Glute Sets Supine;Bilateral  (30 reps x 2 sets )   Hip Flexion Sitting;Bilateral  (12 reps x 3 sets )   Knee AROM Long Arc Quad Sitting;Bilateral  (12 reps x )   Ankle Pumps Sitting;Bilateral  (30 reps x 2 sets )   Balance training  10 sit to stands x 1, standing from 10 seconds to 20 seconds    Assessment   Prognosis Fair   Problem List Decreased strength;Decreased endurance; Impaired balance;Decreased mobility   Assessment Pt presents to therapy today with decreased B LE strength, limited endurance, limited standing tolerance and fall risk  These impairments limit the patient by increasing the level of assist needed for mobility  Pt would benefit from continued skilled therapy while in the hospital to improve functional mobility and reducing the level of assist needed upon d/c  Recommend rehab  At end of session patient was left seated with call bell within reach  Goals   STG Expiration Date 08/22/19   Short Term Goal #1 continue to progress towards goals    Treatment Day 7   Plan   Treatment/Interventions LE strengthening/ROM; Therapeutic exercise; Endurance training;Bed mobility;Gait training   Progress Slow progress, cognitive deficits PT Frequency   (3-5x wk)   Recommendation   Recommendation   (rehab)   Equipment Recommended Wheelchair;Walker   PT - OK to Discharge Yes   Additional Comments to rehab when medically cleared      Owen Francisco, Pt, DPT

## 2019-08-16 NOTE — DISCHARGE SUMMARY
Discharge- Cordell Zuniga 3/23/1931, 80 y o  female MRN: [id number]    Unit/Bed#: -01 Encounter: [id number]    Primary Care Provider: Constanza Walker MD   Date and time admitted to hospital: 7/26/2019 12:24 PM        * Community acquired pneumonia  Assessment & Plan  Status post antibiotic therapy  Note Infectious Disease input    Supportive care      Achalasia of esophagus  Assessment & Plan  Status post EGD yesterday  Discussed findings with GI  Findings consistent with Schatzki's ring status post dilatation with partial improvement of symptoms  Loculated pleural effusion  Assessment & Plan  Pneumonia with loculated pleural effusion with pleuritic pain status post chest tube  Placement  Will discuss with thoracic plans regarding chest tube removal   To consider IR consult for removal   Continue IV antibiotics  Analgesics, lidocaine patch  Supportive care    Leukocytosis  Assessment & Plan  Stable        Metabolic encephalopathy  Assessment & Plan  In setting of acute infection/pneumonia as evidenced by improving mentation  Although there is some dementia and pt can be labile with mental status  Pt with improving wbc count     Generalized weakness  Assessment & Plan  Deconditioning, ambulatory dysfunction  Safe ambulation fall precautions  Physical therapy    Squamous cell carcinoma  Assessment & Plan  S/p recent removal of anterior right  Thigh mass   - now with incision intact with staples no drainage, erythema noted           Dementia  Assessment & Plan  Continue Namenda and Aricept    Reorientation sleep hygiene      Essential hypertension  Assessment & Plan  Monitor blood pressures  Avoid hypotension    Acquired hypothyroidism  Assessment & Plan  Continue levothyroxine              Resolved Problems  Date Reviewed: 8/12/2019    None          Admission Date:   Admission Orders (From admission, onward)     Ordered        07/27/19 1643  Inpatient Admission  Once         07/26/19 1546  Place in Observation (expected length of stay for this patient is less than two midnights)  Once                     Admitting Diagnosis: Cough [R05]  URI (upper respiratory infection) [J06 9]  Left lower lobe pneumonia ([address] ) [J18 1]    HPI:  This is a 45-year-old female with past medical history hyperlipidemia hypertension history of myocardial proximal who presents hospital with increasing symptoms of weakness and back pain with associated coughing  The patient states that she has been having yellow productive sputum when runny nose for the past week  The patient presents with symptoms of fevers prior to admission  She was seen by her doctor as an outpatient subsequently sent to the hospital for further workup evaluation  She was treated empirically for community-acquired pneumonia  Unfortunately, her white count was persistently elevated despite aggressive antibiotic therapy  In follow-up repeat CT did illustrated evidence of parapneumonic effusion for which she was drain and subsequently had a chest tube for a collapsed lung  She was followed by thoracic surgery and Pulmonary during this hospitalization in addition to Infectious Disease  She ultimately had the chest tube removed with stable respiratory status  She completed full course of antibiotic therapy after converting from Rocephin to ertapenem by the Infectious Disease team   There was a concern also that she may have had a transaminitis in the setting of antibiotic exposure to Rocephin  · Community-acquired pneumonia complicated with empyema and altered mental status  · Patient is at her baseline mental status  · She status post chest tube placement and removal  · She is status post IV antibiotic therapy  · Plan will be to transition back to skilled nursing facility  · Leukocytosis secondary to above  Now resolved  · Metabolic encephalopathy-the patient at baseline likely etiology secondary to infection    · Squamous cell-status post removal of right anterior thigh mass  Stitches were removed during this hospitalization  Procedures Performed: No orders of the defined types were placed in this encounter  Condition at Discharge: fair         Discharge instructions/Information to patient and family:   See after visit summary for information provided to patient and family  Provisions for Follow-Up Care:  See after visit summary for information related to follow-up care and any pertinent home health orders  PCP: Maryan Winter MD    Disposition: Home    Planned Readmission: No    Discharge Statement   I spent 35 minutes discharging the patient  This time was spent on the day of discharge  I had direct contact with the patient on the day of discharge  Additional documentation is required if more than 30 minutes were spent on discharge  Discharge Medications:  See after visit summary for reconciled discharge medications provided to patient and family

## 2019-08-16 NOTE — SOCIAL WORK
Patient has been medically cleared for discharge to rehab back to [facility]  Transportation arranged with Maxime Barraza for 4pm pickup  Facility, RN at bedside, and family aware of arrangements  Chart copy requested  Facility contacts entered in [facility] [address]

## 2019-08-16 NOTE — PLAN OF CARE
Problem: Potential for Falls  Goal: Patient will remain free of falls  Description  INTERVENTIONS:  - Assess patient frequently for physical needs  -  Identify cognitive and physical deficits and behaviors that affect risk of falls    -  [place] fall precautions as indicated by assessment   - Educate patient/family on patient safety including physical limitations  - Instruct patient to call for assistance with activity based on assessment  - Modify environment to reduce risk of injury  - Consider OT/PT consult to assist with strengthening/mobility  Outcome: Progressing     Problem: Prexisting or High Potential for Compromised Skin Integrity  Goal: Skin integrity is maintained or improved  Description  INTERVENTIONS:  - Identify patients at risk for skin breakdown  - Assess and monitor skin integrity  - Assess and monitor nutrition and hydration status  - Monitor labs (i e  albumin)  - Assess for incontinence   - Turn and reposition patient  - Assist with mobility/ambulation  - Relieve pressure over bony prominences  - Avoid friction and shearing  - Provide appropriate hygiene as needed including keeping skin clean and dry  - Evaluate need for skin moisturizer/barrier cream  - Collaborate with interdisciplinary team (i e  Nutrition, Rehabilitation, etc )   - Patient/family teaching  Outcome: Progressing     Problem: PAIN - ADULT  Goal: Verbalizes/displays adequate comfort level or baseline comfort level  Description  Interventions:  - Encourage patient to monitor pain and request assistance  - Assess pain using appropriate pain scale  - Administer analgesics based on type and severity of pain and evaluate response  - Implement non-pharmacological measures as appropriate and evaluate response  - Notify physician/advanced practitioner if interventions unsuccessful or patient reports new pain   Outcome: Progressing     Problem: SAFETY ADULT  Goal: Maintain or return to baseline ADL function  Description  INTERVENTIONS:  -  Assess patient's ability to carry out ADLs; assess patient's baseline for ADL function and identify physical deficits which impact ability to perform ADLs (bathing, care of mouth/teeth, toileting, grooming, dressing, etc )  - Assess/evaluate cause of self-care deficits   - Assess range of motion  - Assess patient's mobility; develop plan if impaired  - Assess patient's need for assistive devices and provide as appropriate  - Encourage maximum independence but intervene and supervise when necessary  ¯ Involve family in performance of ADLs  ¯ Assess for home care needs following discharge   ¯ Request OT consult to assist with ADL evaluation and planning for discharge  ¯ Provide patient education as appropriate  Outcome: Progressing  Goal: Maintain or return mobility status to optimal level  Description  INTERVENTIONS:  - Assess patient's baseline mobility status (ambulation, transfers, stairs, etc )    - Identify cognitive and physical deficits and behaviors that affect mobility  - Identify mobility aids required to assist with transfers and/or ambulation (gait belt, sit-to-stand, lift, walker, cane, etc )  - [facility] fall precautions as indicated by assessment  - Record patient progress and toleration of activity level on Mobility SBAR; progress patient to next Phase/Stage  - Instruct patient to call for assistance with activity based on assessment  - Request Rehabilitation consult to assist with strengthening/weightbearing, etc   Outcome: Progressing     Problem: DISCHARGE PLANNING  Goal: Discharge to home or other facility with appropriate resources  Description  INTERVENTIONS:  - Identify barriers to discharge w/patient and caregiver  - Arrange for needed discharge resources and transportation as appropriate  - Identify discharge learning needs (meds, wound care, etc )  - Refer to Case Management Department for coordinating discharge planning if the patient needs post-hospital services based on physician/advanced practitioner order or complex needs related to functional status, cognitive ability, or social support system   Outcome: Progressing     Problem: Knowledge Deficit  Goal: Patient/family/caregiver demonstrates understanding of disease process, treatment plan, medications, and discharge instructions  Description  Complete learning assessment and assess knowledge base    Interventions:  - Provide teaching at level of understanding  - Provide teaching via preferred learning methods  Outcome: Progressing     Problem: INFECTION - ADULT  Goal: Absence or prevention of progression during hospitalization  Description  INTERVENTIONS:  - Assess and monitor for signs and symptoms of infection  - Monitor lab/diagnostic results  - Monitor all insertion sites, i e  indwelling lines, tubes, and drains  - [place] appropriate cooling/warming therapies per order  - Administer medications as ordered  - Instruct and encourage patient and family to use good hand hygiene technique  - Identify and instruct in appropriate isolation precautions for identified infection/condition   Outcome: Progressing     Problem: MUSCULOSKELETAL - ADULT  Goal: Maintain or return mobility to safest level of function  Description  INTERVENTIONS:  - Assess patient's ability to carry out ADLs; assess patient's baseline for ADL function and identify physical deficits which impact ability to perform ADLs (bathing, care of mouth/teeth, toileting, grooming, dressing, etc )  - Assess/evaluate cause of self-care deficits   - Assess range of motion  - Assess patient's mobility; develop plan if impaired  - Assess patient's need for assistive devices and provide as appropriate  - Encourage maximum independence but intervene and supervise when necessary  - Involve family in performance of ADLs  - Assess for home care needs following discharge   - Request OT consult to assist with ADL evaluation and planning for discharge  - Provide patient education as appropriate  Outcome: Progressing     Problem: Nutrition/Hydration-ADULT  Goal: Nutrient/Hydration intake appropriate for improving, restoring or maintaining nutritional needs  Description  Monitor and assess patient's nutrition/hydration status for malnutrition (ex- brittle hair, bruises, dry skin, pale skin and conjunctiva, muscle wasting, smooth red tongue, and disorientation)  Collaborate with interdisciplinary team and initiate plan and interventions as ordered  Monitor patient's weight and dietary intake as ordered or per policy  Utilize nutrition screening tool and intervene per policy  Determine patient's food preferences and provide high-protein, high-caloric foods as appropriate       INTERVENTIONS:  - Monitor oral intake, urinary output, labs, and treatment plans  - Assess nutrition and hydration status and recommend course of action  - Evaluate amount of meals eaten  - Assist patient with eating if necessary   - Allow adequate time for meals  - Recommend/ encourage appropriate diets, oral nutritional supplements, and vitamin/mineral supplements  - Order, calculate, and assess calorie counts as needed  - Recommend, monitor, and adjust tube feedings and TPN/PPN based on assessed needs  - Assess need for intravenous fluids  - Provide specific nutrition/hydration education as appropriate  - Include patient/family/caregiver in decisions related to nutrition  Outcome: Progressing

## 2019-08-16 NOTE — SOCIAL WORK
CM was informed that pt was medically stable for d/c  CM called [facility] where pt is bed hold  CM left message for admissions

## 2019-08-16 NOTE — PLAN OF CARE
Problem: PHYSICAL THERAPY ADULT  Goal: Performs mobility at highest level of function for planned discharge setting  See evaluation for individualized goals  Description  Treatment/Interventions: Functional transfer training, LE strengthening/ROM, Therapeutic exercise, Endurance training, Patient/family training, Equipment eval/education, Bed mobility, Gait training, Spoke to nursing, Family, Cognitive reorientation  Equipment Recommended: Greg David       See flowsheet documentation for full assessment, interventions and recommendations  Note:   Prognosis: Fair  Problem List: Decreased strength, Decreased endurance, Impaired balance, Decreased mobility  Assessment: Pt presents to therapy today with decreased B LE strength, limited endurance, limited standing tolerance and fall risk  These impairments limit the patient by increasing the level of assist needed for mobility  Pt would benefit from continued skilled therapy while in the hospital to improve functional mobility and reducing the level of assist needed upon d/c  Recommend rehab  At end of session patient was left seated with call bell within reach  Recommendation: (rehab)     PT - OK to Discharge: Yes    See flowsheet documentation for full assessment

## 2019-08-16 NOTE — ASSESSMENT & PLAN NOTE
"Lacie Burgess RN  " Pneumonia with loculated pleural effusion with pleuritic pain status post chest tube  Placement    Will discuss with thoracic plans regarding chest tube removal   To consider IR consult for removal   Continue IV antibiotics  Analgesics, lidocaine patch  Supportive care

## 2019-08-19 ENCOUNTER — NURSING HOME VISIT (OUTPATIENT)
Dept: GERIATRICS | Facility: OTHER | Age: 84
End: 2019-08-19
Payer: MEDICARE

## 2019-08-19 VITALS
HEART RATE: 77 BPM | OXYGEN SATURATION: 93 % | RESPIRATION RATE: 17 BRPM | SYSTOLIC BLOOD PRESSURE: 127 MMHG | TEMPERATURE: 97.2 F | DIASTOLIC BLOOD PRESSURE: 65 MMHG

## 2019-08-19 DIAGNOSIS — I10 ESSENTIAL HYPERTENSION: ICD-10-CM

## 2019-08-19 DIAGNOSIS — J90 LOCULATED PLEURAL EFFUSION: ICD-10-CM

## 2019-08-19 DIAGNOSIS — D72.19 EOSINOPHILIC LEUKOCYTOSIS: ICD-10-CM

## 2019-08-19 DIAGNOSIS — R53.1 WEAKNESS: ICD-10-CM

## 2019-08-19 DIAGNOSIS — H35.30 MACULAR DEGENERATION OF BOTH EYES, UNSPECIFIED TYPE: ICD-10-CM

## 2019-08-19 DIAGNOSIS — R32 URINARY INCONTINENCE, UNSPECIFIED TYPE: ICD-10-CM

## 2019-08-19 DIAGNOSIS — G93.41 METABOLIC ENCEPHALOPATHY: ICD-10-CM

## 2019-08-19 DIAGNOSIS — J18.9 COMMUNITY ACQUIRED PNEUMONIA, UNSPECIFIED LATERALITY: Primary | ICD-10-CM

## 2019-08-19 DIAGNOSIS — D63.8 ANEMIA IN OTHER CHRONIC DISEASES CLASSIFIED ELSEWHERE: ICD-10-CM

## 2019-08-19 DIAGNOSIS — F03.90 DEMENTIA WITHOUT BEHAVIORAL DISTURBANCE, UNSPECIFIED DEMENTIA TYPE (HCC): ICD-10-CM

## 2019-08-19 DIAGNOSIS — K22.2 SCHATZKI'S RING: ICD-10-CM

## 2019-08-19 PROBLEM — D72.829 LEUKOCYTOSIS: Status: RESOLVED | Noted: 2019-08-01 | Resolved: 2019-08-19

## 2019-08-19 PROBLEM — D64.9 ANEMIA: Status: ACTIVE | Noted: 2019-08-19

## 2019-08-19 PROCEDURE — 99305 1ST NF CARE MODERATE MDM 35: CPT | Performed by: INTERNAL MEDICINE

## 2019-08-19 NOTE — ASSESSMENT & PLAN NOTE
Secondary to comorbidities - weight loss during hospital stay for CAP  Patient has regained appetite and is eating well  Ambulating better with use of walker   Working with PT to be discharged home

## 2019-08-19 NOTE — PROGRESS NOTES
[address] [place] [address] Progress Note    NAME: Min Calvillo  AGE: 80 y o  SEX: female [id number]    DATE OF ENCOUNTER: 8/19/2019  [place] [id number]  Assessment and Plan     Problem List Items Addressed This Visit        Digestive    RESOLVED: Schatzki's ring     Dilated on endoscopy; will maintain on omeprazole to prevent recurrence  Respiratory    RESOLVED: Community acquired pneumonia - Primary     Patient treated with IV Ertapenem during hospital stay  Resolved  RESOLVED: Loculated pleural effusion     Drained via chest tube  Chest tube removed  Resolved            Cardiovascular and Mediastinum    Essential hypertension     D/C amlodipine due to low pressures; pressure today: 127/65            Nervous and Auditory    Dementia     Patient demonstrated mild confusion (unaware of the month); good historian to hospital stay and health status; insistent on returning home to her apartment with   Currently taking Aricept 10 and Namenda          RESOLVED: Metabolic encephalopathy     Cognitively appears to be more alert; very keen that she wanted to return to her own apartment  Appetite has improved  Able to ambulate with a walker, went to dinner with  last night at downstairs dining facility  Did not know what month it was            Other    Weakness     Secondary to comorbidities - weight loss during hospital stay for CAP  Patient has regained appetite and is eating well  Ambulating better with use of walker   Working with PT to be discharged home         Macular degeneration of both eyes     Bilateral eyes -  states she is essentially blind         Urinary leakage     Patient has mild urinary incontinence for which she wears protective pads         Anemia     Normochromic, normocytic anemia, Hgb 10 8          RESOLVED: Leukocytosis     7,300 WBC count; no left shift               All medications and routine orders were reviewed and updated as needed  Plan discussed with: Patient    Chief Complaint     No chief complaint on file  History of Present Illness     Patient is an 51-year-old  female with multiple comorbidities presenting for admission to [facility] following discharge from [facility]  Patient has past medical history significant for hypertension, CAD, dysphagia secondary to Schatzki's ring  She developed fever, impaired cognition, and was treated in the hospital for CAP  She had persistent leukocytosis despite adequate antibiotic treatment  On further evaluation there was evidence of parapneumonic effusion which was drained with chest tube placement, she also had a subsequent collapsed lung  She developed metabolic encephalopathy while in the hospital that improved as time progressed  She had an upper endoscopy where they dilated her esophagus due to difficulty swallowing solids  Staples and chest tube have since been removed  She is ambulating and eating well  Mild normochromic, normocytic anemia; Hgb 10 8          HISTORY:  Past Surgical History:   Procedure Laterality Date    APPENDECTOMY      COLONOSCOPY      [id number]  LAST ASSESSED    CT GUIDED CHEST TUBE  8/7/2019    IR CHEST TUBE  8/2/2019    IR CHEST TUBE  8/6/2019      Past Medical History:   Diagnosis Date    Disease of thyroid gland     GERD (gastroesophageal reflux disease)     Hyperlipidemia     Hypertension     Insomnia     [facility]) ([facility] [address] )      Family History   Problem Relation Age of Onset    No Known Problems Father     Heart disease Family      Social History     Socioeconomic History    Marital status: /Civil Union     Spouse name: Not on file    Number of children: Not on file    Years of education: Not on file    Highest education level: Not on file   Occupational History    Not on file   Social Needs    Financial resource strain: Not on file    Food insecurity:     Worry: Not on file Inability: Not on file    Transportation needs:     Medical: Not on file     Non-medical: Not on file   Tobacco Use    Smoking status: Never Smoker    Smokeless tobacco: Never Used   Substance and Sexual Activity    Alcohol use: Yes    Drug use: No    Sexual activity: Not on file   Lifestyle    Physical activity:     Days per week: Not on file     Minutes per session: Not on file    Stress: Not on file   Relationships    Social connections:     Talks on phone: Not on file     Gets together: Not on file     Attends Judaism service: Not on file     Active member of club or organization: Not on file     Attends meetings of clubs or organizations: Not on file     Relationship status: Not on file    Intimate partner violence:     Fear of current or ex partner: Not on file     Emotionally abused: Not on file     Physically abused: Not on file     Forced sexual activity: Not on file   Other Topics Concern    Not on file   Social History Narrative    Not on file       Allergies: Allergies   Allergen Reactions    Alendronate      Other reaction(s): tooth decay    Diphenhydramine Hyperactivity    Penicillins Other (See Comments)     Reaction not listed; however, has tolerated Ceftriaxone and Cefepime which have different side chains  Review of Systems     Review of Systems   Constitutional: Positive for appetite change  Negative for activity change, chills, diaphoresis and fever  Increased appetite since surgery   HENT: Negative for congestion, hearing loss, sore throat, trouble swallowing and voice change  Eyes: Positive for visual disturbance  Negative for discharge  Macular degeneration and glaucoma bilateral eyes   Respiratory: Negative for cough, chest tightness and shortness of breath  Cardiovascular: Negative for chest pain and palpitations  Gastrointestinal: Negative for abdominal pain, constipation, diarrhea, nausea and vomiting  Endocrine: Negative      Genitourinary: Negative for decreased urine volume, difficulty urinating and dysuria  Mild urinary incontinence; patient wears pad   Musculoskeletal: Negative  Negative for arthralgias and gait problem  Skin: Negative  Allergic/Immunologic: Negative  Neurological: Negative for dizziness, light-headedness, numbness and headaches  Hematological: Negative  Psychiatric/Behavioral: Negative  Negative for decreased concentration and dysphoric mood  The patient is not nervous/anxious          PHQ-9 Depression Screening    PHQ-9:    Frequency of the following problems over the past two weeks:              Medications and orders       Current Outpatient Medications:     amLODIPine (NORVASC) 5 mg tablet, Take 1 tablet by mouth daily for 30 days, Disp: 30 tablet, Rfl: 0    aspirin 81 mg chewable tablet, Chew 1 tablet (81 mg total) daily, Disp: 30 tablet, Rfl: 3    atorvastatin (LIPITOR) 40 mg tablet, TAKE 1 TABLET DAILY IN THE EVENING, Disp: 30 tablet, Rfl: 2    benzonatate (TESSALON PERLES) 100 mg capsule, Take 1 capsule (100 mg total) by mouth 3 (three) times a day as needed for cough, Disp: 21 capsule, Rfl: 0    bisacodyl (DULCOLAX) 10 mg suppository, Insert 1 suppository (10 mg total) into the rectum daily as needed for constipation, Disp: 12 suppository, Rfl: 0    donepezil (ARICEPT) 10 mg tablet, TAKE 1 TABLET DAILY TAB/TAN/RND, Disp: 30 tablet, Rfl: 2    fluticasone (FLONASE) 50 mcg/act nasal spray, 2 sprays into each nostril daily, Disp: 16 g, Rfl: 3    levothyroxine 100 mcg tablet, TAKE 1 TABLET DAILY, Disp: 30 tablet, Rfl: 3    lidocaine (LIDODERM) 5 %, Apply 2 patches topically daily Remove & Discard patch within 12 hours or as directed by MD, Disp: 30 patch, Rfl: 0    losartan (COZAAR) 50 mg tablet, Take 1 tablet (50 mg total) by mouth daily, Disp: 30 tablet, Rfl: 3    memantine (NAMENDA) 10 mg tablet, TAKE 1 TABLET TWICE DAILY, Disp: 60 tablet, Rfl: 2    metoprolol succinate (TOPROL-XL) 50 mg 24 hr tablet, TAKE 1 TABLET DAILY, Disp: 30 tablet, Rfl: 2    montelukast (SINGULAIR) 10 mg tablet, TAKE ONE TABLET AT BEDTIME, Disp: 30 tablet, Rfl: 0    omeprazole (PriLOSEC) 20 mg delayed release capsule, TAKE 1 CAPSULE DAILY, Disp: 30 capsule, Rfl: 2    senna-docusate sodium (SENOKOT S) 8 6-50 mg per tablet, Take 1 tablet by mouth 2 (two) times a day, Disp: 30 tablet, Rfl: 0    zolpidem (AMBIEN) 5 mg tablet, Take 1 tablet (5 mg total) by mouth daily at bedtime as needed for sleep, Disp: 30 tablet, Rfl: 3       Objective     Vitals:   Vitals:    08/19/19 1614   BP: 127/65   Pulse: 77   Resp: 17   Temp: (!) 97 2 °F (36 2 °C)   SpO2: 93%       Physical Exam   Constitutional: She is oriented to person, place, and time  She appears well-developed and well-nourished  No distress  HENT:   Head: Normocephalic and atraumatic  Right Ear: Hearing, tympanic membrane, external ear and ear canal normal  No drainage  No decreased hearing is noted  Left Ear: Hearing, tympanic membrane, external ear and ear canal normal  No drainage  No decreased hearing is noted  Nose: Nose normal    Mouth/Throat: Oropharynx is clear and moist  No oropharyngeal exudate  Eyes: Pupils are equal, round, and reactive to light  Conjunctivae, EOM and lids are normal  Right eye exhibits no discharge  Left eye exhibits no discharge  Neck: Trachea normal, normal range of motion and full passive range of motion without pain  Neck supple  No tracheal deviation present  Cardiovascular: Normal rate, regular rhythm, S1 normal, normal heart sounds, intact distal pulses and normal pulses  Pulmonary/Chest: Effort normal and breath sounds normal  No respiratory distress  She has no decreased breath sounds  Abdominal: Soft  Bowel sounds are normal  She exhibits no distension  Musculoskeletal: Normal range of motion  Neurological: She is alert and oriented to person, place, and time  Skin: Skin is warm  She is diaphoretic     Psychiatric: She has a normal mood and affect  Her behavior is normal        Pertinent Laboratory/Diagnostic Studies: The following labs/studies were reviewed please see facility chart for details

## 2019-08-19 NOTE — ASSESSMENT & PLAN NOTE
Cognitively appears to be more alert; very keen that she wanted to return to her own apartment  Appetite has improved  Able to ambulate with a walker, went to dinner with  last night at downstairs dining facility   Did not know what month it was

## 2019-08-19 NOTE — PATIENT INSTRUCTIONS
1  - Discontinue amlodipine due to episode of hypotension  2  - Continue omeprazole to prevent recurrence of Schatzki's ring  3  - Continue PT/OT  4  - Encouraged increased caloric intake

## 2019-08-19 NOTE — ASSESSMENT & PLAN NOTE
Patient demonstrated mild confusion (unaware of the month); good historian to hospital stay and health status; insistent on returning home to her apartment with    Currently taking Aricept 10 and Namenda

## 2019-08-28 ENCOUNTER — NURSING HOME VISIT (OUTPATIENT)
Dept: GERIATRICS | Facility: OTHER | Age: 84
End: 2019-08-28
Payer: MEDICARE

## 2019-08-28 VITALS
RESPIRATION RATE: 20 BRPM | DIASTOLIC BLOOD PRESSURE: 71 MMHG | BODY MASS INDEX: 16.54 KG/M2 | TEMPERATURE: 97.8 F | WEIGHT: 99.4 LBS | SYSTOLIC BLOOD PRESSURE: 132 MMHG | HEART RATE: 64 BPM | OXYGEN SATURATION: 97 %

## 2019-08-28 DIAGNOSIS — J18.9 PNEUMONIA, UNSPECIFIED ORGANISM: ICD-10-CM

## 2019-08-28 DIAGNOSIS — R26.9 GAIT DISTURBANCE: ICD-10-CM

## 2019-08-28 DIAGNOSIS — F03.90 DEMENTIA WITHOUT BEHAVIORAL DISTURBANCE, UNSPECIFIED DEMENTIA TYPE (HCC): ICD-10-CM

## 2019-08-28 DIAGNOSIS — K59.01 SLOW TRANSIT CONSTIPATION: ICD-10-CM

## 2019-08-28 DIAGNOSIS — I10 ESSENTIAL HYPERTENSION: ICD-10-CM

## 2019-08-28 DIAGNOSIS — I25.10 CORONARY ARTERY DISEASE, ANGINA PRESENCE UNSPECIFIED, UNSPECIFIED VESSEL OR LESION TYPE, UNSPECIFIED WHETHER NATIVE OR TRANSPLANTED HEART: ICD-10-CM

## 2019-08-28 DIAGNOSIS — R53.81 PHYSICAL DECONDITIONING: Primary | ICD-10-CM

## 2019-08-28 PROCEDURE — 99316 NF DSCHRG MGMT 30 MIN+: CPT | Performed by: NURSE PRACTITIONER

## 2019-08-28 NOTE — ASSESSMENT & PLAN NOTE
· Improvement on cough and shortness of breath  · Antibiotics completed  · continue Colace and deep breath exercises  · Follow-up patient

## 2019-08-28 NOTE — ASSESSMENT & PLAN NOTE
· Mild memory loss, some poor judgment, stayed in Memory Care Unit during stay  · [place] not completed secondary to vision impairment  · Continue Aricept and Namenda at this  · Consider geriatric follow-up for full cognitive and functional evaluation  · Engage in daily activity  Monitor sleep-wake cycles  Monitor depression    · Monitor change in mental status and eval and treat as needed  · Follow-up outpatient

## 2019-08-28 NOTE — ASSESSMENT & PLAN NOTE
· Continue PT OT    Fall precautions  · Continue use of walker for stability  · Follow-up outpatient

## 2019-08-28 NOTE — ASSESSMENT & PLAN NOTE
· Stable at present  Continue PT OT  · Turn and reposition frequently  Monitor skin integrity  Encourage cough and deep breathe exercises  · Ensure adequate hydration nutrition    · Monitor closely as patient transitions back  · F/u outpt

## 2019-08-28 NOTE — PROGRESS NOTES
[facility]  [address]  [phone]  [address] (>30min) Note  [id number]      NAME: Celina Dong  AGE: 80 y o  SEX: female    DATE OF ENCOUNTER: 8/28/2019    Assessment and Plan     Problem List Items Addressed This Visit        Digestive    Slow transit constipation     · Stable b i d  Senna-s  · Follow-up outpatient            Respiratory    Pneumonia, unspecified organism     · Improvement on cough and shortness of breath  · Antibiotics completed  · continue Colace and deep breath exercises  · Follow-up patient            Cardiovascular and Mediastinum    Essential hypertension     · Stable during rehab  · Continue Toprol, Cozaar  · Follow-up outpatient         CAD (coronary artery disease)     · No chest pain during rehab  · Continue statin, aspirin, and beta-blocker  · Follow-up outpatient            Nervous and Auditory    Dementia     · Mild memory loss, some poor judgment, stayed in Memory Care Unit during stay  · Slums not completed secondary to vision impairment  · Continue Aricept and Namenda at this  · Consider geriatric follow-up for full cognitive and functional evaluation  · Engage in daily activity  Monitor sleep-wake cycles  Monitor depression  · Monitor change in mental status and eval and treat as needed  · Follow-up outpatient            Other    Gait disturbance     · Continue PT OT  Fall precautions  · Continue use of walker for stability  · Follow-up outpatient         Physical deconditioning - Primary     · Stable at present  Continue PT OT  · Turn and reposition frequently  Monitor skin integrity  Encourage cough and deep breathe exercises  · Ensure adequate hydration nutrition    · Monitor closely as patient transitions back  · F/u outpt               No orders of the defined types were placed in this encounter       - Counseling Documentation: patient was counseled regarding: instructions for management, patient and family education and impressions    Chief Complaint     No chief complaint on file  History of Present Illness     Mrs Annabelle Garcia is a 80-year-old female came to [facility] status post hospitalization for pneumonia  She was admitted 8//2019 and will discharge 08/30/2019 to [facility] independent living apartment with her   PMH includes dementia, CAD, hypertension, dysphagia secondary to Schatzki's ring    During stay patient stated add Memory Care unit and did well  Vital signs were monitored and noted to be stable  Labs were stable  Patient participated in therapy during stay  She initially ambulated 50 ft with walker and min assist   She progressed to ambulating 80 ft with walker and close guard  Tug test 30 seconds  She was initially standby assist UB/mod assist lb self-care and progressed to supervision UB/Min assist lb for self-care  Slums was not done secondary to visual deficit  Pt seen for discharge eval   She is forgetful but able to answer simple questions/follow commands  She denies cp/sob/cough  Denies gi/gu distress  Per nursing, no concerns  No complaints of pain  She states she would like to go home today and "does not want to be with the crazy people anymore "      The following portions of the patient's history were reviewed and updated as appropriate: allergies, current medications, past family history, past medical history, past social history, past surgical history and problem list     Review of Systems     Review of Systems   Constitutional: Negative for activity change, appetite change, chills and fatigue  HENT: Negative for congestion  Respiratory: Negative for cough and shortness of breath  Cardiovascular: Negative for chest pain  Gastrointestinal: Negative for abdominal pain, constipation, diarrhea, nausea and vomiting  Genitourinary: Negative for difficulty urinating  Musculoskeletal: Positive for gait problem     Neurological: Negative for dizziness and light-headedness  Psychiatric/Behavioral: Positive for decreased concentration (forgetful)  Active Problem List     Patient Active Problem List   Diagnosis    Acquired hypothyroidism    Essential hypertension    CAD (coronary artery disease)    GERD (gastroesophageal reflux disease)    Chest pain    Weakness    Dementia    History of TIA (transient ischemic attack)    Twitching    Abnormal EKG    Gait disturbance    Cataracts, bilateral    Glaucoma    History of appendectomy    History of MI (myocardial infarction)    Macular degeneration of both eyes    Actinic keratosis    Post-nasal drip    Squamous cell carcinoma    Urinary leakage    Pneumonia, unspecified organism    Generalized weakness    Slow transit constipation    Achalasia of esophagus    Anemia    Physical deconditioning       Objective     /71   Pulse 64   Temp 97 8 °F (36 6 °C)   Resp 20   Wt 45 1 kg (99 lb 6 4 oz)   SpO2 97%   BMI 16 54 kg/m²     Physical Exam   Constitutional: She appears well-developed and well-nourished  No distress  HENT:   Head: Normocephalic  Cardiovascular: Normal rate and normal heart sounds  Exam reveals no gallop and no friction rub  No murmur heard  Pulmonary/Chest: Effort normal and breath sounds normal  No respiratory distress  She has no wheezes  She has no rales  Abdominal: Soft  Bowel sounds are normal  She exhibits no distension  There is no tenderness  There is no rebound  Musculoskeletal: Normal range of motion  Neurological: She is alert  Patient oriented to self, place, partial situation, not to time  Follows commands, answers questions   Skin: Skin is warm and dry  She is not diaphoretic  Psychiatric: She has a normal mood and affect  Her behavior is normal    Nursing note and vitals reviewed  Pertinent Laboratory/Diagnostic Studies:  Labs reviewed in facility paper chart      Current Medications   Medications were reviewed and updated         Current Outpatient Medications:     aspirin 81 mg chewable tablet, Chew 1 tablet (81 mg total) daily, Disp: 30 tablet, Rfl: 3    atorvastatin (LIPITOR) 40 mg tablet, TAKE 1 TABLET DAILY IN THE EVENING, Disp: 30 tablet, Rfl: 2    benzonatate (TESSALON PERLES) 100 mg capsule, Take 1 capsule (100 mg total) by mouth 3 (three) times a day as needed for cough, Disp: 21 capsule, Rfl: 0    bisacodyl (DULCOLAX) 10 mg suppository, Insert 1 suppository (10 mg total) into the rectum daily as needed for constipation, Disp: 12 suppository, Rfl: 0    donepezil (ARICEPT) 10 mg tablet, TAKE 1 TABLET DAILY TAB/TAN/RND, Disp: 30 tablet, Rfl: 2    fluticasone (FLONASE) 50 mcg/act nasal spray, 2 sprays into each nostril daily, Disp: 16 g, Rfl: 3    levothyroxine 100 mcg tablet, TAKE 1 TABLET DAILY, Disp: 30 tablet, Rfl: 3    lidocaine (LIDODERM) 5 %, Apply 2 patches topically daily Remove & Discard patch within 12 hours or as directed by MD, Disp: 30 patch, Rfl: 0    losartan (COZAAR) 50 mg tablet, Take 1 tablet (50 mg total) by mouth daily, Disp: 30 tablet, Rfl: 3    memantine (NAMENDA) 10 mg tablet, TAKE 1 TABLET TWICE DAILY, Disp: 60 tablet, Rfl: 2    metoprolol succinate (TOPROL-XL) 50 mg 24 hr tablet, TAKE 1 TABLET DAILY, Disp: 30 tablet, Rfl: 2    montelukast (SINGULAIR) 10 mg tablet, TAKE ONE TABLET AT BEDTIME, Disp: 30 tablet, Rfl: 0    omeprazole (PriLOSEC) 20 mg delayed release capsule, TAKE 1 CAPSULE DAILY, Disp: 30 capsule, Rfl: 2    senna-docusate sodium (SENOKOT S) 8 6-50 mg per tablet, Take 1 tablet by mouth 2 (two) times a day, Disp: 30 tablet, Rfl: 0      LEE Jade  8/28/2019 10:09 AM

## 2019-08-30 DIAGNOSIS — R26.9 GAIT DISTURBANCE: Primary | ICD-10-CM

## 2019-09-03 ENCOUNTER — OFFICE VISIT (OUTPATIENT)
Dept: GERIATRICS | Facility: CLINIC | Age: 84
End: 2019-09-03
Payer: MEDICARE

## 2019-09-03 VITALS — HEART RATE: 63 BPM | SYSTOLIC BLOOD PRESSURE: 98 MMHG | OXYGEN SATURATION: 98 % | DIASTOLIC BLOOD PRESSURE: 54 MMHG

## 2019-09-03 DIAGNOSIS — R26.9 GAIT DISTURBANCE: ICD-10-CM

## 2019-09-03 DIAGNOSIS — H40.9 GLAUCOMA OF BOTH EYES, UNSPECIFIED GLAUCOMA TYPE: ICD-10-CM

## 2019-09-03 DIAGNOSIS — I10 ESSENTIAL HYPERTENSION: ICD-10-CM

## 2019-09-03 DIAGNOSIS — F03.90 DEMENTIA WITHOUT BEHAVIORAL DISTURBANCE, UNSPECIFIED DEMENTIA TYPE (HCC): ICD-10-CM

## 2019-09-03 DIAGNOSIS — K59.01 SLOW TRANSIT CONSTIPATION: Primary | ICD-10-CM

## 2019-09-03 DIAGNOSIS — J18.9 PNEUMONIA, UNSPECIFIED ORGANISM: ICD-10-CM

## 2019-09-03 LAB — FUNGUS SPEC CULT: NORMAL

## 2019-09-03 PROCEDURE — 99214 OFFICE O/P EST MOD 30 MIN: CPT | Performed by: NURSE PRACTITIONER

## 2019-09-03 RX ORDER — LOSARTAN POTASSIUM 50 MG/1
25 TABLET ORAL DAILY
Qty: 30 TABLET | Refills: 3 | Status: SHIPPED | OUTPATIENT
Start: 2019-09-03 | End: 2019-10-23 | Stop reason: SDUPTHER

## 2019-09-03 NOTE — ASSESSMENT & PLAN NOTE
· BP on low side today (102/58)  · Decrease cozaar 25mg   · Check bp x 1 week daily   · Encourage fluids

## 2019-09-03 NOTE — PATIENT INSTRUCTIONS
· BP low norm today - decrease cozaar 25mg daily  · Check bp daily x1 week and notify if SBP<100/x or >160/x  · Encourage fluids

## 2019-09-03 NOTE — PROGRESS NOTES
Assessment/Plan:    Essential hypertension  · BP on low side today (102/58)  · Decrease cozaar 25mg   · Check bp x 1 week daily   · Encourage fluids    Slow transit constipation  · Doing well without complaint  · Use senna-s as needed  · Encourage dietary fiber and fluids    Pneumonia, unspecified organism  · Continues with resolving cough, otherwise no respiratory symptoms  · Aspiration precauitons  · Cont C&db exercises    Dementia  · No memory changes  · Cont namenda  · Cont to encourage daily activity, monitor sleep-wake cycles, monitor depression    Gait disturbance  · Cont with pt/ot  · Fall precautions    Glaucoma  · Conts with poor vision  · Encourage good lighting  · Fall precautions       Diagnoses and all orders for this visit:    Slow transit constipation    Essential hypertension    Pneumonia, unspecified organism    Dementia without behavioral disturbance, unspecified dementia type    Gait disturbance    Glaucoma of both eyes, unspecified glaucoma type        Subjective:      Patient ID: Nathaly Coles is a 80 y o  female  Doing well, recently at [facility] after hospitalization for pneumonia  She states breathing is doing well - does have cough  ROS/HPI limited by memory loss   not present  Denies cp/sob  Denies gi/gu distress   not present for visit  No concerns per nursing  The following portions of the patient's history were reviewed and updated as appropriate: past family history, past medical history, past social history and past surgical history  Review of Systems   Reason unable to perform ROS: poor memory limits ros  Constitutional: Negative for activity change, appetite change, chills and fatigue  HENT: Negative for congestion  Respiratory: Positive for cough (better)  Negative for shortness of breath  Cardiovascular: Negative for chest pain  Gastrointestinal: Negative for abdominal pain, constipation and diarrhea     Genitourinary: Negative for difficulty urinating  Musculoskeletal: Positive for gait problem  Neurological: Negative for dizziness and light-headedness  Psychiatric/Behavioral: Positive for decreased concentration (forgetful) and sleep disturbance (occasional)  Objective:      BP 98/54   Pulse 63   SpO2 98%          Physical Exam   Constitutional: She appears well-developed and well-nourished  No distress  HENT:   Head: Normocephalic  Cardiovascular: Normal rate and normal heart sounds  Exam reveals no gallop and no friction rub  No murmur heard  Pulmonary/Chest: Effort normal and breath sounds normal  No respiratory distress  She has no wheezes  She has no rales  Abdominal: Soft  Bowel sounds are normal  She exhibits no distension  There is no tenderness  There is no rebound  Musculoskeletal: Normal range of motion  Neurological: She is alert  Oriented to self, place, partial time/situation   Skin: Skin is warm and dry  She is not diaphoretic  Psychiatric: She has a normal mood and affect  Her behavior is normal    Vitals reviewed

## 2019-09-03 NOTE — ASSESSMENT & PLAN NOTE
· No memory changes  · Cont namenda  · Cont to encourage daily activity, monitor sleep-wake cycles, monitor depression

## 2019-09-03 NOTE — ASSESSMENT & PLAN NOTE
· Continues with resolving cough, otherwise no respiratory symptoms  · Aspiration precauitons  · Cont C&db exercises

## 2019-09-16 ENCOUNTER — OFFICE VISIT (OUTPATIENT)
Dept: PULMONOLOGY | Facility: CLINIC | Age: 84
End: 2019-09-16
Payer: MEDICARE

## 2019-09-16 VITALS
TEMPERATURE: 98.9 F | HEART RATE: 64 BPM | DIASTOLIC BLOOD PRESSURE: 60 MMHG | BODY MASS INDEX: 16.66 KG/M2 | OXYGEN SATURATION: 98 % | WEIGHT: 100 LBS | RESPIRATION RATE: 18 BRPM | SYSTOLIC BLOOD PRESSURE: 110 MMHG | HEIGHT: 65 IN

## 2019-09-16 DIAGNOSIS — J18.9 PNEUMONIA, UNSPECIFIED ORGANISM: Primary | ICD-10-CM

## 2019-09-16 DIAGNOSIS — J30.9 ALLERGIC RHINITIS, UNSPECIFIED SEASONALITY, UNSPECIFIED TRIGGER: ICD-10-CM

## 2019-09-16 DIAGNOSIS — R09.82 POST-NASAL DRIP: ICD-10-CM

## 2019-09-16 DIAGNOSIS — K22.0 ACHALASIA OF ESOPHAGUS: ICD-10-CM

## 2019-09-16 PROCEDURE — 1123F ACP DISCUSS/DSCN MKR DOCD: CPT | Performed by: NURSE PRACTITIONER

## 2019-09-16 PROCEDURE — 99214 OFFICE O/P EST MOD 30 MIN: CPT | Performed by: NURSE PRACTITIONER

## 2019-09-16 NOTE — ASSESSMENT & PLAN NOTE
*  patient denies any episodes of coughing or choking- she reports she has more frequent smaller meals  *  she stated that she takes her time chewing and is always sitting upright at a table for Meals  *  will continue aspiration precaution

## 2019-09-16 NOTE — ASSESSMENT & PLAN NOTE
*  patient was recently hospitalized for her complicated parapneumonic effusion likely empyema of unknown organism- patient required 2 chest tubes with tPA/dornase administration  *  today patient appeared in optimal respiratory health- upon physical examination patient had bilateral clear breath sounds with noted aeration throughout all lung fields  *  it was suspected that admission pneumonia was caused from aspiration after EGD confirmed Schatzkins ring  *  will repeat chest x-ray today- will call patient with result  *  if chest x-ray is clear-patient will begin following with our office on an as-needed basis

## 2019-09-16 NOTE — PROGRESS NOTES
Pulmonary Follow-Up Note   Fahad Gonzalez 80 y o  female MRN: [id number]  9/16/2019      Assessment/Plan:    Problem List Items Addressed This Visit        Digestive    Achalasia of esophagus     *  patient denies any episodes of coughing or choking- she reports she has more frequent smaller meals  *  she stated that she takes her time chewing and is always sitting upright at a table for Meals  *  will continue aspiration precaution            Respiratory    Pneumonia, unspecified organism - Primary     *  patient was recently hospitalized for her complicated parapneumonic effusion likely empyema of unknown organism- patient required 2 chest tubes with tPA/dornase administration  *  today patient appeared in optimal respiratory health- upon physical examination patient had bilateral clear breath sounds with noted aeration throughout all lung fields  *  it was suspected that admission pneumonia was caused from aspiration after EGD confirmed Schatzkins ring  *  will repeat chest x-ray today- will call patient with result  *  if chest x-ray is clear-patient will begin following with our office on an as-needed basis         Relevant Orders    XR chest pa & lateral       Other    Post-nasal drip     *  will continue Flonase daily  *  continue singular daily           Other Visit Diagnoses     Allergic rhinitis, unspecified seasonality, unspecified trigger              Education provided at this visit:   Need for Vaccination:  Influenza 10/04/2016, pneumococcal polysaccharide PPV 23 10/05/2012 pneumococcal conjugate 13 10/14/2016   Pulmonary Rehab:  Not indicated   Smoking Cessation:  Not indicated   Lung Cancer Screening:  Not indicated   Inhaler Use: N/A    No follow-ups on file  All of Odalis Berg questions were answered prior to leaving the office today  Odalis Berg will follow-up with Dr Norma Hoover on a p r n  Basis or sooner should the need arise    Odalis Berg is aware to call our office with any further questions or concerns  History of Present Illness   Reason for Visit:  Hospital follow-up  Chief Complaint: "I am feeling a lot better"  HPI: Ginette Perez is a 80 y o  female who presents to the office today for hospital follow-up  Patient has past medical history of GERD, mi, insomnia, and pneumonia  Patient was admitted to the hospital from 7/26/19-8/16/19 with initial symptoms of increased weakness, fever, and back pain  Patient was initially treated for community-acquired pneumonia however subsequent imaging revealed parapneumonic effusion  Patient had thoracentesis for 460 mL in repeat imaging showed left lung which chest tube had to be placed  Pleural fluid was suspicious for possible empyema in the setting of very low pH and very high LDH  Patient then had 2nd chest tube placed completed a course a tPA dornase  GI was consulted for EGD for suspicion of aspiration  Findings were consistent with Schatzkis ring status post dilation  ID was following patient and she completed a 3 week course of IV antibiotics  Chest tubes were removed on 08/15/2019  Patient was discharged with no oxygen or inhaled therapies required  Today patient appeared in optimal respiratory health  Upon examination patient had bilateral clear lung sounds throughout all lung fields  She reports her respiratory status has improved significantly since hospitalization  She reports that she feels she is gaining her strength back as she is increasing her activity  Patient reports she is taking smaller meals more frequently and chewing and swallowing more slowly  She denies any episodes of coughing or choking  She currently denies any fever, chills, hemoptysis, headache, nausea, night sweats, or pleuritic chest pain  From a pulmonary standpoint, patient has never had a pulmonologist   Patient will begin following with Dr Shannan Hickman on a p r n  Basis  Patient has been a lifelong nonsmoker    She reports never being diagnosed with COPD or asthma  Patient is not maintained on any inhaled or oxygen therapies  Patient denies any symptoms of MELODY  She does report occasional symptoms of GERD in which she takes over-the-counter antacids  Patient reports postnasal drip in which she takes Flonase daily  Patient denies any seasonal allergies or exposures to dust, mold, or pet dander  Patient denies any occupational exposures as she worked as an  for [employer]  Patient will begin to follow up with our office on an as-needed basis  Review of Systems   Constitutional: Negative for activity change and appetite change  HENT: Negative for congestion and postnasal drip  Respiratory: Negative for apnea, cough, choking, chest tightness, shortness of breath, wheezing and stridor  Cardiovascular: Negative for chest pain, palpitations and leg swelling  Gastrointestinal: Negative for abdominal distention and abdominal pain  Genitourinary: Negative for difficulty urinating and dyspareunia  Musculoskeletal: Negative for arthralgias and back pain  Skin: Negative for color change and pallor  Neurological: Negative for dizziness and facial asymmetry  Psychiatric/Behavioral: Negative for agitation and behavioral problems         Historical Information   Past Medical History:   Diagnosis Date    Disease of thyroid gland     GERD (gastroesophageal reflux disease)     Hyperlipidemia     Hypertension     Insomnia     MI (myocardial infarction) ([facility] [address] )      Past Surgical History:   Procedure Laterality Date    APPENDECTOMY      COLONOSCOPY      [id number]  LAST ASSESSED    CT GUIDED CHEST TUBE  8/7/2019    IR CHEST TUBE  8/2/2019    IR CHEST TUBE  8/6/2019     Family History   Problem Relation Age of Onset    No Known Problems Father     Heart disease Family      Social History   Social History     Substance and Sexual Activity   Alcohol Use Yes     Social History     Substance and Sexual Activity   Drug Use No     Social History Tobacco Use   Smoking Status Never Smoker   Smokeless Tobacco Never Used       Meds/Allergies     Current Outpatient Medications:     aspirin 81 mg chewable tablet, Chew 1 tablet (81 mg total) daily, Disp: 30 tablet, Rfl: 3    atorvastatin (LIPITOR) 40 mg tablet, TAKE 1 TABLET DAILY IN THE EVENING, Disp: 30 tablet, Rfl: 2    benzonatate (TESSALON PERLES) 100 mg capsule, Take 1 capsule (100 mg total) by mouth 3 (three) times a day as needed for cough, Disp: 21 capsule, Rfl: 0    bisacodyl (DULCOLAX) 10 mg suppository, Insert 1 suppository (10 mg total) into the rectum daily as needed for constipation, Disp: 12 suppository, Rfl: 0    donepezil (ARICEPT) 10 mg tablet, TAKE 1 TABLET DAILY TAB/TAN/RND, Disp: 30 tablet, Rfl: 2    fluticasone (FLONASE) 50 mcg/act nasal spray, 2 sprays into each nostril daily, Disp: 16 g, Rfl: 3    levothyroxine 100 mcg tablet, TAKE 1 TABLET DAILY, Disp: 30 tablet, Rfl: 3    losartan (COZAAR) 50 mg tablet, Take 0 5 tablets (25 mg total) by mouth daily, Disp: 30 tablet, Rfl: 3    memantine (NAMENDA) 10 mg tablet, TAKE 1 TABLET TWICE DAILY, Disp: 60 tablet, Rfl: 2    metoprolol succinate (TOPROL-XL) 50 mg 24 hr tablet, TAKE 1 TABLET DAILY, Disp: 30 tablet, Rfl: 2    montelukast (SINGULAIR) 10 mg tablet, TAKE ONE TABLET AT BEDTIME, Disp: 30 tablet, Rfl: 0    omeprazole (PriLOSEC) 20 mg delayed release capsule, TAKE 1 CAPSULE DAILY, Disp: 30 capsule, Rfl: 2    senna-docusate sodium (SENOKOT S) 8 6-50 mg per tablet, Take 1 tablet by mouth 2 (two) times a day, Disp: 30 tablet, Rfl: 0  Allergies   Allergen Reactions    Alendronate      Other reaction(s): tooth decay    Diphenhydramine Hyperactivity    Penicillins Other (See Comments)     Reaction not listed; however, has tolerated Ceftriaxone and Cefepime which have different side chains  Vitals: Blood pressure 110/60, pulse 64, temperature 98 9 °F (37 2 °C), temperature source Tympanic, resp   rate 18, height 5' 5" (1 651 m), weight 45 4 kg (100 lb), SpO2 98 %  Body mass index is 16 64 kg/m²  Oxygen Therapy  SpO2: 98 %RA    Physical Exam:  Physical Exam   Constitutional: She is oriented to person, place, and time  She appears well-developed and well-nourished  HENT:   Head: Normocephalic and atraumatic  Neck: Normal range of motion  Neck supple  No tracheal deviation present  No thyromegaly present  Cardiovascular: Normal rate, regular rhythm and normal heart sounds  Exam reveals no gallop and no friction rub  No murmur heard  Pulmonary/Chest: Effort normal and breath sounds normal  No accessory muscle usage or stridor  No tachypnea and no bradypnea  No respiratory distress  She has no decreased breath sounds  She has no wheezes  She has no rhonchi  She has no rales  She exhibits no tenderness  Abdominal: Soft  Bowel sounds are normal  She exhibits no distension  There is no tenderness  Musculoskeletal: Normal range of motion  She exhibits no edema or deformity  Neurological: She is alert and oriented to person, place, and time  Skin: Skin is warm and dry  Psychiatric: She has a normal mood and affect  Her behavior is normal        Imaging and other studies: I have personally reviewed pertinent films in PACS     Chest CT 08/12/2019 fluid tracking into major fissure improved ground-glass opacity throughout right upper and middle lobes    Pulmonary Results (PFTs, PSG): I have personally reviewed pertinent films in PACS     No PFTs recorded      LEE Herrera  [facility]        Portions of the record may have been created with voice recognition software  Occasional wrong word or "sound a like" substitutions may have occurred due to the inherent limitations of voice recognition software  Read the chart carefully and recognize, using context, where substitutions have occurred or contact the dictating provider

## 2019-09-17 LAB
MYCOBACTERIUM SPEC CULT: NORMAL
RHODAMINE-AURAMINE STN SPEC: NORMAL

## 2019-09-18 ENCOUNTER — TRANSCRIBE ORDERS (OUTPATIENT)
Dept: RADIOLOGY | Facility: HOSPITAL | Age: 84
End: 2019-09-18

## 2019-09-18 ENCOUNTER — HOSPITAL ENCOUNTER (OUTPATIENT)
Dept: RADIOLOGY | Facility: HOSPITAL | Age: 84
Discharge: HOME | End: 2019-09-18
Payer: MEDICARE

## 2019-09-18 DIAGNOSIS — J18.9 PNEUMONIA, UNSPECIFIED ORGANISM: ICD-10-CM

## 2019-09-18 PROCEDURE — 71046 X-RAY EXAM CHEST 2 VIEWS: CPT

## 2019-09-23 DIAGNOSIS — I25.10 CAD (CORONARY ARTERY DISEASE): ICD-10-CM

## 2019-09-24 RX ORDER — DOCUSATE SODIUM, SENNOSIDES 50; 8.6 MG/1; MG/1
TABLET ORAL
Qty: 60 TABLET | Refills: 3 | Status: SHIPPED | OUTPATIENT
Start: 2019-09-24 | End: 2020-07-30

## 2019-09-25 ENCOUNTER — TELEPHONE (OUTPATIENT)
Dept: PULMONOLOGY | Facility: HOSPITAL | Age: 84
End: 2019-09-25

## 2019-09-25 NOTE — TELEPHONE ENCOUNTER
Called patient and spoke to both Barby Torres and her   Barby Torres is occasionally confused  I discussed chest x-ray results that there is still a small pleural effusion on left side  I stated that the body will likely reabsorbed this however there is a chance that this fluid can reaccumulate and at this time we will just monitor for symptom management  I educated patient on symptoms such as shortness of breath, increased dyspnea, lying flat having shortness of breath would all be signs of fluid reaccumulation  All questions were answered and patient was advised to call office if any acute symptoms were to arise

## 2019-09-27 DIAGNOSIS — J45.20 MILD INTERMITTENT ASTHMA WITHOUT COMPLICATION: ICD-10-CM

## 2019-09-27 RX ORDER — MONTELUKAST SODIUM 10 MG/1
TABLET ORAL
Qty: 30 TABLET | Refills: 0 | Status: SHIPPED | OUTPATIENT
Start: 2019-09-27 | End: 2019-10-22 | Stop reason: SDUPTHER

## 2019-10-09 ENCOUNTER — TELEPHONE (OUTPATIENT)
Dept: DERMATOLOGY | Facility: CLINIC | Age: 84
End: 2019-10-09

## 2019-10-09 NOTE — TELEPHONE ENCOUNTER
Called pt to schedule an appt via wait list  Pt was going to check with primary doctor to see if appt was still needed  Pt will reach out to primary and then call back to let us know if appt is needed

## 2019-10-18 DIAGNOSIS — I10 ESSENTIAL HYPERTENSION: ICD-10-CM

## 2019-10-18 DIAGNOSIS — R41.3 MEMORY DEFICIT: ICD-10-CM

## 2019-10-18 DIAGNOSIS — E03.9 ACQUIRED HYPOTHYROIDISM: ICD-10-CM

## 2019-10-18 DIAGNOSIS — F03.90 DEMENTIA WITHOUT BEHAVIORAL DISTURBANCE, UNSPECIFIED DEMENTIA TYPE (HCC): ICD-10-CM

## 2019-10-18 DIAGNOSIS — K21.9 GASTROESOPHAGEAL REFLUX DISEASE WITHOUT ESOPHAGITIS: ICD-10-CM

## 2019-10-18 DIAGNOSIS — E78.5 HYPERLIPIDEMIA, UNSPECIFIED HYPERLIPIDEMIA TYPE: ICD-10-CM

## 2019-10-18 RX ORDER — METOPROLOL SUCCINATE 50 MG/1
TABLET, EXTENDED RELEASE ORAL
Qty: 30 TABLET | Refills: 2 | Status: SHIPPED | OUTPATIENT
Start: 2019-10-18 | End: 2020-07-30 | Stop reason: SDUPTHER

## 2019-10-18 RX ORDER — OMEPRAZOLE 20 MG/1
CAPSULE, DELAYED RELEASE ORAL
Qty: 30 CAPSULE | Refills: 2 | Status: SHIPPED | OUTPATIENT
Start: 2019-10-18 | End: 2020-02-05 | Stop reason: SDUPTHER

## 2019-10-18 RX ORDER — ATORVASTATIN CALCIUM 40 MG/1
TABLET, FILM COATED ORAL
Qty: 30 TABLET | Refills: 2 | Status: SHIPPED | OUTPATIENT
Start: 2019-10-18 | End: 2020-02-05 | Stop reason: SDUPTHER

## 2019-10-18 RX ORDER — DONEPEZIL HYDROCHLORIDE 10 MG/1
TABLET, FILM COATED ORAL
Qty: 30 TABLET | Refills: 2 | Status: SHIPPED | OUTPATIENT
Start: 2019-10-18 | End: 2020-07-30

## 2019-10-18 RX ORDER — MEMANTINE HYDROCHLORIDE 10 MG/1
TABLET ORAL
Qty: 60 TABLET | Refills: 2 | Status: SHIPPED | OUTPATIENT
Start: 2019-10-18 | End: 2020-02-05 | Stop reason: SDUPTHER

## 2019-10-22 DIAGNOSIS — J45.20 MILD INTERMITTENT ASTHMA WITHOUT COMPLICATION: ICD-10-CM

## 2019-10-22 RX ORDER — MONTELUKAST SODIUM 10 MG/1
TABLET ORAL
Qty: 30 TABLET | Refills: 0 | Status: SHIPPED | OUTPATIENT
Start: 2019-10-22 | End: 2019-11-19 | Stop reason: SDUPTHER

## 2019-10-23 RX ORDER — LEVOTHYROXINE SODIUM 0.1 MG/1
TABLET ORAL
Qty: 30 TABLET | Refills: 3 | Status: SHIPPED | OUTPATIENT
Start: 2019-10-23 | End: 2020-03-10 | Stop reason: SDUPTHER

## 2019-10-23 RX ORDER — LOSARTAN POTASSIUM 50 MG/1
50 TABLET ORAL DAILY
Qty: 30 TABLET | Refills: 3 | Status: SHIPPED | OUTPATIENT
Start: 2019-10-23 | End: 2020-07-30 | Stop reason: SDUPTHER

## 2019-10-25 DIAGNOSIS — M79.604 PAIN OF RIGHT LOWER EXTREMITY: Primary | ICD-10-CM

## 2019-10-25 RX ORDER — TRAMADOL HYDROCHLORIDE 50 MG/1
50 TABLET ORAL EVERY 6 HOURS PRN
Qty: 30 TABLET | Refills: 0 | Status: SHIPPED | OUTPATIENT
Start: 2019-10-25 | End: 2020-10-14 | Stop reason: HOSPADM

## 2019-10-25 NOTE — TELEPHONE ENCOUNTER
Pt states tylenol is not helping s/p skin graft procedure  Per Dr Sonido Mcmillan, pt should try Tramadol and check in on Monday

## 2019-11-19 DIAGNOSIS — J45.20 MILD INTERMITTENT ASTHMA WITHOUT COMPLICATION: ICD-10-CM

## 2019-11-19 RX ORDER — MONTELUKAST SODIUM 10 MG/1
TABLET ORAL
Qty: 30 TABLET | Refills: 0 | Status: SHIPPED | OUTPATIENT
Start: 2019-11-19 | End: 2020-07-30

## 2019-11-22 ENCOUNTER — TELEPHONE (OUTPATIENT)
Dept: FAMILY MEDICINE CLINIC | Facility: CLINIC | Age: 84
End: 2019-11-22

## 2019-11-22 NOTE — TELEPHONE ENCOUNTER
Melquiades Mcdermott called requesting an order for patient to receive physical therapy for her gait disturbance for 3 times a week      Please fax the order to [phone], Attn:  Therapy

## 2019-11-25 DIAGNOSIS — I10 ESSENTIAL HYPERTENSION: ICD-10-CM

## 2019-11-26 RX ORDER — LOSARTAN POTASSIUM 25 MG/1
TABLET ORAL
Qty: 30 TABLET | Refills: 0 | OUTPATIENT
Start: 2019-11-26

## 2019-12-02 ENCOUNTER — HOSPITAL ENCOUNTER (EMERGENCY)
Facility: HOSPITAL | Age: 84
Discharge: HOME | End: 2019-12-02
Attending: SURGERY | Admitting: SURGERY
Payer: MEDICARE

## 2019-12-02 ENCOUNTER — APPOINTMENT (EMERGENCY)
Dept: RADIOLOGY | Facility: HOSPITAL | Age: 84
End: 2019-12-02
Payer: MEDICARE

## 2019-12-02 VITALS
WEIGHT: 106.48 LBS | HEART RATE: 68 BPM | DIASTOLIC BLOOD PRESSURE: 74 MMHG | OXYGEN SATURATION: 93 % | SYSTOLIC BLOOD PRESSURE: 158 MMHG | RESPIRATION RATE: 16 BRPM | TEMPERATURE: 97.8 F
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
## 2019-12-02 DIAGNOSIS — S09.90XA INJURY OF HEAD, INITIAL ENCOUNTER: ICD-10-CM
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
## 2019-12-02 PROBLEM — S50.312A ABRASION OF LEFT ELBOW: Status: ACTIVE | Noted: 2019-12-02

## 2019-12-02 LAB
BASE EXCESS BLDA CALC-SCNC: 0 MMOL/L (ref -2–3)
CA-I BLD-SCNC: 1.19 MMOL/L (ref 1.12–1.32)
GLUCOSE SERPL-MCNC: 85 MG/DL (ref 65–140)
HCO3 BLDA-SCNC: 25.9 MMOL/L (ref 24–30)
HCT VFR BLD CALC: 37 % (ref 34.8–46.1)
HGB BLDA-MCNC: 12.6 G/DL (ref 11.5–15.4)
PCO2 BLD: 27 MMOL/L (ref 21–32)
PCO2 BLD: 44.7 MM HG (ref 42–50)
PH BLD: 7.37 [PH] (ref 7.3–7.4)
PO2 BLD: 16 MM HG (ref 35–45)
POTASSIUM BLD-SCNC: 3.7 MMOL/L (ref 3.5–5.3)
SAO2 % BLD FROM PO2: 20 % (ref 60–85)
SODIUM BLD-SCNC: 143 MMOL/L (ref 136–145)
SPECIMEN SOURCE: ABNORMAL

## 2019-12-02 PROCEDURE — 84132 ASSAY OF SERUM POTASSIUM: CPT

## 2019-12-02 PROCEDURE — 90471 IMMUNIZATION ADMIN: CPT

## 2019-12-02 PROCEDURE — 85014 HEMATOCRIT: CPT

## 2019-12-02 PROCEDURE — 82947 ASSAY GLUCOSE BLOOD QUANT: CPT

## 2019-12-02 PROCEDURE — 82803 BLOOD GASES ANY COMBINATION: CPT

## 2019-12-02 PROCEDURE — 82330 ASSAY OF CALCIUM: CPT

## 2019-12-02 PROCEDURE — 99282 EMERGENCY DEPT VISIT SF MDM: CPT | Performed by: SURGERY

## 2019-12-02 PROCEDURE — 90715 TDAP VACCINE 7 YRS/> IM: CPT | Performed by: EMERGENCY MEDICINE

## 2019-12-02 PROCEDURE — 70450 CT HEAD/BRAIN W/O DYE: CPT

## 2019-12-02 PROCEDURE — 84295 ASSAY OF SERUM SODIUM: CPT

## 2019-12-02 PROCEDURE — NC001 PR NO CHARGE: Performed by: EMERGENCY MEDICINE

## 2019-12-02 PROCEDURE — 72125 CT NECK SPINE W/O DYE: CPT

## 2019-12-02 PROCEDURE — 99284 EMERGENCY DEPT VISIT MOD MDM: CPT

## 2019-12-02 RX ADMIN — TETANUS TOXOID, REDUCED DIPHTHERIA TOXOID AND ACELLULAR PERTUSSIS VACCINE, ADSORBED 0.5 ML: 5; 2.5; 8; 8; 2.5 SUSPENSION INTRAMUSCULAR at 16:10

## 2019-12-02 NOTE — ED PROVIDER NOTES
ED Airway Note    Ashley Gonzalez is an 80 year female presenting as a Level B trauma  Patient stood up with her walker, walker collapsed, and patient fell down, striking the back of her head  No LOC, is at baseline mental status  On baby ASA  No blood thinners  Reports mild headache  GCS 15  Arrives in cervical collar  Protects own airway, no indication for intervention for airway protection       Madhavi Vega MD  12/02/19 [id number]

## 2019-12-02 NOTE — H&P
H&P Exam - Trauma   Brittani Anderson 80 y o  female MRN: [id number]  Unit/Bed#: ED 12 Encounter: [id number]    Assessment/Plan   Trauma Alert: Level B  Model of Arrival: Ambulance  Trauma Team: Attending Hamzah Kwong and Residents Gris  Consultants: None    Trauma Active Problems:   Patient Active Problem List    Diagnosis Date Noted    Fall from ground level 12/02/2019    Head injury 12/02/2019    Abrasion of left elbow 12/02/2019         Trauma Plan:     Head strike on ASA without LOC  - CT head and c-spine: no intracranial hemorrhage    Mechanical fall from ground level  - patient ambulated by nursing and observed to have steady gait with home walker    L elbow abrasion  - tetanus vaccine updated    Dispo: discharge to home with pcp f/u as needed  Discussed return precautions with patient  Chief Complaint: Head injury    History of Present Illness   HPI:  Brittani Anderson is a 80 y o  female who presents following a fall  Pt arrives from assisted living after witnessed fall by   Pt alert on arrival, reported to have tripped and fell forward and hit front of her head  No LOC  Denies pain or other complaints  Mechanism:Fall    Review of Systems   Constitutional: Negative for chills and fever  HENT: Negative for rhinorrhea and sore throat  Eyes: Negative for photophobia and visual disturbance  Respiratory: Negative for cough and shortness of breath  Cardiovascular: Negative for chest pain and palpitations  Gastrointestinal: Negative for abdominal pain, blood in stool, diarrhea, nausea and vomiting  Genitourinary: Negative for dysuria and hematuria  Musculoskeletal: Negative for neck pain and neck stiffness  Skin: Negative for rash and wound  Neurological: Negative for syncope, facial asymmetry, speech difficulty, weakness, numbness and headaches  Psychiatric/Behavioral: Negative for agitation and confusion  All other systems reviewed and are negative        12-point, complete review of systems was reviewed and negative except as stated above  Historical Information     No past medical history on file  No past surgical history on file  Social History   Social History     Substance and Sexual Activity   Alcohol Use Not on file     Social History     Substance and Sexual Activity   Drug Use Not on file     Social History     Tobacco Use   Smoking Status Not on file     Immunization History   Administered Date(s) Administered    Tdap 12/02/2019     Last Tetanus: unknown  Family History: Non-contributory        Meds/Allergies   - aspirin 81 mg daily    NKDA          PHYSICAL EXAM    Objective   Vitals:   First set: Temperature: 97 8 °F (36 6 °C) (12/02/19 1532)  Pulse: 72 (12/02/19 1532)  Respirations: 16 (12/02/19 1532)  Blood Pressure: (!) 198/84 (12/02/19 1532)    Primary Survey:   (A) Airway: patent  (B) Breathing: BL breath sounds, no resp distress  (C) Circulation: Pulses:   pedal  2/4, radial  2/4 and femoral  2/4  (D) Disabliity:  GCS Total:  15  (E) Expose:  Completed    Secondary Survey: (Click on Physical Exam tab above)  Physical Exam   Constitutional: She appears well-developed  No distress  HENT:   Head: Normocephalic  Right Ear: External ear normal    Left Ear: External ear normal    Nose: Nose normal    Mouth/Throat: Oropharynx is clear and moist    Eyes: Conjunctivae and EOM are normal    Neck: No tracheal deviation present  Cardiovascular: Normal rate, normal heart sounds and intact distal pulses  Pulmonary/Chest: Effort normal and breath sounds normal  No stridor  No respiratory distress  She has no wheezes  She has no rales  She exhibits no tenderness  Abdominal: Soft  She exhibits no distension  There is no tenderness  There is no rebound and no guarding  Musculoskeletal: She exhibits no tenderness or deformity  No C/T/L spine tenderness, upper and lower extremities nontender to palpation with full ROM and intact motor and sensation bilaterally   Abrasion over L elbow  Neurological: She is alert  No cranial nerve deficit or sensory deficit  She exhibits normal muscle tone  Skin: Skin is warm and dry  Capillary refill takes less than 2 seconds  She is not diaphoretic  Psychiatric: Her behavior is normal    Nursing note and vitals reviewed  Invasive Devices     None                 Lab Results: Results: I have personally reviewed pertinent reports  Imaging/EKG Studies: Results: I have personally reviewed pertinent reports      Other Studies: n/a    Code Status: No Order  Advance Directive and Living Will:      Power of :    POLST:

## 2019-12-05 ENCOUNTER — OFFICE VISIT (OUTPATIENT)
Dept: GERIATRICS | Facility: CLINIC | Age: 84
End: 2019-12-05
Payer: MEDICARE

## 2019-12-05 VITALS
BODY MASS INDEX: 17.99 KG/M2 | SYSTOLIC BLOOD PRESSURE: 128 MMHG | DIASTOLIC BLOOD PRESSURE: 72 MMHG | OXYGEN SATURATION: 98 % | HEART RATE: 65 BPM | WEIGHT: 108 LBS | HEIGHT: 65 IN

## 2019-12-05 DIAGNOSIS — S09.90XD INJURY OF HEAD, SUBSEQUENT ENCOUNTER: ICD-10-CM

## 2019-12-05 DIAGNOSIS — I25.10 CORONARY ARTERY DISEASE, ANGINA PRESENCE UNSPECIFIED, UNSPECIFIED VESSEL OR LESION TYPE, UNSPECIFIED WHETHER NATIVE OR TRANSPLANTED HEART: ICD-10-CM

## 2019-12-05 DIAGNOSIS — D04.71 SQUAMOUS CELL CARCINOMA IN SITU (SCCIS) OF SKIN OF RIGHT LOWER LEG: ICD-10-CM

## 2019-12-05 DIAGNOSIS — E03.9 ACQUIRED HYPOTHYROIDISM: Primary | ICD-10-CM

## 2019-12-05 DIAGNOSIS — K22.0 ACHALASIA OF ESOPHAGUS: ICD-10-CM

## 2019-12-05 DIAGNOSIS — F03.90 DEMENTIA WITHOUT BEHAVIORAL DISTURBANCE, UNSPECIFIED DEMENTIA TYPE (HCC): ICD-10-CM

## 2019-12-05 PROCEDURE — 99214 OFFICE O/P EST MOD 30 MIN: CPT | Performed by: INTERNAL MEDICINE

## 2019-12-05 NOTE — ASSESSMENT & PLAN NOTE
Patient on aricept and memantine  Today she is able to provide a reliable history and is answering questions appropriately

## 2019-12-05 NOTE — PROGRESS NOTES
[facility] Progress Note    NAME: Marixa Jensen  AGE: 80 y o  SEX: female [id number]    DATE OF ENCOUNTER: 12/5/2019    Assessment and Plan     Problem List Items Addressed This Visit        Digestive    Achalasia of esophagus     Patient complaining of increased frequency of dysphagia with solid foods  Will have patient follow up with GI specialist in [place] (leaving tomorrow, won't return until May 2020)  Endocrine    Acquired hypothyroidism - Primary     On levothyroxine  TSH last year was 0 03  Will order TSH and free T4 levels  Relevant Orders    TSH, 3rd generation with Free T4 reflex       Cardiovascular and Mediastinum    CAD (coronary artery disease)     Currently on memantine and metoprolol  Given that they both reduce HR, will closely monitor vital signs  Today, HR noted to be in low 60s            Nervous and Auditory    Dementia (HCC)     Patient on aricept and memantine  Today she is able to provide a reliable history and is answering questions appropriately  Musculoskeletal and Integument    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     Patient with recent resection  Healing well  Other    Head injury     Patient presents s/p mechanical fall with walker  ED evaluation negative  CT head scan negative for acute lesions and tetanus updated  On ASA  No blood thinners  All medications and routine orders were reviewed and updated as needed  Plan discussed with: Patient    Chief Complaint     Chief Complaint   Patient presents with    Fall        History of Present Illness     This is an 80year old  female with multiple comorbidities including HTN, GERD, CAD (on 81mg ASA), achalasia, hypothyroidism, squamous cell carcinoma of right leg, and dementia presents with  for a follow up visit s/p mechanical fall with her walker on 12/2/19   Per , patient was about to sit on her 4 wheeled walker when the walker collapsed, causing patient to fall backward and hit her head  She was evaluated in the ED  CT head scan was negative for any acute lesions and tetanus was updated given left elbow abrasion  Today, patient is doing overall well  She is able to give us a reliable history and answers questions appropriately  She is currently on aricept and memantine  Given that patient is also on metoprolol for CAD, will closely monitor heart rate  Today HR noted to be in low 60s  Patient has a history of hypothyroidism, and TSH levels from last year were very low at 0 03  Currently on levothyroxine  Will reorder TSH and free T4 levels  Patient and  will be leaving for [place] tomorrow and will not return until May 2020  They agree to provide us contact information to her [place] PCP so we can send results and notes  She also has a history of achalasia and has had interventional stretching by GI  Today, patient notes increasing frequency in dysphagia  Patient advised to follow up with GI specialist in [place]  Patient has a history of squamous cell carcinoma in situ of right leg that was resected  Healing well       HISTORY:  Past Surgical History:   Procedure Laterality Date    APPENDECTOMY      COLONOSCOPY      [id number]  LAST ASSESSED    CT GUIDED CHEST TUBE  8/7/2019    IR CHEST TUBE  8/2/2019    IR CHEST TUBE  8/6/2019      Past Medical History:   Diagnosis Date    Disease of thyroid gland     GERD (gastroesophageal reflux disease)     Hyperlipidemia     Hypertension     Insomnia     MI (myocardial infarction) ([address] )      Family History   Problem Relation Age of Onset    No Known Problems Father     Heart disease Family      Social History     Socioeconomic History    Marital status: /Civil Union     Spouse name: None    Number of children: None    Years of education: None    Highest education level: None   Occupational History    None   Social Needs    Financial resource strain: None    Food insecurity:     Worry: None     Inability: None    Transportation needs:     Medical: None     Non-medical: None   Tobacco Use    Smoking status: Never Smoker    Smokeless tobacco: Never Used   Substance and Sexual Activity    Alcohol use: Yes    Drug use: No    Sexual activity: None   Lifestyle    Physical activity:     Days per week: None     Minutes per session: None    Stress: None   Relationships    Social connections:     Talks on phone: None     Gets together: None     Attends Roman Catholic service: None     Active member of club or organization: None     Attends meetings of clubs or organizations: None     Relationship status: None    Intimate partner violence:     Fear of current or ex partner: None     Emotionally abused: None     Physically abused: None     Forced sexual activity: None   Other Topics Concern    None   Social History Narrative    None       Allergies: Allergies   Allergen Reactions    Alendronate      Other reaction(s): tooth decay    Diphenhydramine Hyperactivity    Penicillins Other (See Comments)     Reaction not listed; however, has tolerated Ceftriaxone and Cefepime which have different side chains  Review of Systems     Review of Systems   Constitutional: Negative  HENT: Positive for trouble swallowing (history of schatzki ring)  Negative for hearing loss  Eyes: Positive for visual disturbance (hx decreased visual acuity)  Respiratory: Negative  Negative for shortness of breath  Cardiovascular: Negative  Negative for chest pain and palpitations  Gastrointestinal: Positive for constipation (off and on, improved with Miralax)  Endocrine: Negative  Genitourinary: Negative  Negative for difficulty urinating  Musculoskeletal: Negative  Skin: Negative  Allergic/Immunologic: Negative  Neurological: Negative  Hematological: Negative  Psychiatric/Behavioral: Negative          PHQ-9 Depression Screening    PHQ-9:    Frequency of the following problems over the past two weeks:              Medications and orders       Current Outpatient Medications:     aspirin 81 mg chewable tablet, Chew 1 tablet (81 mg total) daily, Disp: 30 tablet, Rfl: 3    atorvastatin (LIPITOR) 40 mg tablet, TAKE 1 TABLET DAILY IN THE EVENING, Disp: 30 tablet, Rfl: 2    donepezil (ARICEPT) 10 mg tablet, TAKE 1 TABLET DAILY TAB/TAN/RND, Disp: 30 tablet, Rfl: 2    memantine (NAMENDA) 10 mg tablet, TAKE 1 TABLET TWICE DAILY, Disp: 60 tablet, Rfl: 2    metoprolol succinate (TOPROL-XL) 50 mg 24 hr tablet, TAKE 1 TABLET DAILY, Disp: 30 tablet, Rfl: 2    montelukast (SINGULAIR) 10 mg tablet, TAKE ONE TABLET AT BEDTIME, Disp: 30 tablet, Rfl: 0    omeprazole (PriLOSEC) 20 mg delayed release capsule, TAKE 1 CAPSULE DAILY, Disp: 30 capsule, Rfl: 2    bisacodyl (DULCOLAX) 10 mg suppository, Insert 1 suppository (10 mg total) into the rectum daily as needed for constipation, Disp: 12 suppository, Rfl: 0    levothyroxine 100 mcg tablet, TAKE 1 TABLET DAILY, Disp: 30 tablet, Rfl: 3    losartan (COZAAR) 50 mg tablet, TAKE 1 TABLET (50 MG TOTAL) BY MOUTH DAILY, Disp: 30 tablet, Rfl: 3    QC STOOL SOFTENER PLS LAXATIVE 8 6-50 MG per tablet, TAKE 1 TABLET TWICE DAILY, Disp: 60 tablet, Rfl: 3    traMADol (ULTRAM) 50 mg tablet, Take 1 tablet (50 mg total) by mouth every 6 (six) hours as needed for moderate pain, Disp: 30 tablet, Rfl: 0       Objective     Vitals:   Vitals:    12/05/19 1236   BP: 128/72   Pulse: 65   SpO2: 98%   Weight: 49 kg (108 lb)   Height: 5' 5" (1 651 m)       Physical Exam   Constitutional: She appears well-developed and well-nourished  HENT:   Head: Normocephalic and atraumatic  Right Ear: External ear normal    Left Ear: External ear normal    Mouth/Throat: Oropharynx is clear and moist    Eyes: Pupils are equal, round, and reactive to light   Conjunctivae and EOM are normal    Decreased visual acuity   Neck: Normal range of motion  Neck supple  Cardiovascular: Normal rate, regular rhythm, normal heart sounds and intact distal pulses  Pulmonary/Chest: Effort normal and breath sounds normal    Abdominal: Soft  Bowel sounds are normal    Musculoskeletal: Normal range of motion  Neurological: She is alert  Able to provide reliable history, answers questions appropriately   Skin: Skin is warm and dry  Patient wearing stockings, she reported resection site is well healing   Psychiatric: She has a normal mood and affect  Her behavior is normal    Nursing note and vitals reviewed  Pertinent Laboratory/Diagnostic Studies: The following labs/studies were reviewed please see facility chart for details

## 2019-12-05 NOTE — ASSESSMENT & PLAN NOTE
Currently on memantine and metoprolol  Given that they both reduce HR, will closely monitor vital signs   Today, HR noted to be in low 60s

## 2019-12-05 NOTE — ASSESSMENT & PLAN NOTE
Patient complaining of increased frequency of dysphagia with solid foods  Will have patient follow up with GI specialist in [place] (leaving tomorrow, won't return until May 2020)

## 2019-12-05 NOTE — ASSESSMENT & PLAN NOTE
Patient presents s/p mechanical fall with walker  ED evaluation negative  CT head scan negative for acute lesions and tetanus updated  On ASA  No blood thinners

## 2019-12-05 NOTE — PATIENT INSTRUCTIONS
1 - Will order TSH and Free T4 levels to evaluate thyroid function  2 - Patient and  advised to bring all medications during next follow up visit to review medication regimen  3 - Patient advised to follow up with GI specialist in [place] regarding ongoing dysphagia and possible stretching of esophagus  4 - Patient advised to avoid dry, thick foods such as dry meats, and to instead intake shakes and softer foods  5 - Patient will follow up in our office next May 2020 when they return from [facility]  6 - Patient and  given my personal cell to contact me in case of emergency  7 -  will provide [place] PCP's contact information so we can forward patient's results and notes  8 - Tessalon perles and flonase discontinued

## 2020-01-08 DIAGNOSIS — I10 ESSENTIAL HYPERTENSION: ICD-10-CM

## 2020-01-08 DIAGNOSIS — K21.9 GASTROESOPHAGEAL REFLUX DISEASE WITHOUT ESOPHAGITIS: ICD-10-CM

## 2020-01-08 DIAGNOSIS — R41.3 MEMORY DEFICIT: ICD-10-CM

## 2020-01-08 DIAGNOSIS — E78.5 HYPERLIPIDEMIA, UNSPECIFIED HYPERLIPIDEMIA TYPE: ICD-10-CM

## 2020-01-09 RX ORDER — MEMANTINE HYDROCHLORIDE 10 MG/1
TABLET ORAL
Qty: 60 TABLET | Refills: 0 | OUTPATIENT
Start: 2020-01-09

## 2020-01-09 RX ORDER — ATORVASTATIN CALCIUM 40 MG/1
TABLET, FILM COATED ORAL
Qty: 30 TABLET | Refills: 0 | OUTPATIENT
Start: 2020-01-09

## 2020-01-09 RX ORDER — OMEPRAZOLE 20 MG/1
CAPSULE, DELAYED RELEASE ORAL
Qty: 30 CAPSULE | Refills: 0 | OUTPATIENT
Start: 2020-01-09

## 2020-01-09 RX ORDER — METOPROLOL SUCCINATE 50 MG/1
TABLET, EXTENDED RELEASE ORAL
Qty: 30 TABLET | Refills: 0 | OUTPATIENT
Start: 2020-01-09

## 2020-02-05 DIAGNOSIS — R41.3 MEMORY DEFICIT: ICD-10-CM

## 2020-02-05 DIAGNOSIS — E78.5 HYPERLIPIDEMIA, UNSPECIFIED HYPERLIPIDEMIA TYPE: ICD-10-CM

## 2020-02-05 DIAGNOSIS — K21.9 GASTROESOPHAGEAL REFLUX DISEASE WITHOUT ESOPHAGITIS: ICD-10-CM

## 2020-02-05 RX ORDER — MEMANTINE HYDROCHLORIDE 10 MG/1
10 TABLET ORAL 2 TIMES DAILY
Qty: 60 TABLET | Refills: 2 | Status: SHIPPED | OUTPATIENT
Start: 2020-02-05 | End: 2020-05-02

## 2020-02-05 RX ORDER — OMEPRAZOLE 20 MG/1
20 CAPSULE, DELAYED RELEASE ORAL DAILY
Qty: 30 CAPSULE | Refills: 2 | Status: SHIPPED | OUTPATIENT
Start: 2020-02-05 | End: 2020-05-02

## 2020-02-05 RX ORDER — ATORVASTATIN CALCIUM 40 MG/1
40 TABLET, FILM COATED ORAL EVERY EVENING
Qty: 30 TABLET | Refills: 2 | Status: SHIPPED | OUTPATIENT
Start: 2020-02-05 | End: 2020-05-02

## 2020-03-06 DIAGNOSIS — F03.90 DEMENTIA WITHOUT BEHAVIORAL DISTURBANCE, UNSPECIFIED DEMENTIA TYPE (HCC): ICD-10-CM

## 2020-03-06 DIAGNOSIS — E03.9 ACQUIRED HYPOTHYROIDISM: ICD-10-CM

## 2020-03-06 RX ORDER — LEVOTHYROXINE SODIUM 0.1 MG/1
TABLET ORAL
Qty: 30 TABLET | Refills: 5 | OUTPATIENT
Start: 2020-03-06

## 2020-03-06 RX ORDER — DONEPEZIL HYDROCHLORIDE 10 MG/1
TABLET, FILM COATED ORAL
Qty: 90 TABLET | Refills: 3 | OUTPATIENT
Start: 2020-03-06

## 2020-03-10 DIAGNOSIS — E03.9 ACQUIRED HYPOTHYROIDISM: ICD-10-CM

## 2020-03-11 RX ORDER — LEVOTHYROXINE SODIUM 0.1 MG/1
100 TABLET ORAL DAILY
Qty: 30 TABLET | Refills: 3 | Status: SHIPPED | OUTPATIENT
Start: 2020-03-11 | End: 2020-07-30

## 2020-04-30 DIAGNOSIS — E78.5 HYPERLIPIDEMIA, UNSPECIFIED HYPERLIPIDEMIA TYPE: ICD-10-CM

## 2020-04-30 DIAGNOSIS — K21.9 GASTROESOPHAGEAL REFLUX DISEASE WITHOUT ESOPHAGITIS: ICD-10-CM

## 2020-04-30 DIAGNOSIS — R41.3 MEMORY DEFICIT: ICD-10-CM

## 2020-05-02 RX ORDER — OMEPRAZOLE 20 MG/1
CAPSULE, DELAYED RELEASE ORAL
Qty: 30 CAPSULE | Refills: 2 | Status: SHIPPED | OUTPATIENT
Start: 2020-05-02 | End: 2020-07-08

## 2020-05-02 RX ORDER — MEMANTINE HYDROCHLORIDE 10 MG/1
TABLET ORAL
Qty: 60 TABLET | Refills: 2 | Status: SHIPPED | OUTPATIENT
Start: 2020-05-02 | End: 2020-07-08

## 2020-05-02 RX ORDER — ATORVASTATIN CALCIUM 40 MG/1
TABLET, FILM COATED ORAL
Qty: 30 TABLET | Refills: 2 | Status: SHIPPED | OUTPATIENT
Start: 2020-05-02 | End: 2020-07-08

## 2020-07-07 DIAGNOSIS — E78.5 HYPERLIPIDEMIA, UNSPECIFIED HYPERLIPIDEMIA TYPE: ICD-10-CM

## 2020-07-07 DIAGNOSIS — R41.3 MEMORY DEFICIT: ICD-10-CM

## 2020-07-07 DIAGNOSIS — K21.9 GASTROESOPHAGEAL REFLUX DISEASE WITHOUT ESOPHAGITIS: ICD-10-CM

## 2020-07-08 RX ORDER — ATORVASTATIN CALCIUM 40 MG/1
TABLET, FILM COATED ORAL
Qty: 30 TABLET | Refills: 2 | Status: SHIPPED | OUTPATIENT
Start: 2020-07-08 | End: 2020-08-26

## 2020-07-08 RX ORDER — OMEPRAZOLE 20 MG/1
CAPSULE, DELAYED RELEASE ORAL
Qty: 30 CAPSULE | Refills: 2 | Status: SHIPPED | OUTPATIENT
Start: 2020-07-08 | End: 2020-07-30

## 2020-07-08 RX ORDER — MEMANTINE HYDROCHLORIDE 10 MG/1
TABLET ORAL
Qty: 60 TABLET | Refills: 2 | Status: SHIPPED | OUTPATIENT
Start: 2020-07-08 | End: 2020-08-26

## 2020-07-29 DIAGNOSIS — I25.10 CAD (CORONARY ARTERY DISEASE): ICD-10-CM

## 2020-07-29 DIAGNOSIS — I10 ESSENTIAL HYPERTENSION: ICD-10-CM

## 2020-07-29 DIAGNOSIS — R41.3 MEMORY DEFICIT: ICD-10-CM

## 2020-07-29 DIAGNOSIS — E03.9 ACQUIRED HYPOTHYROIDISM: ICD-10-CM

## 2020-07-29 DIAGNOSIS — F03.90 DEMENTIA WITHOUT BEHAVIORAL DISTURBANCE, UNSPECIFIED DEMENTIA TYPE (HCC): ICD-10-CM

## 2020-07-29 DIAGNOSIS — E78.5 HYPERLIPIDEMIA, UNSPECIFIED HYPERLIPIDEMIA TYPE: ICD-10-CM

## 2020-07-29 DIAGNOSIS — K21.9 GASTROESOPHAGEAL REFLUX DISEASE WITHOUT ESOPHAGITIS: ICD-10-CM

## 2020-07-29 DIAGNOSIS — J45.20 MILD INTERMITTENT ASTHMA WITHOUT COMPLICATION: ICD-10-CM

## 2020-07-29 RX ORDER — ATORVASTATIN CALCIUM 40 MG/1
TABLET, FILM COATED ORAL
Qty: 30 TABLET | Refills: 2 | OUTPATIENT
Start: 2020-07-29

## 2020-07-29 RX ORDER — METOPROLOL SUCCINATE 50 MG/1
TABLET, EXTENDED RELEASE ORAL
Qty: 30 TABLET | Refills: 2 | OUTPATIENT
Start: 2020-07-29

## 2020-07-29 RX ORDER — MEMANTINE HYDROCHLORIDE 10 MG/1
TABLET ORAL
Qty: 60 TABLET | Refills: 2 | OUTPATIENT
Start: 2020-07-29

## 2020-07-30 DIAGNOSIS — D63.8 ANEMIA IN OTHER CHRONIC DISEASES CLASSIFIED ELSEWHERE: ICD-10-CM

## 2020-07-30 DIAGNOSIS — H26.9 CATARACT OF BOTH EYES, UNSPECIFIED CATARACT TYPE: Primary | ICD-10-CM

## 2020-07-30 DIAGNOSIS — I10 ESSENTIAL HYPERTENSION: ICD-10-CM

## 2020-07-30 DIAGNOSIS — D04.71 SQUAMOUS CELL CARCINOMA IN SITU (SCCIS) OF SKIN OF RIGHT LOWER LEG: ICD-10-CM

## 2020-07-30 RX ORDER — MONTELUKAST SODIUM 10 MG/1
TABLET ORAL
Qty: 30 TABLET | Refills: 0 | Status: SHIPPED | OUTPATIENT
Start: 2020-07-30 | End: 2020-08-26

## 2020-07-30 RX ORDER — LOSARTAN POTASSIUM 25 MG/1
TABLET ORAL
Qty: 90 TABLET | Refills: 3 | Status: SHIPPED | OUTPATIENT
Start: 2020-07-30 | End: 2020-10-14 | Stop reason: HOSPADM

## 2020-07-30 RX ORDER — OMEPRAZOLE 20 MG/1
CAPSULE, DELAYED RELEASE ORAL
Qty: 30 CAPSULE | Refills: 2 | Status: SHIPPED | OUTPATIENT
Start: 2020-07-30 | End: 2020-09-28

## 2020-07-30 RX ORDER — DONEPEZIL HYDROCHLORIDE 10 MG/1
TABLET, FILM COATED ORAL
Qty: 30 TABLET | Refills: 2 | Status: SHIPPED | OUTPATIENT
Start: 2020-07-30 | End: 2020-09-28

## 2020-07-30 RX ORDER — LEVOTHYROXINE SODIUM 0.1 MG/1
TABLET ORAL
Qty: 30 TABLET | Refills: 3 | Status: SHIPPED | OUTPATIENT
Start: 2020-07-30 | End: 2020-10-15

## 2020-07-30 RX ORDER — DOCUSATE SODIUM, SENNOSIDES 50; 8.6 MG/1; MG/1
TABLET ORAL
Qty: 60 TABLET | Refills: 3 | Status: SHIPPED | OUTPATIENT
Start: 2020-07-30 | End: 2020-10-15

## 2020-08-03 ENCOUNTER — OFFICE VISIT (OUTPATIENT)
Dept: GERIATRICS | Facility: CLINIC | Age: 85
End: 2020-08-03
Payer: MEDICARE

## 2020-08-03 VITALS
HEART RATE: 53 BPM | RESPIRATION RATE: 16 BRPM | OXYGEN SATURATION: 98 % | DIASTOLIC BLOOD PRESSURE: 70 MMHG | SYSTOLIC BLOOD PRESSURE: 130 MMHG | TEMPERATURE: 98.7 F

## 2020-08-03 DIAGNOSIS — I10 ESSENTIAL HYPERTENSION: ICD-10-CM

## 2020-08-03 DIAGNOSIS — K22.2 SCHATZKI'S RING: ICD-10-CM

## 2020-08-03 DIAGNOSIS — E03.8 OTHER SPECIFIED HYPOTHYROIDISM: ICD-10-CM

## 2020-08-03 DIAGNOSIS — H35.30 MACULAR DEGENERATION OF BOTH EYES, UNSPECIFIED TYPE: ICD-10-CM

## 2020-08-03 DIAGNOSIS — K21.9 GASTROESOPHAGEAL REFLUX DISEASE WITHOUT ESOPHAGITIS: ICD-10-CM

## 2020-08-03 DIAGNOSIS — F02.81 DEMENTIA ASSOCIATED WITH OTHER UNDERLYING DISEASE WITH BEHAVIORAL DISTURBANCE (HCC): ICD-10-CM

## 2020-08-03 DIAGNOSIS — K22.0 ACHALASIA OF ESOPHAGUS: Primary | ICD-10-CM

## 2020-08-03 DIAGNOSIS — R26.9 GAIT DISTURBANCE: ICD-10-CM

## 2020-08-03 PROBLEM — E03.9 ACQUIRED HYPOTHYROIDISM: Status: RESOLVED | Noted: 2017-05-19 | Resolved: 2020-08-03

## 2020-08-03 PROCEDURE — 99214 OFFICE O/P EST MOD 30 MIN: CPT | Performed by: INTERNAL MEDICINE

## 2020-08-03 NOTE — PROGRESS NOTES
[address] [place] [address] Progress Note    NAME: Selma Car  AGE: 80 y o  SEX: female [id number]    DATE OF ENCOUNTER: 8/3/2020    Assessment and Plan     Problem List Items Addressed This Visit        Digestive    GERD (gastroesophageal reflux disease)     Patient is currently taking omeprazole 20 mg orally daily  Achalasia of esophagus - Primary     Patient had evidence of Schatzki's ring on previous endoscopy  She is having issues swallowing again and this will need to be revisited I will refer her to the gastroenterologist          Marthas ring     Patient had biopsies done of her Schatzki's ring which improved her swallowing he  states the problems are beginning to recur will refer back to endoscopy  Endocrine    Other specified hypothyroidism     Patient is currently taking 100 mcg of levothyroxine id to get a TSH  Relevant Orders    TSH, 3rd generation    Lipid panel       Cardiovascular and Mediastinum    Essential hypertension     Blood pressure is well controlled at present  Relevant Orders    Comprehensive metabolic panel       Nervous and Auditory    Dementia [facility])     Patient is currently taking Aricept and memantine  Patient appears to be doing well on this medication  Other    Gait disturbance     Patient is able to ambulate in her apartment with a walker  For longer distances she needs to go on the wheelchair  Macular degeneration of both eyes     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  All medications and routine orders were reviewed and updated as needed  Plan discussed with: Family member    Chief Complaint     Chief Complaint   Patient presents with    Follow-up     Patient was in [place]        History of Present Illness     Patient is a rosalio 45-year-old  female  who enjoys the company of her     They have been  for 79 years he is her caregiver she is followed in the practice for  cognitive impairment, hypothyroidism, hypertension, GERD, history of Schatzki's rings  Patient was noted to have recurrent swallowing issues she had had success after previous endoscopy was done  I will need to refer her back to the gastroenterologist   She also recently came back from [place]  She is able to ambulate in her fall using a walker, at present she is mainly wheelchair-bound her  brace to to the office her cognition is poor she is legally blind  I will need to do some test a to evaluate her thyroid, CMP, CBC with  I will also need to get the records from her doctor in [place]  HISTORY:  Past Surgical History:   Procedure Laterality Date    APPENDECTOMY      COLONOSCOPY      [id number]  LAST ASSESSED    CT GUIDED CHEST TUBE  8/7/2019    IR CHEST TUBE  8/2/2019    IR CHEST TUBE  8/6/2019      Past Medical History:   Diagnosis Date    Acquired hypothyroidism 5/19/2017    Disease of thyroid gland     GERD (gastroesophageal reflux disease)     Hyperlipidemia     Hypertension     Insomnia     MI (myocardial infarction) ([address] )      Family History   Problem Relation Age of Onset    No Known Problems Father     Heart disease Family      Social History     Socioeconomic History    Marital status: /Civil Union     Spouse name: Not on file    Number of children: Not on file    Years of education: Not on file    Highest education level: Not on file   Occupational History    Not on file   Social Needs    Financial resource strain: Not on file    Food insecurity     Worry: Not on file     Inability: Not on file    Transportation needs     Medical: Not on file     Non-medical: Not on file   Tobacco Use    Smoking status: Never Smoker    Smokeless tobacco: Never Used   Substance and Sexual Activity    Alcohol use:  Yes    Drug use: No    Sexual activity: Not on file   Lifestyle    Physical activity     Days per week: Not on file Minutes per session: Not on file    Stress: Not on file   Relationships    Social connections     Talks on phone: Not on file     Gets together: Not on file     Attends Anglican service: Not on file     Active member of club or organization: Not on file     Attends meetings of clubs or organizations: Not on file     Relationship status: Not on file    Intimate partner violence     Fear of current or ex partner: Not on file     Emotionally abused: Not on file     Physically abused: Not on file     Forced sexual activity: Not on file   Other Topics Concern    Not on file   Social History Narrative    Not on file       Allergies: Allergies   Allergen Reactions    Alendronate      Other reaction(s): tooth decay    Diphenhydramine Hyperactivity    Penicillins Other (See Comments)     Reaction not listed; however, has tolerated Ceftriaxone and Cefepime which have different side chains  Review of Systems     Review of Systems   Constitutional: Negative  HENT: Negative  Eyes: Positive for visual disturbance  Patient is unable to see because of macular degeneration to her  Respiratory: Negative  Cardiovascular: Negative  Gastrointestinal:        She patient has history of sulfa G of stricture which was stretched in the past she is beginning to have issues again with swallowing  Endocrine: Negative  Genitourinary: Negative  Musculoskeletal: Positive for arthralgias  Skin: Negative  Allergic/Immunologic: Negative  Neurological: Positive for weakness  Hematological: Negative  Psychiatric/Behavioral: Negative          PHQ-9 Depression Screening    PHQ-9:    Frequency of the following problems over the past two weeks:              Medications and orders       Current Outpatient Medications:     aspirin 81 mg chewable tablet, Chew 1 tablet (81 mg total) daily, Disp: 30 tablet, Rfl: 3    atorvastatin (LIPITOR) 40 mg tablet, TAKE 1 TABLET BY MOUTH EVERY EVENING, Disp: 30 tablet, Rfl: 2    bisacodyl (DULCOLAX) 10 mg suppository, Insert 1 suppository (10 mg total) into the rectum daily as needed for constipation, Disp: 12 suppository, Rfl: 0    donepezil (ARICEPT) 10 mg tablet, TAKE 1 TABLET DAILY TAB/TAN/RND, Disp: 30 tablet, Rfl: 2    levothyroxine 100 mcg tablet, TAKE 1 TABLET DAILY, Disp: 30 tablet, Rfl: 3    losartan (COZAAR) 25 mg tablet, TAKE ONE TABLET (25MG) DAILY, Disp: 90 tablet, Rfl: 3    memantine (NAMENDA) 10 mg tablet, TAKE 1 TABLET BY MOUTH 2 TIMES A DAY, Disp: 60 tablet, Rfl: 2    metoprolol succinate (TOPROL-XL) 50 mg 24 hr tablet, TAKE 1 TABLET DAILY, Disp: 30 tablet, Rfl: 2    montelukast (SINGULAIR) 10 mg tablet, TAKE ONE TABLET AT BEDTIME, Disp: 30 tablet, Rfl: 0    omeprazole (PriLOSEC) 20 mg delayed release capsule, TAKE 1 CAPSULE DAILY, Disp: 30 capsule, Rfl: 2    QC STOOL SOFTENER PLS LAXATIVE 8 6-50 MG per tablet, TAKE 1 TABLET TWICE DAILY, Disp: 60 tablet, Rfl: 3    traMADol (ULTRAM) 50 mg tablet, Take 1 tablet (50 mg total) by mouth every 6 (six) hours as needed for moderate pain, Disp: 30 tablet, Rfl: 0       Objective     Vitals:   Vitals:    08/03/20 1234   BP: 130/70   BP Location: Left arm   Patient Position: Sitting   Cuff Size: Standard   Pulse: (!) 53   Resp: 16   Temp: 98 7 °F (37 1 °C)   TempSrc: Temporal   SpO2: 98%       Physical Exam   Constitutional: She appears well-developed  HENT:   Head: Normocephalic and atraumatic  Right Ear: Tympanic membrane and ear canal normal    Left Ear: Tympanic membrane and ear canal normal    Nose: Nose normal    Mouth/Throat: Mucous membranes are moist  Oropharynx is clear  Eyes: Pupils are equal, round, and reactive to light  Conjunctivae are normal    Neck: Normal range of motion  Cardiovascular: Normal rate, regular rhythm and normal heart sounds  No murmur heard  Pulmonary/Chest: Effort normal and breath sounds normal  No respiratory distress  She has no wheezes  She has no rales  Abdominal: Soft  Normal appearance and bowel sounds are normal  There is no abdominal tenderness  Musculoskeletal:         General: No tenderness  Neurological: She is alert  Skin: Skin is warm and dry  She is not diaphoretic  Psychiatric: Her behavior is normal  Mood and thought content normal        Pertinent Laboratory/Diagnostic Studies: The following labs/studies were reviewed please see facility chart for details

## 2020-08-03 NOTE — ASSESSMENT & PLAN NOTE
Patient is currently taking Aricept and memantine  Patient appears to be doing well on this medication

## 2020-08-03 NOTE — ASSESSMENT & PLAN NOTE
Patient had biopsies done of her Schatzki's ring which improved her swallowing he  states the problems are beginning to recur will refer back to endoscopy

## 2020-08-03 NOTE — ASSESSMENT & PLAN NOTE
Patient had evidence of Schatzki's ring on previous endoscopy    She is having issues swallowing again and this will need to be revisited I will refer her to the gastroenterologist

## 2020-08-03 NOTE — ASSESSMENT & PLAN NOTE
Patient is able to ambulate in her apartment with a walker  For longer distances she needs to go on the wheelchair

## 2020-08-05 ENCOUNTER — TELEPHONE (OUTPATIENT)
Dept: GERIATRICS | Facility: CLINIC | Age: 85
End: 2020-08-05

## 2020-08-05 DIAGNOSIS — R53.1 GENERALIZED WEAKNESS: Primary | ICD-10-CM

## 2020-08-05 DIAGNOSIS — R26.9 GAIT DISTURBANCE: ICD-10-CM

## 2020-08-05 DIAGNOSIS — K22.0 ACHALASIA OF ESOPHAGUS: ICD-10-CM

## 2020-08-05 DIAGNOSIS — R53.81 PHYSICAL DECONDITIONING: ICD-10-CM

## 2020-08-05 DIAGNOSIS — K22.2 SCHATZKI'S RING: ICD-10-CM

## 2020-08-05 NOTE — TELEPHONE ENCOUNTER
Phone call received from at home services due to patient deterioration  Orders that were requested are physical therapy, occupational therapy, and speech therapy

## 2020-08-07 RX ORDER — METOPROLOL SUCCINATE 25 MG/1
25 TABLET, EXTENDED RELEASE ORAL DAILY
Qty: 30 TABLET | Refills: 2 | Status: ON HOLD | OUTPATIENT
Start: 2020-08-07 | End: 2020-10-14 | Stop reason: SDUPTHER

## 2020-08-07 RX ORDER — LANOLIN ALCOHOL/MO/W.PET/CERES
1000 CREAM (GRAM) TOPICAL
Qty: 30 TABLET | Refills: 2 | Status: SHIPPED | OUTPATIENT
Start: 2020-08-07 | End: 2020-10-14 | Stop reason: HOSPADM

## 2020-08-17 ENCOUNTER — OFFICE VISIT (OUTPATIENT)
Dept: GERIATRICS | Facility: CLINIC | Age: 85
End: 2020-08-17
Payer: MEDICARE

## 2020-08-17 VITALS
HEART RATE: 63 BPM | SYSTOLIC BLOOD PRESSURE: 108 MMHG | WEIGHT: 108 LBS | DIASTOLIC BLOOD PRESSURE: 48 MMHG | TEMPERATURE: 97.7 F | BODY MASS INDEX: 17.99 KG/M2 | HEIGHT: 65 IN | OXYGEN SATURATION: 97 %

## 2020-08-17 DIAGNOSIS — S81.811A SKIN TEAR OF RIGHT LOWER LEG WITHOUT COMPLICATION, INITIAL ENCOUNTER: Primary | ICD-10-CM

## 2020-08-17 DIAGNOSIS — K22.2 SCHATZKI'S RING: ICD-10-CM

## 2020-08-17 PROCEDURE — 99213 OFFICE O/P EST LOW 20 MIN: CPT | Performed by: INTERNAL MEDICINE

## 2020-08-17 NOTE — PROGRESS NOTES
Assessment/Plan:    1  Skin tear of right lower leg without complication, initial encounter  Assessment & Plan:  -secondary to trauma suffered during a fall  -she is doing well with current Steri-Strips treatment  -she was advised to continue to wash the area with soap and water as per her normal routine  -she was advised to dry the area completely  -she will continue with local care until the Steri-Strips fall off  -she was advised to contact the office if she should develop fever, redness, warmth, or drainage from the area  - was present to hear the instructions, also      2  Schatzki's ring  Assessment & Plan:  -patient notes difficulties swallowing solids with nausea and vomiting  -electronic message was sent to GI specialist to contact them for evaluation and management  -she and her  were advised to contact our office, if they do not hear from the GI specialist later today  -continue with monitoring      She will return to the wellness center for a wound check this Friday and follow-up at her regularly scheduled office visit on September 4, 2020 or sooner, if needed  Subjective:  She is here for an evaluation of a skin tear on her right lower leg that she suffered after a fall in the middle of last week  Patient ID: Sin Suarez is a 80 y o  female  She is an 80-year-old woman who is accompanied by her  and seen in the office for an acute visit to evaluate a skin tear on her right shin  While ambulating with her walker, one of the skids on the front leg became stuck and she fell forward  She suffered a skin tear on her right shin and bumped her forehead  She notes some initial pain in her forehead and right leg that have since resolved  She denies difficulty with her vision or focal weakness  Her leg does not bother her, either    She was seen by a representative from the wellness center at her continuing care [facility] and Steri-Strips were applied to the skin tear on her right leg  She continues to have difficulty swallowing and vomited this morning by report  Her  has been crushing her medications so that she may take them  The following portions of the patient's history were reviewed and updated as appropriate: allergies, current medications, past family history, past medical history, past social history, past surgical history and problem list     Review of Systems   Constitutional: Negative for fever  Gastrointestinal: Positive for nausea and vomiting  See HPI  Musculoskeletal: Negative for myalgias  Skin: Positive for wound  See HPI  Objective:    BP (!) 108/48   Pulse 63   Temp 97 7 °F (36 5 °C) (Temporal)   Ht 5' 5" (1 651 m)   Wt 49 kg (108 lb)   SpO2 97%   BMI 17 97 kg/m²      Physical Exam  Vitals signs reviewed  Exam conducted with a chaperone present  Constitutional:       General: She is not in acute distress  Appearance: She is underweight  She is not ill-appearing, toxic-appearing or diaphoretic  Comments: She appears comfortable in her wheelchair, stated age, and very frail  Eyes:      General: No scleral icterus  Extraocular Movements: Extraocular movements intact  Conjunctiva/sclera: Conjunctivae normal    Neck:      Comments: Active range of motion of her cervical spine is without discomfort  Skin:     General: Skin is warm and dry  Comments: Examination of the skin overlying the middle aspect of her right shin reveals a small wound with Steri-Strips in place  There is no erythema, drainage, or bleeding  There is a small area of ecchymosis on her left forehead that is almost resolved  There is no open areas  Neurological:      Mental Status: She is alert  Comments: No focal motor deficits in upper and lower extremities

## 2020-08-17 NOTE — ASSESSMENT & PLAN NOTE
-patient notes difficulties swallowing solids with nausea and vomiting  -electronic message was sent to GI specialist to contact them for evaluation and management  -she and her  were advised to contact our office, if they do not hear from the GI specialist later today  -continue with monitoring

## 2020-08-17 NOTE — ASSESSMENT & PLAN NOTE
-secondary to trauma suffered during a fall  -she is doing well with current Steri-Strips treatment  -she was advised to continue to wash the area with soap and water as per her normal routine  -she was advised to dry the area completely  -she will continue with local care until the Steri-Strips fall off  -she was advised to contact the office if she should develop fever, redness, warmth, or drainage from the area  - was present to hear the instructions, also

## 2020-08-24 ENCOUNTER — TRANSCRIBE ORDERS (OUTPATIENT)
Dept: ADMINISTRATIVE | Facility: HOSPITAL | Age: 85
End: 2020-08-24

## 2020-08-24 DIAGNOSIS — R41.3 MEMORY DEFICIT: ICD-10-CM

## 2020-08-24 DIAGNOSIS — J45.20 MILD INTERMITTENT ASTHMA WITHOUT COMPLICATION: ICD-10-CM

## 2020-08-24 DIAGNOSIS — R13.10 DYSPHAGIA, UNSPECIFIED TYPE: Primary | ICD-10-CM

## 2020-08-24 DIAGNOSIS — E78.5 HYPERLIPIDEMIA, UNSPECIFIED HYPERLIPIDEMIA TYPE: ICD-10-CM

## 2020-08-26 RX ORDER — ATORVASTATIN CALCIUM 40 MG/1
TABLET, FILM COATED ORAL
Qty: 30 TABLET | Refills: 2 | Status: SHIPPED | OUTPATIENT
Start: 2020-08-26 | End: 2020-11-10

## 2020-08-26 RX ORDER — MONTELUKAST SODIUM 10 MG/1
TABLET ORAL
Qty: 30 TABLET | Refills: 0 | Status: SHIPPED | OUTPATIENT
Start: 2020-08-26

## 2020-08-26 RX ORDER — MEMANTINE HYDROCHLORIDE 10 MG/1
TABLET ORAL
Qty: 60 TABLET | Refills: 2 | Status: SHIPPED | OUTPATIENT
Start: 2020-08-26 | End: 2020-11-10

## 2020-08-28 ENCOUNTER — HOSPITAL ENCOUNTER (OUTPATIENT)
Dept: RADIOLOGY | Facility: HOSPITAL | Age: 85
Discharge: HOME | End: 2020-08-28
Attending: INTERNAL MEDICINE
Payer: MEDICARE

## 2020-08-28 DIAGNOSIS — R13.10 DYSPHAGIA, UNSPECIFIED TYPE: ICD-10-CM

## 2020-08-28 PROCEDURE — 74220 X-RAY XM ESOPHAGUS 1CNTRST: CPT

## 2020-09-02 ENCOUNTER — HOSPITAL ENCOUNTER (OUTPATIENT)
Dept: GASTROENTEROLOGY | Facility: HOSPITAL | Age: 85
Setting detail: OUTPATIENT SURGERY
Discharge: HOME | End: 2020-09-02
Attending: INTERNAL MEDICINE | Admitting: INTERNAL MEDICINE
Payer: MEDICARE

## 2020-09-02 ENCOUNTER — ANESTHESIA (OUTPATIENT)
Dept: GASTROENTEROLOGY | Facility: HOSPITAL | Age: 85
End: 2020-09-02

## 2020-09-02 ENCOUNTER — ANESTHESIA EVENT (OUTPATIENT)
Dept: GASTROENTEROLOGY | Facility: HOSPITAL | Age: 85
End: 2020-09-02

## 2020-09-02 VITALS
DIASTOLIC BLOOD PRESSURE: 69 MMHG | HEART RATE: 65 BPM | OXYGEN SATURATION: 99 % | RESPIRATION RATE: 18 BRPM | WEIGHT: 110 LBS | HEIGHT: 63 IN | BODY MASS INDEX: 19.49 KG/M2 | SYSTOLIC BLOOD PRESSURE: 152 MMHG | TEMPERATURE: 98.3 F

## 2020-09-02 DIAGNOSIS — R13.10 DYSPHAGIA, UNSPECIFIED: ICD-10-CM

## 2020-09-02 PROBLEM — J18.9 PNEUMONIA, UNSPECIFIED ORGANISM: Status: RESOLVED | Noted: 2019-07-26 | Resolved: 2020-09-02

## 2020-09-02 RX ORDER — SODIUM CHLORIDE 9 MG/ML
125 INJECTION, SOLUTION INTRAVENOUS CONTINUOUS
Status: DISCONTINUED | OUTPATIENT
Start: 2020-09-02 | End: 2020-09-06 | Stop reason: HOSPADM

## 2020-09-02 RX ORDER — LIDOCAINE HYDROCHLORIDE 10 MG/ML
INJECTION, SOLUTION EPIDURAL; INFILTRATION; INTRACAUDAL; PERINEURAL AS NEEDED
Status: DISCONTINUED | OUTPATIENT
Start: 2020-09-02 | End: 2020-09-02

## 2020-09-02 RX ORDER — PROPOFOL 10 MG/ML
INJECTION, EMULSION INTRAVENOUS AS NEEDED
Status: DISCONTINUED | OUTPATIENT
Start: 2020-09-02 | End: 2020-09-02

## 2020-09-02 RX ORDER — LABETALOL 20 MG/4 ML (5 MG/ML) INTRAVENOUS SYRINGE
AS NEEDED
Status: DISCONTINUED | OUTPATIENT
Start: 2020-09-02 | End: 2020-09-02

## 2020-09-02 RX ORDER — SODIUM CHLORIDE 9 MG/ML
INJECTION, SOLUTION INTRAVENOUS CONTINUOUS PRN
Status: DISCONTINUED | OUTPATIENT
Start: 2020-09-02 | End: 2020-09-02

## 2020-09-02 RX ADMIN — LABETALOL 20 MG/4 ML (5 MG/ML) INTRAVENOUS SYRINGE 5 MG: at 14:37

## 2020-09-02 RX ADMIN — ONABOTULINUMTOXINA 100 UNITS: 100 INJECTION, POWDER, LYOPHILIZED, FOR SOLUTION INTRADERMAL; INTRAMUSCULAR at 14:43

## 2020-09-02 RX ADMIN — LIDOCAINE HYDROCHLORIDE 50 MG: 10 INJECTION, SOLUTION EPIDURAL; INFILTRATION; INTRACAUDAL; PERINEURAL at 14:28

## 2020-09-02 RX ADMIN — PROPOFOL 90 MG: 10 INJECTION, EMULSION INTRAVENOUS at 14:28

## 2020-09-02 RX ADMIN — PROPOFOL 10 MG: 10 INJECTION, EMULSION INTRAVENOUS at 14:31

## 2020-09-02 RX ADMIN — SODIUM CHLORIDE 125 ML/HR: 0.9 INJECTION, SOLUTION INTRAVENOUS at 13:47

## 2020-09-02 RX ADMIN — SODIUM CHLORIDE: 9 INJECTION, SOLUTION INTRAVENOUS at 14:19

## 2020-09-02 NOTE — ANESTHESIA PREPROCEDURE EVALUATION
Procedure:  EGD    Relevant Problems   CARDIO  2017 TTE normal   (+) CAD (coronary artery disease)   (+) Essential hypertension      ENDO   (+) Other specified hypothyroidism      GI/HEPATIC   (+) Dysphagia (Achalasia)   (+) GERD (gastroesophageal reflux disease)      HEMATOLOGY   (+) Anemia      NEURO/PSYCH   (+) Dementia (HCC)      PULMONARY (within normal limits)   (+) Pneumonia, unspecified organism (Resolved)        Physical Exam    Airway    Mallampati score: II  TM Distance: >3 FB  Neck ROM: full     Dental   No notable dental hx     Cardiovascular      Pulmonary      Other Findings        Anesthesia Plan  ASA Score- 3     Anesthesia Type- IV sedation with anesthesia with ASA Monitors  Additional Monitors:   Airway Plan:           Plan Factors-Exercise tolerance (METS): <4 METS  Chart reviewed  EKG reviewed  Existing labs reviewed  Patient summary reviewed  Patient is not a current smoker  Induction-     Postoperative Plan-     Informed Consent- Anesthetic plan and risks discussed with patient  I personally reviewed this patient with the CRNA  Discussed and agreed on the Anesthesia Plan with the CRNA  Zeeshan Boateng

## 2020-09-02 NOTE — ANESTHESIA POSTPROCEDURE EVALUATION
Post-Op Assessment Note    CV Status:  Stable  Pain Score: 0    Pain management: adequate     Mental Status:  Sleepy   Hydration Status:  Stable   PONV Controlled:  None   Airway Patency:  Patent and adequate      Post Op Vitals Reviewed: Yes      Staff: CRNA         No complications documented      BP  170/77   Temp      Pulse  79   Resp   18   SpO2   100%

## 2020-09-02 NOTE — H&P
History and Physical - [facility]  Patrick Trinh 80 y o  female MRN: [id number]                  HPI: Patrick Trinh is a 80y o  year old female who presents for EGD with possible esophageal dilatation and possible Botox injection into the region of the lower esophageal sphincter     The indications for the procedure:  Dysphagia  Recent barium esophagogram showed distended esophagus with distal narrowing, possible stricture versus achalasia  On August 30, 2019 underwent EGD and biopsy disruption of small Schatzki's ring  REVIEW OF SYSTEMS: Per the HPI, and otherwise unremarkable      Historical Information   Past Medical History:   Diagnosis Date    Acquired hypothyroidism 5/19/2017    Disease of thyroid gland     GERD (gastroesophageal reflux disease)     Hyperlipidemia     Hypertension     Insomnia     MI (myocardial infarction) ([address] )      Past Surgical History:   Procedure Laterality Date    APPENDECTOMY      COLONOSCOPY      03OCT2012  LAST ASSESSED    CT GUIDED CHEST TUBE  8/7/2019    IR CHEST TUBE PLACEMENT  8/2/2019    IR CHEST TUBE PLACEMENT  8/6/2019     Social History   Social History     Substance and Sexual Activity   Alcohol Use Yes    Alcohol/week: 3 0 standard drinks    Types: 3 Shots of liquor per week    Frequency: 4 or more times a week    Drinks per session: 3 or 4    Binge frequency: Never    Comment: 2-3 ounces of vodka a day     Social History     Substance and Sexual Activity   Drug Use No     Social History     Tobacco Use   Smoking Status Never Smoker   Smokeless Tobacco Never Used     Family History   Problem Relation Age of Onset    No Known Problems Father     Heart disease Family        Meds/Allergies     (Not in a hospital admission)      Allergies   Allergen Reactions    Alendronate      Other reaction(s): tooth decay    Diphenhydramine Hyperactivity    Penicillins Other (See Comments)     Reaction not listed; however, has tolerated Ceftriaxone and Cefepime which have different side chains  Objective     BP (!) 189/79   Pulse 62   Temp 98 3 °F (36 8 °C) (Oral)   Resp 16   Ht 5' 3" (1 6 m)   Wt 49 9 kg (110 lb)   SpO2 99%   BMI 19 49 kg/m²       PHYSICAL EXAM    Gen: NAD  CV: RRR  CHEST: Clear  ABD: soft, NT/ND  EXT: no edema      ASSESSMENT/PLAN:  This is a 80y o  year old female here for EGD with possible esophageal dilatation and possible Botox injection, and she is stable and optimized for her procedure

## 2020-09-04 ENCOUNTER — OFFICE VISIT (OUTPATIENT)
Dept: GERIATRICS | Facility: CLINIC | Age: 85
End: 2020-09-04
Payer: MEDICARE

## 2020-09-04 VITALS
SYSTOLIC BLOOD PRESSURE: 110 MMHG | OXYGEN SATURATION: 99 % | TEMPERATURE: 98.7 F | BODY MASS INDEX: 18.61 KG/M2 | DIASTOLIC BLOOD PRESSURE: 62 MMHG | HEART RATE: 65 BPM | HEIGHT: 63 IN | WEIGHT: 105 LBS

## 2020-09-04 DIAGNOSIS — R13.19 OTHER DYSPHAGIA: Primary | ICD-10-CM

## 2020-09-04 DIAGNOSIS — K21.00 GASTROESOPHAGEAL REFLUX DISEASE WITH ESOPHAGITIS: ICD-10-CM

## 2020-09-04 PROBLEM — K22.2 SCHATZKI'S RING: Status: RESOLVED | Noted: 2019-08-19 | Resolved: 2020-09-04

## 2020-09-04 PROCEDURE — 99214 OFFICE O/P EST MOD 30 MIN: CPT | Performed by: INTERNAL MEDICINE

## 2020-09-04 NOTE — ASSESSMENT & PLAN NOTE
Patient dysphagia is markedly improved at present she will be seen Gastroenterology in the next few weeks

## 2020-09-04 NOTE — PROGRESS NOTES
[facility] Progress Note    NAME: Allison Ohraa  AGE: 80 y o  SEX: female [id number]    DATE OF ENCOUNTER: 9/4/2020    Assessment and Plan     Problem List Items Addressed This Visit        Digestive    GERD (gastroesophageal reflux disease)     Patient with history of reflux currently on omeprazole 20 mg orally daily  She should continue on this dosage  Dysphagia - Primary     Patient dysphagia is markedly improved at present she will be seen Gastroenterology in the next few weeks  All medications and routine orders were reviewed and updated as needed  Plan discussed with: Patient    Chief Complaint     Chief Complaint   Patient presents with    Follow-up     wound check         History of Present Illness     Patient is an 41-year-old  female who had a history of dysphagia she has been followed by Gastroenterology on a regular basis  She returned to see Dr Sunita Pereira do had a dilated at [facility] ring previously  She apparently went to [facility] to get an esophagram last week and they found that a pill was not passing through  It subsequently did  Then she did undergo a procedure and IV inject the some Botox into the distal esophagus  The patient had some stretching of her esophagus and she feels 100% better she  is very happy that she can swallow without difficulty          HISTORY:  Past Surgical History:   Procedure Laterality Date    APPENDECTOMY      COLONOSCOPY      03OCT2012  LAST ASSESSED    CT GUIDED CHEST TUBE  8/7/2019    IR CHEST TUBE PLACEMENT  8/2/2019    IR CHEST TUBE PLACEMENT  8/6/2019      Past Medical History:   Diagnosis Date    Acquired hypothyroidism 5/19/2017    Disease of thyroid gland     GERD (gastroesophageal reflux disease)     Hyperlipidemia     Hypertension     Insomnia     MI (myocardial infarction) ([address] )      Family History   Problem Relation Age of Onset    No Known Problems Father  Heart disease Family      Social History     Socioeconomic History    Marital status: /Civil Union     Spouse name: Not on file    Number of children: Not on file    Years of education: Not on file    Highest education level: Not on file   Occupational History    Not on file   Social Needs    Financial resource strain: Not on file    Food insecurity     Worry: Not on file     Inability: Not on file   [employer] needs     Medical: Not on file     Non-medical: Not on file   Tobacco Use    Smoking status: Never Smoker    Smokeless tobacco: Never Used   Substance and Sexual Activity    Alcohol use: Yes     Alcohol/week: 3 0 standard drinks     Types: 3 Shots of liquor per week     Frequency: 4 or more times a week     Drinks per session: 3 or 4     Binge frequency: Never     Comment: 2-3 ounces of vodka a day    Drug use: No    Sexual activity: Not on file     Comment: She does not remember Medical/surgical history   Lifestyle    Physical activity     Days per week: Not on file     Minutes per session: Not on file    Stress: Not on file   Relationships    Social connections     Talks on phone: Not on file     Gets together: Not on file     Attends Faith service: Not on file     Active member of club or organization: Not on file     Attends meetings of clubs or organizations: Not on file     Relationship status: Not on file    Intimate partner violence     Fear of current or ex partner: Not on file     Emotionally abused: Not on file     Physically abused: Not on file     Forced sexual activity: Not on file   Other Topics Concern    Not on file   Social History Narrative    Not on file       Allergies: Allergies   Allergen Reactions    Alendronate      Other reaction(s): tooth decay    Diphenhydramine Hyperactivity    Penicillins Other (See Comments)     Reaction not listed; however, has tolerated Ceftriaxone and Cefepime which have different side chains         Review of Systems Review of Systems   Constitutional: Negative  HENT: Negative  Eyes: Positive for visual disturbance  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   Respiratory: Negative  Cardiovascular: Negative  Gastrointestinal: Negative  Endocrine: Negative  Genitourinary: Negative  Musculoskeletal: Negative  Skin: Negative  Allergic/Immunologic: Negative  Neurological: Negative  Hematological: Negative  Psychiatric/Behavioral: Negative          PHQ-9 Depression Screening    PHQ-9:    Frequency of the following problems over the past two weeks:              Medications and orders       Current Outpatient Medications:     aspirin 81 mg chewable tablet, Chew 1 tablet (81 mg total) daily, Disp: 30 tablet, Rfl: 3    atorvastatin (LIPITOR) 40 mg tablet, TAKE 1 TABLET DAILY IN THE EVENING, Disp: 30 tablet, Rfl: 2    bisacodyl (DULCOLAX) 10 mg suppository, Insert 1 suppository (10 mg total) into the rectum daily as needed for constipation, Disp: 12 suppository, Rfl: 0    donepezil (ARICEPT) 10 mg tablet, TAKE 1 TABLET DAILY TAB/TAN/RND, Disp: 30 tablet, Rfl: 2    levothyroxine 100 mcg tablet, TAKE 1 TABLET DAILY, Disp: 30 tablet, Rfl: 3    losartan (COZAAR) 25 mg tablet, TAKE ONE TABLET (25MG) DAILY, Disp: 90 tablet, Rfl: 3    memantine (NAMENDA) 10 mg tablet, TAKE 1 TABLET TWICE DAILY, Disp: 60 tablet, Rfl: 2    metoprolol succinate (TOPROL-XL) 25 mg 24 hr tablet, Take 1 tablet (25 mg total) by mouth daily IN THE EVENING , Disp: 30 tablet, Rfl: 2    montelukast (SINGULAIR) 10 mg tablet, TAKE ONE TABLET DAILY IN THE EVENING, Disp: 30 tablet, Rfl: 0    Multiple Vitamins-Minerals (Eye Vitamins) CAPS, Take 3 capsules by mouth daily TWO IN THE MORNING AND 1 IN THE EVENING, Disp: 90 capsule, Rfl: 2    omeprazole (PriLOSEC) 20 mg delayed release capsule, TAKE 1 CAPSULE DAILY, Disp: 30 capsule, Rfl: 2    QC STOOL SOFTENER PLS LAXATIVE 8 6-50 MG per tablet, TAKE 1 TABLET TWICE DAILY, Disp: 60 tablet, Rfl: 3    traMADol (ULTRAM) 50 mg tablet, Take 1 tablet (50 mg total) by mouth every 6 (six) hours as needed for moderate pain, Disp: 30 tablet, Rfl: 0    vitamin B-12 (VITAMIN B-12) 1,000 mcg tablet, Take 1 tablet (1,000 mcg total) by mouth daily in the early morning, Disp: 30 tablet, Rfl: 2  No current facility-administered medications for this visit  Facility-Administered Medications Ordered in Other Visits:     onabotulinumtoxin A (BOTOX) 100 Units in sodium chloride 0 9 % 5 mL injection, 100 Units, Intramuscular, Once, Kolton Krause MD    sodium chloride 0 9 % infusion, 125 mL/hr, Intravenous, Continuous, Patric Reeder MD, Last Rate: 125 mL/hr at 09/02/20 1347, 125 mL/hr at 09/02/20 1347       Objective     Vitals:   Vitals:    09/04/20 1208   BP: 110/62   Pulse: 65   Temp: 98 7 °F (37 1 °C)   TempSrc: Temporal   SpO2: 99%   Weight: 47 6 kg (105 lb)   Height: 5' 3" (1 6 m)       Physical Exam  Constitutional:       Appearance: She is well-developed and normal weight  She is not diaphoretic  HENT:      Head: Normocephalic and atraumatic  Right Ear: Tympanic membrane normal       Left Ear: Tympanic membrane normal       Nose: Nose normal       Mouth/Throat:      Mouth: Mucous membranes are moist       Pharynx: Oropharynx is clear  Eyes:      Extraocular Movements: Extraocular movements intact  Conjunctiva/sclera: Conjunctivae normal       Pupils: Pupils are equal, round, and reactive to light  Neck:      Musculoskeletal: Normal range of motion and neck supple  Cardiovascular:      Rate and Rhythm: Normal rate and regular rhythm  Pulses: Normal pulses  Heart sounds: Normal heart sounds  No murmur  Pulmonary:      Effort: Pulmonary effort is normal  No respiratory distress  Breath sounds: Normal breath sounds  No wheezing or rales  Abdominal:      General: Abdomen is flat  Bowel sounds are normal       Palpations: Abdomen is soft  Tenderness: There is no abdominal tenderness  Musculoskeletal:         General: No tenderness  Skin:     General: Skin is warm and dry  Neurological:      General: No focal deficit present  Mental Status: She is alert and oriented to person, place, and time  Mental status is at baseline  Psychiatric:         Mood and Affect: Mood normal          Behavior: Behavior normal          Thought Content: Thought content normal          Pertinent Laboratory/Diagnostic Studies: The following labs/studies were reviewed please see facility chart for details

## 2020-09-04 NOTE — ASSESSMENT & PLAN NOTE
Patient with history of reflux currently on omeprazole 20 mg orally daily  She should continue on this dosage

## 2020-09-16 ENCOUNTER — OFFICE VISIT (OUTPATIENT)
Dept: GERIATRICS | Facility: CLINIC | Age: 85
End: 2020-09-16
Payer: MEDICARE

## 2020-09-16 VITALS
TEMPERATURE: 96.8 F | RESPIRATION RATE: 16 BRPM | OXYGEN SATURATION: 98 % | WEIGHT: 107.9 LBS | SYSTOLIC BLOOD PRESSURE: 110 MMHG | DIASTOLIC BLOOD PRESSURE: 70 MMHG | HEART RATE: 60 BPM | HEIGHT: 63 IN | BODY MASS INDEX: 19.12 KG/M2

## 2020-09-16 DIAGNOSIS — F02.80 DEMENTIA ASSOCIATED WITH OTHER UNDERLYING DISEASE WITHOUT BEHAVIORAL DISTURBANCE (HCC): Primary | ICD-10-CM

## 2020-09-16 PROCEDURE — 99214 OFFICE O/P EST MOD 30 MIN: CPT | Performed by: INTERNAL MEDICINE

## 2020-09-16 NOTE — ASSESSMENT & PLAN NOTE
- Currently stable  - patient wishes to stay with her  and does not want to be   She states that she will be happy staying with him as much as possible  Considering patient's wishes and caregiver wants to continue to care for her, will have patient stay with her   - she does have help with her ADLs   - patient's  also prefers to have her stay with him and will get help if he needs more help  - Will continue periodic monitoring if at risk will consider long term placement

## 2020-09-16 NOTE — PROGRESS NOTES
[facility]  [address] [place] [address]  [place] [facility]    NAME: Darylene Silva  AGE: 80 y o  SEX: female    DATE OF ENCOUNTER: 9/16/2020      Assessment and Plan     Problem List Items Addressed This Visit        Nervous and Auditory    Dementia ([address] ) - Primary     - Currently stable  - patient wishes to stay with her  and does not want to be   She states that she will be happy staying with him as much as possible  Considering patient's wishes and caregiver wants to continue to care for her, will have patient stay with her   - she does have help with her ADLs   - patient's  also prefers to have her stay with him and will get help if he needs more help  - Will continue periodic monitoring if at risk will consider long term placement  All medications and routine orders were reviewed and updated as needed  Plan discussed with: Family member    Chief Complaint     Possible long term care placement evaluation    History of Present Illness     Mrs Yesica Subramanian is a 79 y/o female with GERD, Achalasia of esophagus, Hypothyrodism, Hyperlipidemia, CAD, Dementia, macular degeneration of both eyes, who [place] [facility] [place] for possible long term care placement  Her  is her primary care giver  In terms of her ADLs, she has nursing helping her with her showers and getting dressed  She is able to eat by her self and independent on her toileting  She does have difficulty with her short term memory  She is dependent on her  for her IADLs  In terms of her goals, patient wishes to stay with her  and does not need any additional help  Her  also reports no difficulty taking care of the patient and wishes to live with her and does not want to be   He is currently doing well with the help and if needed he is happy to get more help       The following portions of the patient's history were reviewed and updated as appropriate: current medications, past family history, past medical history, past social history, past surgical history and problem list     Allergies: Allergies   Allergen Reactions    Alendronate      Other reaction(s): tooth decay    Diphenhydramine Hyperactivity    Penicillins Other (See Comments)     Reaction not listed; however, has tolerated Ceftriaxone and Cefepime which have different side chains  Review of Systems     Review of Systems   Constitutional: Negative for activity change, appetite change, fatigue and fever  HENT: Negative for congestion, ear discharge, rhinorrhea and sore throat  Eyes: Negative for pain and discharge  Respiratory: Negative for cough, chest tightness and shortness of breath  Cardiovascular: Negative for chest pain, palpitations and leg swelling  Gastrointestinal: Negative for abdominal distention, abdominal pain, constipation, diarrhea, nausea and vomiting  Genitourinary: Negative for difficulty urinating, dysuria and frequency  Musculoskeletal: Negative for arthralgias, back pain and myalgias  Skin: Negative for rash  Neurological: Negative for dizziness, weakness, numbness and headaches  Psychiatric/Behavioral: Negative for sleep disturbance and suicidal ideas  Medications and orders     All medications reviewed and updated in Nursing Home EMR  Objective     Vitals:   Vitals:    09/16/20 0906   BP: 110/70   Pulse: 60   Resp: 16   Temp: (!) 96 8 °F (36 °C)   SpO2: 98%       Physical Exam  Constitutional:       General: She is not in acute distress  Appearance: She is well-developed  She is not diaphoretic  HENT:      Head: Normocephalic and atraumatic  Nose: Nose normal    Eyes:      General:         Right eye: No discharge  Left eye: No discharge  Conjunctiva/sclera: Conjunctivae normal       Pupils: Pupils are equal, round, and reactive to light     Neck: Musculoskeletal: Normal range of motion and neck supple  Cardiovascular:      Rate and Rhythm: Normal rate and regular rhythm  Pulmonary:      Effort: Pulmonary effort is normal  No respiratory distress  Breath sounds: Normal breath sounds  Abdominal:      General: Bowel sounds are normal  There is no distension  Palpations: Abdomen is soft  Tenderness: There is no abdominal tenderness  Musculoskeletal:         General: No tenderness  Comments: abnormal gait  Uses a walker   Skin:     General: Skin is warm  Neurological:      Mental Status: She is alert  Comments: Oriented to person, place but not to time  Forgetful         Pertinent Laboratory/Diagnostic Studies: The following labs were reviewed please see chart or hospital paperwork for details      TSH: 2 58    - Counseling Documentation: patient was counseled regarding: prognosis, patient and family education and importance of compliance with treatment    Mily Schaeffer MD  Geriatrics Fellow

## 2020-09-19 DIAGNOSIS — F03.90 DEMENTIA WITHOUT BEHAVIORAL DISTURBANCE, UNSPECIFIED DEMENTIA TYPE (HCC): ICD-10-CM

## 2020-09-19 DIAGNOSIS — K21.9 GASTROESOPHAGEAL REFLUX DISEASE WITHOUT ESOPHAGITIS: ICD-10-CM

## 2020-09-28 RX ORDER — DONEPEZIL HYDROCHLORIDE 10 MG/1
TABLET, FILM COATED ORAL
Qty: 30 TABLET | Refills: 2 | Status: SHIPPED | OUTPATIENT
Start: 2020-09-28 | End: 2021-01-26

## 2020-09-28 RX ORDER — OMEPRAZOLE 20 MG/1
CAPSULE, DELAYED RELEASE ORAL
Qty: 30 CAPSULE | Refills: 2 | Status: SHIPPED | OUTPATIENT
Start: 2020-09-28 | End: 2021-01-25

## 2020-10-13 ENCOUNTER — HOSPITAL ENCOUNTER (OUTPATIENT)
Facility: HOSPITAL | Age: 85
Setting detail: OBSERVATION
Discharge: HOME | End: 2020-10-14
Attending: EMERGENCY MEDICINE | Admitting: EMERGENCY MEDICINE
Payer: MEDICARE

## 2020-10-13 ENCOUNTER — APPOINTMENT (EMERGENCY)
Dept: RADIOLOGY | Facility: HOSPITAL | Age: 85
End: 2020-10-13
Payer: MEDICARE

## 2020-10-13 DIAGNOSIS — E86.0 DEHYDRATION: ICD-10-CM

## 2020-10-13 DIAGNOSIS — I10 ESSENTIAL HYPERTENSION: ICD-10-CM

## 2020-10-13 DIAGNOSIS — R53.1 GENERALIZED WEAKNESS: Primary | ICD-10-CM

## 2020-10-13 LAB
ALBUMIN SERPL BCP-MCNC: 3.7 G/DL (ref 3.5–5)
ALP SERPL-CCNC: 114 U/L (ref 46–116)
ALT SERPL W P-5'-P-CCNC: 21 U/L (ref 12–78)
AMMONIA PLAS-SCNC: 14 UMOL/L (ref 11–35)
ANION GAP SERPL CALCULATED.3IONS-SCNC: 4 MMOL/L (ref 4–13)
AST SERPL W P-5'-P-CCNC: 25 U/L (ref 5–45)
ATRIAL RATE: 55 BPM
BASOPHILS # BLD AUTO: 0.02 THOUSANDS/ΜL (ref 0–0.1)
BASOPHILS NFR BLD AUTO: 0 % (ref 0–1)
BILIRUB SERPL-MCNC: 0.6 MG/DL (ref 0.2–1)
BILIRUB UR QL STRIP: NEGATIVE
BUN SERPL-MCNC: 14 MG/DL (ref 5–25)
CALCIUM SERPL-MCNC: 9.5 MG/DL (ref 8.3–10.1)
CHLORIDE SERPL-SCNC: 108 MMOL/L (ref 100–108)
CLARITY UR: CLEAR
CO2 SERPL-SCNC: 29 MMOL/L (ref 21–32)
COLOR UR: YELLOW
COLOR, POC: NORMAL
CREAT SERPL-MCNC: 0.79 MG/DL (ref 0.6–1.3)
EOSINOPHIL # BLD AUTO: 0.07 THOUSAND/ΜL (ref 0–0.61)
EOSINOPHIL NFR BLD AUTO: 1 % (ref 0–6)
ERYTHROCYTE [DISTWIDTH] IN BLOOD BY AUTOMATED COUNT: 12.1 % (ref 11.6–15.1)
GFR SERPL CREATININE-BSD FRML MDRD: 67 ML/MIN/1.73SQ M
GLUCOSE SERPL-MCNC: 73 MG/DL (ref 65–140)
GLUCOSE UR STRIP-MCNC: NEGATIVE MG/DL
HCT VFR BLD AUTO: 43.1 % (ref 34.8–46.1)
HGB BLD-MCNC: 14.2 G/DL (ref 11.5–15.4)
HGB UR QL STRIP.AUTO: NEGATIVE
IMM GRANULOCYTES # BLD AUTO: 0.02 THOUSAND/UL (ref 0–0.2)
IMM GRANULOCYTES NFR BLD AUTO: 0 % (ref 0–2)
KETONES UR STRIP-MCNC: ABNORMAL MG/DL
LEUKOCYTE ESTERASE UR QL STRIP: NEGATIVE
LYMPHOCYTES # BLD AUTO: 1.67 THOUSANDS/ΜL (ref 0.6–4.47)
LYMPHOCYTES NFR BLD AUTO: 20 % (ref 14–44)
MCH RBC QN AUTO: 31.8 PG (ref 26.8–34.3)
MCHC RBC AUTO-ENTMCNC: 32.9 G/DL (ref 31.4–37.4)
MCV RBC AUTO: 97 FL (ref 82–98)
MONOCYTES # BLD AUTO: 0.6 THOUSAND/ΜL (ref 0.17–1.22)
MONOCYTES NFR BLD AUTO: 7 % (ref 4–12)
NEUTROPHILS # BLD AUTO: 6.08 THOUSANDS/ΜL (ref 1.85–7.62)
NEUTS SEG NFR BLD AUTO: 72 % (ref 43–75)
NITRITE UR QL STRIP: NEGATIVE
NRBC BLD AUTO-RTO: 0 /100 WBCS
P AXIS: 76 DEGREES
PH UR STRIP.AUTO: 7 [PH] (ref 4.5–8)
PLATELET # BLD AUTO: 152 THOUSANDS/UL (ref 149–390)
PMV BLD AUTO: 10.4 FL (ref 8.9–12.7)
POTASSIUM SERPL-SCNC: 4 MMOL/L (ref 3.5–5.3)
PR INTERVAL: 184 MS
PROT SERPL-MCNC: 7.6 G/DL (ref 6.4–8.2)
PROT UR STRIP-MCNC: NEGATIVE MG/DL
QRS AXIS: 57 DEGREES
QRSD INTERVAL: 68 MS
QT INTERVAL: 444 MS
QTC INTERVAL: 424 MS
RBC # BLD AUTO: 4.46 MILLION/UL (ref 3.81–5.12)
SARS-COV-2 RNA RESP QL NAA+PROBE: NEGATIVE
SODIUM SERPL-SCNC: 141 MMOL/L (ref 136–145)
SP GR UR STRIP.AUTO: 1.02 (ref 1–1.03)
T WAVE AXIS: 57 DEGREES
T4 FREE SERPL-MCNC: 1.1 NG/DL (ref 0.76–1.46)
TSH SERPL DL<=0.05 MIU/L-ACNC: 4.13 UIU/ML (ref 0.36–3.74)
UROBILINOGEN UR QL STRIP.AUTO: 0.2 E.U./DL
VENTRICULAR RATE: 55 BPM
WBC # BLD AUTO: 8.46 THOUSAND/UL (ref 4.31–10.16)

## 2020-10-13 PROCEDURE — 85025 COMPLETE CBC W/AUTO DIFF WBC: CPT | Performed by: EMERGENCY MEDICINE

## 2020-10-13 PROCEDURE — 93010 ELECTROCARDIOGRAM REPORT: CPT | Performed by: INTERNAL MEDICINE

## 2020-10-13 PROCEDURE — 82140 ASSAY OF AMMONIA: CPT | Performed by: EMERGENCY MEDICINE

## 2020-10-13 PROCEDURE — 84439 ASSAY OF FREE THYROXINE: CPT | Performed by: EMERGENCY MEDICINE

## 2020-10-13 PROCEDURE — 93005 ELECTROCARDIOGRAM TRACING: CPT

## 2020-10-13 PROCEDURE — 81003 URINALYSIS AUTO W/O SCOPE: CPT

## 2020-10-13 PROCEDURE — 36415 COLL VENOUS BLD VENIPUNCTURE: CPT | Performed by: EMERGENCY MEDICINE

## 2020-10-13 PROCEDURE — U0003 INFECTIOUS AGENT DETECTION BY NUCLEIC ACID (DNA OR RNA); SEVERE ACUTE RESPIRATORY SYNDROME CORONAVIRUS 2 (SARS-COV-2) (CORONAVIRUS DISEASE [COVID-19]), AMPLIFIED PROBE TECHNIQUE, MAKING USE OF HIGH THROUGHPUT TECHNOLOGIES AS DESCRIBED BY CMS-2020-01-R: HCPCS | Performed by: EMERGENCY MEDICINE

## 2020-10-13 PROCEDURE — 99285 EMERGENCY DEPT VISIT HI MDM: CPT

## 2020-10-13 PROCEDURE — 1123F ACP DISCUSS/DSCN MKR DOCD: CPT | Performed by: EMERGENCY MEDICINE

## 2020-10-13 PROCEDURE — 99285 EMERGENCY DEPT VISIT HI MDM: CPT | Performed by: EMERGENCY MEDICINE

## 2020-10-13 PROCEDURE — 96365 THER/PROPH/DIAG IV INF INIT: CPT

## 2020-10-13 PROCEDURE — 70450 CT HEAD/BRAIN W/O DYE: CPT

## 2020-10-13 PROCEDURE — 80053 COMPREHEN METABOLIC PANEL: CPT | Performed by: EMERGENCY MEDICINE

## 2020-10-13 PROCEDURE — G1004 CDSM NDSC: HCPCS

## 2020-10-13 PROCEDURE — 84443 ASSAY THYROID STIM HORMONE: CPT | Performed by: EMERGENCY MEDICINE

## 2020-10-13 PROCEDURE — 99220 PR INITIAL OBSERVATION CARE/DAY 70 MINUTES: CPT | Performed by: FAMILY MEDICINE

## 2020-10-13 RX ORDER — BENZONATATE 100 MG/1
100 CAPSULE ORAL 3 TIMES DAILY PRN
COMMUNITY
End: 2021-09-08 | Stop reason: ALTCHOICE

## 2020-10-13 RX ORDER — LIDOCAINE 4 G/G
PATCH TOPICAL
COMMUNITY
End: 2021-09-09 | Stop reason: ALTCHOICE

## 2020-10-13 RX ORDER — MEMANTINE HYDROCHLORIDE 5 MG/1
10 TABLET ORAL 2 TIMES DAILY
Status: DISCONTINUED | OUTPATIENT
Start: 2020-10-13 | End: 2020-10-14 | Stop reason: HOSPADM

## 2020-10-13 RX ORDER — ATORVASTATIN CALCIUM 40 MG/1
40 TABLET, FILM COATED ORAL EVERY EVENING
Status: DISCONTINUED | OUTPATIENT
Start: 2020-10-13 | End: 2020-10-14 | Stop reason: HOSPADM

## 2020-10-13 RX ORDER — SODIUM CHLORIDE, SODIUM GLUCONATE, SODIUM ACETATE, POTASSIUM CHLORIDE, MAGNESIUM CHLORIDE, SODIUM PHOSPHATE, DIBASIC, AND POTASSIUM PHOSPHATE .53; .5; .37; .037; .03; .012; .00082 G/100ML; G/100ML; G/100ML; G/100ML; G/100ML; G/100ML; G/100ML
1000 INJECTION, SOLUTION INTRAVENOUS ONCE
Status: COMPLETED | OUTPATIENT
Start: 2020-10-13 | End: 2020-10-13

## 2020-10-13 RX ORDER — ACETAMINOPHEN 325 MG/1
650 TABLET ORAL EVERY 4 HOURS PRN
COMMUNITY

## 2020-10-13 RX ORDER — PANTOPRAZOLE SODIUM 40 MG/1
40 TABLET, DELAYED RELEASE ORAL
Status: DISCONTINUED | OUTPATIENT
Start: 2020-10-14 | End: 2020-10-14 | Stop reason: HOSPADM

## 2020-10-13 RX ORDER — LOSARTAN POTASSIUM 25 MG/1
25 TABLET ORAL DAILY
Status: DISCONTINUED | OUTPATIENT
Start: 2020-10-13 | End: 2020-10-13

## 2020-10-13 RX ORDER — LOSARTAN POTASSIUM 50 MG/1
50 TABLET ORAL DAILY
Status: DISCONTINUED | OUTPATIENT
Start: 2020-10-14 | End: 2020-10-14 | Stop reason: HOSPADM

## 2020-10-13 RX ORDER — ASPIRIN 81 MG/1
81 TABLET, CHEWABLE ORAL DAILY
Status: DISCONTINUED | OUTPATIENT
Start: 2020-10-14 | End: 2020-10-14 | Stop reason: HOSPADM

## 2020-10-13 RX ORDER — FLUTICASONE PROPIONATE 50 MCG
1 SPRAY, SUSPENSION (ML) NASAL DAILY
COMMUNITY
End: 2020-10-16 | Stop reason: SDUPTHER

## 2020-10-13 RX ORDER — LEVOTHYROXINE SODIUM 0.1 MG/1
100 TABLET ORAL
Status: DISCONTINUED | OUTPATIENT
Start: 2020-10-14 | End: 2020-10-14 | Stop reason: HOSPADM

## 2020-10-13 RX ORDER — LOSARTAN POTASSIUM 50 MG/1
25 TABLET ORAL DAILY
COMMUNITY
End: 2021-10-21 | Stop reason: SDUPTHER

## 2020-10-13 RX ORDER — DONEPEZIL HYDROCHLORIDE 10 MG/1
10 TABLET, FILM COATED ORAL
Status: DISCONTINUED | OUTPATIENT
Start: 2020-10-13 | End: 2020-10-14 | Stop reason: HOSPADM

## 2020-10-13 RX ORDER — METOPROLOL SUCCINATE 50 MG/1
50 TABLET, EXTENDED RELEASE ORAL EVERY EVENING
Status: DISCONTINUED | OUTPATIENT
Start: 2020-10-13 | End: 2020-10-14 | Stop reason: HOSPADM

## 2020-10-13 RX ORDER — BISACODYL 10 MG
10 SUPPOSITORY, RECTAL RECTAL DAILY PRN
Status: DISCONTINUED | OUTPATIENT
Start: 2020-10-13 | End: 2020-10-14 | Stop reason: HOSPADM

## 2020-10-13 RX ORDER — MINERAL OIL 100 G/100G
1 OIL RECTAL DAILY PRN
COMMUNITY
End: 2021-09-08 | Stop reason: ALTCHOICE

## 2020-10-13 RX ORDER — SODIUM CHLORIDE 9 MG/ML
100 INJECTION, SOLUTION INTRAVENOUS CONTINUOUS
Status: DISCONTINUED | OUTPATIENT
Start: 2020-10-13 | End: 2020-10-14 | Stop reason: HOSPADM

## 2020-10-13 RX ORDER — MONTELUKAST SODIUM 10 MG/1
10 TABLET ORAL EVERY EVENING
Status: DISCONTINUED | OUTPATIENT
Start: 2020-10-13 | End: 2020-10-14 | Stop reason: HOSPADM

## 2020-10-13 RX ADMIN — METOPROLOL SUCCINATE 50 MG: 50 TABLET, EXTENDED RELEASE ORAL at 18:59

## 2020-10-13 RX ADMIN — SODIUM CHLORIDE 100 ML/HR: 0.9 INJECTION, SOLUTION INTRAVENOUS at 16:01

## 2020-10-13 RX ADMIN — MONTELUKAST 10 MG: 10 TABLET, FILM COATED ORAL at 18:59

## 2020-10-13 RX ADMIN — DONEPEZIL HYDROCHLORIDE 10 MG: 10 TABLET ORAL at 21:34

## 2020-10-13 RX ADMIN — MEMANTINE 10 MG: 5 TABLET ORAL at 18:59

## 2020-10-13 RX ADMIN — ATORVASTATIN CALCIUM 40 MG: 40 TABLET, FILM COATED ORAL at 19:00

## 2020-10-13 RX ADMIN — SODIUM CHLORIDE, SODIUM GLUCONATE, SODIUM ACETATE, POTASSIUM CHLORIDE, MAGNESIUM CHLORIDE, SODIUM PHOSPHATE, DIBASIC, AND POTASSIUM PHOSPHATE 1000 ML: .53; .5; .37; .037; .03; .012; .00082 INJECTION, SOLUTION INTRAVENOUS at 10:40

## 2020-10-14 VITALS
TEMPERATURE: 97.7 F | RESPIRATION RATE: 18 BRPM | DIASTOLIC BLOOD PRESSURE: 81 MMHG | OXYGEN SATURATION: 98 % | BODY MASS INDEX: 18.96 KG/M2 | HEIGHT: 63 IN | SYSTOLIC BLOOD PRESSURE: 132 MMHG | HEART RATE: 50 BPM | WEIGHT: 107 LBS

## 2020-10-14 DIAGNOSIS — E03.9 ACQUIRED HYPOTHYROIDISM: ICD-10-CM

## 2020-10-14 DIAGNOSIS — I25.10 CAD (CORONARY ARTERY DISEASE): ICD-10-CM

## 2020-10-14 LAB
ANION GAP SERPL CALCULATED.3IONS-SCNC: 4 MMOL/L (ref 4–13)
BUN SERPL-MCNC: 19 MG/DL (ref 5–25)
CALCIUM SERPL-MCNC: 8.6 MG/DL (ref 8.3–10.1)
CHLORIDE SERPL-SCNC: 109 MMOL/L (ref 100–108)
CO2 SERPL-SCNC: 29 MMOL/L (ref 21–32)
CREAT SERPL-MCNC: 0.75 MG/DL (ref 0.6–1.3)
ERYTHROCYTE [DISTWIDTH] IN BLOOD BY AUTOMATED COUNT: 12.1 % (ref 11.6–15.1)
GFR SERPL CREATININE-BSD FRML MDRD: 71 ML/MIN/1.73SQ M
GLUCOSE SERPL-MCNC: 84 MG/DL (ref 65–140)
HCT VFR BLD AUTO: 38.1 % (ref 34.8–46.1)
HGB BLD-MCNC: 12.7 G/DL (ref 11.5–15.4)
MCH RBC QN AUTO: 32.1 PG (ref 26.8–34.3)
MCHC RBC AUTO-ENTMCNC: 33.3 G/DL (ref 31.4–37.4)
MCV RBC AUTO: 96 FL (ref 82–98)
PLATELET # BLD AUTO: 145 THOUSANDS/UL (ref 149–390)
PMV BLD AUTO: 10.2 FL (ref 8.9–12.7)
POTASSIUM SERPL-SCNC: 3.7 MMOL/L (ref 3.5–5.3)
RBC # BLD AUTO: 3.96 MILLION/UL (ref 3.81–5.12)
SODIUM SERPL-SCNC: 142 MMOL/L (ref 136–145)
WBC # BLD AUTO: 7.28 THOUSAND/UL (ref 4.31–10.16)

## 2020-10-14 PROCEDURE — 85027 COMPLETE CBC AUTOMATED: CPT | Performed by: FAMILY MEDICINE

## 2020-10-14 PROCEDURE — 80048 BASIC METABOLIC PNL TOTAL CA: CPT | Performed by: FAMILY MEDICINE

## 2020-10-14 PROCEDURE — 99217 PR OBSERVATION CARE DISCHARGE MANAGEMENT: CPT | Performed by: PHYSICIAN ASSISTANT

## 2020-10-14 RX ORDER — METOPROLOL SUCCINATE 25 MG/1
50 TABLET, EXTENDED RELEASE ORAL DAILY
Start: 2020-10-14 | End: 2021-06-07

## 2020-10-14 RX ADMIN — LEVOTHYROXINE SODIUM 100 MCG: 100 TABLET ORAL at 06:00

## 2020-10-14 RX ADMIN — ASPIRIN 81 MG CHEWABLE TABLET 81 MG: 81 TABLET CHEWABLE at 09:32

## 2020-10-14 RX ADMIN — SODIUM CHLORIDE 100 ML/HR: 0.9 INJECTION, SOLUTION INTRAVENOUS at 12:54

## 2020-10-14 RX ADMIN — SODIUM CHLORIDE 100 ML/HR: 0.9 INJECTION, SOLUTION INTRAVENOUS at 02:00

## 2020-10-14 RX ADMIN — MEMANTINE 10 MG: 5 TABLET ORAL at 09:32

## 2020-10-14 RX ADMIN — LOSARTAN POTASSIUM 50 MG: 50 TABLET, FILM COATED ORAL at 09:32

## 2020-10-14 RX ADMIN — PANTOPRAZOLE SODIUM 40 MG: 40 TABLET, DELAYED RELEASE ORAL at 06:00

## 2020-10-14 RX ADMIN — ENOXAPARIN SODIUM 40 MG: 40 INJECTION SUBCUTANEOUS at 09:30

## 2020-10-15 RX ORDER — DOCUSATE SODIUM, SENNOSIDES 50; 8.6 MG/1; MG/1
TABLET ORAL
Qty: 60 TABLET | Refills: 3 | Status: SHIPPED | OUTPATIENT
Start: 2020-10-15 | End: 2021-06-07

## 2020-10-15 RX ORDER — LEVOTHYROXINE SODIUM 0.1 MG/1
TABLET ORAL
Qty: 30 TABLET | Refills: 3 | Status: SHIPPED | OUTPATIENT
Start: 2020-10-15 | End: 2021-03-30 | Stop reason: SDUPTHER

## 2020-10-16 ENCOUNTER — OFFICE VISIT (OUTPATIENT)
Dept: GERIATRICS | Facility: CLINIC | Age: 85
End: 2020-10-16
Payer: MEDICARE

## 2020-10-16 VITALS
SYSTOLIC BLOOD PRESSURE: 120 MMHG | OXYGEN SATURATION: 98 % | HEART RATE: 59 BPM | DIASTOLIC BLOOD PRESSURE: 70 MMHG | RESPIRATION RATE: 16 BRPM | TEMPERATURE: 98.8 F

## 2020-10-16 DIAGNOSIS — I10 ESSENTIAL HYPERTENSION: ICD-10-CM

## 2020-10-16 DIAGNOSIS — F03.90 DEMENTIA WITHOUT BEHAVIORAL DISTURBANCE, UNSPECIFIED DEMENTIA TYPE (HCC): ICD-10-CM

## 2020-10-16 DIAGNOSIS — H35.30 MACULAR DEGENERATION OF BOTH EYES, UNSPECIFIED TYPE: ICD-10-CM

## 2020-10-16 DIAGNOSIS — K21.9 GASTROESOPHAGEAL REFLUX DISEASE, UNSPECIFIED WHETHER ESOPHAGITIS PRESENT: Primary | ICD-10-CM

## 2020-10-16 DIAGNOSIS — Z09 ENCOUNTER FOR EXAMINATION FOLLOWING TREATMENT AT HOSPITAL: ICD-10-CM

## 2020-10-16 DIAGNOSIS — J34.89 RHINORRHEA: ICD-10-CM

## 2020-10-16 PROBLEM — E86.0 DEHYDRATION: Status: RESOLVED | Noted: 2020-10-13 | Resolved: 2020-10-16

## 2020-10-16 PROCEDURE — 99214 OFFICE O/P EST MOD 30 MIN: CPT | Performed by: INTERNAL MEDICINE

## 2020-10-16 RX ORDER — FLUTICASONE PROPIONATE 50 MCG
1 SPRAY, SUSPENSION (ML) NASAL DAILY
Qty: 1 BOTTLE | Refills: 3 | Status: SHIPPED | OUTPATIENT
Start: 2020-10-16 | End: 2021-09-08 | Stop reason: ALTCHOICE

## 2020-10-22 RX ORDER — VIT A/VIT C/VIT E/ZINC/COPPER 2148-113
TABLET ORAL
COMMUNITY
Start: 2020-10-09 | End: 2021-06-16

## 2020-10-22 RX ORDER — CHOLECALCIFEROL (VITAMIN D3) 25 MCG
1000 CAPSULE ORAL DAILY
COMMUNITY
Start: 2020-07-30 | End: 2021-06-07

## 2020-11-06 ENCOUNTER — OFFICE VISIT (OUTPATIENT)
Dept: GERIATRICS | Facility: CLINIC | Age: 85
End: 2020-11-06
Payer: MEDICARE

## 2020-11-06 VITALS
HEART RATE: 58 BPM | TEMPERATURE: 98.5 F | SYSTOLIC BLOOD PRESSURE: 120 MMHG | DIASTOLIC BLOOD PRESSURE: 70 MMHG | OXYGEN SATURATION: 99 %

## 2020-11-06 DIAGNOSIS — T19.2XXA FOREIGN BODY IN VAGINA, INITIAL ENCOUNTER: Primary | ICD-10-CM

## 2020-11-06 PROCEDURE — 99213 OFFICE O/P EST LOW 20 MIN: CPT | Performed by: INTERNAL MEDICINE

## 2020-11-09 ENCOUNTER — OFFICE VISIT (OUTPATIENT)
Dept: OBGYN CLINIC | Facility: CLINIC | Age: 85
End: 2020-11-09
Payer: MEDICARE

## 2020-11-09 VITALS
BODY MASS INDEX: 19.84 KG/M2 | SYSTOLIC BLOOD PRESSURE: 122 MMHG | DIASTOLIC BLOOD PRESSURE: 78 MMHG | WEIGHT: 112 LBS | TEMPERATURE: 98.1 F

## 2020-11-09 DIAGNOSIS — R41.3 MEMORY DEFICIT: ICD-10-CM

## 2020-11-09 DIAGNOSIS — E78.5 HYPERLIPIDEMIA, UNSPECIFIED HYPERLIPIDEMIA TYPE: ICD-10-CM

## 2020-11-09 DIAGNOSIS — Z71.1 WORRIED WELL: Primary | ICD-10-CM

## 2020-11-09 PROCEDURE — 99202 OFFICE O/P NEW SF 15 MIN: CPT | Performed by: NURSE PRACTITIONER

## 2020-11-10 RX ORDER — MEMANTINE HYDROCHLORIDE 10 MG/1
TABLET ORAL
Qty: 60 TABLET | Refills: 2 | Status: SHIPPED | OUTPATIENT
Start: 2020-11-10 | End: 2021-01-25

## 2020-11-10 RX ORDER — ATORVASTATIN CALCIUM 40 MG/1
TABLET, FILM COATED ORAL
Qty: 30 TABLET | Refills: 2 | Status: SHIPPED | OUTPATIENT
Start: 2020-11-10 | End: 2021-01-25

## 2021-01-23 DIAGNOSIS — R41.3 MEMORY DEFICIT: ICD-10-CM

## 2021-01-23 DIAGNOSIS — E78.5 HYPERLIPIDEMIA, UNSPECIFIED HYPERLIPIDEMIA TYPE: ICD-10-CM

## 2021-01-23 DIAGNOSIS — K21.9 GASTROESOPHAGEAL REFLUX DISEASE WITHOUT ESOPHAGITIS: ICD-10-CM

## 2021-01-25 RX ORDER — OMEPRAZOLE 20 MG/1
CAPSULE, DELAYED RELEASE ORAL
Qty: 30 CAPSULE | Refills: 2 | Status: SHIPPED | OUTPATIENT
Start: 2021-01-25 | End: 2021-04-23 | Stop reason: SDUPTHER

## 2021-01-25 RX ORDER — ATORVASTATIN CALCIUM 40 MG/1
TABLET, FILM COATED ORAL
Qty: 30 TABLET | Refills: 2 | Status: SHIPPED | OUTPATIENT
Start: 2021-01-25 | End: 2021-04-23 | Stop reason: SDUPTHER

## 2021-01-25 RX ORDER — MEMANTINE HYDROCHLORIDE 10 MG/1
TABLET ORAL
Qty: 60 TABLET | Refills: 2 | Status: SHIPPED | OUTPATIENT
Start: 2021-01-25 | End: 2021-04-23 | Stop reason: SDUPTHER

## 2021-01-26 DIAGNOSIS — F03.90 DEMENTIA WITHOUT BEHAVIORAL DISTURBANCE, UNSPECIFIED DEMENTIA TYPE (HCC): ICD-10-CM

## 2021-01-26 RX ORDER — DONEPEZIL HYDROCHLORIDE 10 MG/1
TABLET, FILM COATED ORAL
Qty: 30 TABLET | Refills: 6 | Status: SHIPPED | OUTPATIENT
Start: 2021-01-26 | End: 2021-06-07

## 2021-03-29 DIAGNOSIS — E03.9 ACQUIRED HYPOTHYROIDISM: ICD-10-CM

## 2021-03-30 RX ORDER — LEVOTHYROXINE SODIUM 0.1 MG/1
TABLET ORAL
Qty: 30 TABLET | Refills: 3 | OUTPATIENT
Start: 2021-03-30

## 2021-03-30 RX ORDER — LEVOTHYROXINE SODIUM 0.1 MG/1
100 TABLET ORAL DAILY
Qty: 30 TABLET | Refills: 3 | Status: SHIPPED | OUTPATIENT
Start: 2021-03-30 | End: 2021-06-07

## 2021-04-22 DIAGNOSIS — R41.3 MEMORY DEFICIT: ICD-10-CM

## 2021-04-22 DIAGNOSIS — E78.5 HYPERLIPIDEMIA, UNSPECIFIED HYPERLIPIDEMIA TYPE: ICD-10-CM

## 2021-04-22 DIAGNOSIS — I10 ESSENTIAL HYPERTENSION: ICD-10-CM

## 2021-04-22 DIAGNOSIS — K21.9 GASTROESOPHAGEAL REFLUX DISEASE WITHOUT ESOPHAGITIS: ICD-10-CM

## 2021-04-23 RX ORDER — OMEPRAZOLE 20 MG/1
CAPSULE, DELAYED RELEASE ORAL
Qty: 30 CAPSULE | Refills: 2 | OUTPATIENT
Start: 2021-04-23

## 2021-04-23 RX ORDER — MEMANTINE HYDROCHLORIDE 10 MG/1
TABLET ORAL
Qty: 60 TABLET | Refills: 2 | OUTPATIENT
Start: 2021-04-23

## 2021-04-23 RX ORDER — ATORVASTATIN CALCIUM 40 MG/1
TABLET, FILM COATED ORAL
Qty: 30 TABLET | Refills: 2 | OUTPATIENT
Start: 2021-04-23

## 2021-04-23 RX ORDER — OMEPRAZOLE 20 MG/1
20 CAPSULE, DELAYED RELEASE ORAL DAILY
Qty: 30 CAPSULE | Refills: 5 | Status: SHIPPED | OUTPATIENT
Start: 2021-04-23 | End: 2021-06-07

## 2021-04-23 RX ORDER — ATORVASTATIN CALCIUM 40 MG/1
40 TABLET, FILM COATED ORAL EVERY EVENING
Qty: 30 TABLET | Refills: 5 | Status: SHIPPED | OUTPATIENT
Start: 2021-04-23 | End: 2021-06-07

## 2021-04-23 RX ORDER — MEMANTINE HYDROCHLORIDE 10 MG/1
10 TABLET ORAL 2 TIMES DAILY
Qty: 60 TABLET | Refills: 5 | Status: SHIPPED | OUTPATIENT
Start: 2021-04-23 | End: 2021-06-07

## 2021-05-13 ENCOUNTER — TELEPHONE (OUTPATIENT)
Dept: GERIATRICS | Age: 86
End: 2021-05-13

## 2021-05-13 NOTE — TELEPHONE ENCOUNTER
Frandy from [facility] in [place] contacted office to request patient's medication list  Caller relayed patient and spouse will be returning home to [place] next week; pharmacy is filling blister packs for patient  I relayed to pharmacy that will fax medication list as reviewed at October 2020 office visit with previous PCP, Dr Cesar Epley

## 2021-05-19 ENCOUNTER — TELEPHONE (OUTPATIENT)
Dept: GERIATRICS | Facility: CLINIC | Age: 86
End: 2021-05-19

## 2021-05-19 NOTE — TELEPHONE ENCOUNTER
Per [facility])  patient [place] safe [place]  Patient is a plus 2 assist and she's unsteady on her feet  [facility] dept was also in today to evaluate patient and confirm that patient need a higher level of care  Per Dr Svetlana Elliott patient approved for transfer to [facility] for respite stay

## 2021-06-03 ENCOUNTER — OFFICE VISIT (OUTPATIENT)
Dept: GERIATRICS | Facility: CLINIC | Age: 86
End: 2021-06-03
Payer: MEDICARE

## 2021-06-03 VITALS
OXYGEN SATURATION: 97 % | SYSTOLIC BLOOD PRESSURE: 138 MMHG | TEMPERATURE: 97 F | HEART RATE: 57 BPM | DIASTOLIC BLOOD PRESSURE: 74 MMHG

## 2021-06-03 DIAGNOSIS — F03.90 DEMENTIA WITHOUT BEHAVIORAL DISTURBANCE, UNSPECIFIED DEMENTIA TYPE (HCC): ICD-10-CM

## 2021-06-03 DIAGNOSIS — Z74.09 DIFFICULTY TRANSFERRING FROM BED TO WHEELCHAIR: ICD-10-CM

## 2021-06-03 DIAGNOSIS — R53.81 PHYSICAL DECONDITIONING: ICD-10-CM

## 2021-06-03 DIAGNOSIS — R29.898 LEFT HAND WEAKNESS: ICD-10-CM

## 2021-06-03 DIAGNOSIS — E46 PROTEIN-CALORIE MALNUTRITION, UNSPECIFIED SEVERITY (HCC): ICD-10-CM

## 2021-06-03 DIAGNOSIS — M79.642 LEFT HAND PAIN: ICD-10-CM
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
## 2021-06-03 DIAGNOSIS — F02.81 DEMENTIA ASSOCIATED WITH OTHER UNDERLYING DISEASE WITH BEHAVIORAL DISTURBANCE (HCC): ICD-10-CM

## 2021-06-03 PROCEDURE — 99214 OFFICE O/P EST MOD 30 MIN: CPT | Performed by: NURSE PRACTITIONER

## 2021-06-03 RX ORDER — UBIDECARENONE 75 MG
CAPSULE ORAL DAILY
COMMUNITY

## 2021-06-03 NOTE — ASSESSMENT & PLAN NOTE
- Patient reports confusion with no behaviors  - Continue Namenda 10 mg 2 times daily and Aricept 10 mg daily  - Continue home health services in the home     - Continue heart healthy diet  - Encourage physical, mental and cognitive stimulating activities as tolerated

## 2021-06-03 NOTE — PATIENT INSTRUCTIONS
1  Left hand x-ray ordered  2  Maintain adequate hydration   3  OT consulted for left hand weakness, wheelchair transfer training  4  Follow-up in 3 months with Dr Kim Ford

## 2021-06-03 NOTE — ASSESSMENT & PLAN NOTE
- Multi-factorial in the setting of advanced age and multiple co-morbities  - Ensure adequate hydration and nutrition  - Monitor skin integrity  - Continue home health services with a home health aide two times daily and PT/OT

## 2021-06-03 NOTE — PROGRESS NOTES
Assessment/Plan:       Problem List Items Addressed This Visit        Nervous and Auditory    Dementia 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 ([facility] [address] )     - Patient reports confusion with no behaviors  - Continue Namenda 10 mg 2 times daily and Aricept 10 mg daily  - Continue home health services in the home  - Continue heart healthy diet  - Encourage physical, mental and cognitive stimulating activities as tolerated              Other    Physical deconditioning     - Multi-factorial in the setting of advanced age and multiple co-morbities  - Ensure adequate hydration and nutrition  - Monitor skin integrity  - Continue home health services with a home health aide two times daily and PT/OT           Fall - Primary     - Patient is reported to have history of sliding onto floor when transferring with her walker to bed or chair  Most recent incidence was yesterday 06/02/2021 with no reported injuries  - Will consult OT services for transfer training with wheelchair and left hand therapy  - Recommended additional help in the home, however, patient declined at this time  He has a caregiver come into the home 2 times daily and feels like this is sufficient     - Continue home PT services  - Maintain fall precautions         Difficulty transferring from bed to wheelchair     - OT consult to evaluate and treat         Relevant Orders    Ambulatory referral to Occupational Therapy    Left hand weakness     - Chronic left hand weakness reported  - Will consult OT services to evaluate and treat         Relevant Orders    Ambulatory referral to Occupational Therapy    XR hand 3+ vw left    Left hand pain     - Will order a left hand x-ray  - OT consult to evaluate and treat  - Continue Tylenol prn for pain         Relevant Orders    Ambulatory referral to Occupational Therapy    XR hand 3+ vw left    Protein-calorie malnutrition ([facility] )     - Last BMI documented was 19 84  - Continue protein supplementation  - Maintain adequate PO intake  - Will continue to monitor                    Subjective:      Patient ID: Parth Adrian is a 80 y o  female  Albert Love is a 80year old female patient with underlying dementia, essential HTN, hypothyroidism, dysphagia, GERD, constipation, gait dysfunction seen and examined in the office today at [facility] with her  present  She is status post fall yesterday, 06/02/2021  Her home health aide found the patient on the floor when she arrived  She was noted to be in a supine position with her gait belt loosely on her and no footwear on  The , who is her primary caretaker, states patient slid, not fell,  from the walker to the dining room table when trying to transfer her to the chair and she did not hit her head  Patient was lifted off of the ground with assist times 2 and gait belt, proper footwear was applied  Patient reported no injuries at that time  Today the patient states that she  is feeling well  Her  reports that she has a history of sliding when she is transferring from the walker to the chair  He states that she experienced a similar episode in [place] several months ago with no injuries  He states that 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 and having increased lower extremity weakness  She does receive physical therapy everyday in her apartment  She has help in the morning with showers and getting dressed and in the evening when going to bed  Increased help at home was recommended, however both the patient and the  declined at this time   states that the patient becomes more confused with more people coming in and out of the apartment  He feels like he can manage her well  He states that he has the most difficulty with transfers from her walker to the chair  He is going to have her transfer from her wheelchair to the bed or chair instead of using the walker from now on    He is in agreement for occupational therapy to come into the apartment for wheelchair transfer training and left hand therapy  She complains of pain and weakness in her left hand that she states started "a while ago " No injury or trauma reported  She takes Tylenol as needed for pain with relief  The following portions of the patient's history were reviewed and updated as appropriate: allergies, current medications, past family history, past medical history, past social history, past surgical history and problem list     Review of Systems   Constitutional: Negative for chills, diaphoresis, fatigue and fever  HENT: Positive for rhinorrhea  Negative for congestion, ear pain, hearing loss and postnasal drip  Chronic rhinnorhea   Eyes: Positive for visual disturbance  Negative for pain, discharge and itching  Respiratory: Negative for cough, chest tightness, shortness of breath and wheezing  Cardiovascular: Negative for chest pain, palpitations and leg swelling  Gastrointestinal: Negative for abdominal distention, abdominal pain, constipation, diarrhea, nausea and vomiting  Genitourinary: Negative for dysuria, frequency and hematuria  Musculoskeletal: Positive for gait problem  Negative for arthralgias, back pain and myalgias  Skin: Negative for color change, pallor, rash and wound  Neurological: Positive for weakness  Negative for dizziness, numbness and headaches  Legs are weak, problem standing up and moving   Psychiatric/Behavioral: Positive for confusion  Negative for behavioral problems, dysphoric mood and sleep disturbance  The patient is not nervous/anxious  Objective:      /74 (BP Location: Right arm, Patient Position: Sitting, Cuff Size: Adult)   Pulse 57   Temp (!) 97 °F (36 1 °C) (Temporal)   SpO2 97%          Physical Exam  Constitutional:       General: She is not in acute distress  Appearance: Normal appearance  She is normal weight  She is not toxic-appearing or diaphoretic  Comments: Frail and deconditioned appearing   HENT:      Head: Normocephalic and atraumatic  Right Ear: Tympanic membrane, ear canal and external ear normal  There is no impacted cerumen  Left Ear: Tympanic membrane, ear canal and external ear normal  There is no impacted cerumen  Nose: Rhinorrhea present  No congestion  Mouth/Throat:      Mouth: Mucous membranes are moist       Pharynx: Oropharynx is clear  No oropharyngeal exudate or posterior oropharyngeal erythema  Eyes:      General: No scleral icterus  Right eye: No discharge  Left eye: No discharge  Extraocular Movements: Extraocular movements intact  Conjunctiva/sclera: Conjunctivae normal    Neck:      Musculoskeletal: Normal range of motion and neck supple  No neck rigidity or muscular tenderness  Cardiovascular:      Rate and Rhythm: Normal rate and regular rhythm  Pulses: Normal pulses  Heart sounds: Normal heart sounds  No murmur  No friction rub  No gallop  Pulmonary:      Effort: Pulmonary effort is normal  No respiratory distress  Breath sounds: Normal breath sounds  No wheezing, rhonchi or rales  Chest:      Chest wall: No tenderness  Abdominal:      General: Bowel sounds are normal  There is no distension  Palpations: Abdomen is soft  There is no mass  Tenderness: There is no abdominal tenderness  There is no guarding  Hernia: No hernia is present  Musculoskeletal: Normal range of motion  General: No swelling, tenderness, deformity or signs of injury  Right lower leg: No edema  Left lower leg: No edema  Comments: Bilateral lower extremity weakness   Lymphadenopathy:      Cervical: No cervical adenopathy  Skin:     General: Skin is warm and dry  Findings: No bruising, erythema, lesion or rash  Comments: PVD discoloration of bilateral lower extremities   Neurological:      General: No focal deficit present        Mental Status: She is alert  Mental status is at baseline  Motor: Weakness present  Gait: Gait abnormal    Psychiatric:         Mood and Affect: Mood normal          Behavior: Behavior normal          Thought Content:  Thought content normal

## 2021-06-03 NOTE — ASSESSMENT & PLAN NOTE
- Patient is reported to have history of sliding onto floor when transferring with her walker to bed or chair  Most recent incidence was yesterday 06/02/2021 with no reported injuries  - Will consult OT services for transfer training with wheelchair and left hand therapy  - Recommended additional help in the home, however, patient declined at this time  He has a caregiver come into the home 2 times daily and feels like this is sufficient     - Continue home PT services  - Maintain fall precautions

## 2021-06-03 NOTE — ASSESSMENT & PLAN NOTE
- Last BMI documented was 19 84  - Continue protein supplementation  - Maintain adequate PO intake  - Will continue to monitor

## 2021-06-07 DIAGNOSIS — E78.5 HYPERLIPIDEMIA, UNSPECIFIED HYPERLIPIDEMIA TYPE: ICD-10-CM

## 2021-06-07 DIAGNOSIS — I25.10 CAD (CORONARY ARTERY DISEASE): ICD-10-CM

## 2021-06-07 DIAGNOSIS — E03.9 ACQUIRED HYPOTHYROIDISM: ICD-10-CM

## 2021-06-07 DIAGNOSIS — R41.3 MEMORY DEFICIT: ICD-10-CM

## 2021-06-07 DIAGNOSIS — F03.90 DEMENTIA WITHOUT BEHAVIORAL DISTURBANCE, UNSPECIFIED DEMENTIA TYPE (HCC): ICD-10-CM

## 2021-06-07 DIAGNOSIS — I10 ESSENTIAL HYPERTENSION: ICD-10-CM

## 2021-06-07 DIAGNOSIS — K21.9 GASTROESOPHAGEAL REFLUX DISEASE WITHOUT ESOPHAGITIS: ICD-10-CM

## 2021-06-07 DIAGNOSIS — E55.9 VITAMIN D DEFICIENCY: Primary | ICD-10-CM

## 2021-06-07 RX ORDER — ATORVASTATIN CALCIUM 40 MG/1
TABLET, FILM COATED ORAL
Qty: 30 TABLET | Refills: 5 | Status: SHIPPED | OUTPATIENT
Start: 2021-06-07 | End: 2021-10-12

## 2021-06-07 RX ORDER — CHOLECALCIFEROL (VITAMIN D3) 25 MCG
CAPSULE ORAL
Qty: 30 CAPSULE | Refills: 3 | Status: SHIPPED | OUTPATIENT
Start: 2021-06-07 | End: 2021-08-22

## 2021-06-07 RX ORDER — LEVOTHYROXINE SODIUM 0.1 MG/1
TABLET ORAL
Qty: 30 TABLET | Refills: 3 | Status: SHIPPED | OUTPATIENT
Start: 2021-06-07 | End: 2021-08-22

## 2021-06-07 RX ORDER — DONEPEZIL HYDROCHLORIDE 10 MG/1
TABLET, FILM COATED ORAL
Qty: 30 TABLET | Refills: 6 | Status: SHIPPED | OUTPATIENT
Start: 2021-06-07

## 2021-06-07 RX ORDER — MEMANTINE HYDROCHLORIDE 10 MG/1
TABLET ORAL
Qty: 60 TABLET | Refills: 5 | Status: SHIPPED | OUTPATIENT
Start: 2021-06-07 | End: 2021-10-12

## 2021-06-07 RX ORDER — ASPIRIN 81 MG/1
TABLET ORAL
Qty: 30 TABLET | Refills: 3 | Status: SHIPPED | OUTPATIENT
Start: 2021-06-07

## 2021-06-07 RX ORDER — DOCUSATE SODIUM, SENNOSIDES 50; 8.6 MG/1; MG/1
TABLET ORAL
Qty: 60 TABLET | Refills: 3 | Status: SHIPPED | OUTPATIENT
Start: 2021-06-07 | End: 2021-08-22

## 2021-06-07 RX ORDER — METOPROLOL SUCCINATE 25 MG/1
TABLET, EXTENDED RELEASE ORAL
Qty: 30 TABLET | Refills: 3 | Status: SHIPPED | OUTPATIENT
Start: 2021-06-07 | End: 2021-08-22

## 2021-06-07 RX ORDER — OMEPRAZOLE 20 MG/1
CAPSULE, DELAYED RELEASE ORAL
Qty: 30 CAPSULE | Refills: 5 | Status: SHIPPED | OUTPATIENT
Start: 2021-06-07 | End: 2021-10-12

## 2021-06-10 ENCOUNTER — OFFICE VISIT (OUTPATIENT)
Dept: GERIATRICS | Facility: CLINIC | Age: 86
End: 2021-06-10
Payer: MEDICARE

## 2021-06-10 VITALS
TEMPERATURE: 97.1 F | DIASTOLIC BLOOD PRESSURE: 70 MMHG | HEART RATE: 57 BPM | OXYGEN SATURATION: 99 % | SYSTOLIC BLOOD PRESSURE: 160 MMHG

## 2021-06-10 DIAGNOSIS — M85.842 OTHER SPECIFIED DISORDERS OF BONE DENSITY AND STRUCTURE, LEFT HAND: ICD-10-CM

## 2021-06-10 DIAGNOSIS — R26.9 GAIT DISTURBANCE: ICD-10-CM

## 2021-06-10 DIAGNOSIS — F02.81 DEMENTIA ASSOCIATED WITH OTHER UNDERLYING DISEASE WITH BEHAVIORAL DISTURBANCE (HCC): Primary | ICD-10-CM

## 2021-06-10 DIAGNOSIS — I10 ESSENTIAL HYPERTENSION: ICD-10-CM
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
## 2021-06-10 DIAGNOSIS — M85.80 OSTEOPENIA DETERMINED BY X-RAY: ICD-10-CM

## 2021-06-10 PROCEDURE — 99214 OFFICE O/P EST MOD 30 MIN: CPT | Performed by: NURSE PRACTITIONER

## 2021-06-10 NOTE — ASSESSMENT & PLAN NOTE
· Cont namenda, aricept, b12  · Cont to engage in social, cognitive, physical exercise  · Monitor sleep wake cycle  Monitor depression  · Optimize chronic conditions  · Assist as needed for adl/iald    Fall precautions  · Frequent reminder for safety

## 2021-06-10 NOTE — PATIENT INSTRUCTIONS
· Check dxa scan to r/o osteoporosis - routine  · Cont with caregivers to assist as needed    Fall precautions

## 2021-06-10 NOTE — ASSESSMENT & PLAN NOTE
· Pt has osteopenia by xray of left hand  · Cont dietary ca/vit d3 intake, supplement as needed  · Recommend dxa to determine if bones are osteoporotic as pt is high risk for fracture secondary to multiple falls  · Reviewed with pt and

## 2021-06-10 NOTE — ASSESSMENT & PLAN NOTE
· Noted on left hand xray  · Recommend dxa scan to determine if pt has osteoporosis as she has had multiple falls and is high risk for fractures  · Cont dietary ca/vit d3 intake and supplement as needed  · Cont pt/ot

## 2021-06-10 NOTE — ASSESSMENT & PLAN NOTE
· Cont pt/ot  Fall precautions  · Recommend bengay or icy hot for sore muscles  · Assist at all times for ambulation    · Notify office if any pain or swelling of joints

## 2021-06-10 NOTE — ASSESSMENT & PLAN NOTE
· Multiple falls d/t weakness  · Recommend 24/7 care for assist with adl/iadls  · This is accomplished by  [facility]  · Has [place] alert button on  · Cont pt/ot    Fall precautions

## 2021-06-10 NOTE — PROGRESS NOTES
Assessment/Plan:    Fall  · Multiple falls d/t weakness  · Recommend 24/7 care for assist with adl/iadls  · This is accomplished by  [facility]  · Has [place]ora alert button on  · Cont pt/ot  Fall precautions    Gait disturbance  · Cont pt/ot  Fall precautions  · Recommend bengay or icy hot for sore muscles  · Assist at all times for ambulation  · Notify office if any pain or swelling of joints    Osteopenia determined by x-ray  · Noted on left hand xray  · Recommend dxa scan to determine if pt has osteoporosis as she has had multiple falls and is high risk for fractures  · Cont dietary ca/vit d3 intake and supplement as needed  · Cont pt/ot     Other specified disorders of bone density and structure, left hand   · Pt has osteopenia by xray of left hand  · Cont dietary ca/vit d3 intake, supplement as needed  · Recommend dxa to determine if bones are osteoporotic as pt is high risk for fracture secondary to multiple falls  · Reviewed with pt and     Dementia 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 (HCC)  · Cont namenda, aricept, b12  · Cont to engage in social, cognitive, physical exercise  · Monitor sleep wake cycle  Monitor depression  · Optimize chronic conditions  · Assist as needed for adl/iald  Fall precautions  · Frequent reminder for safety    Essential hypertension  · Stable at present  · Cont losartan, metoprolol  · Cont dietary and lifestyle interventions       There are no diagnoses linked to this encounter  Subjective:      Patient ID: Berta Benjamin is a 80 y o  female  Mrs Damian Batres is 81yo female who resides at [address] [place], [place] [id number]  She is here s/p fall  Of note, she was recently admitted to [facility]    Last night patient slid from chair to floor  Did not hit anything, did not hurt anything per   Was recently at [facility],  did not feel it helped do much  She was admitted for physical deconditioning  Aides come for am care/pm care    Help with bath/dress  Help with hs care  Help w/toileting  If needed,  can help in between  Can ambulate with walker and  assistance  When  not able to be there he has staff cover  Has call bell on in case  not present  Has whistle for  when not close by   does state he has hard time getting her up when she falls and is concerned that he will hurt back so he does utilize [facility] [address] [address] staff  No dizziness or headaches  Feels like legs are a little stronger  No back ache  No cp/sob/cough  Does get mild cortez  No gi/gu distress  Some leg soreness  Can see some things but not clarity to vision  No hearing issues  No swallow issue - did have espopheal dilation      The following portions of the patient's history were reviewed and updated as appropriate: past family history, past medical history, past social history and past surgical history  Review of Systems   Constitutional: Negative for activity change, appetite change, chills and fatigue  HENT: Negative for congestion and hearing loss  Eyes: Positive for visual disturbance (chronic)  Respiratory: Negative for cough and shortness of breath  Cardiovascular: Negative for chest pain  Gastrointestinal: Negative for abdominal pain, constipation, diarrhea, nausea and vomiting  Genitourinary: Negative for difficulty urinating  Musculoskeletal: Positive for gait problem and myalgias (bilat LE)  Negative for arthralgias and back pain  Neurological: Negative for dizziness and light-headedness  Psychiatric/Behavioral: Positive for decreased concentration (forgetful)  Objective:      /70 (BP Location: Left arm, Patient Position: Sitting, Cuff Size: Adult)   Pulse 57   Temp (!) 97 1 °F (36 2 °C) (Temporal)   SpO2 99%          Physical Exam  Vitals signs and nursing note reviewed  Constitutional:       General: She is not in acute distress  Appearance: Normal appearance  She is well-developed   She is not diaphoretic  Comments: thin   HENT:      Head: Normocephalic  Cardiovascular:      Rate and Rhythm: Normal rate  Heart sounds: No murmur  No friction rub  No gallop  Pulmonary:      Effort: Pulmonary effort is normal  No respiratory distress  Breath sounds: Normal breath sounds  No wheezing or rales  Abdominal:      General: Bowel sounds are normal  There is no distension  Palpations: Abdomen is soft  Tenderness: There is no abdominal tenderness  There is no rebound  Musculoskeletal: Normal range of motion  General: No swelling  Skin:     General: Skin is warm and dry  Neurological:      General: No focal deficit present  Mental Status: She is alert and oriented to person, place, and time  Mental status is at baseline        Comments: forgetful   Psychiatric:         Mood and Affect: Mood normal          Behavior: Behavior normal

## 2021-06-16 DIAGNOSIS — H35.30 MACULAR DEGENERATION, UNSPECIFIED LATERALITY, UNSPECIFIED TYPE: Primary | ICD-10-CM

## 2021-06-16 RX ORDER — ANTIOX #8/OM3/DHA/EPA/LUT/ZEAX 250-2.5 MG
CAPSULE ORAL
Qty: 120 CAPSULE | Refills: 2 | Status: SHIPPED | OUTPATIENT
Start: 2021-06-16

## 2021-07-21 ENCOUNTER — OFFICE VISIT (OUTPATIENT)
Dept: GERIATRICS | Facility: CLINIC | Age: 86
End: 2021-07-21
Payer: MEDICARE

## 2021-07-21 VITALS
OXYGEN SATURATION: 99 % | SYSTOLIC BLOOD PRESSURE: 110 MMHG | TEMPERATURE: 97.4 F | HEART RATE: 74 BPM | DIASTOLIC BLOOD PRESSURE: 64 MMHG

## 2021-07-21 DIAGNOSIS — F02.81 DEMENTIA ASSOCIATED WITH OTHER UNDERLYING DISEASE WITH BEHAVIORAL DISTURBANCE (HCC): ICD-10-CM

## 2021-07-21 DIAGNOSIS — S81.802A WOUND OF LEFT LOWER EXTREMITY, INITIAL ENCOUNTER: Primary | ICD-10-CM

## 2021-07-21 PROCEDURE — 99213 OFFICE O/P EST LOW 20 MIN: CPT | Performed by: STUDENT IN AN ORGANIZED HEALTH CARE EDUCATION/TRAINING PROGRAM

## 2021-07-21 RX ORDER — CLINDAMYCIN HYDROCHLORIDE 300 MG/1
300 CAPSULE ORAL EVERY 8 HOURS
Qty: 21 CAPSULE | Refills: 0 | Status: SHIPPED | OUTPATIENT
Start: 2021-07-21 | End: 2021-07-28

## 2021-07-21 NOTE — PROGRESS NOTES
[facility]  [address] [facility], [address] [place] [address], [address]      NAME: Hailee Mary  AGE: 80 y o  SEX: female    DATE OF ENCOUNTER:  07/21/2021    Assessment and Plan     Problem List Items Addressed This Visit        Nervous and Auditory    Dementia 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 [facility])     Patient presently caretaker   Unsure about any prior formal neurocognitive assessment   No overt changes in mood, personality or behaviors  Recommend reorientation redirection as needed  Manage chronic conditions  Maintain Falls precautions  Encourage patient to remain active mentally, physically and socially            Other    Leg wound, left - Primary      Patient with leg wound secondary to injury to pre-existing almost healed wound  Patient was seen by the wellness center where wound was cleaned  Skin tear macerated, purulent drainage noted with foul-smelling order      Nursing staff had cleaned the wound, applied Aquacel Ag and covered with a border dressing   Periphery of wound erythematous, warm, tender on palpation  Will plan to treat with antibiotics for 1 week   Start clindamycin 300 mg q 8h   Will recommend a probiotic during antibiotic therapy   Hold stool softeners whilst on antibiotic therapy given side effects of clindamycin to likely cause GI upset         Relevant Medications    clindamycin (CLEOCIN) 300 MG capsule              - Counseling Documentation: patient was counseled regarding: diagnostic results, instructions for management, risk factor reductions, prognosis, patient and family education, impressions and risks and benefits of treatment options    Chief Complaint     Patient presents after sustaining a wound from falling    History of Present Illness     HPI    This is a 19-year-old female with generalized debility, dysphagia, ambulatory dysfunction, dementia, recurrent falls and CAD amongst other medical conditions who presents with a leg wound  The patient was seen earlier this morning by the wellness center after she had fallen and sustained a new leg wound to and ready existing skin tear which was almost healed  The patient is accompanied by her home health aide given her severity of dementia, review of systems are unreliable  Per  [facility] center staff, the patient already had her wound cleaned and debrided after which the [facility] requested starting antibiotic therapy  At that time, the patient was added in for an acute visit to review her acute complaint and clinical status  Wound was noted to appear macerated with a green discharge that was foul smelling  Peripherally, skin was erythematous, warm and tender on palpation  Aquasol Ag was applied and covered with bordered dressing  No fever, chills, nausea, vomiting , lethargy or acute mental status changes    Patient to follow-up next week with nurse practitioner for wound check    The following portions of the patient's history were reviewed and updated as appropriate: allergies, current medications, past family history, past medical history, past social history, past surgical history and problem list     Review of Systems     Review of Systems   Unable to perform ROS: Dementia       Active Problem List     Patient Active Problem List   Diagnosis    Other specified hypothyroidism    Essential hypertension    CAD (coronary artery disease)    GERD (gastroesophageal reflux disease)    generalized Weakness    Dementia 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 ([address] )    Twitching    Abnormal EKG    Gait disturbance    Cataracts, bilateral    Glaucoma    History of appendectomy    Macular degeneration of both eyes    Actinic keratosis    Post-nasal drip    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    Urinary leakage    Generalized weakness    Slow transit constipation    Achalasia of esophagus    Anemia    Physical deconditioning    Fall    Head injury    Abrasion of left elbow    Skin tear of right lower leg without complication    Dysphagia    Rhinorrhea    Encounter for examination following treatment at hospital    Foreign body in vagina    Difficulty transferring from bed to wheelchair    Left hand weakness    Left hand pain    Protein-calorie malnutrition ([address] )    Osteopenia determined by x-ray    Other specified disorders of bone density and structure, left hand     Leg wound, left       Objective     /64 (BP Location: Right arm, Patient Position: Sitting, Cuff Size: Adult)   Pulse 74   Temp (!) 97 4 °F (36 3 °C) (Temporal)   SpO2 99%     Physical Exam  Constitutional:       General: She is not in acute distress  Appearance: Normal appearance  She is well-developed  She is not ill-appearing or diaphoretic  Comments: Elderly female sitting comfortably in chair in no obvious cardiorespiratory or painful distress   HENT:      Head: Normocephalic and atraumatic  Right Ear: External ear normal       Left Ear: External ear normal       Nose: Nose normal       Mouth/Throat:      Mouth: Mucous membranes are moist       Pharynx: No oropharyngeal exudate  Eyes:      General: No scleral icterus  Right eye: No discharge  Left eye: No discharge  Conjunctiva/sclera: Conjunctivae normal    Neck:      Vascular: No JVD  Cardiovascular:      Rate and Rhythm: Normal rate and regular rhythm  Heart sounds: Murmur heard  No friction rub  No gallop  Pulmonary:      Effort: Pulmonary effort is normal  No respiratory distress  Breath sounds: Normal breath sounds  No wheezing or rales  Abdominal:      General: Bowel sounds are normal  There is no distension  Palpations: Abdomen is soft  Tenderness: There is no abdominal tenderness  There is no guarding  Musculoskeletal:         General: No tenderness or deformity  Normal range of motion  Cervical back: Normal range of motion and neck supple  Skin:     General: Skin is warm  Coloration: Skin is not pale  Findings: Erythema present  No rash  Comments: 2 cm by 2 cm open wound, macerated, foul-smelling purulent discharge  Periphery erythematous, warm to touch and tender on palpation  Pulses palpable, no obvious neurovascular compromise   Neurological:      Mental Status: She is alert  Mental status is at baseline  She is disoriented  Cranial Nerves: No cranial nerve deficit  Gait: Gait abnormal       Deep Tendon Reflexes: Reflexes are normal and symmetric           Pertinent Laboratory/Diagnostic Studies:  Reviewed prior blood work   No new lab orders placed today    Current Medications     Current Outpatient Medications:     acetaminophen (TYLENOL) 325 mg tablet, Take 650 mg by mouth every 4 (four) hours as needed for mild pain, Disp: , Rfl:     atorvastatin (LIPITOR) 40 mg tablet, TAKE 1 TABLET DAILY IN THE EVENING, Disp: 30 tablet, Rfl: 5    benzonatate (TESSALON PERLES) 100 mg capsule, Take 100 mg by mouth 3 (three) times a day as needed for cough, Disp: , Rfl:     bisacodyl (DULCOLAX) 10 mg suppository, Insert 1 suppository (10 mg total) into the rectum daily as needed for constipation, Disp: 12 suppository, Rfl: 0    clindamycin (CLEOCIN) 300 MG capsule, Take 1 capsule (300 mg total) by mouth every 8 (eight) hours for 7 days, Disp: 21 capsule, Rfl: 0    cyanocobalamin (VITAMIN B-12) 100 mcg tablet, Take by mouth daily, Disp: , Rfl:     D3 High Potency 25 MCG (1000 UT) capsule, TAKE 1 CAPSULE DAILY, Disp: 30 capsule, Rfl: 3    donepezil (ARICEPT) 10 mg tablet, TAKE 1 TABLET DAILY (MORNING), Disp: 30 tablet, Rfl: 6    fluticasone (FLONASE) 50 mcg/act nasal spray, 1 spray into each nostril daily (Patient taking differently: 1 spray into each nostril daily as needed ), Disp: 1 Bottle, Rfl: 3    levothyroxine 100 mcg tablet, TAKE 1 TABLET DAILY, Disp: 30 tablet, Rfl: 3    Lidocaine 4 % PTCH, Apply topically, Disp: , Rfl:     losartan (COZAAR) 50 mg tablet, Take 25 mg by mouth daily , Disp: , Rfl:     magnesium hydroxide (MILK OF MAGNESIA) 400 mg/5 mL oral suspension, Take by mouth daily as needed for constipation, Disp: , Rfl:     memantine (NAMENDA) 10 mg tablet, TAKE 1 TABLET TWICE DAILY, Disp: 60 tablet, Rfl: 5    metoprolol succinate (TOPROL-XL) 25 mg 24 hr tablet, TAKE 1 TABLET (25 MG TOTAL) BY MOUTH DAILY IN THE EVENING , Disp: 30 tablet, Rfl: 3    mineral oil enema, Insert 1 enema into the rectum daily as needed for constipation, Disp: , Rfl:     montelukast (SINGULAIR) 10 mg tablet, TAKE ONE TABLET DAILY IN THE EVENING, Disp: 30 tablet, Rfl: 0    Multiple Vitamins-Minerals (PreserVision AREDS 2) CAPS, TAKE 2 CAPSULES IN THE AM AND ONE CAPSULE IN THE EVENING, Disp: 120 capsule, Rfl: 2    omeprazole (PriLOSEC) 20 mg delayed release capsule, TAKE 1 CAPSULE DAILY, Disp: 30 capsule, Rfl: 5    QC Aspirin Low Dose 81 MG chewable tablet, TAKE 1 TABLET DAILY AM, Disp: 30 tablet, Rfl: 3    QC Stool Softener Pls Laxative 8 6-50 MG per tablet, TAKE 1 TABLET TWICE DAILY, Disp: 60 tablet, Rfl: 3    Health Maintenance     Health Maintenance   Topic Date Due    Medicare Annual Wellness Visit (AWV)  Never done    COVID-19 Vaccine (1) Never done    Fall Risk  06/05/2019    Depression Screening PHQ  06/05/2019    Influenza Vaccine (1) 09/01/2021    BMI: Adult  11/09/2021    DTaP,Tdap,and Td Vaccines (2 - Td or Tdap) 12/02/2029    Hepatitis C Screening  Completed    Pneumococcal Vaccine: 65+ Years  Completed    HIB Vaccine  Aged Out    Hepatitis B Vaccine  Aged Out    IPV Vaccine  Aged Out    Hepatitis A Vaccine  Aged Out    Meningococcal ACWY Vaccine  Aged Out    HPV Vaccine  Aged [employer] History   Administered Date(s) Administered    INFLUENZA 09/17/2010    Influenza Split High Dose Preservative Free IM 09/26/2014    Influenza, seasonal, injectable 10/04/2016    Pneumococcal Conjugate 13-Valent 10/04/2016    Pneumococcal Polysaccharide PPV23 03/23/1931, 10/05/2012    Td (adult), Unspecified 01/01/2005    Td (adult), adsorbed 08/01/2012    Tdap 08/01/2012, 12/02/2019    Zoster 05/14/2007, 01/01/2008, 01/01/2008       Bart Salvador MD  Geriatrics   7/22/2021 6:06 AM

## 2021-07-21 NOTE — PATIENT INSTRUCTIONS
Start Clindamycin 300mg every 8h x 7 days  Stop stool softeners during the next 7 days  Start probiotic ( in yogurt or drink) daily  Follow up with nurse practitioner next week for wound check

## 2021-07-22 NOTE — ASSESSMENT & PLAN NOTE
Patient presently caretaker   Reza Felixer about any prior formal neurocognitive assessment   No overt changes in mood, personality or behaviors  Recommend reorientation redirection as needed  Manage chronic conditions  Maintain Falls precautions  Encourage patient to remain active mentally, physically and socially

## 2021-07-27 ENCOUNTER — TELEPHONE (OUTPATIENT)
Dept: OTHER | Facility: OTHER | Age: 86
End: 2021-07-27

## 2021-07-27 ENCOUNTER — TELEPHONE (OUTPATIENT)
Dept: OTHER | Facility: HOSPITAL | Age: 86
End: 2021-07-27

## 2021-07-27 ENCOUNTER — OFFICE VISIT (OUTPATIENT)
Dept: GERIATRICS | Facility: CLINIC | Age: 86
End: 2021-07-27
Payer: MEDICARE

## 2021-07-27 DIAGNOSIS — S81.811D SKIN TEAR OF RIGHT LOWER LEG WITHOUT COMPLICATION, SUBSEQUENT ENCOUNTER: Primary | ICD-10-CM

## 2021-07-27 DIAGNOSIS — R53.83 OTHER FATIGUE: ICD-10-CM

## 2021-07-27 DIAGNOSIS — D72.829 LEUKOCYTOSIS, UNSPECIFIED TYPE: Primary | ICD-10-CM

## 2021-07-27 DIAGNOSIS — R53.1 GENERALIZED WEAKNESS: ICD-10-CM

## 2021-07-27 PROCEDURE — 99213 OFFICE O/P EST LOW 20 MIN: CPT | Performed by: NURSE PRACTITIONER

## 2021-07-27 NOTE — TELEPHONE ENCOUNTER
Lab called to office, wbc reported 12 5  Will add ua c&s in to ensure uti is not part of cause for weakness  No c/o sob/cough earlier so will hold on cxr for now  Is completing abx from leg infection, so rise could be from this - no other recent labs    WIll cont to monitor

## 2021-07-27 NOTE — PATIENT INSTRUCTIONS
· Discussion between Mr  And Mrs  Susan Rai and Renu Syed, then updated pt's son via phone call after visit  · Pt to increase services at home to 4 hours in the evening (4-8 or 5-9pm) to help with hs care    This may be temporary, but recommend starting evening services  · Will check blood work today to ensure no current infection or dehydration  · Cont with therapy, fall precautions

## 2021-07-27 NOTE — ASSESSMENT & PLAN NOTE
· Antibiotics completes today  · No SS infection-mild bleeding no were erythema, drainage, warmth, tenderness, swelling  · Continue local treatment as previously written

## 2021-07-27 NOTE — ASSESSMENT & PLAN NOTE
· Nursing concerns for increased weakness and 's inability to care for patient by herself as she is two assist at times  · Recommended ER visit for full eval but patient and  refused  · Will check stat CBC, BMP today to ensure no infection and or dehydration that may be contributing to weakness  · Discussed that patient may need ER visit dependent on these results  · Discussed that pt is a higher risk for falls while weak and may benefit from STR -  and pt refused  · Discussed consideration for both to go to [facility] for more assist - refused  · Discussed that pt may not be able to care for pt by self in home causing both of them increased risk for injury as well as increased risk of fatigue for him  · Nurse Ana Joel joined for care conference- recommendation is to inc hs care hours    Pt and  agreed  · Son updated via phone (donya)  · Cont pt/ot, fall precautions

## 2021-07-27 NOTE — PROGRESS NOTES
Assessment/Plan:    Skin tear of right lower leg without complication  · Antibiotics completes today  · No SS infection-mild bleeding no were erythema, drainage, warmth, tenderness, swelling  · Continue local treatment as previously written    generalized Weakness  · Nursing concerns for increased weakness and 's inability to care for patient by herself as she is two assist at times  · Recommended ER visit for full eval but patient and  refused  · Will check stat CBC, BMP today to ensure no infection and or dehydration that may be contributing to weakness  · Discussed that patient may need ER visit dependent on these results  · Discussed that pt is a higher risk for falls while weak and may benefit from STR -  and pt refused  · Discussed consideration for both to go to [facility] for more assist - refused  · Discussed that pt may not be able to care for pt by self in home causing both of them increased risk for injury as well as increased risk of fatigue for him  · Nurse Brie Hudson joined for care conference- recommendation is to inc  care hours  Pt and  agreed  · Son updated via phone (Saranya Round)  · Cont pt/ot, fall precautions       Diagnoses and all orders for this visit:    Skin tear of right lower leg without complication, subsequent encounter  -     CBC and differential; Future  -     Basic metabolic panel; Future    Generalized weakness  -     CBC and differential; Future  -     Basic metabolic panel; Future        Subjective:      Patient ID: Rita Sauceda is a 80 y o  female  Mrs Shannon Syed is a 79yo female who resides at [address]  She is here for f/u of left shin infection  Comorbidities include amb dys, dementia htn    Pt returns with   Multiple notes per nursing that pt is two assist,  only has home aides in am   She was noted to be very wet (incont urine) during visits    Pt/wife calling requiring more than standard nursing assist    states that his started when pt was placed on abx  Normally they are able to function fairly independently  Per notes, pt having decline since earlier this year  Pt and wife are adamant about staying together in their own home  Lee Messer and Gail M from [facility] are not available today  Discussed with emily from [facility] who has been talking with pts son  He is  agreeable with increased care if needed  Had long discussion with pt and wife that current plan is not safe for the two of them  They have assistance from 9-1p in am, but nothing except 1/2 hour in evening  When pt is unable to transfer with just  it it putting both of them at risk for falls and serious injury that would preclude them from staying in home  Long incont episodes puts pt at risk for skin breakdown and subsequent infection   does verbalize understanding and is agreeable to increased assistance in home with goal to stay in home  His son was updated and will try to arrange evening hours 4-8p to help with hs care  Pt states she is doing well without complaint  She is forgetful but conversant  She is taking abx as directed without diarrhea  She has not change in appetite and is drinking adequately per   She has no pain in left leg  It shows no ss infection  Pt denies cp/sob/cough  Denies gi/gu distress  The following portions of the patient's history were reviewed and updated as appropriate: past medical history, past social history and past surgical history  Review of Systems   Constitutional: Negative for activity change, appetite change, chills and fatigue  HENT: Negative for congestion  Respiratory: Negative for cough and shortness of breath  Cardiovascular: Negative for chest pain  Gastrointestinal: Negative for abdominal pain, constipation, diarrhea, nausea and vomiting  Genitourinary: Negative for difficulty urinating  Musculoskeletal: Positive for gait problem  Negative for arthralgias and back pain  Skin: Positive for wound  Neurological: Positive for weakness  Negative for dizziness and light-headedness  Psychiatric/Behavioral: Positive for decreased concentration (forgetful)  Objective: There were no vitals taken for this visit  Physical Exam  Vitals and nursing note reviewed  Constitutional:       General: She is not in acute distress  Appearance: Normal appearance  She is not diaphoretic  Skin:     General: Skin is warm and dry  Comments: Left shin open area without erythema, edema, purulent drainage tenderness, warmth  Has some bloody drainage  Neurological:      General: No focal deficit present  Mental Status: She is alert  Mental status is at baseline        Comments: forgetful   Psychiatric:         Mood and Affect: Mood normal          Behavior: Behavior normal

## 2021-07-28 NOTE — TELEPHONE ENCOUNTER
[phone]/[id number] [phone] from [facility] labs/Pt is Jenniffer Kirkpatrick  31/Pt has STAT UC/UA Results    Eloisa Swanson was paged at 0001    Please allow 20-30 mins for the provider to call you back  If you do not here from them within that timeframe, please call us back

## 2021-08-01 ENCOUNTER — TELEPHONE (OUTPATIENT)
Dept: OTHER | Facility: OTHER | Age: 86
End: 2021-08-01

## 2021-08-01 ENCOUNTER — TELEPHONE (OUTPATIENT)
Dept: OTHER | Facility: HOSPITAL | Age: 86
End: 2021-08-01

## 2021-08-01 DIAGNOSIS — N39.0 UTI (URINARY TRACT INFECTION): Primary | ICD-10-CM

## 2021-08-01 RX ORDER — NITROFURANTOIN 25; 75 MG/1; MG/1
100 CAPSULE ORAL 2 TIMES DAILY
Qty: 10 CAPSULE | Refills: 0 | Status: SHIPPED | OUTPATIENT
Start: 2021-08-01 | End: 2021-09-08 | Stop reason: ALTCHOICE

## 2021-08-01 NOTE — TELEPHONE ENCOUNTER
Lou calling from [facility] regarding her final urine culture, in need of an antibiotic       Paged Wilfred Litten

## 2021-08-01 NOTE — TELEPHONE ENCOUNTER
Received call from [place] at [address]  Urine culture + for enterococcus susceptible to nitrofurantoin (limited available susceptibilities)  Will send rx to [facility]    Push fluids

## 2021-08-12 ENCOUNTER — HOSPITAL ENCOUNTER (OUTPATIENT)
Dept: RADIOLOGY | Age: 86
Discharge: HOME | End: 2021-08-12
Payer: MEDICARE

## 2021-08-12 DIAGNOSIS — M85.80 OSTEOPENIA DETERMINED BY X-RAY: ICD-10-CM

## 2021-08-12 DIAGNOSIS — M85.842 OTHER SPECIFIED DISORDERS OF BONE DENSITY AND STRUCTURE, LEFT HAND: ICD-10-CM

## 2021-08-12 PROCEDURE — 77080 DXA BONE DENSITY AXIAL: CPT

## 2021-08-17 ENCOUNTER — TELEPHONE (OUTPATIENT)
Dept: GERIATRICS | Facility: CLINIC | Age: 86
End: 2021-08-17

## 2021-08-17 NOTE — TELEPHONE ENCOUNTER
----- Message from Surekha Lima, [address] sent at 8/17/2021 12:31 PM EDT -----  Please schedule visit to review dxa scan    TY

## 2021-08-18 ENCOUNTER — TELEPHONE (OUTPATIENT)
Dept: GERIATRICS | Facility: CLINIC | Age: 86
End: 2021-08-18

## 2021-08-21 DIAGNOSIS — I10 ESSENTIAL HYPERTENSION: ICD-10-CM

## 2021-08-21 DIAGNOSIS — E03.9 ACQUIRED HYPOTHYROIDISM: ICD-10-CM

## 2021-08-21 DIAGNOSIS — E55.9 VITAMIN D DEFICIENCY: ICD-10-CM

## 2021-08-21 DIAGNOSIS — I25.10 CAD (CORONARY ARTERY DISEASE): ICD-10-CM

## 2021-08-22 RX ORDER — METOPROLOL SUCCINATE 25 MG/1
TABLET, EXTENDED RELEASE ORAL
Qty: 30 TABLET | Refills: 3 | Status: SHIPPED | OUTPATIENT
Start: 2021-08-22

## 2021-08-22 RX ORDER — DOCUSATE SODIUM, SENNOSIDES 50; 8.6 MG/1; MG/1
TABLET ORAL
Qty: 60 TABLET | Refills: 3 | Status: SHIPPED | OUTPATIENT
Start: 2021-08-22 | End: 2021-08-31 | Stop reason: ALTCHOICE

## 2021-08-22 RX ORDER — CHOLECALCIFEROL (VITAMIN D3) 25 MCG
CAPSULE ORAL
Qty: 30 CAPSULE | Refills: 3 | Status: SHIPPED | OUTPATIENT
Start: 2021-08-22

## 2021-08-22 RX ORDER — LEVOTHYROXINE SODIUM 0.1 MG/1
TABLET ORAL
Qty: 30 TABLET | Refills: 3 | Status: SHIPPED | OUTPATIENT
Start: 2021-08-22

## 2021-08-31 ENCOUNTER — OFFICE VISIT (OUTPATIENT)
Dept: GERIATRICS | Facility: CLINIC | Age: 86
End: 2021-08-31
Payer: MEDICARE

## 2021-08-31 VITALS
TEMPERATURE: 97.5 F | HEART RATE: 58 BPM | DIASTOLIC BLOOD PRESSURE: 60 MMHG | OXYGEN SATURATION: 97 % | SYSTOLIC BLOOD PRESSURE: 120 MMHG

## 2021-08-31 DIAGNOSIS — M81.0 AGE-RELATED OSTEOPOROSIS WITHOUT CURRENT PATHOLOGICAL FRACTURE: Primary | ICD-10-CM

## 2021-08-31 DIAGNOSIS — K59.01 SLOW TRANSIT CONSTIPATION: ICD-10-CM

## 2021-08-31 PROCEDURE — 99213 OFFICE O/P EST LOW 20 MIN: CPT | Performed by: NURSE PRACTITIONER

## 2021-08-31 NOTE — PATIENT INSTRUCTIONS
· Please mail copy of dexa scan when printer back online  · Pt/ agreeable to start prolia  · Will check insurance for prior auth  · Will check vit d level as none noted recently  · Please stop senna s in bubble pack d/t loose stool

## 2021-08-31 NOTE — PROGRESS NOTES
Assessment/Plan:    Age-related osteoporosis without current pathological fracture  · Check vitamin d level to ensure adequate supplementation  · Cont d3 1000 iu daily  · Discussion concerning dexa scan with pt and   · Dexa scan findings reviewed  · Pt/ would like copy of imaging  · Pt and  agreeable to prolia, will see if insurance will cover then start  · Discussed risk and benefits of medication   · Cont pt/ot, fall precautions    Slow transit constipation  · Having multiple loose stool  ·  attempting high fiber diet  · Keep hydrated  · Will dc senna s at this point, if constipation returns, will restart prn       There are no diagnoses linked to this encounter  Subjective:      Patient ID: Celina Dong is a 80 y o  female  Mrs Jim Chopra is a 79yo pt of Conrad Doyle  She is here today with  to review dxa scan  Comorbidities include dementia, gait dysfunciton  Dexa + osteoporosis, worsened from previous  Discussed treatment options including no treatment as well as side effects of medication  Pt and  would like to try prolia if covered  She is not currently on medication therapy  She does take vitamin d3 supplement  She has had fall history  Pt's  also reporting loose stools  Taking senna s bid  Pt forgetful but engages in conversation, follows commands  Denies cp/sob/cough  Denies gi/gu distress at this time  Can't remember if bothersome otherwise  Per  she continues to be weak  He does have PT involved and extra help  The following portions of the patient's history were reviewed and updated as appropriate: past family history, past medical history, past social history and past surgical history  Review of Systems   Reason unable to perform ROS: limited d/t dementia  Constitutional: Negative for appetite change and fever  Respiratory: Negative for cough and shortness of breath      Cardiovascular: Negative for chest pain  Musculoskeletal: Positive for gait problem (seen in wheel chair)  Negative for arthralgias  Neurological: Positive for weakness (generalized)  Psychiatric/Behavioral: Positive for decreased concentration (forgetful)  Objective:      /60 (BP Location: Left arm, Patient Position: Sitting, Cuff Size: Standard)   Pulse 58   Temp 97 5 °F (36 4 °C) (Temporal)   SpO2 97%          Physical Exam  Vitals reviewed  Constitutional:       General: She is not in acute distress  Appearance: She is well-developed  She is not diaphoretic  HENT:      Head: Normocephalic  Cardiovascular:      Rate and Rhythm: Normal rate  Heart sounds: No murmur heard  No friction rub  No gallop  Pulmonary:      Effort: Pulmonary effort is normal  No respiratory distress  Breath sounds: Normal breath sounds  No wheezing or rales  Abdominal:      General: Bowel sounds are normal  There is no distension  Palpations: Abdomen is soft  Tenderness: There is no abdominal tenderness  There is no rebound  Musculoskeletal:         General: Normal range of motion  Skin:     General: Skin is warm and dry  Neurological:      General: No focal deficit present  Mental Status: She is alert  Mental status is at baseline        Comments: Oriented to self,partial tps   Psychiatric:         Mood and Affect: Mood normal          Behavior: Behavior normal

## 2021-08-31 NOTE — ASSESSMENT & PLAN NOTE
· Having multiple loose stool  ·  attempting high fiber diet  · Keep hydrated  · Will dc senna s at this point, if constipation returns, will restart prn

## 2021-08-31 NOTE — ASSESSMENT & PLAN NOTE
· Check vitamin d level to ensure adequate supplementation  · Cont d3 1000 iu daily  · Discussion concerning dexa scan with pt and   · Dexa scan findings reviewed  · Pt/ would like copy of imaging  · Pt and  agreeable to prolia, will see if insurance will cover then start  · Discussed risk and benefits of medication   · Cont pt/ot, fall precautions

## 2021-09-07 ENCOUNTER — TELEPHONE (OUTPATIENT)
Dept: OTHER | Facility: OTHER | Age: 86
End: 2021-09-07

## 2021-09-08 ENCOUNTER — OFFICE VISIT (OUTPATIENT)
Dept: GERIATRICS | Facility: CLINIC | Age: 86
End: 2021-09-08
Payer: MEDICARE

## 2021-09-08 VITALS
HEART RATE: 60 BPM | DIASTOLIC BLOOD PRESSURE: 62 MMHG | OXYGEN SATURATION: 97 % | SYSTOLIC BLOOD PRESSURE: 136 MMHG | TEMPERATURE: 97.7 F

## 2021-09-08 DIAGNOSIS — I25.10 CORONARY ARTERY DISEASE, UNSPECIFIED VESSEL OR LESION TYPE, UNSPECIFIED WHETHER ANGINA PRESENT, UNSPECIFIED WHETHER NATIVE OR TRANSPLANTED HEART: ICD-10-CM

## 2021-09-08 DIAGNOSIS — M81.0 AGE-RELATED OSTEOPOROSIS WITHOUT CURRENT PATHOLOGICAL FRACTURE: ICD-10-CM

## 2021-09-08 DIAGNOSIS — I10 ESSENTIAL HYPERTENSION: ICD-10-CM

## 2021-09-08 DIAGNOSIS — R53.81 DEBILITY: ICD-10-CM

## 2021-09-08 DIAGNOSIS — E44.1 MILD PROTEIN-CALORIE MALNUTRITION (HCC): ICD-10-CM

## 2021-09-08 DIAGNOSIS — F03.90 DEMENTIA WITHOUT BEHAVIORAL DISTURBANCE, UNSPECIFIED DEMENTIA TYPE (HCC): Primary | ICD-10-CM

## 2021-09-08 DIAGNOSIS — E03.8 OTHER SPECIFIED HYPOTHYROIDISM: ICD-10-CM

## 2021-09-08 DIAGNOSIS — R29.6 RECURRENT FALLS: ICD-10-CM

## 2021-09-08 DIAGNOSIS — K21.9 GASTROESOPHAGEAL REFLUX DISEASE, UNSPECIFIED WHETHER ESOPHAGITIS PRESENT: ICD-10-CM

## 2021-09-08 PROCEDURE — 99215 OFFICE O/P EST HI 40 MIN: CPT | Performed by: STUDENT IN AN ORGANIZED HEALTH CARE EDUCATION/TRAINING PROGRAM

## 2021-09-08 NOTE — PATIENT INSTRUCTIONS
1)Pt to start Prolia injections  Medical Assistant will call  to start therapy with this  2)Continue Vit D supplements   Would avoid calcium due to chronic constipation  3)Continue PT  4)Follow up when return from [place]  5)Confirmed code status as DNR

## 2021-09-08 NOTE — TELEPHONE ENCOUNTER
Edith with [facility] is calling to release values with the on call provider from ordered labs 
No complaints

## 2021-09-08 NOTE — PROGRESS NOTES
[facility]  [address], [address]      NAME: Jonathan Gutierrez  AGE: 80 y o  SEX: female    DATE OF ENCOUNTER:9/8/2021    Assessment and Plan     Problem List Items Addressed This Visit        Digestive    GERD (gastroesophageal reflux disease)      Stable   Continue omeprazole 20 mg daily   Continue anti reflux measures            Endocrine    Other specified hypothyroidism     Patient continues on levothyroxine 100 mcg daily  Will continue to monitor TSH with reflex T4         Relevant Orders    TSH, 3rd generation with T4 reflex       Cardiovascular and Mediastinum    Essential hypertension      /62   Continue metoprolol 25 mg daily   Continue aspirin 81 mg daily   Recommend adherence to a heart healthy diet         CAD (coronary artery disease)      Stable   No cardiorespiratory distress   Manage vascular risk factors   Continue aspirin 81 mg daily   Continue atorvastatin 40 mg daily   Continue metoprolol 25 mg daily   Recommend adherence to a heart healthy diet              Nervous and Auditory    Dementia ([facility] [address] ) - Primary     Patient with a history of moderate to severe dementia   Continues on Aricept and Namenda   Patient is fully dependent in all ADLs and IADLs   has instituted additional home health aides and currently has caretakers from 9am-12pm  And 16:00 to 20:00  Patient has had a history of recurrent falls with 6 reported falls since June of 2020   Staff and myself have had prior conversations with the patient's  about the patient meeting criteria for higher level of care to ensure increased suoervision ans safety      Per , goal is to keep the patient at home indefinitely   Patient will be going to [place] for the next 6 months as they typically do annually where  states he has 24/7 supervised care at home for the patient  Continue PT/ OT Reorientation redirection as needed  Manage chronic conditions  Maintain Falls precautions   Encourage patient to remain active mentally, physically and socially  Participate in cognitively challenging exercises as able            Musculoskeletal and Integument    Age-related osteoporosis without current pathological fracture      Patient with positive DEXA scan   Reviewed prior blood work  Patient is start Prolia 60 mg subcu q 6  Monthly   Maintain Falls precautions   Continue vitamin-D supplementation    Avoid calcium for now given patient's chronic constipation            Other    Debility      With generalized debility, deconditioning and is wheelchair-bound   Continue physical therapy for gait training, balance and strengthening   Continue occupational therapy for ADLs   Encourage adequate nutrition and considered supplementation of meals with Ensure or boost   Maintain Falls precautions  Recommend increased supervision which  has put in place with additional home health aides in the evening as well         Recurrent falls      Patient with history of recurrent falls   Currently wheelchair-bound  Enrolled in physical therapy for gait training, balance and strengthening   Multiple discussions in the past and today with patient's  about increasing supervision and requiring a higher level of care  Has only started increased home health aide care from yesterday  as goal is to keep the patient at home per   Will continue to monitor for potential falls given increased home care this time         Protein-calorie malnutrition ([address] )      BMI 19 8   Patient with some clavicle protrusion   Encourage oral intake   Consider supplements with boost or Ensure daily                   - Counseling Documentation: patient was counseled regarding: diagnostic results, instructions for management, risk factor reductions, prognosis, patient and family education, impressions and risks and benefits of treatment options    Chief Complaint     Patient presents for routine follow-up of acute and chronic conditions    History of Present Illness     HPI    This is a 80-year-old female with HTN, HLD, CAD, GERD, hypothyroidism, malnutrition, ambulatory dysfunction, worsening debility and dementia amongst other medical conditions who presents for routine follow-up of acute and chronic conditions  She is accompanied by her  today during the office visit  Given patient's history of dementia,  ROS and HPI obtained from patient's   Today  explains that the patient continues to have progressively worsening vision impairment  She does have a history of glaucoma and macular degeneration and  relays that the patient was advised by Ophthalmology that there was nothing further to be done in preserving her vision  Overall the patient continues to progressively decline as she is notably very debilitated and wheelchair bound   explains that this decline has occurred over several years and is not new  I did speak with [facility] wellness staff and it is documented that the patient has had 6 reported falls since June of 2021  This typically occurring as  attempts to transfer the patient out of her wheelchair to her bed per staff  The patient's  has been advised multiple times in the past by our team that this patient meets criteria for placement into a higher level of care as she requires increased supervision and to ensure her safety   remains adamant about keeping the patient at home indefinitely  The patient currently has home health aides coming in from 09:00 to 12:00 and  has increased home health aide services from yesterday with additional aides from 16:00 to 20:00  I did relate to the patient's  that we will continue to monitor closely now that the patient has increased home health aides     felt strongly that the reported falls were not actual falls but instead that the patient had slid to the floor which I relayed does infact constitute a fall by definition  Patient continues to tolerate small amounts orally and was recommended she supplement her meals with boost or Ensure  She currently has physical and occupational therapy services however  feels that this has been futile as she has had ongoing physical therapy for several years without any improved muscle strength or mobility  The patient also has a history of a Schatzki ring with dilatation on endoscopy done 5 years ago   is aware of appropriate consistency of foods that she requires  The patient remains dependent in all ADLs and IADLs  She remains wheelchair-bound and is unable to use a walker at this time   explains that typically each year, they return to [place] for 6 months where he does have  continuous supervision for the patient  We reviewed patient's DEXA scan with recommendations to start Prolia which  was in agreement with  We also discussed code status as patient's  felt that the patient should be a full code  I reviewed with the patient's  that given the patient's age, progressive debility, malnutrition, severe dementia, chronic comorbidities and frailty, I would avoid any aggressive measures or invasive procedures moving forward  After a detailed discussion,  was in agreement to keep current DNR/ DNI code status  No cardiorespiratory distress, fever, chills, URI or urinary symptoms  The patient continues on levothyroxine for a history of hypothyroidism  She has been compliant with atorvastatin for a history of HLD  She also continues on Aricept and Namenda for a history of dementia        The following portions of the patient's history were reviewed and updated as appropriate: allergies, current medications, past family history, past medical history, past social history, past surgical history and problem list     Review of Systems     Review of Systems   Unable to perform ROS: Dementia       Active Problem List     Patient Active Problem List   Diagnosis    Other specified hypothyroidism    Essential hypertension    CAD (coronary artery disease)    GERD (gastroesophageal reflux disease)    Debility    Dementia ([address] )    Twitching    Abnormal EKG    Gait disturbance    Cataracts, bilateral    Glaucoma    History of appendectomy    Macular degeneration of both eyes    Actinic keratosis    Post-nasal drip    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    Urinary leakage    Slow transit constipation    Achalasia of esophagus    Anemia    Recurrent falls    Head injury    Abrasion of left elbow    Skin tear of right lower leg without complication    Dysphagia    Difficulty transferring from bed to wheelchair    Left hand weakness    Left hand pain    Protein-calorie malnutrition (HCC)    Age-related osteoporosis without current pathological fracture    Leg wound, left       Objective     /62 (BP Location: Left arm)   Pulse 60   Temp 97 7 °F (36 5 °C) (Temporal)   SpO2 97%     Physical Exam  Vitals reviewed  Constitutional:       General: She is not in acute distress  Appearance: She is well-developed  She is not ill-appearing or diaphoretic  Comments: Elderly frail female sitting comfortably in chair in no obvious cardiorespiratory or painful distress  Protrusion of clavicles, bruising in lower extremities   HENT:      Head: Normocephalic and atraumatic  Right Ear: External ear normal       Left Ear: External ear normal       Nose: Nose normal  No congestion or rhinorrhea  Mouth/Throat:      Mouth: Mucous membranes are moist       Pharynx: Oropharynx is clear  No oropharyngeal exudate  Eyes:      General: No scleral icterus  Right eye: No discharge  Left eye: No discharge        Conjunctiva/sclera: Conjunctivae normal    Neck:      Vascular: No JVD  Cardiovascular:      Rate and Rhythm: Normal rate and regular rhythm  Heart sounds: Murmur heard  No friction rub  No gallop  Pulmonary:      Effort: Pulmonary effort is normal  No respiratory distress  Breath sounds: Normal breath sounds  No wheezing or rales  Abdominal:      General: Bowel sounds are normal  There is no distension  Palpations: Abdomen is soft  Tenderness: There is no abdominal tenderness  There is no guarding  Musculoskeletal:         General: No tenderness or deformity  Normal range of motion  Cervical back: Normal range of motion and neck supple  Skin:     General: Skin is warm  Coloration: Skin is not pale  Findings: No erythema or rash  Neurological:      General: No focal deficit present  Mental Status: She is alert  Mental status is at baseline  She is disoriented  Cranial Nerves: No cranial nerve deficit  Motor: Weakness (Generalized) present  Gait: Gait abnormal       Deep Tendon Reflexes: Reflexes are normal and symmetric     Psychiatric:      Comments: Intermittently confused at baseline         Pertinent Laboratory/Diagnostic Studies:  Reviewed recent blood work   Will order TSH with reflex T4    Current Medications     Current Outpatient Medications:     acetaminophen (TYLENOL) 325 mg tablet, Take 650 mg by mouth every 4 (four) hours as needed for mild pain, Disp: , Rfl:     atorvastatin (LIPITOR) 40 mg tablet, TAKE 1 TABLET DAILY IN THE EVENING, Disp: 30 tablet, Rfl: 5    cyanocobalamin (VITAMIN B-12) 100 mcg tablet, Take by mouth daily (Patient not taking: Reported on 8/31/2021), Disp: , Rfl:     D3 High Potency 25 MCG (1000 UT) capsule, TAKE 1 CAPSULE DAILY, Disp: 30 capsule, Rfl: 3    donepezil (ARICEPT) 10 mg tablet, TAKE 1 TABLET DAILY (MORNING), Disp: 30 tablet, Rfl: 6    levothyroxine 100 mcg tablet, TAKE 1 TABLET DAILY, Disp: 30 tablet, Rfl: 3    losartan (COZAAR) 50 mg tablet, Take 25 mg by mouth daily , Disp: , Rfl:     memantine (NAMENDA) 10 mg tablet, TAKE 1 TABLET TWICE DAILY, Disp: 60 tablet, Rfl: 5    metoprolol succinate (TOPROL-XL) 25 mg 24 hr tablet, TAKE 1 TABLET (25 MG TOTAL) BY MOUTH DAILY IN THE EVENING , Disp: 30 tablet, Rfl: 3    montelukast (SINGULAIR) 10 mg tablet, TAKE ONE TABLET DAILY IN THE EVENING, Disp: 30 tablet, Rfl: 0    Multiple Vitamins-Minerals (PreserVision AREDS 2) CAPS, TAKE 2 CAPSULES IN THE AM AND ONE CAPSULE IN THE EVENING (Patient not taking: Reported on 8/31/2021), Disp: 120 capsule, Rfl: 2    omeprazole (PriLOSEC) 20 mg delayed release capsule, TAKE 1 CAPSULE DAILY, Disp: 30 capsule, Rfl: 5    QC Aspirin Low Dose 81 MG chewable tablet, TAKE 1 TABLET DAILY AM, Disp: 30 tablet, Rfl: 3    Health Maintenance     Health Maintenance   Topic Date Due    Medicare Annual Wellness Visit (AWV)  Never done    COVID-19 Vaccine (1) Never done    Fall Risk  06/05/2019    Depression Screening PHQ  06/05/2019    Influenza Vaccine (1) 09/01/2021    BMI: Adult  11/09/2021    DTaP,Tdap,and Td Vaccines (2 - Td or Tdap) 12/02/2029    Hepatitis C Screening  Completed    Pneumococcal Vaccine: 65+ Years  Completed    HIB Vaccine  Aged Out    Hepatitis B Vaccine  Aged Out    IPV Vaccine  Aged Out    Hepatitis A Vaccine  Aged Out    Meningococcal ACWY Vaccine  Aged Out    HPV Vaccine  Aged [employer] History   Administered Date(s) Administered    INFLUENZA 09/17/2010    Influenza Split High Dose Preservative Free IM 09/26/2014    Influenza, seasonal, injectable 10/04/2016    Pneumococcal Conjugate 13-Valent 10/04/2016    Pneumococcal Polysaccharide PPV23 03/23/1931, 10/05/2012    Td (adult), Unspecified 01/01/2005    Td (adult), adsorbed 08/01/2012    Tdap 08/01/2012, 12/02/2019    Zoster 05/14/2007, 01/01/2008, 01/01/2008       Florin Mccall MD  Geriatrics   9/9/2021 4:49 AM

## 2021-09-09 ENCOUNTER — TELEPHONE (OUTPATIENT)
Dept: GERIATRICS | Facility: CLINIC | Age: 86
End: 2021-09-09

## 2021-09-09 PROBLEM — R29.6 RECURRENT FALLS: Status: ACTIVE | Noted: 2019-08-28

## 2021-09-09 PROBLEM — T19.2XXA FOREIGN BODY IN VAGINA: Status: RESOLVED | Noted: 2020-11-06 | Resolved: 2021-09-09

## 2021-09-09 PROBLEM — R53.81 DEBILITY: Status: ACTIVE | Noted: 2017-10-02

## 2021-09-09 NOTE — TELEPHONE ENCOUNTER
Spoke with pt , informed him that  a order has been place to check pt  thyroid function   which should be done at the end of this month      ----- Message from Cade Govea MD sent at 9/9/2021  4:08 AM EDT -----   Please let patient  that in order for checking the patient's thyroid function which should be done at the end of this month please

## 2021-09-23 ENCOUNTER — OFFICE VISIT (OUTPATIENT)
Dept: GERIATRICS | Facility: CLINIC | Age: 86
End: 2021-09-23
Payer: MEDICARE

## 2021-09-23 VITALS
DIASTOLIC BLOOD PRESSURE: 60 MMHG | HEART RATE: 56 BPM | SYSTOLIC BLOOD PRESSURE: 132 MMHG | OXYGEN SATURATION: 98 % | TEMPERATURE: 97.7 F

## 2021-09-23 DIAGNOSIS — R26.9 GAIT DISTURBANCE: Primary | ICD-10-CM

## 2021-09-23 DIAGNOSIS — R29.6 RECURRENT FALLS: ICD-10-CM

## 2021-09-23 PROCEDURE — 99212 OFFICE O/P EST SF 10 MIN: CPT | Performed by: NURSE PRACTITIONER

## 2021-09-23 NOTE — ASSESSMENT & PLAN NOTE
· Pt with cognitive impairment and functional impairment  · Multiple falls  · Aware of increased cares recommendations - has aides twice a day at this time  · Goal is to stay in home  Going to [place] for 6 mo where increased cares is also arranged    · Fall precautions  · Cont pt/ot

## 2021-09-23 NOTE — PATIENT INSTRUCTIONS
· Continue increased assistance at home due to increased fall risk  · Cont physical therapy  · Fall precautions

## 2021-09-23 NOTE — PROGRESS NOTES
Assessment/Plan:    Recurrent falls  · Slid in shower with aide present  · Did not hit head, no c/o dizziness or weakness that pt's  is aware of  · Cont to increase cares at home as needed  · Cont fall precautions  · Notify if any pain that is new  · Cont pt/ot  · Did discuss with Mr Niki Ramos that pt's HR is stable but mildly destin - he should question pt about dizziness from time to time, yrui if any falls or near falls  If she reports we should be notified to discuss decreasing metoprolol or aricept as both of these have destin SE     · Pt was stood during visit and denied dizzy/lightheadedness  Gait disturbance  · Pt with cognitive impairment and functional impairment  · Multiple falls  · Aware of increased cares recommendations - has aides twice a day at this time  · Goal is to stay in home  Going to [place] for 6 mo where increased cares is also arranged  · Fall precautions  · Cont pt/ot       There are no diagnoses linked to this encounter  Subjective:      Patient ID: Radha Cuellar is a 80 y o  female  Mrs Dionisio Solorzano is a 79yo female resident of [facility] - she presents for another fall  She has comorbidities including gait disturbance, cognitive impairment, htn  Fall occurred in the shower, slid on wet floor in shower  This occurred Monday 9/21  Aide was there  She did hit left lower leg on metal piece  Was fixed by maintenance  Had shoes with traction on in shower and still slid  Did not hit head  Pt poor memory - does not recall any lightheadedness or dizziness  Pt seeing PT currently  They are aware of fall  Currently not complaining of any pain  Pt and  are planning to stay in [place] for next six months - they do this annually  They have care givers set up there as well as pharmacy and doctor  Per , no refills currently needed, no needs presently        The following portions of the patient's history were reviewed and updated as appropriate: past family history, past medical history, past social history and past surgical history  Review of Systems   Constitutional: Negative for activity change, appetite change, chills and fatigue  HENT: Negative for congestion  Respiratory: Negative for cough and shortness of breath  Cardiovascular: Negative for chest pain  Gastrointestinal: Negative for abdominal pain, constipation, diarrhea, nausea and vomiting  Genitourinary: Negative for difficulty urinating  Musculoskeletal: Positive for back pain and gait problem  Negative for arthralgias  Neurological: Negative for dizziness and light-headedness  Psychiatric/Behavioral: Positive for decreased concentration (forgetful)  Objective:      /60 (BP Location: Left arm, Patient Position: Sitting, Cuff Size: Standard)   Pulse 56   Temp 97 7 °F (36 5 °C) (Temporal)   SpO2 98%          Physical Exam  Vitals and nursing note reviewed  Constitutional:       General: She is not in acute distress  Appearance: Normal appearance  She is well-developed  She is not diaphoretic  HENT:      Head: Normocephalic  Cardiovascular:      Rate and Rhythm: Normal rate  Heart sounds: No murmur heard  No friction rub  No gallop  Pulmonary:      Effort: Pulmonary effort is normal  No respiratory distress  Breath sounds: Normal breath sounds  No wheezing or rales  Abdominal:      General: Bowel sounds are normal  There is no distension  Palpations: Abdomen is soft  Tenderness: There is no abdominal tenderness  There is no rebound  Musculoskeletal:         General: No swelling  Normal range of motion  Skin:     General: Skin is warm and dry  Neurological:      General: No focal deficit present  Mental Status: She is alert  Mental status is at baseline  Comments: Alert, oriented to person, mostly tps    Forgetful   Psychiatric:         Mood and Affect: Mood normal          Behavior: Behavior normal

## 2021-09-23 NOTE — ASSESSMENT & PLAN NOTE
· Slid in shower with aide present  · Did not hit head, no c/o dizziness or weakness that pt's  is aware of  · Cont to increase cares at home as needed  · Cont fall precautions  · Notify if any pain that is new  · Cont pt/ot  · Did discuss with Mr  Vincenzo Aguirre that pt's HR is stable but mildly destin - he should question pt about dizziness from time to time, yuri if any falls or near falls  If she reports we should be notified to discuss decreasing metoprolol or aricept as both of these have destin SE     · Pt was stood during visit and denied dizzy/lightheadedness

## 2021-10-12 DIAGNOSIS — E78.5 HYPERLIPIDEMIA, UNSPECIFIED HYPERLIPIDEMIA TYPE: ICD-10-CM

## 2021-10-12 DIAGNOSIS — R41.3 MEMORY DEFICIT: ICD-10-CM

## 2021-10-12 DIAGNOSIS — K21.9 GASTROESOPHAGEAL REFLUX DISEASE WITHOUT ESOPHAGITIS: ICD-10-CM

## 2021-10-12 RX ORDER — OMEPRAZOLE 20 MG/1
CAPSULE, DELAYED RELEASE ORAL
Qty: 30 CAPSULE | Refills: 5 | Status: SHIPPED | OUTPATIENT
Start: 2021-10-12

## 2021-10-12 RX ORDER — MEMANTINE HYDROCHLORIDE 10 MG/1
TABLET ORAL
Qty: 60 TABLET | Refills: 5 | Status: SHIPPED | OUTPATIENT
Start: 2021-10-12

## 2021-10-12 RX ORDER — ATORVASTATIN CALCIUM 40 MG/1
TABLET, FILM COATED ORAL
Qty: 30 TABLET | Refills: 5 | Status: SHIPPED | OUTPATIENT
Start: 2021-10-12

## 2021-10-21 ENCOUNTER — IMMUNIZATIONS (OUTPATIENT)
Dept: GERIATRICS | Facility: CLINIC | Age: 86
End: 2021-10-21
Payer: MEDICARE

## 2021-10-21 DIAGNOSIS — I10 PRIMARY HYPERTENSION: Primary | ICD-10-CM

## 2021-10-21 DIAGNOSIS — Z23 ENCOUNTER FOR IMMUNIZATION: Primary | ICD-10-CM

## 2021-10-21 PROCEDURE — G0008 ADMIN INFLUENZA VIRUS VAC: HCPCS | Performed by: NURSE PRACTITIONER

## 2021-10-21 PROCEDURE — 90662 IIV NO PRSV INCREASED AG IM: CPT | Performed by: NURSE PRACTITIONER

## 2021-10-21 RX ORDER — LOSARTAN POTASSIUM 25 MG/1
TABLET ORAL
Qty: 30 TABLET | Refills: 2 | Status: SHIPPED | OUTPATIENT
Start: 2021-10-21

## 2022-12-31 NOTE — ASSESSMENT & PLAN NOTE
/62   Continue metoprolol 25 mg daily   Continue aspirin 81 mg daily   Recommend adherence to a heart healthy diet
BMI 19 8   Patient with some clavicle protrusion   Encourage oral intake   Consider supplements with boost or Ensure daily
Patient continues on levothyroxine 100 mcg daily  Will continue to monitor TSH with reflex T4
Patient with a history of moderate to severe dementia   Continues on Aricept and Namenda   Patient is fully dependent in all ADLs and IADLs   has instituted additional home health aides and currently has caretakers from 9am-12pm  And 16:00 to 20:00  Patient has had a history of recurrent falls with 6 reported falls since June of 2020   Staff and myself have had prior conversations with the patient's  about the patient meeting criteria for higher level of care to ensure increased suoervision ans safety      Per , goal is to keep the patient at home indefinitely   Patient will be going to [place] for the next 6 months as they typically do annually where  states he has 24/7 supervised care at home for the patient  Continue PT/ OT   Reorientation redirection as needed  Manage chronic conditions  Maintain Falls precautions   Encourage patient to remain active mentally, physically and socially  Participate in cognitively challenging exercises as able
Patient with history of recurrent falls   Currently wheelchair-bound  Enrolled in physical therapy for gait training, balance and strengthening   Multiple discussions in the past and today with patient's  about increasing supervision and requiring a higher level of care  Has only started increased home health aide care from yesterday  as goal is to keep the patient at home per   Will continue to monitor for potential falls given increased home care this time
Patient with positive DEXA scan   Reviewed prior blood work  Patient is start Prolia 60 mg subcu q 6  Monthly   Maintain Falls precautions   Continue vitamin-D supplementation    Avoid calcium for now given patient's chronic constipation
Stable   Continue omeprazole 20 mg daily   Continue anti reflux measures
Stable   No cardiorespiratory distress   Manage vascular risk factors   Continue aspirin 81 mg daily   Continue atorvastatin 40 mg daily   Continue metoprolol 25 mg daily   Recommend adherence to a heart healthy diet
With generalized debility, deconditioning and is wheelchair-bound   Continue physical therapy for gait training, balance and strengthening   Continue occupational therapy for ADLs   Encourage adequate nutrition and considered supplementation of meals with Ensure or boost   Maintain Falls precautions  Recommend increased supervision which  has put in place with additional home health aides in the evening as well
(M6) obeys commands

## 2023-07-13 NOTE — ASSESSMENT & PLAN NOTE
Patient with leg wound secondary to injury to pre-existing almost healed wound  Patient was seen by the wellness center where wound was cleaned  Skin tear macerated, purulent drainage noted with foul-smelling order      Nursing staff had cleaned the wound, applied Aquacel Ag and covered with a border dressing   Periphery of wound erythematous, warm, tender on palpation  Will plan to treat with antibiotics for 1 week   Start clindamycin 300 mg q 8h   Will recommend a probiotic during antibiotic therapy   Hold stool softeners whilst on antibiotic therapy given side effects of clindamycin to likely cause GI upset Topical Clindamycin Counseling: Patient counseled that this medication may cause skin irritation or allergic reactions.  In the event of skin irritation, the patient was advised to reduce the amount of the drug applied or use it less frequently.   The patient verbalized understanding of the proper use and possible adverse effects of clindamycin.  All of the patient's questions and concerns were addressed.

## 2023-11-22 NOTE — PLAN OF CARE
Problem: OCCUPATIONAL THERAPY ADULT  Goal: Performs self-care activities at highest level of function for planned discharge setting  See evaluation for individualized goals  Treatment Interventions: ADL retraining, Functional transfer training, UE strengthening/ROM, Endurance training, Cognitive reorientation, Patient/family training, Equipment evaluation/education, Compensatory technique education, Continued evaluation, Activityengagement, Energy conservation          See flowsheet documentation for full assessment, interventions and recommendations  Outcome: Progressing  Limitation: Decreased ADL status, Decreased UE strength, Decreased Safe judgement during ADL, Decreased cognition, Decreased endurance, Decreased UE ROM, Decreased self-care trans, Decreased high-level ADLs  Prognosis: Fair  Assessment: Pt participated in occupational therapy with focus on activity tolerance, functional transfers/mob, standing  balance and tolerance for pt engagement in functional UB self-care task/oral care, toileting/toilet transfers  Pt presented sitting out of bed to bedside chair for sponge bathing/AM care  Pt pleasant and cooperative with OT requests  Pt reported no need for RW to/from pt bathroom however did require furniture/wall surface to steady balance in stance  Pt reported looking forward to return to home upon d/c from hospital   Pt would benefit from  Out-pt rehab  to continue to address pt deficits with decreased overall strength which currently impair pt ADL and functional mob        Discharge Recommendation: Outpatient OT  OT - OK to Discharge: Yes (to outpt OT/PT @ [address] for neuro specialty when med stable) (M6) obeys commands

## 2024-01-17 NOTE — TELEPHONE ENCOUNTER
L/M for pts  Camron Velazquez to return this call to review medications for refill verification  4 = No assist / stand by assistance

## 2025-06-11 NOTE — PLAN OF CARE
1900: Report received from PELON Avitia.     2000: Assessment completed, pt reports minimal vaginal bleeding.     2345: Dr. Vincent updated with antibiotic orders, received orders for 2 more doses of unasyn.     0500: Report given to PELON Adams.    Problem: PHYSICAL THERAPY ADULT  Goal: Performs mobility at highest level of function for planned discharge setting  See evaluation for individualized goals  Description  Treatment/Interventions: Functional transfer training, LE strengthening/ROM, Therapeutic exercise, Endurance training, Patient/family training, Equipment eval/education, Bed mobility, Gait training, Spoke to nursing, Family, Cognitive reorientation  Equipment Recommended: Stas Gonsalves       See flowsheet documentation for full assessment, interventions and recommendations  Outcome: Progressing  Note:   Prognosis: Fair  Problem List: Decreased strength, Decreased range of motion, Impaired balance, Decreased endurance, Decreased mobility, Decreased coordination, Decreased cognition, Impaired judgement, Decreased safety awareness, Pain  Assessment: Patient demonstrating difficulty keeping eyes open during session, with patient only able to keep eyes open approx 25% of the time, with consistent cues required for task at hand  Patient was maximal assist x1 for supine to sit and supine to sit transfers, standby assist of 2nd  Upon first sitting trial, patient was maximal assist to maintain upright posture with patient consistantly leaning towards right to lay down  Patient only able to sit EOB for approx 5 minutes before requesting to lay back down  Improvements noted with second sitting trial, less assistance required, with slight improvements with posture, however patient requesting to lay back down after 3 minutes  Patient was able to complete some AROM/AAROM exercise however, continuous cues required to keep eyes open and for task at hand  Patient would continue to benefit from physical therapy to improve functional mobility until medically cleared  Recommendation: Other (Comment)(inpt rehab)     PT - OK to Discharge: (S) Yes(to rehab once medically cleared)    See flowsheet documentation for full assessment